# Patient Record
Sex: MALE | Race: BLACK OR AFRICAN AMERICAN | NOT HISPANIC OR LATINO | Employment: OTHER | URBAN - METROPOLITAN AREA
[De-identification: names, ages, dates, MRNs, and addresses within clinical notes are randomized per-mention and may not be internally consistent; named-entity substitution may affect disease eponyms.]

---

## 2017-03-30 RX ORDER — DOXAZOSIN 8 MG/1
8 TABLET ORAL DAILY
Qty: 30 TABLET | Refills: 11 | Status: SHIPPED | OUTPATIENT
Start: 2017-03-30 | End: 2018-02-01 | Stop reason: SDUPTHER

## 2017-04-26 ENCOUNTER — TELEPHONE (OUTPATIENT)
Dept: INTERNAL MEDICINE | Facility: CLINIC | Age: 72
End: 2017-04-26

## 2017-04-26 NOTE — TELEPHONE ENCOUNTER
----- Message from Vicky Oneal sent at 4/26/2017 11:43 AM CDT -----  Mr Johnson stopped in clinic stating someone called him regarding setting up an appointment. Stated his wife gave sugeyner the wrong cell number for him: Correct number to call him is: [phone]. tc

## 2017-04-26 NOTE — TELEPHONE ENCOUNTER
Patient was informed that I do not see anywhere where someone tried to call him.  Asked him if he needed anything from us.  Patient denied needing anything further.

## 2017-09-07 ENCOUNTER — TELEPHONE (OUTPATIENT)
Dept: PULMONOLOGY | Facility: CLINIC | Age: 72
End: 2017-09-07

## 2017-09-07 DIAGNOSIS — R05.9 COUGH: Primary | ICD-10-CM

## 2017-09-08 ENCOUNTER — OFFICE VISIT (OUTPATIENT)
Dept: PULMONOLOGY | Facility: CLINIC | Age: 72
End: 2017-09-08
Payer: MEDICARE

## 2017-09-08 ENCOUNTER — TELEPHONE (OUTPATIENT)
Dept: RADIOLOGY | Facility: HOSPITAL | Age: 72
End: 2017-09-08

## 2017-09-08 ENCOUNTER — HOSPITAL ENCOUNTER (OUTPATIENT)
Dept: RADIOLOGY | Facility: HOSPITAL | Age: 72
Discharge: HOME | End: 2017-09-08
Attending: NURSE PRACTITIONER
Payer: MEDICARE

## 2017-09-08 VITALS
OXYGEN SATURATION: 98 % | RESPIRATION RATE: 20 BRPM | WEIGHT: 183.63 LBS | SYSTOLIC BLOOD PRESSURE: 130 MMHG | HEART RATE: 60 BPM | BODY MASS INDEX: 29.51 KG/M2 | DIASTOLIC BLOOD PRESSURE: 68 MMHG | HEIGHT: 66 IN

## 2017-09-08 DIAGNOSIS — R09.82 POST-NASAL DRIP: ICD-10-CM

## 2017-09-08 DIAGNOSIS — F17.201 TOBACCO USE DISORDER, SEVERE, IN SUSTAINED REMISSION: ICD-10-CM

## 2017-09-08 DIAGNOSIS — R05.3 CHRONIC COUGH: Primary | ICD-10-CM

## 2017-09-08 DIAGNOSIS — J98.11 LINEAR ATELECTASIS: ICD-10-CM

## 2017-09-08 DIAGNOSIS — R05.9 COUGH: ICD-10-CM

## 2017-09-08 PROCEDURE — 71020 XR CHEST PA AND LATERAL: CPT | Mod: TC,PO

## 2017-09-08 PROCEDURE — 3078F DIAST BP <80 MM HG: CPT | Mod: S$GLB,,, | Performed by: NURSE PRACTITIONER

## 2017-09-08 PROCEDURE — 3075F SYST BP GE 130 - 139MM HG: CPT | Mod: S$GLB,,, | Performed by: NURSE PRACTITIONER

## 2017-09-08 PROCEDURE — 99204 OFFICE O/P NEW MOD 45 MIN: CPT | Mod: S$GLB,,, | Performed by: NURSE PRACTITIONER

## 2017-09-08 PROCEDURE — 1159F MED LIST DOCD IN RCRD: CPT | Mod: S$GLB,,, | Performed by: NURSE PRACTITIONER

## 2017-09-08 PROCEDURE — 71020 XR CHEST PA AND LATERAL: CPT | Mod: 26,,, | Performed by: RADIOLOGY

## 2017-09-08 PROCEDURE — 3008F BODY MASS INDEX DOCD: CPT | Mod: S$GLB,,, | Performed by: NURSE PRACTITIONER

## 2017-09-08 PROCEDURE — 99999 PR PBB SHADOW E&M-EST. PATIENT-LVL III: CPT | Mod: PBBFAC,,, | Performed by: NURSE PRACTITIONER

## 2017-09-08 RX ORDER — LORATADINE 10 MG/1
10 TABLET ORAL DAILY
Qty: 30 TABLET | Refills: 0 | Status: SHIPPED | OUTPATIENT
Start: 2017-09-08 | End: 2019-06-24

## 2017-09-08 RX ORDER — FLUTICASONE PROPIONATE 50 MCG
2 SPRAY, SUSPENSION (ML) NASAL DAILY
Qty: 16 G | Refills: 5 | Status: SHIPPED | OUTPATIENT
Start: 2017-09-08 | End: 2017-10-08

## 2017-09-08 NOTE — PROGRESS NOTES
Subjective:      Chief Complaint   Patient presents with    Cough        Alex Milner is a 72 y.o. male presents to pulmonary clinic self referred, his wife made the appointment related to long standing cough off and on over a year ago.  Onset of symptoms was 1 year ago.   Symptoms have been waxing and waning since that time.   He has seen by a Allergist, Marcelo Avelar MD in [place] more than a year ago. Last seen one month.  Patient reports breathing test was done and placed on Aerospan 80mcg steroid inhaler, then Advair and Spiriva   Off of Advair for about one month since he did not obtain relief so stopped.  Patient reports he was never told he had COPD.    The cough is productive of white sputum and is aggravated by  a feeling of lump in throat. .   Associated symptoms include: shortness of breath, sputum production and wheezing.   Patient does not have a history of asthma.   Patient does not have a history of environmental allergens.   Patient has not traveled recently.   Patient does have a history of smoking 2 pack/day smoker began in 1960 at age 15 years quit smoking in 1995, 70 pack/year smoker.     Occupational History:   Employed full time as  delivering parts for plants  Retired  from [employer]. 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 Denies occupational exposure to Asbestos, Silica,  Petrochemicals,  Fiber glass,  Saw Dust,  Grain Dust and  Pesticides.   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     Avocational Exposure:   None currently.     Pet Exposures:  None currently. Denies allergy to Dog and  cat dander.    Review of Systems  A comprehensive review of systems was negative except for: Ears, nose, mouth, throat, and face: positive for sore throat and post nasal drip and lump sensation deep in throat  Respiratory: positive for cough, sputum and wheezing      Objective:      Oxygen saturation 98% on room air  /68 (BP Location: Right arm, Patient  "Position: Sitting)   Pulse 60   Resp 20   Ht 5' 6" (1.676 m)   Wt 83.3 kg (183 lb 10.3 oz)   SpO2 98%   BMI 29.64 kg/m²   General appearance: alert, appears stated age and cooperative  Head: Normocephalic, without obvious abnormality, atraumatic  Eyes: negative  Ears: normal TM's and external ear canals both ears  Nose: Nares normal. Septum midline. Mucosa normal. No drainage or sinus tenderness.  Throat: lips, mucosa, and tongue normal; teeth and gums normal  Neck: no adenopathy, no carotid bruit, no JVD and supple, symmetrical, trachea midline  Lungs: clear to auscultation bilaterally  Heart: regular rate and rhythm, S1, S2 normal, no murmur, click, rub or gallop  Abdomen: normal findings: soft, non-tender  Extremities: extremities normal, atraumatic, no cyanosis or edema  Pulses: 2+ and symmetric  Skin: Skin color, texture, turgor normal. No rashes or lesions  Lymph nodes: Cervical, supraclavicular, and axillary nodes normal.  Neurologic: Grossly normal      Chest xray 9/8/2017  Focal infiltrates noted within the medial aspect of the LEFT base partially silhouetting heart border.    This appears more pronounced than noted on prior exam of 2010 and may represent a developing lingular infiltrate and/or partial atelectasis.    Additional subsegmental atelectasis and/or scarring noted within both bases.    Calcified nodule again noted along the inferior medial margin LEFT hilum.  Lungs otherwise clear and trachea midline.    Heart size within normal limits.  Mild osteopenia and spondylosis.  IMPRESSION:   Equivocal lingular infiltrates as suggesting a scarring and/or subsegmental atelectasis with superimposed areas of focal infiltrate or developing airspace disease unable to be completely excluded.    Followup after appropriate course of medical therapy suggested.  Remainder the exam is otherwise stable in appearance.  See above.        Assessment:          1. Chronic cough  CT Chest With Contrast    Complete " PFT with bronchodilator    Creatinine, serum   2. Tobacco use disorder, severe, in sustained remission  CT Chest With Contrast   3. Post-nasal drip  loratadine (CLARITIN) 10 mg tablet    fluticasone (FLONASE) 50 mcg/actuation nasal spray    sodium bicarb-sodium chloride Pack   4. Linear atelectasis  Incentive spirometry       Plan:     Problem List Items Addressed This Visit     None      Visit Diagnoses     Chronic cough    -  Primary    Relevant Orders    CT Chest With Contrast    Complete PFT with bronchodilator    Creatinine, serum    Tobacco use disorder, severe, in sustained remission        Relevant Orders    CT Chest With Contrast    Post-nasal drip        Relevant Medications    loratadine (CLARITIN) 10 mg tablet    fluticasone (FLONASE) 50 mcg/actuation nasal spray    sodium bicarb-sodium chloride Pack    Linear atelectasis        Relevant Orders    Incentive spirometry           Return in about 3 weeks (around 9/26/2017) for Cough w/review cpft/ct chest.

## 2017-09-11 ENCOUNTER — HOSPITAL ENCOUNTER (OUTPATIENT)
Dept: RADIOLOGY | Facility: HOSPITAL | Age: 72
Discharge: HOME | End: 2017-09-11
Attending: NURSE PRACTITIONER
Payer: MEDICARE

## 2017-09-11 DIAGNOSIS — F17.201 TOBACCO USE DISORDER, SEVERE, IN SUSTAINED REMISSION: ICD-10-CM

## 2017-09-11 DIAGNOSIS — R05.3 CHRONIC COUGH: ICD-10-CM

## 2017-09-11 PROCEDURE — 71260 CT THORAX DX C+: CPT | Mod: 26,,, | Performed by: RADIOLOGY

## 2017-09-11 PROCEDURE — 25500020 PHARM REV CODE 255: Mod: PO | Performed by: NURSE PRACTITIONER

## 2017-09-11 PROCEDURE — 71260 CT THORAX DX C+: CPT | Mod: TC,PO

## 2017-09-11 RX ADMIN — IOHEXOL 75 ML: 350 INJECTION, SOLUTION INTRAVENOUS at 03:09

## 2017-09-12 DIAGNOSIS — R91.1 PULMONARY NODULE, RIGHT: ICD-10-CM

## 2017-09-12 DIAGNOSIS — R91.1 PULMONARY NODULE, LEFT: Primary | ICD-10-CM

## 2017-09-12 NOTE — PROGRESS NOTES
No acute infiltrates, effusions or pneumonia. No indication for antibiotic therapy at this time. 7 mm noncalcified nodule in right and left lungs.   Needs repeat CT in 6 months.   Order placed for repeat CT of chest and creatinine level in 6 months.    Risk: Patient does have a history of smoking 2 pack/day smoker began in 1960 at age 15 years quit smoking in 1995, 70 pack/year smoker.

## 2017-09-21 ENCOUNTER — PROCEDURE VISIT (OUTPATIENT)
Dept: PULMONOLOGY | Facility: CLINIC | Age: 72
End: 2017-09-21
Payer: MEDICARE

## 2017-09-21 DIAGNOSIS — R05.3 CHRONIC COUGH: ICD-10-CM

## 2017-09-21 LAB
POST FEF 25 75: 1.18 L/S (ref 1.09–2.63)
POST FET 100: 9.69 S
POST FEV1 FVC: 73 %
POST FEV1: 1.8 L (ref 1.87–2.58)
POST FIF 50: 3.06 L/S
POST FVC: 2.48 L (ref 2.63–3.44)
POST PEF: 5.96 L/S (ref 5.5–7.79)
PRE DLCO: 17.14 ML/MMHG/MIN (ref 14.54–22.83)
PRE ERV: 0.27 L
PRE FEF 25 75: 1.07 L/S (ref 1.09–2.63)
PRE FET 100: 8.61 S
PRE FEV1 FVC: 73 %
PRE FEV1: 1.54 L (ref 1.87–2.58)
PRE FIF 50: 2.06 L/S
PRE FRC PL: 2.62 L (ref 2.26–3.47)
PRE FVC: 2.11 L (ref 2.63–3.44)
PRE KROGHS K: 4.57 1/MIN
PRE PEF: 6.79 L/S (ref 5.5–7.79)
PRE RV: 2.27 L (ref 1.87–2.66)
PRE SVC: 2.11 L
PRE TLC: 4.38 L (ref 4.76–5.75)
PREDICTED DLCO: 18.68 ML/MMHG/MIN (ref 14.54–22.83)
PREDICTED FEV1 FVC: 75.71 % (ref 70.5–80.92)
PREDICTED FEV1: 2.22 L (ref 1.87–2.58)
PREDICTED FRC N2: 2.87 L (ref 2.26–3.47)
PREDICTED FRC PL: 2.87 L (ref 2.26–3.47)
PREDICTED FVC: 3.03 L (ref 2.63–3.44)
PREDICTED RV: 2.27 L (ref 1.87–2.66)
PREDICTED SVC: 2.94 L
PREDICTED TLC: 5.26 L (ref 4.76–5.75)
PROVOCATION PROTOCOL: ABNORMAL

## 2017-09-21 PROCEDURE — 94726 PLETHYSMOGRAPHY LUNG VOLUMES: CPT | Mod: S$GLB,,, | Performed by: INTERNAL MEDICINE

## 2017-09-21 PROCEDURE — 94060 EVALUATION OF WHEEZING: CPT | Mod: S$GLB,,, | Performed by: INTERNAL MEDICINE

## 2017-09-21 PROCEDURE — 94729 DIFFUSING CAPACITY: CPT | Mod: S$GLB,,, | Performed by: INTERNAL MEDICINE

## 2017-09-26 ENCOUNTER — OFFICE VISIT (OUTPATIENT)
Dept: PULMONOLOGY | Facility: CLINIC | Age: 72
End: 2017-09-26
Payer: MEDICARE

## 2017-09-26 VITALS
BODY MASS INDEX: 29.58 KG/M2 | HEART RATE: 60 BPM | HEIGHT: 66 IN | RESPIRATION RATE: 20 BRPM | DIASTOLIC BLOOD PRESSURE: 72 MMHG | WEIGHT: 184.06 LBS | OXYGEN SATURATION: 98 % | SYSTOLIC BLOOD PRESSURE: 132 MMHG

## 2017-09-26 DIAGNOSIS — J45.40 MODERATE PERSISTENT ASTHMA WITHOUT COMPLICATION: Primary | ICD-10-CM

## 2017-09-26 DIAGNOSIS — R91.8 PULMONARY NODULES: ICD-10-CM

## 2017-09-26 PROCEDURE — 3008F BODY MASS INDEX DOCD: CPT | Mod: S$GLB,,, | Performed by: NURSE PRACTITIONER

## 2017-09-26 PROCEDURE — 99499 UNLISTED E&M SERVICE: CPT | Mod: S$GLB,,, | Performed by: NURSE PRACTITIONER

## 2017-09-26 PROCEDURE — 99214 OFFICE O/P EST MOD 30 MIN: CPT | Mod: S$GLB,,, | Performed by: NURSE PRACTITIONER

## 2017-09-26 PROCEDURE — 3075F SYST BP GE 130 - 139MM HG: CPT | Mod: S$GLB,,, | Performed by: NURSE PRACTITIONER

## 2017-09-26 PROCEDURE — 1159F MED LIST DOCD IN RCRD: CPT | Mod: S$GLB,,, | Performed by: NURSE PRACTITIONER

## 2017-09-26 PROCEDURE — 3078F DIAST BP <80 MM HG: CPT | Mod: S$GLB,,, | Performed by: NURSE PRACTITIONER

## 2017-09-26 PROCEDURE — 99999 PR PBB SHADOW E&M-EST. PATIENT-LVL III: CPT | Mod: PBBFAC,,, | Performed by: NURSE PRACTITIONER

## 2017-09-26 RX ORDER — ALBUTEROL SULFATE 90 UG/1
2 AEROSOL, METERED RESPIRATORY (INHALATION) EVERY 6 HOURS PRN
Qty: 18 G | Refills: 11 | Status: SHIPPED | OUTPATIENT
Start: 2017-09-26 | End: 2019-06-24

## 2017-09-26 NOTE — Clinical Note
Please schedule CT of chest order placed on 9/12, due in 6 months. Needs asthma follow up with jesus in  late December

## 2017-09-26 NOTE — ASSESSMENT & PLAN NOTE
Bilateral pul nodules 7 mm seen on CT chest 9/11/2017  Repeat CT chest in 6 months.  Order placed on 9/12/2017 for repeat CT of chest and creatinine level in 6 months.     Risk: Patient does have a history of smoking 2 pack/day smoker began in 1960 at age 15 years quit smoking in 1995, 70 pack/year smoker.

## 2017-09-26 NOTE — PROGRESS NOTES
Subjective:      Chief Complaint   Patient presents with    Cough        Alex Milner is a 72 y.o. male presents to pulmonary clinic for follow up visit related to long standing cough off and on over a year ago with review of CT scan of chest and cpft.  Onset of symptoms was 1 year ago.   Symptoms have been waxing and waning since that time.   He has seen by a Allergist, Marcelo Avelar MD in [place] more than a year ago. Last seen one month.  Patient reports breathing test was done and placed on Aerospan 80mcg steroid inhaler, then Advair and Spiriva   Off of Advair for about one month since he did not obtain relief so stopped.  Patient reports he was never told he had COPD.    The cough is productive of white sputum and is aggravated by  a feeling of lump in throat. .   Associated symptoms include: shortness of breath, sputum production and wheezing.   Patient does not have a history of asthma.   Patient does not have a history of environmental allergens.   Patient has not traveled recently.   Patient does have a history of smoking 2 pack/day smoker began in 1960 at age 15 years quit smoking in 1995, 70 pack/year smoker.     Immunizations reviewed, declined influenza vaccination.    Occupational History:   Employed full time as  delivering parts for plants  Retired  from [employer]. 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 Denies occupational exposure to Asbestos, Silica,  Petrochemicals,  Fiber glass,  Saw Dust,  Grain Dust and  Pesticides.   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     Avocational Exposure:   None currently.     Pet Exposures:  None currently. Denies allergy to Dog and  cat dander.    Review of Systems  A comprehensive review of systems was negative except for: Ears, nose, mouth, throat, and face: positive for sore throat and post nasal drip and lump sensation deep in throat  Respiratory: positive for cough, sputum and wheezing      Objective:       "Oxygen saturation 98% on room air  /72 (BP Location: Right arm, Patient Position: Sitting)   Pulse 60   Resp 20   Ht 5' 6" (1.676 m)   Wt 83.5 kg (184 lb 1.4 oz)   SpO2 98%   BMI 29.71 kg/m²   General appearance: alert, appears stated age and cooperative  Head: Normocephalic, without obvious abnormality, atraumatic  Eyes: negative  Ears: normal TM's and external ear canals both ears  Nose: Nares normal. Septum midline. Mucosa normal. No drainage or sinus tenderness.  Throat: lips, mucosa, and tongue normal; teeth and gums normal  Neck: no adenopathy, no carotid bruit, no JVD and supple, symmetrical, trachea midline  Lungs: clear to auscultation bilaterally  Heart: regular rate and rhythm, S1, S2 normal, no murmur, click, rub or gallop  Abdomen: normal findings: soft, non-tender  Extremities: extremities normal, atraumatic, no cyanosis or edema  Pulses: 2+ and symmetric  Skin: Skin color, texture, turgor normal. No rashes or lesions  Lymph nodes: Cervical, supraclavicular, and axillary nodes normal.  Neurologic: Grossly normal      CT chest 9/11/2017   7 mm noncalcified pulmonary nodule noted within either lung.  Per Fleischner Society guidelines for nodule >6-8mm; in a low risk patient, consider 6-12 month CT chest follow-up and then at 18-24 month if no change.    In a high risk patient/smoker, consider 3-6 month CT chest follow-up and then at 9-12 months and 24 months if unchanged to exclude neoplasia.  If stable at that time no further follow-up needed.    cpft 9/21/2017    Flow volume loop: Obstructive defect  Mild Obstructive Defect / Physiology  FEV1 is increased by >12% and >200mls, indicating a response to inhaled bronchodilators.  FVC is increased by >12% and >200mls indicating a response to inhaled bronchodilators  Plethysmographic lung volumes are normal.  Normal diffusion capacity (DLCO).    Assessment:          1. Moderate persistent asthma without complication  fluticasone furoate (ARNUITY " ELLIPTA) 100 mcg/actuation DsDv    albuterol (VENTOLIN HFA) 90 mcg/actuation inhaler   2. Pulmonary nodules         Plan:     Problem List Items Addressed This Visit     Moderate persistent asthma without complication - Primary     New diagnosis of adult onset asthma   cpft 9/21/2017 FEV1 69% predicted on pre, 81% predicted on post with 17% increase post bronchodilator.  Normal LV and DLCO.   Begin treatment with Arunity ICS and Albuterol inhaler if needed for wheezing or cough.   Follow up in 3 months with repeat jesus          Relevant Medications    fluticasone furoate (ARNUITY ELLIPTA) 100 mcg/actuation DsDv    albuterol (VENTOLIN HFA) 90 mcg/actuation inhaler    Pulmonary nodules     Bilateral pul nodules 7 mm seen on CT chest 9/11/2017  Repeat CT chest in 6 months.  Order placed on 9/12/2017 for repeat CT of chest and creatinine level in 6 months.     Risk: Patient does have a history of smoking 2 pack/day smoker began in 1960 at age 15 years quit smoking in 1995, 70 pack/year smoker.            Other Visit Diagnoses    None.          Return in about 3 months (around 12/19/2017) for asthma follow up w/review jesus. fu pul nodule w/review ct chest in 6mos..

## 2017-09-26 NOTE — ASSESSMENT & PLAN NOTE
New diagnosis of adult onset asthma   cpft 9/21/2017 FEV1 69% predicted on pre, 81% predicted on post with 17% increase post bronchodilator.  Normal LV and DLCO.   Begin treatment with Arunity ICS and Albuterol inhaler if needed for wheezing or cough.   Follow up in 3 months with repeat jesus

## 2017-10-03 ENCOUNTER — OFFICE VISIT (OUTPATIENT)
Dept: INTERNAL MEDICINE | Facility: CLINIC | Age: 72
End: 2017-10-03
Payer: MEDICARE

## 2017-10-03 VITALS
HEART RATE: 71 BPM | SYSTOLIC BLOOD PRESSURE: 137 MMHG | DIASTOLIC BLOOD PRESSURE: 72 MMHG | BODY MASS INDEX: 29.46 KG/M2 | OXYGEN SATURATION: 98 % | HEIGHT: 66 IN | WEIGHT: 183.31 LBS

## 2017-10-03 DIAGNOSIS — J84.10 CALCIFIED GRANULOMA OF LUNG: ICD-10-CM

## 2017-10-03 DIAGNOSIS — R91.8 PULMONARY NODULES: ICD-10-CM

## 2017-10-03 DIAGNOSIS — I10 ESSENTIAL HYPERTENSION: Chronic | ICD-10-CM

## 2017-10-03 DIAGNOSIS — J45.40 MODERATE PERSISTENT ASTHMA WITHOUT COMPLICATION: ICD-10-CM

## 2017-10-03 DIAGNOSIS — Z00.00 ENCOUNTER FOR PREVENTIVE HEALTH EXAMINATION: Primary | ICD-10-CM

## 2017-10-03 DIAGNOSIS — Z86.010 HISTORY OF COLON POLYPS: ICD-10-CM

## 2017-10-03 DIAGNOSIS — E78.00 PURE HYPERCHOLESTEROLEMIA: Chronic | ICD-10-CM

## 2017-10-03 DIAGNOSIS — R97.20 ELEVATED PSA, BETWEEN 10 AND LESS THAN 20 NG/ML: ICD-10-CM

## 2017-10-03 PROBLEM — Z86.0100 HISTORY OF COLON POLYPS: Status: ACTIVE | Noted: 2017-10-03

## 2017-10-03 PROBLEM — J98.4 CALCIFIED GRANULOMA OF LUNG: Status: ACTIVE | Noted: 2017-10-03

## 2017-10-03 PROCEDURE — G0439 PPPS, SUBSEQ VISIT: HCPCS | Mod: S$GLB,,, | Performed by: NURSE PRACTITIONER

## 2017-10-03 PROCEDURE — 99999 PR PBB SHADOW E&M-EST. PATIENT-LVL IV: CPT | Mod: PBBFAC,,, | Performed by: NURSE PRACTITIONER

## 2017-10-03 PROCEDURE — 99499 UNLISTED E&M SERVICE: CPT | Mod: S$GLB,,, | Performed by: NURSE PRACTITIONER

## 2017-10-03 NOTE — PATIENT INSTRUCTIONS
Counseling and Referral of Other Preventative  (Italic type indicates deductible and co-insurance are waived)    Patient Name: Alex Milner  Today's Date: 10/3/2017      SERVICE LIMITATIONS RECOMMENDATION    Vaccines    · Pneumococcal (once after 65)    · Influenza (annually)    · Hepatitis B (if medium/high risk)    · Prevnar 13      Hepatitis B medium/high risk factors:       - End-stage renal disease       - Hemophiliacs who received Factor VII or         IX concentrates       - Clients of institutions for the mentally             retarded       - Persons who live in the same house as          a HepB carrier       - Homosexual men       - Illicit injectable drug abusers     Pneumococcal: Scheduled - see appointments     Influenza: Scheduled - see appointments     Hepatitis B: N/A     Prevnar 13: Done, no repeat necessary    Prostate cancer screening (annually to age 75)     Prostate specific antigen (PSA) Shared decision making with Provider. Sometimes a co-pay may be required if the patient decides to have this test. The USPSTF no longer recommends prostate cancer screening routinely in medicine:  Due     Colorectal cancer screening (to age 75)    · Fecal occult blood test (annual)  · Flexible sigmoidoscopy (5y)  · Screening colonoscopy (10y)  · Barium enema   up to  date    Diabetes self-management training (no USPSTF recommendations)  Requires referral by treating physician for patient with diabetes or renal disease. 10 hours of initial DSMT sessions of no less than 30 minutes each in a continuous 12-month period. 2 hours of follow-up DSMT in subsequent years.  N/A    Glaucoma screening (no USPSTF recommendation)  Diabetes mellitus, family history   , age 50 or over    American, age 65 or over  Recommended to patient, declined    Medical nutrition therapy for diabetes or renal disease (no recommended schedule)  Requires referral by treating physician for patient with diabetes or  renal disease or kidney transplant within the past 3 years.  Can be provided in same year as diabetes self-management training (DSMT), and CMS recommends medical nutrition therapy take place after DSMT. Up to 3 hours for initial year and 2 hours in subsequent years.  N/A    Cardiovascular screening blood tests (every 5 years)  · Fasting lipid panel  Order as a panel if possible  due     Diabetes screening tests (at least every 3 years, Medicare covers annually or at 6-month intervals for prediabetic patients)  · Fasting blood sugar (FBS) or glucose tolerance test (GTT)  Patient must be diagnosed with one of the following:       - Hypertension       - Dyslipidemia       - Obesity (BMI 30kg/m2)       - Previous elevated impaired FBS or GTT       ... or any two of the following:       - Overweight (BMI 25 but <30)       - Family history of diabetes       - Age 65 or older       - History of gestational diabetes or birth of baby weighing more than 9 pounds  Done this year, repeat every year    Abdominal aortic aneurysm screening (once)  · Sonogram   Limited to patients who meet one of the following criteria:       - Men who are 65-75 years old and have smoked more than 100 cigarette in their lifetime       - Anyone with a family history of abdominal aortic aneurysm       - Anyone recommended for screening by the USPSTF  Done, no repeat necessary    HIV screening (annually for increased risk patients)  · HIV-1 and HIV-2 by EIA, or VENU, rapid antibody test or oral mucosa transudate  Patients must be at increased risk for HIV infection per USPSTF guidelines or pregnant. Tests covered annually for patient at increased risk or as requested by the patient. Pregnant patients may receive up to 3 tests during pregnancy.  Risks discussed, screening is not recommended    Smoking cessation counseling (up to 8 sessions per year)  Patients must be asymptomatic of tobacco-related conditions to receive as a preventative service.   Non-smoker    Subsequent annual wellness visit  At least 12 months since last AWV  Return in one year     The following information is provided to all patients.  This information is to help you find resources for any of the problems found today that may be affecting your health:                Living healthy guide: www.[facility]      Understanding Diabetes: www.diabetes.org      Eating healthy: www.cdc.gov/healthyweight      CDC home safety checklist: www.cdc.gov/steadi/patient.html      Agency on Aging: www.goea.[place].[facility]      Alcoholics anonymous (AA): www.aa.org      Physical Activity: www.raya.nih.gov/sc9txci      Tobacco use: www.quitwithusla.org

## 2017-10-03 NOTE — Clinical Note
Your patient was seen today for a HRA visit. Abnormalities have been identified during this visit that may require additional testing and follow up. I have included a copy of my visit note, please review the note and feel free to contact me with any questions.  Thank you for allowing me to participate in the care of your patients.  Saba Milner NP

## 2017-10-03 NOTE — PROGRESS NOTES
"Alex Milner presented for a  Medicare AWV and comprehensive Health Risk Assessment today. The following components were reviewed and updated:    · Medical history  · Family History  · Social history  · Allergies and Current Medications  · Health Risk Assessment  · Health Maintenance  · Care Team     ** See Completed Assessments for Annual Wellness Visit within the encounter summary.**       The following assessments were completed:  · Living Situation  · CAGE  · Depression Screening  · Timed Get Up and Go  · Whisper Test  · Cognitive Function Screening  · Nutrition Screening  · ADL Screening  · PAQ Screening    Vitals:    10/03/17 1424   BP: 137/72   BP Location: Left arm   Patient Position: Sitting   BP Method: Large (Manual)   Pulse: 71   SpO2: 98%   Weight: 83.2 kg (183 lb 5 oz)   Height: 5' 6" (1.676 m)     Body mass index is 29.59 kg/m².  Physical Exam   Constitutional: He appears well-developed. No distress.   HENT:   Head: Normocephalic and atraumatic.   Eyes: Pupils are equal, round, and reactive to light.   Neck: Carotid bruit is not present.   Cardiovascular: Normal rate, regular rhythm, normal heart sounds, intact distal pulses and normal pulses.  Exam reveals no gallop.    No murmur heard.  Pulmonary/Chest: Effort normal and breath sounds normal.   Abdominal: Soft. Bowel sounds are normal. He exhibits no distension. There is no tenderness.   Musculoskeletal: Normal range of motion. He exhibits no edema.   Neurological: He exhibits normal muscle tone. Gait normal.   Skin: Skin is warm, dry and intact.   Psychiatric: He has a normal mood and affect. His speech is normal and behavior is normal. Judgment and thought content normal. Cognition and memory are normal.   Nursing note and vitals reviewed.        Diagnoses and health risks identified today and associated recommendations/orders:    1. Encounter for preventive health examination    2. Elevated PSA, between 10 and less than 20 ng/m  Component     "  Latest Ref Rng & Units 7/13/2016   PSA, SCREEN      0.00 - 4.00 ng/mL 20.5 (H)   This problem is currently not controlled.  Pt  Has not follow up for a repeat since 2016.. Taking Cardura daily Pt  referred back to urologist on 10/ 2017 for evaluation .    3. Essential hypertension  Stable and controlled on Lisinopril daily . Continue current treatment plan as previously prescribed with your PCP.     4. Moderate persistent asthma without complication  Stable and controlled on Arnuity daily and Albuterol as needed . Scheduled for a pulmonary follow in 3/ 2018. Continue current treatment plan as previously prescribed with your pulmonologist     5. Pulmonary nodules  9/11/ 2017 CT scan There is a noncalcified pulmonary nodule noted within the right middle lobe that abuts the minor fissure and measures and best seen on series 3 image 32 and series 601 image 60 and series 602 image 42.  There is also a 2nd pulmonary nodule that is best appreciated on series 602 image 106 and series 601 image 120 within the left upper lobe adjacent to the major fissure that also measures approximate 7 mm.  Stable and controlled. Scheduled for a repeat CT scan in 6 months. Followed by PCP     6. Pure hypercholesterolemia  Component      Latest Ref Rng & Units 11/15/2016   Cholesterol      120 - 199 mg/dL 255 (H)   Triglycerides      30 - 150 mg/dL 109   HDL      40 - 75 mg/dL 63   LDL Cholesterol      63.0 - 159.0 mg/dL 170.2 (H)   HDL/Chol Ratio      20.0 - 50.0 % 24.7   Total Cholesterol/HDL Ratio      2.0 - 5.0 4.0   Non-HDL Cholesterol      mg/dL 192   Chronic and ongoing. Taking Lipitor daily. Due for repeat  Labs. Please follow up with your PCP as planned to discuss adjustments to your treatment plan.    7. Calcified granuloma of lung   CT chest 9/11/ 2017 Calcified left hilar and subcarinal granulomas are noted  ;Calcified granuloma noted within the posterior segment of the right hepatic lobe.  This is a new problem that has been  identified during today's visit. Please follow up with your PCP to discuss the next steps.    8. History of colon polyps  11/15/ 2013- colonoscopy  Due for a repeat colonoscopy in 5 years for surveillance.   Stable and controlled. Followed by PCP and gastroenterologist    Pt schedule for annual exam on 10/27/ 2017 to discuss health maintenance  Pt scheduled on to check PSA on 11/22/2017    Provided Alex with a 5-10 year written screening schedule and personal prevention plan. Recommendations were developed using the USPSTF age appropriate recommendations. Education, counseling, and referrals were provided as needed. After Visit Summary printed and given to patient which includes a list of additional screenings\tests needed.    Return in about 1 year (around 10/3/2018).    Saba Milner NP

## 2017-10-25 ENCOUNTER — TELEPHONE (OUTPATIENT)
Dept: PULMONOLOGY | Facility: CLINIC | Age: 72
End: 2017-10-25

## 2017-10-25 DIAGNOSIS — J45.40 MODERATE PERSISTENT ASTHMA WITHOUT COMPLICATION: Primary | ICD-10-CM

## 2017-10-25 RX ORDER — FLUTICASONE PROPIONATE AND SALMETEROL 100; 50 UG/1; UG/1
1 POWDER RESPIRATORY (INHALATION) 2 TIMES DAILY
Qty: 60 EACH | Refills: 11 | Status: SHIPPED | OUTPATIENT
Start: 2017-10-25 | End: 2018-01-02 | Stop reason: ALTCHOICE

## 2017-10-25 NOTE — TELEPHONE ENCOUNTER
Please advise patient of change in med and dosing to one puff twice a day on Advair approved by his insurance for treatment of Asthma.

## 2017-10-26 ENCOUNTER — TELEPHONE (OUTPATIENT)
Dept: PULMONOLOGY | Facility: CLINIC | Age: 72
End: 2017-10-26

## 2017-10-26 NOTE — TELEPHONE ENCOUNTER
Attempted to contact and notify patient of change in Advair 1 puuf twice daily, message left per voicemail

## 2017-10-27 ENCOUNTER — LAB VISIT (OUTPATIENT)
Dept: LAB | Facility: HOSPITAL | Age: 72
End: 2017-10-27
Payer: MEDICARE

## 2017-10-27 ENCOUNTER — OFFICE VISIT (OUTPATIENT)
Dept: INTERNAL MEDICINE | Facility: CLINIC | Age: 72
End: 2017-10-27
Payer: MEDICARE

## 2017-10-27 VITALS
WEIGHT: 182.56 LBS | SYSTOLIC BLOOD PRESSURE: 120 MMHG | TEMPERATURE: 99 F | BODY MASS INDEX: 29.34 KG/M2 | HEIGHT: 66 IN | HEART RATE: 74 BPM | DIASTOLIC BLOOD PRESSURE: 70 MMHG

## 2017-10-27 DIAGNOSIS — E78.00 PURE HYPERCHOLESTEROLEMIA: Chronic | ICD-10-CM

## 2017-10-27 DIAGNOSIS — Z12.5 ENCOUNTER FOR SCREENING FOR MALIGNANT NEOPLASM OF PROSTATE: ICD-10-CM

## 2017-10-27 DIAGNOSIS — R97.20 ELEVATED PSA, BETWEEN 10 AND LESS THAN 20 NG/ML: ICD-10-CM

## 2017-10-27 DIAGNOSIS — J84.10 CALCIFIED GRANULOMA OF LUNG: ICD-10-CM

## 2017-10-27 DIAGNOSIS — I10 ESSENTIAL HYPERTENSION: Chronic | ICD-10-CM

## 2017-10-27 DIAGNOSIS — Z00.00 ROUTINE GENERAL MEDICAL EXAMINATION AT HEALTH CARE FACILITY: Primary | ICD-10-CM

## 2017-10-27 DIAGNOSIS — J45.40 MODERATE PERSISTENT ASTHMA WITHOUT COMPLICATION: ICD-10-CM

## 2017-10-27 PROBLEM — R91.8 PULMONARY NODULES: Status: RESOLVED | Noted: 2017-09-26 | Resolved: 2017-10-27

## 2017-10-27 LAB
ALBUMIN SERPL BCP-MCNC: 3.7 G/DL
ALP SERPL-CCNC: 83 U/L
ALT SERPL W/O P-5'-P-CCNC: 14 U/L
ANION GAP SERPL CALC-SCNC: 9 MMOL/L
AST SERPL-CCNC: 21 U/L
BASOPHILS # BLD AUTO: 0.07 K/UL
BASOPHILS NFR BLD: 1.3 %
BILIRUB SERPL-MCNC: 0.4 MG/DL
BUN SERPL-MCNC: 12 MG/DL
CALCIUM SERPL-MCNC: 9.5 MG/DL
CHLORIDE SERPL-SCNC: 106 MMOL/L
CHOLEST SERPL-MCNC: 253 MG/DL
CHOLEST/HDLC SERPL: 3.2 {RATIO}
CO2 SERPL-SCNC: 27 MMOL/L
COMPLEXED PSA SERPL-MCNC: 31.3 NG/ML
CREAT SERPL-MCNC: 1.3 MG/DL
DIFFERENTIAL METHOD: ABNORMAL
EOSINOPHIL # BLD AUTO: 0.3 K/UL
EOSINOPHIL NFR BLD: 4.9 %
ERYTHROCYTE [DISTWIDTH] IN BLOOD BY AUTOMATED COUNT: 14.6 %
EST. GFR  (AFRICAN AMERICAN): >60 ML/MIN/1.73 M^2
EST. GFR  (NON AFRICAN AMERICAN): 53.8 ML/MIN/1.73 M^2
GLUCOSE SERPL-MCNC: 99 MG/DL
HCT VFR BLD AUTO: 46.1 %
HDLC SERPL-MCNC: 79 MG/DL
HDLC SERPL: 31.2 %
HGB BLD-MCNC: 14.5 G/DL
IMM GRANULOCYTES # BLD AUTO: 0.01 K/UL
IMM GRANULOCYTES NFR BLD AUTO: 0.2 %
LDLC SERPL CALC-MCNC: 157 MG/DL
LYMPHOCYTES # BLD AUTO: 1.6 K/UL
LYMPHOCYTES NFR BLD: 30.8 %
MCH RBC QN AUTO: 26.6 PG
MCHC RBC AUTO-ENTMCNC: 31.5 G/DL
MCV RBC AUTO: 84 FL
MONOCYTES # BLD AUTO: 0.5 K/UL
MONOCYTES NFR BLD: 9.9 %
NEUTROPHILS # BLD AUTO: 2.8 K/UL
NEUTROPHILS NFR BLD: 52.9 %
NONHDLC SERPL-MCNC: 174 MG/DL
NRBC BLD-RTO: 0 /100 WBC
PLATELET # BLD AUTO: 262 K/UL
PMV BLD AUTO: 10.2 FL
POTASSIUM SERPL-SCNC: 4.8 MMOL/L
PROT SERPL-MCNC: 7.2 G/DL
RBC # BLD AUTO: 5.46 M/UL
SODIUM SERPL-SCNC: 142 MMOL/L
TRIGL SERPL-MCNC: 85 MG/DL
WBC # BLD AUTO: 5.26 K/UL

## 2017-10-27 PROCEDURE — G0009 ADMIN PNEUMOCOCCAL VACCINE: HCPCS | Mod: S$GLB,,, | Performed by: INTERNAL MEDICINE

## 2017-10-27 PROCEDURE — 99999 PR PBB SHADOW E&M-EST. PATIENT-LVL III: CPT | Mod: PBBFAC,,, | Performed by: INTERNAL MEDICINE

## 2017-10-27 PROCEDURE — 36415 COLL VENOUS BLD VENIPUNCTURE: CPT | Mod: PO

## 2017-10-27 PROCEDURE — 90662 IIV NO PRSV INCREASED AG IM: CPT | Mod: S$GLB,,, | Performed by: INTERNAL MEDICINE

## 2017-10-27 PROCEDURE — 85025 COMPLETE CBC W/AUTO DIFF WBC: CPT

## 2017-10-27 PROCEDURE — 99397 PER PM REEVAL EST PAT 65+ YR: CPT | Mod: 25,S$GLB,, | Performed by: INTERNAL MEDICINE

## 2017-10-27 PROCEDURE — 84153 ASSAY OF PSA TOTAL: CPT

## 2017-10-27 PROCEDURE — 80061 LIPID PANEL: CPT

## 2017-10-27 PROCEDURE — G0008 ADMIN INFLUENZA VIRUS VAC: HCPCS | Mod: S$GLB,,, | Performed by: INTERNAL MEDICINE

## 2017-10-27 PROCEDURE — 80053 COMPREHEN METABOLIC PANEL: CPT

## 2017-10-27 PROCEDURE — 99499 UNLISTED E&M SERVICE: CPT | Mod: S$GLB,,, | Performed by: INTERNAL MEDICINE

## 2017-10-27 PROCEDURE — 90732 PPSV23 VACC 2 YRS+ SUBQ/IM: CPT | Mod: S$GLB,,, | Performed by: INTERNAL MEDICINE

## 2017-10-27 NOTE — ASSESSMENT & PLAN NOTE
Stable over time.  Occasional wheezing.  Managed well with current regimen. Follow up with pulmonary as planned.

## 2017-10-27 NOTE — PROGRESS NOTES
"Subjective:       Patient ID: Alex Milner is a 72 y.o. male.    Chief Complaint: Annual Exam    HPI  Patient is a 72-year-old male presented for updated physical exam review chronic health issues.  He indicates he has been doing well.  Patient has a history of hypertension, hyperlipidemia, elevated PSA, calcified granulomas of the long, moderate persistent asthma.  Overall he states he's been doing well.  His breathing is stable at this time is chronic health conditions are under control.  He has had no hospitalizations or emergency room visits.  He is due for flu shot and Pneumovax.  He has a pending appointment with Dr. Ramírez to follow-up on his prostate issues.  He will be having a follow CT of the chest further granuloma in March.    Review of Systems   Constitutional: Negative for fever and unexpected weight change.   HENT: Negative for hearing loss, postnasal drip and rhinorrhea.    Eyes: Negative for pain and visual disturbance.   Respiratory: Positive for shortness of breath (occasional mild). Negative for cough and wheezing.    Cardiovascular: Negative for chest pain and palpitations.   Gastrointestinal: Negative for constipation, diarrhea, nausea and vomiting.   Genitourinary: Negative for dysuria and hematuria.   Musculoskeletal: Negative for arthralgias, back pain, myalgias and neck stiffness.   Skin: Negative for pallor and rash.   Neurological: Negative for seizures, syncope and headaches.   Hematological: Negative for adenopathy.   Psychiatric/Behavioral: Negative for dysphoric mood. The patient is not nervous/anxious.        Objective:   /70   Pulse 74   Temp 98.5 °F (36.9 °C) (Tympanic)   Ht 5' 6" (1.676 m)   Wt 82.8 kg (182 lb 8.7 oz)   BMI 29.46 kg/m²      Physical Exam   Constitutional: He is oriented to person, place, and time. He appears well-developed and well-nourished. No distress.   HENT:   Head: Normocephalic and atraumatic.   Mouth/Throat: Oropharynx is clear and " moist.   Eyes: EOM are normal. Pupils are equal, round, and reactive to light.   Neck: Normal range of motion. Neck supple. No JVD present. No thyromegaly present.   Cardiovascular: Normal rate, regular rhythm, normal heart sounds and intact distal pulses.  Exam reveals no gallop and no friction rub.    No murmur heard.  Pulmonary/Chest: Effort normal and breath sounds normal. He has no wheezes. He has no rales.   Abdominal: Soft. Bowel sounds are normal. He exhibits no distension. There is no tenderness. There is no rebound and no guarding.   Musculoskeletal: Normal range of motion. He exhibits no edema.   Lymphadenopathy:     He has no cervical adenopathy.   Neurological: He is alert and oriented to person, place, and time. He has normal reflexes. No cranial nerve deficit.   Skin: Skin is warm and dry. No rash noted.   Psychiatric: He has a normal mood and affect. Judgment normal.   Vitals reviewed.      No visits with results within 2 Week(s) from this visit.   Latest known visit with results is:   Orders Only on 09/21/2017   Component Date Value    Provocation Protocol 09/21/2017 ---     Pre FVC 09/21/2017 2.11*    Pre FEV1 09/21/2017 1.54*    Pre FEF 25 75 09/21/2017 1.07*    Pre PEF 09/21/2017 6.79     Pre  09/21/2017 8.61     Pre FIF 50 09/21/2017 2.06     Pre SVC 09/21/2017 2.11     Pre TLC 09/21/2017 4.38*    Pre RV 09/21/2017 2.27     Pre FRC PL 09/21/2017 2.62     Pre ERV 09/21/2017 0.27     Pre DLCO 09/21/2017 17.14     Pre Kroghs K 09/21/2017 4.57     Pre FEV1 FVC 09/21/2017 73     Post FVC 09/21/2017 2.48*    Post FEV1 09/21/2017 1.8*    Post FEF 25 75 09/21/2017 1.18     Post PEF 09/21/2017 5.96     Post  09/21/2017 9.69     Post FIF 50 09/21/2017 3.06     Post FEV1 FVC 09/21/2017 73     Predicted FEV1 FVC 09/21/2017 75.71     Predicted DLCO 09/21/2017 18.68     Predicted FEV1 09/21/2017 2.22     Predicted FRC PL 09/21/2017 2.87     Predicted FRC N2 09/21/2017  2.87     Predicted RV 09/21/2017 2.27     Predicted TLC 09/21/2017 5.26     Predicted FVC 09/21/2017 3.03     Predicted SVC 09/21/2017 2.94        Assessment:       1. Routine general medical examination at health care facility    2. Essential hypertension    3. Pure hypercholesterolemia    4. Elevated PSA, between 10 and less than 20 ng/ml    5. Calcified granuloma of lung    6. Moderate persistent asthma without complication    7. Encounter for screening for malignant neoplasm of prostate        Plan:   Essential hypertension  Blood pressure is under good control.  We will continue the current regimen.  Will work on regular aerobic exercise and a low salt diet.        Pure hypercholesterolemia  Cholesterol stable over time. Update labs today.  No changes*    Moderate persistent asthma without complication  Stable over time.  Occasional wheezing.  Managed well with current regimen. Follow up with pulmonary as planned.    Calcified granuloma of lung  Lesions have been stable.  Repeat CT scheduled for March 2018    Elevated PSA, between 10 and less than 20 ng/ml  Update labs today.  Will follow up with Urology as planned.    Alex was seen today for annual exam.    Diagnoses and all orders for this visit:    Routine general medical examination at health care facility  Comments:  Focus on good health habits, low fat diet, regular exercise, seatbelt use  Orders:  -     (In Office Administered) Pneumococcal Polysaccharide Vaccine (23 Valent) (SQ/IM)    Essential hypertension  -     CBC auto differential; Future  -     Comprehensive metabolic panel; Future    Pure hypercholesterolemia  -     Lipid panel; Future    Elevated PSA, between 10 and less than 20 ng/ml  -     PSA, Screening; Future    Calcified granuloma of lung    Moderate persistent asthma without complication    Encounter for screening for malignant neoplasm of prostate  -     PSA, Screening; Future    Other orders  -     Influenza - High Dose (65+)  (PF) (IM)        Return in about 1 year (around 10/27/2018).

## 2018-01-02 ENCOUNTER — OFFICE VISIT (OUTPATIENT)
Dept: PULMONOLOGY | Facility: CLINIC | Age: 73
End: 2018-01-02
Payer: MEDICARE

## 2018-01-02 ENCOUNTER — PROCEDURE VISIT (OUTPATIENT)
Dept: PULMONOLOGY | Facility: CLINIC | Age: 73
End: 2018-01-02
Payer: MEDICARE

## 2018-01-02 VITALS
OXYGEN SATURATION: 97 % | DIASTOLIC BLOOD PRESSURE: 68 MMHG | RESPIRATION RATE: 20 BRPM | BODY MASS INDEX: 30.05 KG/M2 | SYSTOLIC BLOOD PRESSURE: 128 MMHG | WEIGHT: 187 LBS | HEIGHT: 66 IN | HEART RATE: 60 BPM

## 2018-01-02 DIAGNOSIS — J45.40 MODERATE PERSISTENT ASTHMA WITHOUT COMPLICATION: Primary | ICD-10-CM

## 2018-01-02 DIAGNOSIS — J45.909 ASTHMA, UNSPECIFIED ASTHMA SEVERITY, UNSPECIFIED WHETHER COMPLICATED, UNSPECIFIED WHETHER PERSISTENT: ICD-10-CM

## 2018-01-02 DIAGNOSIS — J84.10 CALCIFIED GRANULOMA OF LUNG: ICD-10-CM

## 2018-01-02 DIAGNOSIS — J45.909 ASTHMA, UNSPECIFIED ASTHMA SEVERITY, UNSPECIFIED WHETHER COMPLICATED, UNSPECIFIED WHETHER PERSISTENT: Primary | ICD-10-CM

## 2018-01-02 DIAGNOSIS — R91.1 PULMONARY NODULE, RIGHT: ICD-10-CM

## 2018-01-02 LAB
POST FEF 25 75: 1.07 L/S (ref 1.17–2.76)
POST FET 100: 9.86 S
POST FEV1 FVC: 72 %
POST FEV1: 1.57 L (ref 1.98–2.73)
POST FIF 50: 1.3 L/S
POST FVC: 2.18 L (ref 2.78–3.62)
POST PEF: 6.22 L/S (ref 5.73–8.1)
PRE FEF 25 75: 1.04 L/S (ref 1.17–2.76)
PRE FET 100: 9.05 S
PRE FEV1 FVC: 72 %
PRE FEV1: 1.52 L (ref 1.98–2.73)
PRE FIF 50: 1.5 L/S
PRE FVC: 2.11 L (ref 2.78–3.62)
PRE PEF: 3.21 L/S (ref 5.73–8.1)
PREDICTED FEV1 FVC: 75.71 % (ref 70.5–80.92)
PREDICTED FEV1: 2.36 L (ref 1.98–2.73)
PREDICTED FVC: 3.2 L (ref 2.78–3.62)
PROVOCATION PROTOCOL: ABNORMAL

## 2018-01-02 PROCEDURE — 99214 OFFICE O/P EST MOD 30 MIN: CPT | Mod: 25,S$GLB,, | Performed by: NURSE PRACTITIONER

## 2018-01-02 PROCEDURE — 99499 UNLISTED E&M SERVICE: CPT | Mod: S$GLB,,, | Performed by: NURSE PRACTITIONER

## 2018-01-02 PROCEDURE — 94060 EVALUATION OF WHEEZING: CPT | Mod: S$GLB,,, | Performed by: INTERNAL MEDICINE

## 2018-01-02 PROCEDURE — 99999 PR PBB SHADOW E&M-EST. PATIENT-LVL III: CPT | Mod: PBBFAC,,, | Performed by: NURSE PRACTITIONER

## 2018-01-02 RX ORDER — FLUTICASONE FUROATE AND VILANTEROL 100; 25 UG/1; UG/1
1 POWDER RESPIRATORY (INHALATION) DAILY
Qty: 1 EACH | Refills: 11 | Status: SHIPPED | OUTPATIENT
Start: 2018-01-02 | End: 2019-06-24

## 2018-01-02 NOTE — PROGRESS NOTES
"Subjective:      Chief Complaint   Patient presents with    Asthma        Alex Milner is a 72 y.o. male presents to pulmonary clinic for follow up visit related to diagnosis of Asthma with long standing cough off and on over a year ago.  Since on Breo ellipta daily he no longer coughs or has wheezing.   He stopped his Breo about 1 week ago since needed refill and wanted to await today's appointment before obtaining refills since he was doing well without cough or wheezing or shortness of breath.   Advised to continue Breo daily since cough and wheezing are improved with Breo treatment.     Immunizations reviewed, declined influenza vaccination.     Occupational History:   Employed full time as  delivering parts for plants  Retired  from [employer]. 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 Denies occupational exposure to Asbestos, Silica,  Petrochemicals,  Fiber glass,  Saw Dust,  Grain Dust and  Pesticides.   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     Avocational Exposure:   None currently.     Pet Exposures:  None currently. Denies allergy to Dog and  cat dander.    Review of Systems  A comprehensive review of systems was negative except for: Ears, nose, mouth, throat, and face: positive for sore throat and post nasal drip and lump sensation deep in throat  Respiratory: positive for cough, sputum and wheezing      Objective:      /68 (BP Location: Right arm, Patient Position: Sitting)   Pulse 60   Resp 20   Ht 5' 6" (1.676 m)   Wt 84.8 kg (187 lb)   SpO2 97%   BMI 30.18 kg/m²   General appearance: alert, appears stated age and cooperative  Head: Normocephalic, without obvious abnormality, atraumatic  Eyes: negative  Ears: normal TM's and external ear canals both ears  Nose: Nares normal. Septum midline. Mucosa normal. No drainage or sinus tenderness.  Throat: lips, mucosa, and tongue normal; teeth and gums normal  Neck: no adenopathy, no carotid bruit, no JVD " and supple, symmetrical, trachea midline  Lungs: clear to auscultation bilaterally  Heart: regular rate and rhythm, S1, S2 normal, no murmur, click, rub or gallop  Abdomen: normal findings: soft, non-tender  Extremities: extremities normal, atraumatic, no cyanosis or edema  Pulses: 2+ and symmetric  Skin: Skin color, texture, turgor normal. No rashes or lesions  Lymph nodes: Cervical, supraclavicular, and axillary nodes normal.  Neurologic: Grossly normal      Pulmonary function tests: 1/2/18  FEV1: 1.52  (65 % predicted), FVC:  2.11 (66 % predicted), FEV1/FVC:  72 (95 % predicted)  Post bronchodilator: FEV1: 1.57  (67 % predicted), FVC:  2.18 (68 % predicted), FEV1/FVC:  72 (96 % predicted).   Stable jesus compared to 9/21/2017, no bronchodilator response with today's jesus.   Positive bronchodilator response on 9/21/2017 jesus.    cpft 9/21/2017    Flow volume loop: Obstructive defect  Mild Obstructive Defect / Physiology  FEV1 is increased by >12% and >200mls, indicating a response to inhaled bronchodilators.  FVC is increased by >12% and >200mls indicating a response to inhaled bronchodilators  Plethysmographic lung volumes are normal.  Normal diffusion capacity (DLCO).    CT chest 9/11/2017   7 mm noncalcified pulmonary nodule noted within either lung.  Per Fleischner Society guidelines for nodule >6-8mm; in a low risk patient, consider 6-12 month CT chest follow-up and then at 18-24 month if no change.    In a high risk patient/smoker, consider 3-6 month CT chest follow-up and then at 9-12 months and 24 months if unchanged to exclude neoplasia.  If stable at that time no further follow-up needed.    Assessment:          1. Moderate persistent asthma without complication  fluticasone-vilanterol (BREO ELLIPTA) 100-25 mcg/dose diskus inhaler   2. Pulmonary nodule, right      fu in March for 6 month fu with repeat CT of chest   3. Calcified granuloma of lung         Plan:     Problem List Items Addressed This  Visit     Calcified granuloma of lung     No additional follow up for left calcified left hilar nodule.  Follow up in March 2018 for 7mm right non calcified pul nodules.         Moderate persistent asthma without complication - Primary     Improved on Breo 100 mcg inhaler daily.  Continue Breo daily and albuterol if needed.   ACT score 22. Scores 19 or greater indicate well controlled asthma.          Relevant Medications    fluticasone-vilanterol (BREO ELLIPTA) 100-25 mcg/dose diskus inhaler    Pulmonary nodule, right     Stable follow up CT chest planned March 2018.                 Return in about 3 months (around 3/26/2018) for asthma follow up, pul nodule fu w/review CT chest..

## 2018-01-02 NOTE — ASSESSMENT & PLAN NOTE
No additional follow up for left calcified left hilar nodule.  Follow up in March 2018 for 7mm right non calcified pul nodules.

## 2018-01-02 NOTE — ASSESSMENT & PLAN NOTE
Improved on Breo 100 mcg inhaler daily.  Continue Breo daily and albuterol if needed.   ACT score 22. Scores 19 or greater indicate well controlled asthma.

## 2018-01-08 ENCOUNTER — OFFICE VISIT (OUTPATIENT)
Dept: UROLOGY | Facility: CLINIC | Age: 73
End: 2018-01-08
Payer: MEDICARE

## 2018-01-08 VITALS — SYSTOLIC BLOOD PRESSURE: 142 MMHG | WEIGHT: 187 LBS | BODY MASS INDEX: 30.18 KG/M2 | DIASTOLIC BLOOD PRESSURE: 76 MMHG

## 2018-01-08 DIAGNOSIS — R97.20 ELEVATED PSA: Primary | ICD-10-CM

## 2018-01-08 DIAGNOSIS — Z12.5 ENCOUNTER FOR SCREENING FOR MALIGNANT NEOPLASM OF PROSTATE: ICD-10-CM

## 2018-01-08 LAB
BILIRUB SERPL-MCNC: NORMAL MG/DL
BLOOD URINE, POC: NORMAL
COLOR, POC UA: NORMAL
GLUCOSE UR QL STRIP: NORMAL
KETONES UR QL STRIP: NORMAL
LEUKOCYTE ESTERASE URINE, POC: NORMAL
NITRITE, POC UA: NORMAL
PH, POC UA: 6
PROTEIN, POC: NORMAL
SPECIFIC GRAVITY, POC UA: 1.01
UROBILINOGEN, POC UA: NORMAL

## 2018-01-08 PROCEDURE — 81002 URINALYSIS NONAUTO W/O SCOPE: CPT | Mod: S$GLB,,, | Performed by: UROLOGY

## 2018-01-08 PROCEDURE — 99999 PR PBB SHADOW E&M-EST. PATIENT-LVL II: CPT | Mod: PBBFAC,,, | Performed by: UROLOGY

## 2018-01-08 PROCEDURE — 99204 OFFICE O/P NEW MOD 45 MIN: CPT | Mod: 25,S$GLB,, | Performed by: UROLOGY

## 2018-01-08 RX ORDER — FINASTERIDE 5 MG/1
5 TABLET, FILM COATED ORAL DAILY
Qty: 30 TABLET | Refills: 11 | Status: SHIPPED | OUTPATIENT
Start: 2018-01-08 | End: 2018-02-08 | Stop reason: SDUPTHER

## 2018-01-08 RX ORDER — TAMSULOSIN HYDROCHLORIDE 0.4 MG/1
0.4 CAPSULE ORAL DAILY
Qty: 30 CAPSULE | Refills: 11 | Status: SHIPPED | OUTPATIENT
Start: 2018-01-08 | End: 2018-02-08 | Stop reason: SDUPTHER

## 2018-01-08 NOTE — PROGRESS NOTES
Chief Complaint: Elevated PSA    HPI:   1/8/18: Returns after long absence.  PSA >30.  Says he had a biopsy in 2016 with another urologist in town off of [facility].  Second one he had.  MRI report from 5/17 reassuring with 98gm prostate.  Reduced stream not on flomax/finasteride.  No abd/pelvic pain and no exac/rel factors.  No hematuria.  No urolithiasis.  3-4 cups coffee in AM.  Nocturia x3-4.  7/13: Carol: 67-year-old male returns to the clinic today for followup on his elevated PSA.  Currently, his PSA is 11.83.  This compares to 13.34 back in April, and 17.94 back in January of this year.  That was when we had started him on finasteride, and he has been taking it regularly.  It is good to note that his PSA is declining as we would anticipate.  He does need continued followup, and is willing to do so.    Allergies:  Patient has no known allergies.    Medications:  has a current medication list which includes the following prescription(s): albuterol, atorvastatin, brimonidine 0.15 % opth drop, doxazosin, fluticasone-vilanterol, lisinopril, loratadine, and sodium bicarb-sodium chloride.    Review of Systems:  General: No fever, chills, fatigability, or weight loss.  Skin: No rashes, itching, or changes in color or texture of skin.  Chest: Denies THOMPSON, cyanosis, wheezing, cough, and sputum production.  Abdomen: Appetite fine. No weight loss. Denies diarrhea, abdominal pain, hematemesis, or blood in stool.  Musculoskeletal: No joint stiffness or swelling. Some back pain.  : As above.  All other review of systems negative.    PMH:   has a past medical history of Atrial fibrillation; Back pain; Diverticulosis; Diverticulosis (10/28/13); Elevated PSA; Hyperlipidemia; Hypertension; Moderate persistent asthma without complication (9/26/2017); Obesity; Tobacco dependence; and Urinary tract infection.    PSH:   has a past surgical history that includes Hernia repair.    FamHx: family history includes Cancer in his  brother, father, and mother; Cancer (age of onset: 64) in his sister.    SocHx:  reports that he quit smoking about 32 years ago. He started smoking about 58 years ago. He has a 30.00 pack-year smoking history. He has never used smokeless tobacco. He reports that he drinks about 1.8 oz of alcohol per week . He reports that he does not use drugs.      Physical Exam:  Vitals:    01/08/18 0843   BP: (!) 142/76     General: A&Ox3, no apparent distress, no deformities  Neck: No masses, normal thyroid  Lungs: normal inspiration, no use of accessory muscles  Heart: normal pulse, no arrhythmias  Abdomen: Soft, NT, ND, no masses, no hernias, no hepatosplenomegaly  Lymphatic: Neck and groin nodes negative  Skin: The skin is warm and dry. No jaundice.  Ext: No c/c/e.  : Test desc loren, no abnormalities of epididymus. Penis normal, with normal penile and scrotal skin. Meatus normal. Normal rectal tone, no hemorrhoids. Prost >50 gm no nodules or masses appreciated. SV not palpable. Perineum and anus normal.    Labs/Studies:   PSA    10/17: 31.3    Impression/Plan:   1. Will restart flomax/finasteride and recheck PSA and RTC 6 mo.  2. Caffeine cessation.

## 2018-01-29 ENCOUNTER — TELEPHONE (OUTPATIENT)
Dept: INTERNAL MEDICINE | Facility: CLINIC | Age: 73
End: 2018-01-29

## 2018-02-01 ENCOUNTER — TELEPHONE (OUTPATIENT)
Dept: INTERNAL MEDICINE | Facility: CLINIC | Age: 73
End: 2018-02-01

## 2018-02-01 RX ORDER — DOXAZOSIN 8 MG/1
8 TABLET ORAL DAILY
Qty: 90 TABLET | Refills: 3 | Status: SHIPPED | OUTPATIENT
Start: 2018-02-01 | End: 2018-02-01 | Stop reason: ALTCHOICE

## 2018-02-01 NOTE — TELEPHONE ENCOUNTER
Patient has a prescription for flomax from Dr. Gaming and cardura from me.  These are essentially the same medications.  Call his pharmacy and cancel the cardura.  Make sure the patient knows to stop the cardura as well.  Refill request came through on the cardura this morning and was refilled before I caught this.

## 2018-02-01 NOTE — TELEPHONE ENCOUNTER
Canceled [facility] order form this morning.  Wife notified to pull medication he can no longer be on this drug.  Wife verbalized understanding.

## 2018-02-08 RX ORDER — FINASTERIDE 5 MG/1
5 TABLET, FILM COATED ORAL DAILY
Qty: 30 TABLET | Refills: 11 | Status: SHIPPED | OUTPATIENT
Start: 2018-02-08 | End: 2018-02-08 | Stop reason: SDUPTHER

## 2018-02-08 RX ORDER — TAMSULOSIN HYDROCHLORIDE 0.4 MG/1
0.4 CAPSULE ORAL DAILY
Qty: 30 CAPSULE | Refills: 11 | Status: SHIPPED | OUTPATIENT
Start: 2018-02-08 | End: 2018-02-08 | Stop reason: SDUPTHER

## 2018-02-08 NOTE — TELEPHONE ENCOUNTER
Patient was informed that his refills for finasteride and flomax were refilled and ready for pickup.

## 2018-02-12 RX ORDER — FINASTERIDE 5 MG/1
5 TABLET, FILM COATED ORAL DAILY
Qty: 30 TABLET | Refills: 11 | Status: SHIPPED | OUTPATIENT
Start: 2018-02-12 | End: 2018-07-12 | Stop reason: SDUPTHER

## 2018-02-12 RX ORDER — TAMSULOSIN HYDROCHLORIDE 0.4 MG/1
0.4 CAPSULE ORAL DAILY
Qty: 30 CAPSULE | Refills: 11 | Status: SHIPPED | OUTPATIENT
Start: 2018-02-12 | End: 2018-07-12 | Stop reason: SDUPTHER

## 2018-03-23 ENCOUNTER — LAB VISIT (OUTPATIENT)
Dept: LAB | Facility: HOSPITAL | Age: 73
End: 2018-03-23
Attending: NURSE PRACTITIONER
Payer: MEDICARE

## 2018-03-23 ENCOUNTER — TELEPHONE (OUTPATIENT)
Dept: RADIOLOGY | Facility: HOSPITAL | Age: 73
End: 2018-03-23

## 2018-03-23 DIAGNOSIS — R91.1 PULMONARY NODULE, LEFT: ICD-10-CM

## 2018-03-23 DIAGNOSIS — R91.1 PULMONARY NODULE, RIGHT: ICD-10-CM

## 2018-03-23 LAB
CREAT SERPL-MCNC: 1.2 MG/DL
EST. GFR  (AFRICAN AMERICAN): >60 ML/MIN/1.73 M^2
EST. GFR  (NON AFRICAN AMERICAN): 59 ML/MIN/1.73 M^2

## 2018-03-23 PROCEDURE — 82565 ASSAY OF CREATININE: CPT | Mod: PO

## 2018-03-23 PROCEDURE — 36415 COLL VENOUS BLD VENIPUNCTURE: CPT | Mod: PO

## 2018-03-26 ENCOUNTER — HOSPITAL ENCOUNTER (OUTPATIENT)
Dept: RADIOLOGY | Facility: HOSPITAL | Age: 73
Discharge: HOME | End: 2018-03-26
Attending: NURSE PRACTITIONER
Payer: MEDICARE

## 2018-03-26 DIAGNOSIS — R91.1 PULMONARY NODULE, RIGHT: ICD-10-CM

## 2018-03-26 DIAGNOSIS — R91.1 PULMONARY NODULE, LEFT: ICD-10-CM

## 2018-03-26 PROCEDURE — 25500020 PHARM REV CODE 255: Mod: PO | Performed by: NURSE PRACTITIONER

## 2018-03-26 PROCEDURE — 71260 CT THORAX DX C+: CPT | Mod: TC,PO

## 2018-03-26 PROCEDURE — 71260 CT THORAX DX C+: CPT | Mod: 26,,, | Performed by: RADIOLOGY

## 2018-03-26 RX ADMIN — IOHEXOL 75 ML: 350 INJECTION, SOLUTION INTRAVENOUS at 03:03

## 2018-03-27 ENCOUNTER — OFFICE VISIT (OUTPATIENT)
Dept: PULMONOLOGY | Facility: CLINIC | Age: 73
End: 2018-03-27
Payer: MEDICARE

## 2018-03-27 VITALS
SYSTOLIC BLOOD PRESSURE: 130 MMHG | BODY MASS INDEX: 29.09 KG/M2 | OXYGEN SATURATION: 96 % | DIASTOLIC BLOOD PRESSURE: 62 MMHG | HEIGHT: 66 IN | HEART RATE: 64 BPM | WEIGHT: 181 LBS | RESPIRATION RATE: 18 BRPM

## 2018-03-27 DIAGNOSIS — J45.20 ASTHMA, MILD INTERMITTENT, WELL-CONTROLLED: Primary | ICD-10-CM

## 2018-03-27 DIAGNOSIS — R91.8 MULTIPLE PULMONARY NODULES: ICD-10-CM

## 2018-03-27 PROBLEM — J45.909 ASTHMA, WELL CONTROLLED: Status: ACTIVE | Noted: 2018-03-27

## 2018-03-27 PROCEDURE — 99499 UNLISTED E&M SERVICE: CPT | Mod: S$GLB,,, | Performed by: NURSE PRACTITIONER

## 2018-03-27 PROCEDURE — 3075F SYST BP GE 130 - 139MM HG: CPT | Mod: CPTII,S$GLB,, | Performed by: NURSE PRACTITIONER

## 2018-03-27 PROCEDURE — 99214 OFFICE O/P EST MOD 30 MIN: CPT | Mod: S$GLB,,, | Performed by: NURSE PRACTITIONER

## 2018-03-27 PROCEDURE — 3078F DIAST BP <80 MM HG: CPT | Mod: CPTII,S$GLB,, | Performed by: NURSE PRACTITIONER

## 2018-03-27 PROCEDURE — 99999 PR PBB SHADOW E&M-EST. PATIENT-LVL IV: CPT | Mod: PBBFAC,,, | Performed by: NURSE PRACTITIONER

## 2018-03-27 NOTE — ASSESSMENT & PLAN NOTE
Controlled, ACT score 24.  Not on controller, no rescue use.  Has breo and albuterol if needed for acute flare

## 2018-03-27 NOTE — PROGRESS NOTES
Chief Complaint   Patient presents with    Asthma    Pulmonary Nodules       Subjective:       Alex Milner is a 72 y.o. male who presents for evaluation of asthma. The patient has a history of asthma. Age when diagnosed with Asthma as an adult.   Current Disease Severity  Alex has no daytime asthma symptoms. He has no nighttime asthma symptoms. The patient is using short-acting beta agonists for symptom control none, has been about 2 months since last used.     He has exacerbations requiring oral systemic corticosteroids 0 times per year.   Current limitations in activity from asthma: none.  Number of urgent/emergent visit in last year: 0  ACT score 24, asthma scores 19 or greater indicate well controlled asthma   Not on controller Breo, no use of rescue in past 2 months.      Pulmonary nodule right and left lung follow up with CT of chest results review  Stable follow up in 24 months with repeat CT chest low risk pul nodules >6mm.   7.4 right lung nodule  7.2 left lung nodule    Past Medical History: The following portions of the patient's history were reviewed and updated as appropriate:   He  has a past surgical history that includes Hernia repair.  His family history includes Cancer in his brother, father, and mother; Cancer (age of onset: 64) in his sister.  He  reports that he quit smoking about 32 years ago. He started smoking about 58 years ago. He has a 30.00 pack-year smoking history. He has never used smokeless tobacco. He reports that he drinks about 1.8 oz of alcohol per week . He reports that he does not use drugs.  He has a current medication list which includes the following prescription(s): finasteride, lisinopril, tamsulosin, albuterol, atorvastatin, brimonidine 0.15 % opth drop, fluticasone-vilanterol, loratadine, and sodium bicarb-sodium chloride.  He has No Known Allergies..    Review of Systems  Review of Systems   Constitutional: Negative for fever, chills, weight loss, weight gain,  "activity change, appetite change, fatigue and night sweats.   HENT: Negative for postnasal drip, rhinorrhea, sinus pressure, voice change and congestion.    Eyes: Negative for redness and itching.   Respiratory: Negative for snoring, cough, sputum production, chest tightness, shortness of breath, wheezing, orthopnea, asthma nighttime symptoms, dyspnea on extertion, use of rescue inhaler and somnolence.    Cardiovascular: Negative.  Negative for chest pain, palpitations and leg swelling.   Genitourinary: Negative for difficulty urinating and hematuria.   Endocrine: Negative for cold intolerance and heat intolerance.    Musculoskeletal: Negative for arthralgias, gait problem, joint swelling and myalgias.   Skin: Negative.    Gastrointestinal: Negative for nausea, vomiting, abdominal pain and acid reflux.   Neurological: Negative for dizziness, weakness, light-headedness and headaches.   Hematological: Negative for adenopathy. No excessive bruising.   All other systems reviewed and are negative.       Objective:     /62 (BP Location: Right arm, Patient Position: Sitting)   Pulse 64   Resp 18   Ht 5' 6" (1.676 m)   Wt 82.1 kg (181 lb)   SpO2 96%   BMI 29.21 kg/m²   Physical Exam   Constitutional: He is oriented to person, place, and time. He appears well-developed and well-nourished. He is active and cooperative.  Non-toxic appearance. He does not appear ill. No distress.   HENT:   Head: Normocephalic and atraumatic.   Right Ear: External ear normal.   Left Ear: External ear normal.   Nose: Nose normal.   Mouth/Throat: Oropharynx is clear and moist. No oropharyngeal exudate.   Eyes: Conjunctivae are normal.   Neck: Normal range of motion. Neck supple.   Cardiovascular: Normal rate, regular rhythm, normal heart sounds and intact distal pulses.    Pulmonary/Chest: Effort normal and breath sounds normal.   Abdominal: Soft.   Musculoskeletal: He exhibits no edema.   Neurological: He is alert and oriented to " person, place, and time.   Skin: Skin is warm and dry.   Psychiatric: He has a normal mood and affect. His behavior is normal. Judgment and thought content normal.   Vitals reviewed.    Diagnostic Testing Reviewed  All relevant imaging and labs reviewed.  CT chest 3/26/2018  COMPARISON:  September 11, 2017.    FINDINGS:  There are unchanged bilateral noncalcified lung nodules.  On the right there is a 7.4 mm nodule in the middle lobe abutting the minor fissure (series 3, image 118).  On the left there is a 7.2 mm nodule in the posterior upper lobe abutting the superior extent of the major fissure (series 3, image 64).  No new lung nodules are identified.  There is chronic parenchymal scarring or atelectasis in the medial right middle lobe and lingula.  No alveolar infiltrate or pleural effusion.    There are calcified lymph nodes in the mediastinum and left hilum.  No pathologic lymphadenopathy.  Aorta, heart, and pericardium are unremarkable.    There is a small calcified granuloma posteriorly in the right lobe of the liver.  Included upper abdominal structures are otherwise within normal limits.    There is multilevel degenerative change in the spine.   Impression       Stable bilateral lung nodules as detailed.  Recommend continued follow-up per Fleischner society guidelines.  No other significant findings.         Assessment:     1. Asthma, mild intermittent, well-controlled    2. Multiple pulmonary nodules        Orders Placed This Encounter   Procedures    X-Ray Chest PA And Lateral     Standing Status:   Future     Standing Expiration Date:   3/27/2020     Order Specific Question:   May the Radiologist modify the order per protocol to meet the clinical needs of the patient?     Answer:   Yes    CT Chest With Contrast     Standing Status:   Future     Standing Expiration Date:   3/27/2019     Order Specific Question:   Is the patient allergic to iodine or contrast? Has a steroid / antihistamine prep been  administered?     Answer:   No     Order Specific Question:   Is the patient on ANY Metformin drug such as Glugophage/Glucovance?           Should be off drug 48 hours after contrast. Check renal function before restart.     Answer:   No     Order Specific Question:   History of Kidney Disease - including: decreased kidney function, dialysis, kidney transplay, single kidney, kidney cancer, kidney surgery?     Answer:   None     Order Specific Question:   Does the patient have high blood pressure requiring medical treatment?     Answer:   Yes     Order Specific Question:   Diabetes?     Answer:   No     Order Specific Question:   May the Radiologist modify the order per protocol to meet the clinical needs of the patient?     Answer:   Yes     Order Specific Question:   Will this service be billed to a Worker's Comp policy?     Answer:   Yes    Creatinine, serum     Standing Status:   Future     Standing Expiration Date:   5/26/2019    Spirometry with/without bronchodilator     Standing Status:   Future     Standing Expiration Date:   3/27/2020     Plan:     Problem List Items Addressed This Visit     Asthma, well controlled - Primary     Controlled, ACT score 24.  Not on controller, no rescue use.  Has breo and albuterol if needed for acute flare            Relevant Orders    Spirometry with/without bronchodilator    X-Ray Chest PA And Lateral    Multiple pulmonary nodules     Stable, 7.4 right and 7.2 mm left pulmonary nodules. follow up in 18 -24 months per Fleishner guidelines in low risk patient. Scheduled for 18 month follow up CT chest, September 2019.  Quit smoking 33 years ago.             Relevant Orders    CT Chest With Contrast    Creatinine, serum        Follow-up in about 18 months (around 9/27/2019) for asthma and multiple pul nodule 18 mo fu w/review jesus/CT chest . follow up sooner for acute flare or new onset of symptoms.

## 2018-03-27 NOTE — ASSESSMENT & PLAN NOTE
Stable, 7.4 right and 7.2 mm left pulmonary nodules. follow up in 18 -24 months per Fleishner guidelines in low risk patient. Scheduled for 18 month follow up CT chest, September 2019.  Quit smoking 33 years ago.

## 2018-06-14 ENCOUNTER — PES CALL (OUTPATIENT)
Dept: ADMINISTRATIVE | Facility: CLINIC | Age: 73
End: 2018-06-14

## 2018-06-28 ENCOUNTER — PATIENT OUTREACH (OUTPATIENT)
Dept: ADMINISTRATIVE | Facility: HOSPITAL | Age: 73
End: 2018-06-28

## 2018-07-09 ENCOUNTER — LAB VISIT (OUTPATIENT)
Dept: LAB | Facility: HOSPITAL | Age: 73
End: 2018-07-09
Attending: UROLOGY
Payer: MEDICARE

## 2018-07-09 DIAGNOSIS — Z12.5 ENCOUNTER FOR SCREENING FOR MALIGNANT NEOPLASM OF PROSTATE: ICD-10-CM

## 2018-07-09 DIAGNOSIS — R97.20 ELEVATED PSA: ICD-10-CM

## 2018-07-09 LAB — COMPLEXED PSA SERPL-MCNC: 14.9 NG/ML

## 2018-07-09 PROCEDURE — 84153 ASSAY OF PSA TOTAL: CPT

## 2018-07-09 PROCEDURE — 36415 COLL VENOUS BLD VENIPUNCTURE: CPT | Mod: PO

## 2018-07-10 PROBLEM — I70.0 AORTIC CALCIFICATION: Status: ACTIVE | Noted: 2018-07-10

## 2018-07-12 ENCOUNTER — OFFICE VISIT (OUTPATIENT)
Dept: UROLOGY | Facility: CLINIC | Age: 73
End: 2018-07-12
Payer: MEDICARE

## 2018-07-12 ENCOUNTER — OFFICE VISIT (OUTPATIENT)
Dept: INTERNAL MEDICINE | Facility: CLINIC | Age: 73
End: 2018-07-12
Payer: MEDICARE

## 2018-07-12 VITALS
WEIGHT: 181.88 LBS | BODY MASS INDEX: 30.3 KG/M2 | HEIGHT: 66 IN | WEIGHT: 181 LBS | SYSTOLIC BLOOD PRESSURE: 112 MMHG | BODY MASS INDEX: 29.09 KG/M2 | HEIGHT: 65 IN | DIASTOLIC BLOOD PRESSURE: 60 MMHG | SYSTOLIC BLOOD PRESSURE: 112 MMHG | OXYGEN SATURATION: 97 % | HEART RATE: 62 BPM | DIASTOLIC BLOOD PRESSURE: 60 MMHG

## 2018-07-12 DIAGNOSIS — R91.8 MULTIPLE PULMONARY NODULES: ICD-10-CM

## 2018-07-12 DIAGNOSIS — N40.0 BENIGN PROSTATIC HYPERPLASIA, UNSPECIFIED WHETHER LOWER URINARY TRACT SYMPTOMS PRESENT: Primary | ICD-10-CM

## 2018-07-12 DIAGNOSIS — Z00.00 ENCOUNTER FOR PREVENTIVE HEALTH EXAMINATION: Primary | ICD-10-CM

## 2018-07-12 DIAGNOSIS — I10 ESSENTIAL HYPERTENSION: Chronic | ICD-10-CM

## 2018-07-12 DIAGNOSIS — M79.605 BILATERAL LEG PAIN: ICD-10-CM

## 2018-07-12 DIAGNOSIS — J45.909 ASTHMA, UNSPECIFIED ASTHMA SEVERITY, UNSPECIFIED WHETHER COMPLICATED, UNSPECIFIED WHETHER PERSISTENT: ICD-10-CM

## 2018-07-12 DIAGNOSIS — M79.604 BILATERAL LEG PAIN: ICD-10-CM

## 2018-07-12 DIAGNOSIS — Z12.5 ENCOUNTER FOR SCREENING FOR MALIGNANT NEOPLASM OF PROSTATE: ICD-10-CM

## 2018-07-12 DIAGNOSIS — R97.20 ELEVATED PSA: ICD-10-CM

## 2018-07-12 DIAGNOSIS — I70.0 AORTIC CALCIFICATION: ICD-10-CM

## 2018-07-12 DIAGNOSIS — E78.5 HYPERLIPIDEMIA, UNSPECIFIED HYPERLIPIDEMIA TYPE: ICD-10-CM

## 2018-07-12 DIAGNOSIS — R09.89 DECREASED DORSALIS PEDIS PULSE: ICD-10-CM

## 2018-07-12 DIAGNOSIS — N52.9 ERECTILE DYSFUNCTION, UNSPECIFIED ERECTILE DYSFUNCTION TYPE: ICD-10-CM

## 2018-07-12 LAB
BILIRUB SERPL-MCNC: NORMAL MG/DL
BLOOD URINE, POC: NORMAL
COLOR, POC UA: YELLOW
GLUCOSE UR QL STRIP: NORMAL
KETONES UR QL STRIP: NORMAL
LEUKOCYTE ESTERASE URINE, POC: NORMAL
NITRITE, POC UA: NORMAL
PH, POC UA: 5
POC RESIDUAL URINE VOLUME: 162 ML (ref 0–100)
PROTEIN, POC: NORMAL
SPECIFIC GRAVITY, POC UA: 1.01
UROBILINOGEN, POC UA: NORMAL

## 2018-07-12 PROCEDURE — 81002 URINALYSIS NONAUTO W/O SCOPE: CPT | Mod: S$GLB,,, | Performed by: UROLOGY

## 2018-07-12 PROCEDURE — 3078F DIAST BP <80 MM HG: CPT | Mod: CPTII,S$GLB,, | Performed by: NURSE PRACTITIONER

## 2018-07-12 PROCEDURE — 3074F SYST BP LT 130 MM HG: CPT | Mod: CPTII,S$GLB,, | Performed by: UROLOGY

## 2018-07-12 PROCEDURE — 99999 PR PBB SHADOW E&M-EST. PATIENT-LVL II: CPT | Mod: PBBFAC,,, | Performed by: UROLOGY

## 2018-07-12 PROCEDURE — 51798 US URINE CAPACITY MEASURE: CPT | Mod: S$GLB,,, | Performed by: UROLOGY

## 2018-07-12 PROCEDURE — 99499 UNLISTED E&M SERVICE: CPT | Mod: S$GLB,,, | Performed by: NURSE PRACTITIONER

## 2018-07-12 PROCEDURE — G0439 PPPS, SUBSEQ VISIT: HCPCS | Mod: S$GLB,,, | Performed by: NURSE PRACTITIONER

## 2018-07-12 PROCEDURE — 99214 OFFICE O/P EST MOD 30 MIN: CPT | Mod: 25,S$GLB,, | Performed by: UROLOGY

## 2018-07-12 PROCEDURE — 99999 PR PBB SHADOW E&M-EST. PATIENT-LVL IV: CPT | Mod: PBBFAC,,, | Performed by: NURSE PRACTITIONER

## 2018-07-12 PROCEDURE — 3078F DIAST BP <80 MM HG: CPT | Mod: CPTII,S$GLB,, | Performed by: UROLOGY

## 2018-07-12 PROCEDURE — 3074F SYST BP LT 130 MM HG: CPT | Mod: CPTII,S$GLB,, | Performed by: NURSE PRACTITIONER

## 2018-07-12 RX ORDER — TAMSULOSIN HYDROCHLORIDE 0.4 MG/1
0.4 CAPSULE ORAL DAILY
Qty: 30 CAPSULE | Refills: 11 | Status: SHIPPED | OUTPATIENT
Start: 2018-07-12 | End: 2019-01-14 | Stop reason: SDUPTHER

## 2018-07-12 RX ORDER — FINASTERIDE 5 MG/1
5 TABLET, FILM COATED ORAL DAILY
Qty: 30 TABLET | Refills: 11 | Status: SHIPPED | OUTPATIENT
Start: 2018-07-12 | End: 2019-01-14 | Stop reason: SDUPTHER

## 2018-07-12 RX ORDER — SILDENAFIL CITRATE 20 MG/1
TABLET ORAL
Qty: 50 TABLET | Refills: 11 | Status: SHIPPED | OUTPATIENT
Start: 2018-07-12 | End: 2019-01-14 | Stop reason: SDUPTHER

## 2018-07-12 NOTE — PATIENT INSTRUCTIONS
Counseling and Referral of Other Preventative  (Italic type indicates deductible and co-insurance are waived)    Patient Name: Alex Milner  Today's Date: 7/12/2018    Health Maintenance       Date Due Completion Date    TETANUS VACCINE 08/21/1961 ---    Influenza Vaccine 08/01/2018 10/27/2017    Lipid Panel 10/27/2018 10/27/2017    PROSTATE-SPECIFIC ANTIGEN 07/09/2019 7/9/2018    Override on 7/16/2013: Done    Override on 4/16/2013: Done    High Dose Statin 07/12/2019 7/12/2018    Colonoscopy 11/15/2023 11/15/2013    Override on 10/23/2008: Done        No orders of the defined types were placed in this encounter.    The following information is provided to all patients.  This information is to help you find resources for any of the problems found today that may be affecting your health:                Living healthy guide: www.[facility].[place].gov      Understanding Diabetes: www.diabetes.org      Eating healthy: www.cdc.gov/healthyweight      [place] home safety checklist: www.cdc.gov/steadi/patient.html      Agency on Aging: www.goea.[place].[facility]      Alcoholics anonymous (AA): www.aa.org      Physical Activity: www.raya.nih.gov/ui9xiuj      Tobacco use: www.quitwithusla.org

## 2018-07-12 NOTE — PROGRESS NOTES
Chief Complaint: Elevated PSA    HPI:   7/12/18: Stream is a little better.  Nocturia x2.  Has slowly improved no step change with flomax. Reviewed history in detail. Discussed ED.  1/8/18: Returns after long absence.  PSA >30.  Says he had a biopsy in 2016 with another urologist in [facility] off of [facility].  Second one he had.  MRI report from 5/17 reassuring with 98gm prostate.  Reduced stream not on flomax/finasteride.  No abd/pelvic pain and no exac/rel factors.  No hematuria.  No urolithiasis.  3-4 cups coffee in AM.  Nocturia x3-4.  7/13: Carol: 67-year-old male returns to the clinic today for followup on his elevated PSA.  Currently, his PSA is 11.83.  This compares to 13.34 back in April, and 17.94 back in January of this year.  That was when we had started him on finasteride, and he has been taking it regularly.  It is good to note that his PSA is declining as we would anticipate.  He does need continued followup, and is willing to do so.    Allergies:  Patient has no known allergies.    Medications:  has a current medication list which includes the following prescription(s): albuterol, atorvastatin, brimonidine 0.15 % opth drop, finasteride, fluticasone-vilanterol, lisinopril, loratadine, sodium bicarb-sodium chloride, and tamsulosin.    Review of Systems:  General: No fever, chills, fatigability, or weight loss.  Skin: No rashes, itching, or changes in color or texture of skin.  Chest: Denies THOMPSON, cyanosis, wheezing, cough, and sputum production.  Abdomen: Appetite fine. No weight loss. Denies diarrhea, abdominal pain, hematemesis, or blood in stool.  Musculoskeletal: No joint stiffness or swelling. Some back pain.  : As above.  All other review of systems negative.    PMH:   has a past medical history of Atrial fibrillation; Back pain; Bilateral inguinal hernia; Diverticulosis; Diverticulosis (10/28/13); Elevated PSA; Granuloma of liver; Hyperlipidemia; Hypertension; Moderate persistent asthma without  complication (9/26/2017); Obesity; Tobacco dependence; and Urinary tract infection.    PSH:   has a past surgical history that includes Hernia repair.    FamHx: family history includes Cancer in his brother, father, and mother; Cancer (age of onset: 64) in his sister.    SocHx:  reports that he quit smoking about 32 years ago. He started smoking about 58 years ago. He has a 30.00 pack-year smoking history. He has never used smokeless tobacco. He reports that he drinks about 1.8 oz of alcohol per week . He reports that he does not use drugs.      Physical Exam:  Vitals:    07/12/18 1306   BP: 112/60     General: A&Ox3, no apparent distress, no deformities  Neck: No masses, normal thyroid  Lungs: normal inspiration, no use of accessory muscles  Heart: normal pulse, no arrhythmias  Abdomen: Soft, NT, ND, no masses, no hernias, no hepatosplenomegaly  Lymphatic: Neck and groin nodes negative  Skin: The skin is warm and dry. No jaundice.  Ext: No c/c/e.  :   1/18: Test desc loren, no abnormalities of epididymus. Penis normal, with normal penile and scrotal skin. Meatus normal. Normal rectal tone, no hemorrhoids. Prost >50 gm no nodules or masses appreciated. SV not palpable. Perineum and anus normal.    Labs/Studies:   PSA    10/17: 31.3    7/18: 14.9    Impression/Plan:   1. Will continue flomax/finasteride and recheck PSA/RTC 6 mo.  2. Caffeine cessation.  3. Sildenafil rx.

## 2018-07-13 NOTE — PROGRESS NOTES
"Alex Milner presented for a  Medicare AWV and comprehensive Health Risk Assessment today. The following components were reviewed and updated:    · Medical history  · Family History  · Social history  · Allergies and Current Medications  · Health Risk Assessment  · Health Maintenance  · Care Team     ** See Completed Assessments for Annual Wellness Visit within the encounter summary.**       The following assessments were completed:  · Living Situation  · CAGE  · Depression Screening  · Timed Get Up and Go  · Whisper Test  · Cognitive Function Screening  · Nutrition Screening  · ADL Screening  · PAQ Screening    Vitals:    07/12/18 1340   BP: 112/60   Pulse: 62   SpO2: 97%   Weight: 82.5 kg (181 lb 14.1 oz)   Height: 5' 5.35" (1.66 m)     Body mass index is 29.94 kg/m².  Physical Exam   Constitutional: He is oriented to person, place, and time. He appears well-developed and well-nourished.   HENT:   Head: Normocephalic.   Cardiovascular: Normal rate, regular rhythm and normal heart sounds.    No murmur heard.  Pulses:       Dorsalis pedis pulses are 2+ on the right side, and 1+ on the left side.   Pulmonary/Chest: Effort normal and breath sounds normal. No respiratory distress.   Abdominal: Soft. He exhibits no mass. There is no tenderness.   Musculoskeletal: Normal range of motion. He exhibits no edema.   Feet:   Right Foot:   Protective Sensation: 10 sites tested. 10 sites sensed.   Skin Integrity: Positive for dry skin. Negative for ulcer or blister.   Left Foot:   Protective Sensation: 10 sites tested. 10 sites sensed.   Skin Integrity: Positive for dry skin. Negative for ulcer or blister.   Neurological: He is alert and oriented to person, place, and time. He exhibits normal muscle tone.   Skin: Skin is warm, dry and intact.   Toes cool to touch, otherwise feet warm to touch. Feet with normal color.   Thickening and yellowing noted to toenails. Advised to follow up with PCP for treatment. He expressed " "understanding.     Psychiatric: He has a normal mood and affect. His speech is normal and behavior is normal.   Nursing note and vitals reviewed.        Diagnoses and health risks identified today and associated recommendations/orders:    1. Encounter for preventive health examination  Abnormal whisper test-left abnormal; right normal. Denies difficulty hearing. Advised to follow up with PCP for further evaluation and recommendations. He expressed understanding.      Reports he believes he had a Tetanus vaccine about 2 years ago. He will try to find out and update PCP.     2. Essential hypertension  Stable and controlled. Continue current treatment plan as previously prescribed with your PCP.     3. Hyperlipidemia, unspecified hyperlipidemia type  Discussed dietary changes.  Improved. Continue current treatment plan as previously prescribed with your PCP.     4. Aortic calcification  Ct abdomen/pelvis 3/9/2015 (care everywhere)---  Aortic calcifications, no aneurysm.    Discussed diagnosis and risk reduction.   Advised to follow up with PCP for further recommendations. He expressed understanding.      5. Bilateral leg pain  Reports bilateral leg pain on exertion (long distances), ongoing for "a while", denies any worsening.   Decreased left DP pulse.   Discussed and recommended FREDY for screening for PVD. He declines scheduling at this time. He would like to first discuss with his PCP.   Advised to follow up with PCP for further evaluation and treatment. He expressed understanding.      6. Decreased dorsalis pedis pulse  See #5    7. Multiple pulmonary nodules  CT CHest 3/26/2018---Stable bilateral lung nodules as detailed.  Recommend continued follow-up per Fleischner society guidelines.  No other significant findings.  Reports he is at his respiratory baseline.   Stable. Continue current treatment plan as previously prescribed with your pulmonary NP, CROW Ocasio.     8. Asthma, unspecified asthma severity, unspecified " whether complicated, unspecified whether persistent  Reports he is at his respiratory baseline.   Reports he never started Breo due to cost. Advised to update pulmonary NP. He expressed understanding.    Stable and controlled. Continue current treatment plan as previously prescribed with your PCP and pulmonary NP.     9. Elevated PSA  14.9, decreased from prior check. Patient reports he had a prostate biopsy in 2014/2015, approx, reportedly negative.   BPH listed per urologist.   Taking Proscar and Flomax.   Continue current treatment plan as previously prescribed with your PCP and urologist, Dr. Ramírez.     Provided Alex with a 5-10 year written screening schedule and personal prevention plan. Education, counseling, and referrals were provided as needed. After Visit Summary printed and given to patient which includes a list of additional screenings\tests needed.    Follow-up in about 1 year (around 7/12/2019) for AWV.    Chacha Osborn NP

## 2019-01-11 ENCOUNTER — CLINICAL SUPPORT (OUTPATIENT)
Dept: INTERNAL MEDICINE | Facility: CLINIC | Age: 74
End: 2019-01-11
Payer: MEDICARE

## 2019-01-11 ENCOUNTER — LAB VISIT (OUTPATIENT)
Dept: LAB | Facility: HOSPITAL | Age: 74
End: 2019-01-11
Attending: UROLOGY
Payer: MEDICARE

## 2019-01-11 DIAGNOSIS — R97.20 ELEVATED PSA: ICD-10-CM

## 2019-01-11 DIAGNOSIS — Z23 FLU VACCINE NEED: Primary | ICD-10-CM

## 2019-01-11 DIAGNOSIS — N40.0 BENIGN PROSTATIC HYPERPLASIA, UNSPECIFIED WHETHER LOWER URINARY TRACT SYMPTOMS PRESENT: ICD-10-CM

## 2019-01-11 DIAGNOSIS — N52.9 ERECTILE DYSFUNCTION, UNSPECIFIED ERECTILE DYSFUNCTION TYPE: ICD-10-CM

## 2019-01-11 DIAGNOSIS — Z12.5 ENCOUNTER FOR SCREENING FOR MALIGNANT NEOPLASM OF PROSTATE: ICD-10-CM

## 2019-01-11 LAB — COMPLEXED PSA SERPL-MCNC: 15.1 NG/ML

## 2019-01-11 PROCEDURE — 84153 ASSAY OF PSA TOTAL: CPT | Mod: HCNC

## 2019-01-11 PROCEDURE — 90662 FLU VACCINE - HIGH DOSE (65+) PRESERVATIVE FREE IM: ICD-10-PCS | Mod: HCNC,S$GLB,, | Performed by: INTERNAL MEDICINE

## 2019-01-11 PROCEDURE — 36415 COLL VENOUS BLD VENIPUNCTURE: CPT | Mod: HCNC,PO

## 2019-01-11 PROCEDURE — 99999 PR PBB SHADOW E&M-EST. PATIENT-LVL II: CPT | Mod: PBBFAC,HCNC,,

## 2019-01-11 PROCEDURE — G0008 ADMIN INFLUENZA VIRUS VAC: HCPCS | Mod: HCNC,S$GLB,, | Performed by: INTERNAL MEDICINE

## 2019-01-11 PROCEDURE — G0008 FLU VACCINE - HIGH DOSE (65+) PRESERVATIVE FREE IM: ICD-10-PCS | Mod: HCNC,S$GLB,, | Performed by: INTERNAL MEDICINE

## 2019-01-11 PROCEDURE — 90662 IIV NO PRSV INCREASED AG IM: CPT | Mod: HCNC,S$GLB,, | Performed by: INTERNAL MEDICINE

## 2019-01-11 PROCEDURE — 99999 PR PBB SHADOW E&M-EST. PATIENT-LVL II: ICD-10-PCS | Mod: PBBFAC,HCNC,,

## 2019-01-14 ENCOUNTER — OFFICE VISIT (OUTPATIENT)
Dept: UROLOGY | Facility: CLINIC | Age: 74
End: 2019-01-14
Payer: MEDICARE

## 2019-01-14 VITALS
SYSTOLIC BLOOD PRESSURE: 152 MMHG | DIASTOLIC BLOOD PRESSURE: 60 MMHG | BODY MASS INDEX: 29.05 KG/M2 | WEIGHT: 180.75 LBS | HEIGHT: 66 IN | HEART RATE: 60 BPM

## 2019-01-14 DIAGNOSIS — N52.9 ERECTILE DYSFUNCTION, UNSPECIFIED ERECTILE DYSFUNCTION TYPE: ICD-10-CM

## 2019-01-14 DIAGNOSIS — Z12.5 PROSTATE CANCER SCREENING: ICD-10-CM

## 2019-01-14 DIAGNOSIS — N40.0 BENIGN PROSTATIC HYPERPLASIA, UNSPECIFIED WHETHER LOWER URINARY TRACT SYMPTOMS PRESENT: Primary | ICD-10-CM

## 2019-01-14 LAB
BILIRUB SERPL-MCNC: NORMAL MG/DL
BLOOD URINE, POC: NORMAL
COLOR, POC UA: YELLOW
GLUCOSE UR QL STRIP: NORMAL
KETONES UR QL STRIP: NORMAL
LEUKOCYTE ESTERASE URINE, POC: NORMAL
NITRITE, POC UA: NORMAL
PH, POC UA: 5
PROTEIN, POC: NORMAL
SPECIFIC GRAVITY, POC UA: 1.01
UROBILINOGEN, POC UA: NORMAL

## 2019-01-14 PROCEDURE — 3078F PR MOST RECENT DIASTOLIC BLOOD PRESSURE < 80 MM HG: ICD-10-PCS | Mod: CPTII,HCNC,S$GLB, | Performed by: UROLOGY

## 2019-01-14 PROCEDURE — 1101F PT FALLS ASSESS-DOCD LE1/YR: CPT | Mod: CPTII,HCNC,S$GLB, | Performed by: UROLOGY

## 2019-01-14 PROCEDURE — 99999 PR PBB SHADOW E&M-EST. PATIENT-LVL III: ICD-10-PCS | Mod: PBBFAC,HCNC,, | Performed by: UROLOGY

## 2019-01-14 PROCEDURE — 3077F SYST BP >= 140 MM HG: CPT | Mod: CPTII,HCNC,S$GLB, | Performed by: UROLOGY

## 2019-01-14 PROCEDURE — 99999 PR PBB SHADOW E&M-EST. PATIENT-LVL III: CPT | Mod: PBBFAC,HCNC,, | Performed by: UROLOGY

## 2019-01-14 PROCEDURE — 99214 OFFICE O/P EST MOD 30 MIN: CPT | Mod: 25,HCNC,S$GLB, | Performed by: UROLOGY

## 2019-01-14 PROCEDURE — 1101F PR PT FALLS ASSESS DOC 0-1 FALLS W/OUT INJ PAST YR: ICD-10-PCS | Mod: CPTII,HCNC,S$GLB, | Performed by: UROLOGY

## 2019-01-14 PROCEDURE — 81002 POCT URINE DIPSTICK WITHOUT MICROSCOPE: ICD-10-PCS | Mod: HCNC,S$GLB,, | Performed by: UROLOGY

## 2019-01-14 PROCEDURE — 3078F DIAST BP <80 MM HG: CPT | Mod: CPTII,HCNC,S$GLB, | Performed by: UROLOGY

## 2019-01-14 PROCEDURE — 81002 URINALYSIS NONAUTO W/O SCOPE: CPT | Mod: HCNC,S$GLB,, | Performed by: UROLOGY

## 2019-01-14 PROCEDURE — 3077F PR MOST RECENT SYSTOLIC BLOOD PRESSURE >= 140 MM HG: ICD-10-PCS | Mod: CPTII,HCNC,S$GLB, | Performed by: UROLOGY

## 2019-01-14 PROCEDURE — 99214 PR OFFICE/OUTPT VISIT, EST, LEVL IV, 30-39 MIN: ICD-10-PCS | Mod: 25,HCNC,S$GLB, | Performed by: UROLOGY

## 2019-01-14 RX ORDER — FINASTERIDE 5 MG/1
5 TABLET, FILM COATED ORAL DAILY
Qty: 30 TABLET | Refills: 11 | Status: SHIPPED | OUTPATIENT
Start: 2019-01-14 | End: 2020-01-15 | Stop reason: SDUPTHER

## 2019-01-14 RX ORDER — SILDENAFIL CITRATE 20 MG/1
TABLET ORAL
Qty: 50 TABLET | Refills: 11 | Status: SHIPPED | OUTPATIENT
Start: 2019-01-14 | End: 2020-01-15

## 2019-01-14 RX ORDER — TAMSULOSIN HYDROCHLORIDE 0.4 MG/1
0.4 CAPSULE ORAL DAILY
Qty: 30 CAPSULE | Refills: 11 | Status: SHIPPED | OUTPATIENT
Start: 2019-01-14 | End: 2020-01-15 | Stop reason: SDUPTHER

## 2019-01-14 NOTE — PROGRESS NOTES
Chief Complaint: Elevated PSA    HPI:   1/14/19: Stream overall better again since last visit, slow in the mornings.  Nocturia x2.  Cut back on caffeine but not all the way.  Sildenafil working well.  7/12/18: Stream is a little better.  Nocturia x2.  Has slowly improved no step change with flomax. Reviewed history in detail. Discussed ED.  1/8/18: Returns after long absence.  PSA >30.  Says he had a biopsy in 2016 with another urologist in town off of [facility].  Second one he had.  MRI report from 5/17 reassuring with 98gm prostate.  Reduced stream not on flomax/finasteride.  No abd/pelvic pain and no exac/rel factors.  No hematuria.  No urolithiasis.  3-4 cups coffee in AM.  Nocturia x3-4.  7/13: Carol: 67-year-old male returns to the clinic today for followup on his elevated PSA.  Currently, his PSA is 11.83.  This compares to 13.34 back in April, and 17.94 back in January of this year.  That was when we had started him on finasteride, and he has been taking it regularly.  It is good to note that his PSA is declining as we would anticipate.  He does need continued followup, and is willing to do so.    Allergies:  Patient has no known allergies.    Medications:  has a current medication list which includes the following prescription(s): albuterol, atorvastatin, brimonidine 0.15 % opth drop, finasteride, fluticasone-vilanterol, lisinopril, sildenafil, sodium bicarb-sodium chloride, tamsulosin, and loratadine.    Review of Systems:  General: No fever, chills, fatigability, or weight loss.  Skin: No rashes, itching, or changes in color or texture of skin.  Chest: Denies THOMPSON, cyanosis, wheezing, cough, and sputum production.  Abdomen: Appetite fine. No weight loss. Denies diarrhea, abdominal pain, hematemesis, or blood in stool.  Musculoskeletal: No joint stiffness or swelling. Some back pain.  : As above.  All other review of systems negative.    PMH:   has a past medical history of Atrial fibrillation, Back  pain, Bilateral inguinal hernia, Diverticulosis, Diverticulosis (10/28/13), ED (erectile dysfunction), Elevated PSA, Granuloma of liver, Hyperlipidemia, Hypertension, Moderate persistent asthma without complication (9/26/2017), Obesity, Tobacco dependence, and Urinary tract infection.    PSH:   has a past surgical history that includes Hernia repair and Prostate biopsy.    FamHx: family history includes Cancer in his brother, father, and mother; Cancer (age of onset: 64) in his sister; Prostate cancer in his brother.    SocHx:  reports that he quit smoking about 33 years ago. His smoking use included cigarettes. He started smoking about 59 years ago. He has a 30.00 pack-year smoking history. he has never used smokeless tobacco. He reports that he drinks about 7.2 oz of alcohol per week. He reports that he does not use drugs.      Physical Exam:  Vitals:    01/14/19 1312   BP: (!) 152/60   Pulse: 60     General: A&Ox3, no apparent distress, no deformities  Neck: No masses, normal thyroid  Lungs: normal inspiration, no use of accessory muscles  Heart: normal pulse, no arrhythmias  Abdomen: Soft, NT, ND, no masses, no hernias, no hepatosplenomegaly  Lymphatic: Neck and groin nodes negative  Skin: The skin is warm and dry. No jaundice.  Ext: No c/c/e.  :   1/18: Test desc loren, no abnormalities of epididymus. Penis normal, with normal penile and scrotal skin. Meatus normal. Normal rectal tone, no hemorrhoids. Prost >50 gm no nodules or masses appreciated. SV not palpable. Perineum and anus normal.    Labs/Studies:   PSA    10/17: 31.3    7/18: 14.9    1/19: 15.1    Impression/Plan:   1. Will continue flomax/finasteride and recheck PSA 6/12 with RTC 12 mo  2. Caffeine cessation still a good idea.  3. Sildenafil rx.

## 2019-01-17 ENCOUNTER — TELEPHONE (OUTPATIENT)
Dept: UROLOGY | Facility: CLINIC | Age: 74
End: 2019-01-17

## 2019-01-17 NOTE — TELEPHONE ENCOUNTER
----- Message from Joselyn Bautista sent at 1/17/2019  3:49 PM CST -----  Contact: Filipe from [employer]  States he's calling rg a PA on pt's celdinofil 20 mg tabs and can be reached at [phone]//thanks/dbw

## 2019-01-17 NOTE — TELEPHONE ENCOUNTER
Notified pharmacist that PA will not be approved for ED. He states understanding and will inform patient.

## 2019-03-21 ENCOUNTER — PES CALL (OUTPATIENT)
Dept: ADMINISTRATIVE | Facility: CLINIC | Age: 74
End: 2019-03-21

## 2019-06-21 ENCOUNTER — OFFICE VISIT (OUTPATIENT)
Dept: INTERNAL MEDICINE | Facility: CLINIC | Age: 74
End: 2019-06-21
Payer: MEDICARE

## 2019-06-21 VITALS
DIASTOLIC BLOOD PRESSURE: 52 MMHG | SYSTOLIC BLOOD PRESSURE: 132 MMHG | WEIGHT: 176.56 LBS | HEIGHT: 65 IN | HEART RATE: 64 BPM | BODY MASS INDEX: 29.42 KG/M2 | OXYGEN SATURATION: 98 %

## 2019-06-21 DIAGNOSIS — I10 ESSENTIAL HYPERTENSION: Chronic | ICD-10-CM

## 2019-06-21 DIAGNOSIS — I70.0 AORTIC CALCIFICATION: ICD-10-CM

## 2019-06-21 DIAGNOSIS — J45.909 ASTHMA, UNSPECIFIED ASTHMA SEVERITY, UNSPECIFIED WHETHER COMPLICATED, UNSPECIFIED WHETHER PERSISTENT: ICD-10-CM

## 2019-06-21 DIAGNOSIS — R91.8 MULTIPLE PULMONARY NODULES: ICD-10-CM

## 2019-06-21 DIAGNOSIS — E78.5 HYPERLIPIDEMIA, UNSPECIFIED HYPERLIPIDEMIA TYPE: ICD-10-CM

## 2019-06-21 DIAGNOSIS — R97.20 ELEVATED PSA, BETWEEN 10 AND LESS THAN 20 NG/ML: ICD-10-CM

## 2019-06-21 DIAGNOSIS — Z00.00 ENCOUNTER FOR PREVENTIVE HEALTH EXAMINATION: Primary | ICD-10-CM

## 2019-06-21 PROCEDURE — 99999 PR PBB SHADOW E&M-EST. PATIENT-LVL IV: ICD-10-PCS | Mod: PBBFAC,HCNC,, | Performed by: NURSE PRACTITIONER

## 2019-06-21 PROCEDURE — G0439 PR MEDICARE ANNUAL WELLNESS SUBSEQUENT VISIT: ICD-10-PCS | Mod: HCNC,S$GLB,, | Performed by: NURSE PRACTITIONER

## 2019-06-21 PROCEDURE — 3075F PR MOST RECENT SYSTOLIC BLOOD PRESS GE 130-139MM HG: ICD-10-PCS | Mod: HCNC,CPTII,S$GLB, | Performed by: NURSE PRACTITIONER

## 2019-06-21 PROCEDURE — 99999 PR PBB SHADOW E&M-EST. PATIENT-LVL IV: CPT | Mod: PBBFAC,HCNC,, | Performed by: NURSE PRACTITIONER

## 2019-06-21 PROCEDURE — 3078F PR MOST RECENT DIASTOLIC BLOOD PRESSURE < 80 MM HG: ICD-10-PCS | Mod: HCNC,CPTII,S$GLB, | Performed by: NURSE PRACTITIONER

## 2019-06-21 PROCEDURE — G0439 PPPS, SUBSEQ VISIT: HCPCS | Mod: HCNC,S$GLB,, | Performed by: NURSE PRACTITIONER

## 2019-06-21 PROCEDURE — 99499 UNLISTED E&M SERVICE: CPT | Mod: HCNC,S$GLB,, | Performed by: NURSE PRACTITIONER

## 2019-06-21 PROCEDURE — 3075F SYST BP GE 130 - 139MM HG: CPT | Mod: HCNC,CPTII,S$GLB, | Performed by: NURSE PRACTITIONER

## 2019-06-21 PROCEDURE — 3078F DIAST BP <80 MM HG: CPT | Mod: HCNC,CPTII,S$GLB, | Performed by: NURSE PRACTITIONER

## 2019-06-21 PROCEDURE — 99499 RISK ADDL DX/OHS AUDIT: ICD-10-PCS | Mod: HCNC,S$GLB,, | Performed by: NURSE PRACTITIONER

## 2019-06-21 NOTE — PATIENT INSTRUCTIONS
Counseling and Referral of Other Preventative  (Italic type indicates deductible and co-insurance are waived)    Patient Name: Alex Milner  Today's Date: 6/21/2019    Health Maintenance       Date Due Completion Date    TETANUS VACCINE 08/21/1961 ---    Shingles Vaccine (1 of 2) 08/21/1993 ---    Lipid Panel 10/27/2018 10/27/2017    Influenza Vaccine 08/01/2019 1/11/2019    PROSTATE-SPECIFIC ANTIGEN 01/11/2020 1/11/2019    Override on 7/16/2013: Done    Override on 4/16/2013: Done    High Dose Statin 06/21/2020 6/21/2019    Colonoscopy 11/15/2023 11/15/2013    Override on 10/23/2008: Done        No orders of the defined types were placed in this encounter.    The following information is provided to all patients.  This information is to help you find resources for any of the problems found today that may be affecting your health:                Living healthy guide: www.[facility].[place].[facility]      Understanding Diabetes: www.diabetes.org      Eating healthy: www.cdc.gov/healthyweight      CDC home safety checklist: www.cdc.gov/steadi/patient.html      Agency on Aging: www.goea.[place].[facility]      Alcoholics anonymous (AA): www.aa.org      Physical Activity: www.raya.nih.gov/fu3ftqs      Tobacco use: www.quitwithusla.org

## 2019-06-21 NOTE — PROGRESS NOTES
"Alex Milner presented for a  Medicare AWV and comprehensive Health Risk Assessment today. The following components were reviewed and updated:    · Medical history  · Family History  · Social history  · Allergies and Current Medications  · Health Risk Assessment  · Health Maintenance  · Care Team     ** See Completed Assessments for Annual Wellness Visit within the encounter summary.**       The following assessments were completed:  · Living Situation  · CAGE  · Depression Screening  · Timed Get Up and Go  · Whisper Test  · Cognitive Function Screening  · Nutrition Screening  · ADL Screening  · PAQ Screening    Vitals:    06/21/19 0750   BP: (!) 132/52   BP Location: Left arm   Patient Position: Sitting   BP Method: Medium (Manual)   Pulse: 64   SpO2: 98%   Weight: 80.1 kg (176 lb 9.4 oz)   Height: 5' 5.35" (1.66 m)     Body mass index is 29.07 kg/m².  Physical Exam   Constitutional: He is oriented to person, place, and time. He appears well-developed and well-nourished.   HENT:   Head: Normocephalic.   Cardiovascular: Normal rate, regular rhythm and normal heart sounds.   No murmur heard.  Pulmonary/Chest: Effort normal and breath sounds normal. No respiratory distress.   Abdominal: Soft. He exhibits no mass. There is no tenderness.   Musculoskeletal: Normal range of motion. He exhibits no edema.   Neurological: He is alert and oriented to person, place, and time. He exhibits normal muscle tone.   Skin: Skin is warm, dry and intact.   Psychiatric: He has a normal mood and affect. His speech is normal and behavior is normal.   Nursing note and vitals reviewed.        Diagnoses and health risks identified today and associated recommendations/orders:    1. Encounter for preventive health examination  He will discuss health maintenance at upcoming annual exam.    2. Essential hypertension  Reports he feels great. He will monitor blood pressure and keep a log and bring into upcoming annual exam for review. "   Continue current treatment plan as previously prescribed with your PCP.     3. Hyperlipidemia, unspecified hyperlipidemia type  No recent check. He will follow up at upcoming annual exam.   Continue current treatment plan as previously prescribed with your PCP.      4. Aortic calcification  Ct abdomen/pelvis 3/9/2015 (care everywhere)  Discussed diagnosis and risk reduction.   Advised to follow up with PCP for further recommendations. Patient expressed understanding.       5. Elevated PSA, between 10 and less than 20 ng/ml  Continue current treatment plan as previously prescribed with your urologist.     6. Asthma, unspecified asthma severity, unspecified whether complicated, unspecified whether persistent  Continue current treatment plan as previously prescribed with your pulmonary NP.     7. Multiple pulmonary nodules  Ct chest 3/26/2018  Continue current treatment plan as previously prescribed with your pulmonary NP.     8.   Abnormal whisper test.   Advised to follow up with PCP for further evaluation and recommendations. Patient expressed understanding.       Provided Alex with a 5-10 year written screening schedule and personal prevention plan.  Education, counseling, and referrals were provided as needed. After Visit Summary printed and given to patient which includes a list of additional screenings\tests needed.    Follow up in about 1 year (around 6/21/2020) for AWV.    Chacha Osborn, NP  I offered to discuss end of life issues, including information on how to make advance directives that the patient could use to name someone who would make medical decisions on their behalf if they became too ill to make themselves.    ___Patient declined  _X_Patient is interested, I provided paper work and offered to discuss.

## 2019-06-24 ENCOUNTER — OFFICE VISIT (OUTPATIENT)
Dept: INTERNAL MEDICINE | Facility: CLINIC | Age: 74
End: 2019-06-24
Payer: MEDICARE

## 2019-06-24 VITALS
DIASTOLIC BLOOD PRESSURE: 68 MMHG | SYSTOLIC BLOOD PRESSURE: 118 MMHG | TEMPERATURE: 99 F | WEIGHT: 180.13 LBS | BODY MASS INDEX: 29.65 KG/M2 | HEART RATE: 56 BPM

## 2019-06-24 DIAGNOSIS — I10 ESSENTIAL HYPERTENSION: Primary | Chronic | ICD-10-CM

## 2019-06-24 DIAGNOSIS — I70.0 AORTIC CALCIFICATION: ICD-10-CM

## 2019-06-24 DIAGNOSIS — E78.00 PURE HYPERCHOLESTEROLEMIA: Chronic | ICD-10-CM

## 2019-06-24 PROCEDURE — 99214 OFFICE O/P EST MOD 30 MIN: CPT | Mod: HCNC,S$GLB,, | Performed by: PHYSICIAN ASSISTANT

## 2019-06-24 PROCEDURE — 3078F DIAST BP <80 MM HG: CPT | Mod: HCNC,CPTII,S$GLB, | Performed by: PHYSICIAN ASSISTANT

## 2019-06-24 PROCEDURE — 99999 PR PBB SHADOW E&M-EST. PATIENT-LVL III: CPT | Mod: PBBFAC,HCNC,, | Performed by: PHYSICIAN ASSISTANT

## 2019-06-24 PROCEDURE — 1101F PR PT FALLS ASSESS DOC 0-1 FALLS W/OUT INJ PAST YR: ICD-10-PCS | Mod: HCNC,CPTII,S$GLB, | Performed by: PHYSICIAN ASSISTANT

## 2019-06-24 PROCEDURE — 3074F PR MOST RECENT SYSTOLIC BLOOD PRESSURE < 130 MM HG: ICD-10-PCS | Mod: HCNC,CPTII,S$GLB, | Performed by: PHYSICIAN ASSISTANT

## 2019-06-24 PROCEDURE — 3078F PR MOST RECENT DIASTOLIC BLOOD PRESSURE < 80 MM HG: ICD-10-PCS | Mod: HCNC,CPTII,S$GLB, | Performed by: PHYSICIAN ASSISTANT

## 2019-06-24 PROCEDURE — 3074F SYST BP LT 130 MM HG: CPT | Mod: HCNC,CPTII,S$GLB, | Performed by: PHYSICIAN ASSISTANT

## 2019-06-24 PROCEDURE — 99999 PR PBB SHADOW E&M-EST. PATIENT-LVL III: ICD-10-PCS | Mod: PBBFAC,HCNC,, | Performed by: PHYSICIAN ASSISTANT

## 2019-06-24 PROCEDURE — 1101F PT FALLS ASSESS-DOCD LE1/YR: CPT | Mod: HCNC,CPTII,S$GLB, | Performed by: PHYSICIAN ASSISTANT

## 2019-06-24 PROCEDURE — 99214 PR OFFICE/OUTPT VISIT, EST, LEVL IV, 30-39 MIN: ICD-10-PCS | Mod: HCNC,S$GLB,, | Performed by: PHYSICIAN ASSISTANT

## 2019-06-24 NOTE — PROGRESS NOTES
Subjective:       Patient ID: Alex Milner is a 73 y.o. male.    Chief Complaint: Annual Exam    HPI  Patient is a 73-year-old male presented for updated physical exam review chronic health issues.  He indicates he has been doing well.  Patient has a history of hypertension, hyperlipidemia, elevated PSA, calcified granulomas of the lung, moderate persistent asthma. Overall he states he's been doing well.  His breathing is stable at this time is chronic health conditions are under control.  He has had no hospitalizations or emergency room visits.  He is follwed by  Dr. Ramírez for  his prostate issues    BP also stable   Needs updated labs   Patient willing to return to do this since he is not fasting today   Health Maintenance Due   Topic Date Due    TETANUS VACCINE  08/21/1963    Lipid Panel  10/27/2018       Past Medical History:   Diagnosis Date    Atrial fibrillation     pt unaware of this    Back pain     Bilateral inguinal hernia     CT ABdomen/pelvis 3/9/2015 (care everywhere)---Bilateral inguinal hernias containing fat.      Calcified granuloma of lung 10/3/2017    CT CHest 3/26/2018---There are calcified lymph nodes in the mediastinum and left hilum.    CT chest 9/11/ 2017 Calcified left hilar and subcarinal granulomas are noted  ;Calcified granuloma noted within the posterior segment of the right hepatic lobe.    Diverticulosis     Diverticulosis 10/28/13    Colonoscopy     ED (erectile dysfunction)     Elevated PSA     Granuloma of liver     ct chest 3/26/2018---There is a small calcified granuloma posteriorly in the right lobe of the liver    Hyperlipidemia     Hypertension     Moderate persistent asthma without complication 9/26/2017    Obesity     Tobacco dependence     resolved    Urinary tract infection        Current Outpatient Medications   Medication Sig Dispense Refill    atorvastatin (LIPITOR) 80 MG tablet Take 1 tablet (80 mg total) by mouth once daily. 30 tablet 11     brimonidine 0.15 % OPTH DROP (ALPHAGAN) 0.15 % ophthalmic solution LOCATION: BOTH EYES. INSTILL ONE DROP IN BOTH EYES TWICE A DAY X 90 DAYS  1    finasteride (PROSCAR) 5 mg tablet Take 1 tablet (5 mg total) by mouth once daily. 30 tablet 11    lisinopril 10 MG tablet Take 1 tablet (10 mg total) by mouth once daily. 90 tablet 4    sildenafil (REVATIO) 20 mg Tab Take 1-5 tablets as needed on an empty stomach with no alcohol an hour before desiring an erection. 50 tablet 11    sodium bicarb-sodium chloride Pack 1 packet by Nasal route 2 (two) times daily. 120 each 0    tamsulosin (FLOMAX) 0.4 mg Cap Take 1 capsule (0.4 mg total) by mouth once daily. 30 capsule 11     No current facility-administered medications for this visit.        Review of Systems   Constitutional: Negative for activity change, fatigue, fever and unexpected weight change.   HENT: Negative for trouble swallowing and voice change.    Eyes: Negative for visual disturbance.   Respiratory: Negative for cough and shortness of breath.    Cardiovascular: Negative for chest pain and leg swelling.   Gastrointestinal: Negative for abdominal distention, abdominal pain and blood in stool.   Endocrine: Negative for polyuria.   Genitourinary: Negative for difficulty urinating and penile pain.   Musculoskeletal: Negative for arthralgias and back pain.   Skin: Negative for color change and rash.   Allergic/Immunologic: Negative.  Negative for immunocompromised state.   Neurological: Negative for dizziness and speech difficulty.   Hematological: Negative for adenopathy. Does not bruise/bleed easily.   Psychiatric/Behavioral: Negative for agitation and suicidal ideas.       Objective:   /68   Pulse (!) 56   Temp 98.7 °F (37.1 °C) (Oral)   Wt 81.7 kg (180 lb 1.9 oz)   BMI 29.65 kg/m²      Physical Exam   Constitutional: He is oriented to person, place, and time. He appears well-developed and well-nourished. No distress.   HENT:   Head: Normocephalic and  atraumatic.   Mouth/Throat: Oropharynx is clear and moist.   Eyes: Pupils are equal, round, and reactive to light. EOM are normal.   Neck: Normal range of motion. Neck supple. No JVD present. No thyromegaly present.   Cardiovascular: Normal rate, regular rhythm, normal heart sounds and intact distal pulses. Exam reveals no gallop and no friction rub.   No murmur heard.  Pulmonary/Chest: Effort normal and breath sounds normal. He has no wheezes. He has no rales.   Abdominal: Soft. Bowel sounds are normal. He exhibits no distension. There is no tenderness. There is no rebound and no guarding.   Musculoskeletal: Normal range of motion. He exhibits no edema.   Lymphadenopathy:     He has no cervical adenopathy.   Neurological: He is alert and oriented to person, place, and time. He has normal reflexes. No cranial nerve deficit.   Skin: Skin is warm and dry. No rash noted.   Psychiatric: He has a normal mood and affect. Judgment normal.   Vitals reviewed.        Lab Results   Component Value Date    WBC 5.26 10/27/2017    HGB 14.5 10/27/2017    HCT 46.1 10/27/2017     10/27/2017    CHOL 253 (H) 10/27/2017    TRIG 85 10/27/2017    HDL 79 (H) 10/27/2017    ALT 14 10/27/2017    AST 21 10/27/2017     10/27/2017    K 4.8 10/27/2017     10/27/2017    CREATININE 1.2 03/23/2018    BUN 12 10/27/2017    CO2 27 10/27/2017    TSH 1.5 08/09/2006    PSA 15.1 (H) 01/11/2019       Assessment:       1. Essential hypertension    2. Routine general medical examination at a health care facility    3. Pure hypercholesterolemia    4. Aortic calcification        Plan:   Essential hypertension  -     TSH; Future; Expected date: 06/24/2019  -     Lipid panel; Future; Expected date: 06/24/2019  -     Comprehensive metabolic panel; Future; Expected date: 06/24/2019  -     CBC auto differential; Future; Expected date: 06/24/2019    Routine general medical examination at a health care facility    Pure hypercholesterolemia  -      TSH; Future; Expected date: 06/24/2019  -     Lipid panel; Future; Expected date: 06/24/2019    Aortic calcification  -     Lipid panel; Future; Expected date: 06/24/2019    Update labs   Patient has no compalints today   Needs to set follow up with pulm for history of lung nodules and annual with PCP to go over labs.

## 2019-06-25 ENCOUNTER — TELEPHONE (OUTPATIENT)
Dept: INTERNAL MEDICINE | Facility: CLINIC | Age: 74
End: 2019-06-25

## 2019-06-25 NOTE — TELEPHONE ENCOUNTER
----- Message from IRENE Mayer sent at 6/25/2019 10:27 AM CDT -----  Make sure he is on target as to when he needs to follow up with pulm for his CT, he may be due

## 2019-06-27 ENCOUNTER — LAB VISIT (OUTPATIENT)
Dept: LAB | Facility: HOSPITAL | Age: 74
End: 2019-06-27
Attending: PHYSICIAN ASSISTANT
Payer: MEDICARE

## 2019-06-27 ENCOUNTER — OFFICE VISIT (OUTPATIENT)
Dept: OPHTHALMOLOGY | Facility: CLINIC | Age: 74
End: 2019-06-27
Payer: MEDICARE

## 2019-06-27 DIAGNOSIS — I10 ESSENTIAL HYPERTENSION: Chronic | ICD-10-CM

## 2019-06-27 DIAGNOSIS — H25.13 CATARACT, NUCLEAR SCLEROTIC SENILE, BILATERAL: ICD-10-CM

## 2019-06-27 DIAGNOSIS — H40.1134 PRIMARY OPEN ANGLE GLAUCOMA OF BOTH EYES, INDETERMINATE STAGE: Primary | ICD-10-CM

## 2019-06-27 DIAGNOSIS — H52.7 REFRACTIVE ERROR: ICD-10-CM

## 2019-06-27 DIAGNOSIS — I70.0 AORTIC CALCIFICATION: ICD-10-CM

## 2019-06-27 DIAGNOSIS — E78.00 PURE HYPERCHOLESTEROLEMIA: Chronic | ICD-10-CM

## 2019-06-27 LAB
ALBUMIN SERPL BCP-MCNC: 3.6 G/DL (ref 3.5–5.2)
ALP SERPL-CCNC: 77 U/L (ref 55–135)
ALT SERPL W/O P-5'-P-CCNC: 10 U/L (ref 10–44)
ANION GAP SERPL CALC-SCNC: 8 MMOL/L (ref 8–16)
AST SERPL-CCNC: 15 U/L (ref 10–40)
BASOPHILS # BLD AUTO: 0.07 K/UL (ref 0–0.2)
BASOPHILS NFR BLD: 1.3 % (ref 0–1.9)
BILIRUB SERPL-MCNC: 0.3 MG/DL (ref 0.1–1)
BUN SERPL-MCNC: 10 MG/DL (ref 8–23)
CALCIUM SERPL-MCNC: 8.8 MG/DL (ref 8.7–10.5)
CHLORIDE SERPL-SCNC: 106 MMOL/L (ref 95–110)
CHOLEST SERPL-MCNC: 271 MG/DL (ref 120–199)
CHOLEST/HDLC SERPL: 3.3 {RATIO} (ref 2–5)
CO2 SERPL-SCNC: 27 MMOL/L (ref 23–29)
CREAT SERPL-MCNC: 1.1 MG/DL (ref 0.5–1.4)
DIFFERENTIAL METHOD: ABNORMAL
EOSINOPHIL # BLD AUTO: 0.2 K/UL (ref 0–0.5)
EOSINOPHIL NFR BLD: 4.2 % (ref 0–8)
ERYTHROCYTE [DISTWIDTH] IN BLOOD BY AUTOMATED COUNT: 14.9 % (ref 11.5–14.5)
EST. GFR  (AFRICAN AMERICAN): >60 ML/MIN/1.73 M^2
EST. GFR  (NON AFRICAN AMERICAN): >60 ML/MIN/1.73 M^2
GLUCOSE SERPL-MCNC: 87 MG/DL (ref 70–110)
HCT VFR BLD AUTO: 46.9 % (ref 40–54)
HDLC SERPL-MCNC: 81 MG/DL (ref 40–75)
HDLC SERPL: 29.9 % (ref 20–50)
HGB BLD-MCNC: 14.7 G/DL (ref 14–18)
IMM GRANULOCYTES # BLD AUTO: 0.01 K/UL (ref 0–0.04)
IMM GRANULOCYTES NFR BLD AUTO: 0.2 % (ref 0–0.5)
LDLC SERPL CALC-MCNC: 173.2 MG/DL (ref 63–159)
LYMPHOCYTES # BLD AUTO: 1.9 K/UL (ref 1–4.8)
LYMPHOCYTES NFR BLD: 35 % (ref 18–48)
MCH RBC QN AUTO: 26.7 PG (ref 27–31)
MCHC RBC AUTO-ENTMCNC: 31.3 G/DL (ref 32–36)
MCV RBC AUTO: 85 FL (ref 82–98)
MONOCYTES # BLD AUTO: 0.5 K/UL (ref 0.3–1)
MONOCYTES NFR BLD: 9.8 % (ref 4–15)
NEUTROPHILS # BLD AUTO: 2.6 K/UL (ref 1.8–7.7)
NEUTROPHILS NFR BLD: 49.5 % (ref 38–73)
NONHDLC SERPL-MCNC: 190 MG/DL
NRBC BLD-RTO: 0 /100 WBC
PLATELET # BLD AUTO: 234 K/UL (ref 150–350)
PMV BLD AUTO: 9.7 FL (ref 9.2–12.9)
POTASSIUM SERPL-SCNC: 4.2 MMOL/L (ref 3.5–5.1)
PROT SERPL-MCNC: 6.7 G/DL (ref 6–8.4)
RBC # BLD AUTO: 5.5 M/UL (ref 4.6–6.2)
SODIUM SERPL-SCNC: 141 MMOL/L (ref 136–145)
TRIGL SERPL-MCNC: 84 MG/DL (ref 30–150)
TSH SERPL DL<=0.005 MIU/L-ACNC: 0.91 UIU/ML (ref 0.4–4)
WBC # BLD AUTO: 5.28 K/UL (ref 3.9–12.7)

## 2019-06-27 PROCEDURE — 36415 COLL VENOUS BLD VENIPUNCTURE: CPT | Mod: HCNC

## 2019-06-27 PROCEDURE — 99999 PR PBB SHADOW E&M-EST. PATIENT-LVL II: CPT | Mod: PBBFAC,HCNC,, | Performed by: OPTOMETRIST

## 2019-06-27 PROCEDURE — 92133 CPTRZD OPH DX IMG PST SGM ON: CPT | Mod: HCNC,S$GLB,, | Performed by: OPTOMETRIST

## 2019-06-27 PROCEDURE — 80061 LIPID PANEL: CPT | Mod: HCNC

## 2019-06-27 PROCEDURE — 92015 DETERMINE REFRACTIVE STATE: CPT | Mod: HCNC,S$GLB,, | Performed by: OPTOMETRIST

## 2019-06-27 PROCEDURE — 80053 COMPREHEN METABOLIC PANEL: CPT | Mod: HCNC

## 2019-06-27 PROCEDURE — 92004 COMPRE OPH EXAM NEW PT 1/>: CPT | Mod: HCNC,S$GLB,, | Performed by: OPTOMETRIST

## 2019-06-27 PROCEDURE — 92015 PR REFRACTION: ICD-10-PCS | Mod: HCNC,S$GLB,, | Performed by: OPTOMETRIST

## 2019-06-27 PROCEDURE — 85025 COMPLETE CBC W/AUTO DIFF WBC: CPT | Mod: HCNC

## 2019-06-27 PROCEDURE — 99999 PR PBB SHADOW E&M-EST. PATIENT-LVL II: ICD-10-PCS | Mod: PBBFAC,HCNC,, | Performed by: OPTOMETRIST

## 2019-06-27 PROCEDURE — 84443 ASSAY THYROID STIM HORMONE: CPT | Mod: HCNC

## 2019-06-27 PROCEDURE — 92004 PR EYE EXAM, NEW PATIENT,COMPREHESV: ICD-10-PCS | Mod: HCNC,S$GLB,, | Performed by: OPTOMETRIST

## 2019-06-27 PROCEDURE — 92133 POSTERIOR SEGMENT OCT OPTIC NERVE(OCULAR COHERENCE TOMOGRAPHY) - OU - BOTH EYES: ICD-10-PCS | Mod: HCNC,S$GLB,, | Performed by: OPTOMETRIST

## 2019-06-27 RX ORDER — LATANOPROST 50 UG/ML
1 SOLUTION/ DROPS OPHTHALMIC DAILY
Qty: 1 BOTTLE | Refills: 5 | Status: SHIPPED | OUTPATIENT
Start: 2019-06-27 | End: 2020-07-24 | Stop reason: SDUPTHER

## 2019-06-27 NOTE — PROGRESS NOTES
HPI     New Patient  Patient has been prescribed Alphagan drops and has not used them in 2 yrs   Screening for glaucoma  RE  Needs updated eyeglass Rx   Not using any correction at this time    Last edited by Byron Hernandez MA on 6/27/2019  8:03 AM. (History)            Assessment /Plan     For exam results, see Encounter Report.    Primary open angle glaucoma of both eyes, indeterminate stage  -     latanoprost 0.005 % ophthalmic solution; Place 1 drop into both eyes once daily.  Dispense: 1 Bottle; Refill: 5    Cataract, nuclear sclerotic senile, bilateral    Refractive error      GOCT = shows definitive NFL loss OU.  He has large C/D 0.85/0.80.  This alone justifies restarting treatment for  POAG.  Mild to moderate NS OU = will follow.  OH OK OU otherwise.  RTC 3-4 weeks for 24-2 HVF, CCT, and SDP.  GOCT:  First GOCT here at [facility].  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  RTC 3-4 weeks for HVF 24-2, IOL, CCT, and SDP.

## 2019-06-27 NOTE — LETTER
June 27, 2019      Chacha Osborn, [place]  [address] [facility] [address] [place] [address]           [facility]  [address] [facility] [address]  Phone: [phone]  Fax: [phone]          Patient: Alex Milner   MR Number: [id number]   YOB: 1945   Date of Visit: 6/27/2019       Dear Chacha Osborn:    Thank you for referring Alex Milner to me for evaluation. Attached you will find relevant portions of my assessment and plan of care.    If you have questions, please do not hesitate to call me. I look forward to following Alex Milner along with you.    Sincerely,    LUIGI Barboza, OD    Enclosure  CC:  No Recipients    If you would like to receive this communication electronically, please contact [email or web address] or [phone] to request more information on [facility] access.    For providers and/or their staff who would like to refer a patient to [facility], please contact us through our one-stop-shop provider referral line, Elena Mena, at [phone].    If you feel you have received this communication in error or would no longer like to receive these types of communications, please e-mail [email or web address]

## 2019-07-15 ENCOUNTER — LAB VISIT (OUTPATIENT)
Dept: LAB | Facility: HOSPITAL | Age: 74
End: 2019-07-15
Attending: UROLOGY
Payer: MEDICARE

## 2019-07-15 DIAGNOSIS — Z12.5 PROSTATE CANCER SCREENING: ICD-10-CM

## 2019-07-15 DIAGNOSIS — N40.0 BENIGN PROSTATIC HYPERPLASIA, UNSPECIFIED WHETHER LOWER URINARY TRACT SYMPTOMS PRESENT: ICD-10-CM

## 2019-07-15 DIAGNOSIS — N52.9 ERECTILE DYSFUNCTION, UNSPECIFIED ERECTILE DYSFUNCTION TYPE: ICD-10-CM

## 2019-07-15 PROCEDURE — 36415 COLL VENOUS BLD VENIPUNCTURE: CPT | Mod: HCNC,PO

## 2019-07-15 PROCEDURE — 84153 ASSAY OF PSA TOTAL: CPT | Mod: HCNC

## 2019-07-16 LAB — COMPLEXED PSA SERPL-MCNC: 17.5 NG/ML (ref 0–4)

## 2019-07-29 ENCOUNTER — OFFICE VISIT (OUTPATIENT)
Dept: OPHTHALMOLOGY | Facility: CLINIC | Age: 74
End: 2019-07-29
Payer: MEDICARE

## 2019-07-29 DIAGNOSIS — H40.1134 PRIMARY OPEN ANGLE GLAUCOMA OF BOTH EYES, INDETERMINATE STAGE: Primary | ICD-10-CM

## 2019-07-29 PROCEDURE — 92083 EXTENDED VISUAL FIELD XM: CPT | Mod: HCNC,S$GLB,, | Performed by: OPTOMETRIST

## 2019-07-29 PROCEDURE — 99499 RISK ADDL DX/OHS AUDIT: ICD-10-PCS | Mod: HCNC,S$GLB,, | Performed by: OPTOMETRIST

## 2019-07-29 PROCEDURE — 99999 PR PBB SHADOW E&M-EST. PATIENT-LVL II: CPT | Mod: PBBFAC,HCNC,, | Performed by: OPTOMETRIST

## 2019-07-29 PROCEDURE — 92012 PR EYE EXAM, EST PATIENT,INTERMED: ICD-10-PCS | Mod: HCNC,S$GLB,, | Performed by: OPTOMETRIST

## 2019-07-29 PROCEDURE — 92083 HUMPHREY VISUAL FIELD - OU - BOTH EYES: ICD-10-PCS | Mod: HCNC,S$GLB,, | Performed by: OPTOMETRIST

## 2019-07-29 PROCEDURE — 92012 INTRM OPH EXAM EST PATIENT: CPT | Mod: HCNC,S$GLB,, | Performed by: OPTOMETRIST

## 2019-07-29 PROCEDURE — 99499 UNLISTED E&M SERVICE: CPT | Mod: HCNC,S$GLB,, | Performed by: OPTOMETRIST

## 2019-07-29 PROCEDURE — 99999 PR PBB SHADOW E&M-EST. PATIENT-LVL II: ICD-10-PCS | Mod: PBBFAC,HCNC,, | Performed by: OPTOMETRIST

## 2019-07-29 NOTE — PROGRESS NOTES
HPI     Last [facility] exam 06/27/2019  Glaucoma   Latanoprost 0.005% 1 drop OU  Last GOCT 06/27/2019  HVF (Today)    Last edited by Byron Hernandez MA on 7/29/2019  3:38 PM. (History)            Assessment /Plan     For exam results, see Encounter Report.    Primary open angle glaucoma of both eyes, indeterminate stage  -     Coe Visual Field - OU - Extended - Both Eyes      HVF 24-2:  OD = Enlarged blind spot OD with inferior arcuate defects consistent with COAG.                    OS = Poor reliability but no definitive COAG changes    RTC 3 months for IOP check and repeat HVF and GOCT in 6 months.  Continue with latanoprost OU qhs.

## 2019-08-08 ENCOUNTER — OFFICE VISIT (OUTPATIENT)
Dept: INTERNAL MEDICINE | Facility: CLINIC | Age: 74
End: 2019-08-08
Payer: MEDICARE

## 2019-08-08 VITALS
HEIGHT: 66 IN | WEIGHT: 178.81 LBS | RESPIRATION RATE: 20 BRPM | SYSTOLIC BLOOD PRESSURE: 138 MMHG | HEART RATE: 60 BPM | BODY MASS INDEX: 28.74 KG/M2 | DIASTOLIC BLOOD PRESSURE: 60 MMHG | TEMPERATURE: 98 F

## 2019-08-08 DIAGNOSIS — I10 ESSENTIAL HYPERTENSION: ICD-10-CM

## 2019-08-08 DIAGNOSIS — E78.2 MIXED HYPERLIPIDEMIA: ICD-10-CM

## 2019-08-08 DIAGNOSIS — I73.9 CLAUDICATION: ICD-10-CM

## 2019-08-08 PROCEDURE — 3075F SYST BP GE 130 - 139MM HG: CPT | Mod: HCNC,CPTII,S$GLB, | Performed by: INTERNAL MEDICINE

## 2019-08-08 PROCEDURE — 3078F DIAST BP <80 MM HG: CPT | Mod: HCNC,CPTII,S$GLB, | Performed by: INTERNAL MEDICINE

## 2019-08-08 PROCEDURE — 99999 PR PBB SHADOW E&M-EST. PATIENT-LVL III: CPT | Mod: PBBFAC,HCNC,, | Performed by: INTERNAL MEDICINE

## 2019-08-08 PROCEDURE — 99214 OFFICE O/P EST MOD 30 MIN: CPT | Mod: HCNC,S$GLB,, | Performed by: INTERNAL MEDICINE

## 2019-08-08 PROCEDURE — 1101F PR PT FALLS ASSESS DOC 0-1 FALLS W/OUT INJ PAST YR: ICD-10-PCS | Mod: HCNC,CPTII,S$GLB, | Performed by: INTERNAL MEDICINE

## 2019-08-08 PROCEDURE — 99999 PR PBB SHADOW E&M-EST. PATIENT-LVL III: ICD-10-PCS | Mod: PBBFAC,HCNC,, | Performed by: INTERNAL MEDICINE

## 2019-08-08 PROCEDURE — 3078F PR MOST RECENT DIASTOLIC BLOOD PRESSURE < 80 MM HG: ICD-10-PCS | Mod: HCNC,CPTII,S$GLB, | Performed by: INTERNAL MEDICINE

## 2019-08-08 PROCEDURE — 3075F PR MOST RECENT SYSTOLIC BLOOD PRESS GE 130-139MM HG: ICD-10-PCS | Mod: HCNC,CPTII,S$GLB, | Performed by: INTERNAL MEDICINE

## 2019-08-08 PROCEDURE — 99214 PR OFFICE/OUTPT VISIT, EST, LEVL IV, 30-39 MIN: ICD-10-PCS | Mod: HCNC,S$GLB,, | Performed by: INTERNAL MEDICINE

## 2019-08-08 PROCEDURE — 1101F PT FALLS ASSESS-DOCD LE1/YR: CPT | Mod: HCNC,CPTII,S$GLB, | Performed by: INTERNAL MEDICINE

## 2019-08-08 RX ORDER — CILOSTAZOL 50 MG/1
50 TABLET ORAL 2 TIMES DAILY
Qty: 60 TABLET | Refills: 11 | Status: SHIPPED | OUTPATIENT
Start: 2019-08-08 | End: 2020-05-21 | Stop reason: SDUPTHER

## 2019-08-08 RX ORDER — LISINOPRIL 20 MG/1
20 TABLET ORAL DAILY
Qty: 90 TABLET | Refills: 3 | Status: SHIPPED | OUTPATIENT
Start: 2019-08-08 | End: 2022-07-25 | Stop reason: SDUPTHER

## 2019-08-08 NOTE — PROGRESS NOTES
"Subjective:       Patient ID: Alex Milner is a 73 y.o. male.    Chief Complaint: Follow-up (4-5 wks)    HPI Patient is a 73-year-old male presenting today following up on hyperlipidemia and hypertension.  He indicates he has been doing well since his last visit.  His blood pressure remains under good control he is not having any problems with the numbers.  He does occasionally notice numbers into the 140 range systolic.    Cholesterol numbers have been good over time.  He has not experienced any issues with it.  He is noted no problems with medications.    In discussion today he experiences some problems with some leg pain. He states if he walks about 100 ft he gets pain in the back of his calves bilaterally but more on the right than the left.  He has noted that if he stops walking it gets better and then a few minutes at most sometimes it is better within 30 to seconds to a minute.  He denies cold ft.  He has not had a history of peripheral vascular issues.    Review of Systems   Constitutional: Negative for fever and unexpected weight change.   Respiratory: Negative for cough, shortness of breath and wheezing.    Cardiovascular: Negative for chest pain and palpitations.   Gastrointestinal: Negative for constipation, diarrhea, nausea and vomiting.   Genitourinary: Negative for dysuria and hematuria.       Objective:   /60   Pulse 60   Temp 97.9 °F (36.6 °C) (Tympanic)   Resp 20   Ht 5' 5.5" (1.664 m)   Wt 81.1 kg (178 lb 12.7 oz)   BMI 29.30 kg/m²      Physical Exam   Constitutional: He is oriented to person, place, and time. He appears well-developed and well-nourished.   HENT:   Head: Normocephalic and atraumatic.   Eyes: Pupils are equal, round, and reactive to light.   Neck: Neck supple. No thyromegaly present.   Cardiovascular: Normal rate, regular rhythm and normal heart sounds. Exam reveals no gallop and no friction rub.   No murmur heard.  Pulses:       Dorsalis pedis pulses are 0 on the " right side, and 1+ on the left side.        Posterior tibial pulses are 0 on the right side, and 1+ on the left side.   Decreased pulses in the right foot.  Capillary refill appears normal bilaterally. Ft are warm and dry.   Pulmonary/Chest: Breath sounds normal. He has no wheezes. He has no rales.   Abdominal: Soft. Bowel sounds are normal. He exhibits no distension. There is no tenderness.   Neurological: He is alert and oriented to person, place, and time.   Vitals reviewed.      No visits with results within 2 Week(s) from this visit.   Latest known visit with results is:   Lab Visit on 07/15/2019   Component Date Value    PSA, SCREEN 07/15/2019 17.5*       Assessment:       1. Mixed hyperlipidemia    2. Essential hypertension    3. Claudication        Plan:   Mixed hyperlipidemia  Cholesterol numbers look good.  We will continue the current regimen at this time.  Remain focused on low fat diet and high dietary fiber intake.      Mixed hyperlipidemia    Essential hypertension  Comments:  increase lisinopril to 20 mg once daily  Orders:  -     lisinopril (PRINIVIL,ZESTRIL) 20 MG tablet; Take 1 tablet (20 mg total) by mouth once daily.  Dispense: 90 tablet; Refill: 3    Claudication  Comments:  start pletal 50 mg twice daily, exercise arpit  Orders:  -     cilostazol (PLETAL) 50 MG Tab; Take 1 tablet (50 mg total) by mouth 2 (two) times daily.  Dispense: 60 tablet; Refill: 11  -     CAR Ankle Brachial Indices W Stress; Future          Follow up in about 4 weeks (around 9/5/2019).

## 2019-08-14 ENCOUNTER — CLINICAL SUPPORT (OUTPATIENT)
Dept: CARDIOLOGY | Facility: CLINIC | Age: 74
End: 2019-08-14
Attending: INTERNAL MEDICINE
Payer: MEDICARE

## 2019-08-14 DIAGNOSIS — I73.9 CLAUDICATION: ICD-10-CM

## 2019-08-14 LAB — VASCULAR ANKLE BRACHIAL INDEX (ABI) LEFT: 0.49 (ref 0.9–1.2)

## 2019-08-14 PROCEDURE — 93922 CAR ANKLE BRACHIAL INDICES W STRESS: ICD-10-PCS | Mod: HCNC,S$GLB,, | Performed by: INTERNAL MEDICINE

## 2019-08-14 PROCEDURE — 93922 UPR/L XTREMITY ART 2 LEVELS: CPT | Mod: HCNC,S$GLB,, | Performed by: INTERNAL MEDICINE

## 2019-08-15 ENCOUNTER — TELEPHONE (OUTPATIENT)
Dept: INTERNAL MEDICINE | Facility: CLINIC | Age: 74
End: 2019-08-15

## 2019-08-15 DIAGNOSIS — I73.9 PAD (PERIPHERAL ARTERY DISEASE): Primary | ICD-10-CM

## 2019-08-15 NOTE — TELEPHONE ENCOUNTER
Patient was informed of his results via phone.  Patient verbalized understanding.  Appointment was already scheduled with Dr. Ross for tomorrow.  Patient verbalized understanding of his appointment and the importance of keeping it.

## 2019-08-15 NOTE — TELEPHONE ENCOUNTER
FREDY shows significant blood flow issues in the legs.  Referral to cardiology placed.  Needs to see Adam Ross or Dereje FULTON.

## 2019-08-16 ENCOUNTER — OFFICE VISIT (OUTPATIENT)
Dept: CARDIOLOGY | Facility: CLINIC | Age: 74
End: 2019-08-16
Payer: MEDICARE

## 2019-08-16 ENCOUNTER — CLINICAL SUPPORT (OUTPATIENT)
Dept: CARDIOLOGY | Facility: CLINIC | Age: 74
End: 2019-08-16
Payer: MEDICARE

## 2019-08-16 VITALS
BODY MASS INDEX: 29.66 KG/M2 | SYSTOLIC BLOOD PRESSURE: 160 MMHG | DIASTOLIC BLOOD PRESSURE: 74 MMHG | WEIGHT: 178 LBS | HEIGHT: 65 IN | HEART RATE: 58 BPM | RESPIRATION RATE: 18 BRPM

## 2019-08-16 DIAGNOSIS — I10 ESSENTIAL HYPERTENSION: Chronic | ICD-10-CM

## 2019-08-16 DIAGNOSIS — R91.8 MULTIPLE PULMONARY NODULES: ICD-10-CM

## 2019-08-16 DIAGNOSIS — G89.29 CHRONIC CHEST PAIN: ICD-10-CM

## 2019-08-16 DIAGNOSIS — I70.0 AORTIC CALCIFICATION: ICD-10-CM

## 2019-08-16 DIAGNOSIS — E78.2 MIXED HYPERLIPIDEMIA: ICD-10-CM

## 2019-08-16 DIAGNOSIS — I70.219 ATHEROSCLEROTIC PVD WITH INTERMITTENT CLAUDICATION: Primary | ICD-10-CM

## 2019-08-16 DIAGNOSIS — R97.20 ELEVATED PSA, BETWEEN 10 AND LESS THAN 20 NG/ML: ICD-10-CM

## 2019-08-16 DIAGNOSIS — R07.9 CHRONIC CHEST PAIN: ICD-10-CM

## 2019-08-16 DIAGNOSIS — Z86.010 HISTORY OF COLON POLYPS: ICD-10-CM

## 2019-08-16 DIAGNOSIS — I10 ESSENTIAL HYPERTENSION: Primary | Chronic | ICD-10-CM

## 2019-08-16 DIAGNOSIS — J45.20 ASTHMA, MILD INTERMITTENT, WELL-CONTROLLED: ICD-10-CM

## 2019-08-16 PROCEDURE — 3077F PR MOST RECENT SYSTOLIC BLOOD PRESSURE >= 140 MM HG: ICD-10-PCS | Mod: HCNC,CPTII,S$GLB, | Performed by: INTERNAL MEDICINE

## 2019-08-16 PROCEDURE — 1101F PR PT FALLS ASSESS DOC 0-1 FALLS W/OUT INJ PAST YR: ICD-10-PCS | Mod: HCNC,CPTII,S$GLB, | Performed by: INTERNAL MEDICINE

## 2019-08-16 PROCEDURE — 3078F PR MOST RECENT DIASTOLIC BLOOD PRESSURE < 80 MM HG: ICD-10-PCS | Mod: HCNC,CPTII,S$GLB, | Performed by: INTERNAL MEDICINE

## 2019-08-16 PROCEDURE — 93010 EKG 12-LEAD: ICD-10-PCS | Mod: HCNC,S$GLB,, | Performed by: INTERNAL MEDICINE

## 2019-08-16 PROCEDURE — 99204 OFFICE O/P NEW MOD 45 MIN: CPT | Mod: HCNC,S$GLB,, | Performed by: INTERNAL MEDICINE

## 2019-08-16 PROCEDURE — 99999 PR PBB SHADOW E&M-EST. PATIENT-LVL III: CPT | Mod: PBBFAC,HCNC,, | Performed by: INTERNAL MEDICINE

## 2019-08-16 PROCEDURE — 93010 ELECTROCARDIOGRAM REPORT: CPT | Mod: HCNC,S$GLB,, | Performed by: INTERNAL MEDICINE

## 2019-08-16 PROCEDURE — 3077F SYST BP >= 140 MM HG: CPT | Mod: HCNC,CPTII,S$GLB, | Performed by: INTERNAL MEDICINE

## 2019-08-16 PROCEDURE — 93005 ELECTROCARDIOGRAM TRACING: CPT | Mod: HCNC,S$GLB,, | Performed by: INTERNAL MEDICINE

## 2019-08-16 PROCEDURE — 93005 EKG 12-LEAD: ICD-10-PCS | Mod: HCNC,S$GLB,, | Performed by: INTERNAL MEDICINE

## 2019-08-16 PROCEDURE — 3078F DIAST BP <80 MM HG: CPT | Mod: HCNC,CPTII,S$GLB, | Performed by: INTERNAL MEDICINE

## 2019-08-16 PROCEDURE — 1101F PT FALLS ASSESS-DOCD LE1/YR: CPT | Mod: HCNC,CPTII,S$GLB, | Performed by: INTERNAL MEDICINE

## 2019-08-16 PROCEDURE — 99999 PR PBB SHADOW E&M-EST. PATIENT-LVL III: ICD-10-PCS | Mod: PBBFAC,HCNC,, | Performed by: INTERNAL MEDICINE

## 2019-08-16 PROCEDURE — 99204 PR OFFICE/OUTPT VISIT, NEW, LEVL IV, 45-59 MIN: ICD-10-PCS | Mod: HCNC,S$GLB,, | Performed by: INTERNAL MEDICINE

## 2019-08-16 RX ORDER — ATORVASTATIN CALCIUM 80 MG/1
80 TABLET, FILM COATED ORAL DAILY
Qty: 30 TABLET | Refills: 11 | Status: SHIPPED | OUTPATIENT
Start: 2019-08-16 | End: 2021-06-28 | Stop reason: SDUPTHER

## 2019-08-16 RX ORDER — ASPIRIN 81 MG/1
81 TABLET ORAL DAILY
Qty: 30 TABLET | Refills: 6 | Status: SHIPPED | OUTPATIENT
Start: 2019-08-16 | End: 2020-11-12 | Stop reason: SDUPTHER

## 2019-08-16 RX ORDER — AMLODIPINE BESYLATE 2.5 MG/1
2.5 TABLET ORAL DAILY
Qty: 30 TABLET | Refills: 11 | Status: SHIPPED | OUTPATIENT
Start: 2019-08-16 | End: 2020-08-21 | Stop reason: SDUPTHER

## 2019-08-16 NOTE — PROGRESS NOTES
Subjective:   Patient ID:  Alex Milner is a 73 y.o. male who presents for evaluation of No chief complaint on file.      HPI  A 72 yo male with htn hlp ex smoker is here for evaluation of abnormal arpit wreefrerd from DR CAMARGO. THE PATIENT HAS CLAUDICATION IN BOTH LOWER EXTREMITIES THAT IS VERY LIMITING HE CANNOT MAKE A BLOCK HE HAS TO STOP BECAUSE OF LEG PAIN . HE HAS TO REST FOR 2-3 MINUTES BEFORE HE CANM RESUME. HAS NOCTURNAL CRAMPS HAS NUMBNESS IN BOTH LEGS.HE STARTED PLETAL 50 MG PO BID. HAS  ELEVATED PSA FROM HYPERTROPHY NO MALIGNANCY.HAS NO EXERTIONAL CHEST PAIN AND SOME SHORTNESS OF BREATH HAS PULMONARY NODULE.NO TIA. HE IS NOT TAKING ANY STATINS.   Past Medical History:   Diagnosis Date    Atherosclerotic PVD with intermittent claudication 8/16/2019    Atrial fibrillation     pt unaware of this    Back pain     Bilateral inguinal hernia     CT ABdomen/pelvis 3/9/2015 (care everywhere)---Bilateral inguinal hernias containing fat.      Calcified granuloma of lung 10/3/2017    CT CHest 3/26/2018---There are calcified lymph nodes in the mediastinum and left hilum.    CT chest 9/11/ 2017 Calcified left hilar and subcarinal granulomas are noted  ;Calcified granuloma noted within the posterior segment of the right hepatic lobe.    Diverticulosis     Diverticulosis 10/28/13    Colonoscopy     ED (erectile dysfunction)     Elevated PSA     Granuloma of liver     ct chest 3/26/2018---There is a small calcified granuloma posteriorly in the right lobe of the liver    Hyperlipidemia     Hypertension     Moderate persistent asthma without complication 9/26/2017    Obesity     Tobacco dependence     resolved    Urinary tract infection        Past Surgical History:   Procedure Laterality Date    COLONOSCOPY N/A 11/15/2013    [facility]    HERNIA REPAIR      x2    PROSTATE BIOPSY      reported per patient around 2014 or 2015 which was reportedly negative        Social History     Tobacco Use    Smoking status: Former Smoker     Packs/day: 2.00     Years: 15.00     Pack years: 30.00     Types: Cigarettes     Start date:      Last attempt to quit: 1985     Years since quittin.0    Smokeless tobacco: Never Used   Substance Use Topics    Alcohol use: Yes     Alcohol/week: 7.2 oz     Types: 12 Cans of beer per week    Drug use: No       Family History   Problem Relation Age of Onset    Cancer Mother         Unknown primary    Cancer Father     Cancer Brother         prostate     Prostate cancer Brother     Cancer Sister 64        pancreatic     Diabetes Neg Hx     Heart disease Neg Hx     Kidney disease Neg Hx     Stroke Neg Hx     Hyperlipidemia Neg Hx     Hypertension Neg Hx        Current Outpatient Medications   Medication Sig    atorvastatin (LIPITOR) 80 MG tablet Take 1 tablet (80 mg total) by mouth once daily.    brimonidine 0.15 % OPTH DROP (ALPHAGAN) 0.15 % ophthalmic solution LOCATION: BOTH EYES. INSTILL ONE DROP IN BOTH EYES TWICE A DAY X 90 DAYS    cilostazol (PLETAL) 50 MG Tab Take 1 tablet (50 mg total) by mouth 2 (two) times daily.    finasteride (PROSCAR) 5 mg tablet Take 1 tablet (5 mg total) by mouth once daily.    latanoprost 0.005 % ophthalmic solution Place 1 drop into both eyes once daily.    lisinopril (PRINIVIL,ZESTRIL) 20 MG tablet Take 1 tablet (20 mg total) by mouth once daily.    sildenafil (REVATIO) 20 mg Tab Take 1-5 tablets as needed on an empty stomach with no alcohol an hour before desiring an erection.    sodium bicarb-sodium chloride Pack 1 packet by Nasal route 2 (two) times daily.    tamsulosin (FLOMAX) 0.4 mg Cap Take 1 capsule (0.4 mg total) by mouth once daily.     No current facility-administered medications for this visit.      Current Outpatient Medications on File Prior to Visit   Medication Sig    atorvastatin (LIPITOR) 80 MG tablet Take 1 tablet (80 mg total) by mouth once daily.     brimonidine 0.15 % OPTH DROP (ALPHAGAN) 0.15 % ophthalmic solution LOCATION: BOTH EYES. INSTILL ONE DROP IN BOTH EYES TWICE A DAY X 90 DAYS    cilostazol (PLETAL) 50 MG Tab Take 1 tablet (50 mg total) by mouth 2 (two) times daily.    finasteride (PROSCAR) 5 mg tablet Take 1 tablet (5 mg total) by mouth once daily.    latanoprost 0.005 % ophthalmic solution Place 1 drop into both eyes once daily.    lisinopril (PRINIVIL,ZESTRIL) 20 MG tablet Take 1 tablet (20 mg total) by mouth once daily.    sildenafil (REVATIO) 20 mg Tab Take 1-5 tablets as needed on an empty stomach with no alcohol an hour before desiring an erection.    sodium bicarb-sodium chloride Pack 1 packet by Nasal route 2 (two) times daily.    tamsulosin (FLOMAX) 0.4 mg Cap Take 1 capsule (0.4 mg total) by mouth once daily.     No current facility-administered medications on file prior to visit.        Review of patient's allergies indicates:  No Known Allergies    Review of Systems   Constitution: Negative for malaise/fatigue.   Eyes: Negative for blurred vision.   Cardiovascular: Positive for chest pain and claudication. Negative for cyanosis, dyspnea on exertion, irregular heartbeat, leg swelling, near-syncope, orthopnea, palpitations and paroxysmal nocturnal dyspnea.   Respiratory: Positive for shortness of breath. Negative for cough and hemoptysis.    Hematologic/Lymphatic: Negative for bleeding problem. Does not bruise/bleed easily.   Skin: Negative for dry skin and itching.   Musculoskeletal: Negative for falls, muscle weakness and myalgias.   Gastrointestinal: Negative for abdominal pain, diarrhea, heartburn, hematemesis, hematochezia and melena.   Genitourinary: Negative for flank pain and hematuria.   Neurological: Negative for dizziness, focal weakness, headaches, light-headedness, numbness, paresthesias, seizures and weakness.   Psychiatric/Behavioral: Negative for altered mental status and memory loss. The patient is not  nervous/anxious.    Allergic/Immunologic: Negative for hives.       Objective:   Physical Exam   Constitutional: He is oriented to person, place, and time. He appears well-developed and well-nourished. No distress.   HENT:   Head: Normocephalic and atraumatic.   Eyes: Pupils are equal, round, and reactive to light. EOM are normal. Right eye exhibits no discharge. Left eye exhibits no discharge.   Neck: Neck supple. No JVD present. No thyromegaly present.   Cardiovascular: Normal rate, regular rhythm and normal heart sounds. Exam reveals decreased pulses. Exam reveals no gallop and no friction rub.   No murmur heard.  Pulses:       Carotid pulses are 2+ on the right side, and 2+ on the left side.       Radial pulses are 2+ on the right side, and 2+ on the left side.        Femoral pulses are 2+ on the right side with bruit, and 1+ on the left side with bruit.       Popliteal pulses are 1+ on the right side, and 0 on the left side.        Dorsalis pedis pulses are 0 on the right side, and 0 on the left side.        Posterior tibial pulses are 0 on the right side, and 0 on the left side.   Pulmonary/Chest: Effort normal and breath sounds normal. No respiratory distress. He has no wheezes. He has no rales. He exhibits no tenderness.   Abdominal: Soft. Bowel sounds are normal. He exhibits no distension. There is no tenderness.   Musculoskeletal: Normal range of motion. He exhibits no edema.   Neurological: He is alert and oriented to person, place, and time. No cranial nerve deficit.   Skin: Skin is warm and dry. No rash noted. He is not diaphoretic. No erythema.   Psychiatric: He has a normal mood and affect. His behavior is normal.   Nursing note and vitals reviewed.    Vitals:    08/16/19 1523 08/16/19 1525   BP: (!) 156/78 (!) 160/74   BP Location: Right arm Left arm   Patient Position: Sitting Sitting   BP Method: Medium (Automatic) Medium (Automatic)   Pulse: (!) 58    Resp: 18    Weight: 80.7 kg (178 lb)   "  Height: 5' 5" (1.651 m)      Lab Results   Component Value Date    CHOL 271 (H) 06/27/2019    CHOL 253 (H) 10/27/2017    CHOL 255 (H) 11/15/2016     Lab Results   Component Value Date    HDL 81 (H) 06/27/2019    HDL 79 (H) 10/27/2017    HDL 63 11/15/2016     Lab Results   Component Value Date    LDLCALC 173.2 (H) 06/27/2019    LDLCALC 157.0 10/27/2017    LDLCALC 170.2 (H) 11/15/2016     Lab Results   Component Value Date    TRIG 84 06/27/2019    TRIG 85 10/27/2017    TRIG 109 11/15/2016     Lab Results   Component Value Date    CHOLHDL 29.9 06/27/2019    CHOLHDL 31.2 10/27/2017    CHOLHDL 24.7 11/15/2016       Chemistry        Component Value Date/Time     06/27/2019 0722    K 4.2 06/27/2019 0722     06/27/2019 0722    CO2 27 06/27/2019 0722    BUN 10 06/27/2019 0722    CREATININE 1.1 06/27/2019 0722    GLU 87 06/27/2019 0722        Component Value Date/Time    CALCIUM 8.8 06/27/2019 0722    ALKPHOS 77 06/27/2019 0722    AST 15 06/27/2019 0722    ALT 10 06/27/2019 0722    BILITOT 0.3 06/27/2019 0722    ESTGFRAFRICA >60.0 06/27/2019 0722    EGFRNONAA >60.0 06/27/2019 0722          Lab Results   Component Value Date    TSH 0.911 06/27/2019     No results found for: INR, PROTIME  Lab Results   Component Value Date    WBC 5.28 06/27/2019    HGB 14.7 06/27/2019    HCT 46.9 06/27/2019    MCV 85 06/27/2019     06/27/2019     BNP  @LABRCNTIP(BNP,BNPTRIAGEBLO)@  CrCl cannot be calculated (Patient's most recent lab result is older than the maximum 7 days allowed.).     Date of Procedure: 08/14/2019    PRE-TEST DATA   Resting FREDY:    The right ankle brachial index was 0.86 which suggests mild right lower extremity arterial disease.   The left ankle brachial index was 0.49 which suggests severe left lower extremity arterial disease sufficient to cause rest pain.   The right TBI is 0.44.   The left TBI is 0.24.             This document has been electronically    SIGNED BY: Chencho Ross MD On: 08/14/2019 " 17:30  Assessment:     1. Atherosclerotic PVD with intermittent claudication    2. Essential hypertension    3. Elevated PSA, between 10 and less than 20 ng/ml    4. Mixed hyperlipidemia    5. Multiple pulmonary nodules    6. History of colon polyps    7. Asthma, mild intermittent, well-controlled    8. Aortic calcification      HAS SEVERE LIMITING CLAUDICATION WITH SEVERE JEROMY ABNORMAL FREDY WITH MULTILEVEL DISEASE WORSE ON THE L;EFT   HAS SOME EXERTIONAL CHEST PAIN WILL NEED TO ASSESS FOR CAD WILL ADD AMLODIPINE 2.5 MG PO DAILY START ASA EC 81 MG PO DAILY AND RESUME HIS STATINS THAT HE HAS NOT TAKEN IN 4 MONTHS.   Plan:   AMLODIPINE   ATORVASTATIN   ASA   LEXISCAN '  ECHO   ARTERIAL DUPLEX THEN MAKE DECISION ON U/S

## 2019-08-23 ENCOUNTER — HOSPITAL ENCOUNTER (OUTPATIENT)
Dept: RADIOLOGY | Facility: HOSPITAL | Age: 74
Discharge: HOME | End: 2019-08-23
Attending: INTERNAL MEDICINE
Payer: MEDICARE

## 2019-08-23 ENCOUNTER — CLINICAL SUPPORT (OUTPATIENT)
Dept: CARDIOLOGY | Facility: CLINIC | Age: 74
End: 2019-08-23
Attending: INTERNAL MEDICINE
Payer: MEDICARE

## 2019-08-23 DIAGNOSIS — R07.9 CHRONIC CHEST PAIN: ICD-10-CM

## 2019-08-23 DIAGNOSIS — G89.29 CHRONIC CHEST PAIN: ICD-10-CM

## 2019-08-23 DIAGNOSIS — I70.219 ATHEROSCLEROTIC PVD WITH INTERMITTENT CLAUDICATION: ICD-10-CM

## 2019-08-23 DIAGNOSIS — E78.2 MIXED HYPERLIPIDEMIA: ICD-10-CM

## 2019-08-23 PROCEDURE — 78452 NM MULTI PHARM STRESS CARDIAC COMPONENT: ICD-10-PCS | Mod: 26,HCNC,, | Performed by: INTERNAL MEDICINE

## 2019-08-23 PROCEDURE — 78452 HT MUSCLE IMAGE SPECT MULT: CPT | Mod: 26,HCNC,, | Performed by: INTERNAL MEDICINE

## 2019-08-23 PROCEDURE — 93306 TTE W/DOPPLER COMPLETE: CPT | Mod: HCNC,S$GLB,, | Performed by: INTERNAL MEDICINE

## 2019-08-23 PROCEDURE — 93015 CV STRESS TEST SUPVJ I&R: CPT | Mod: HCNC,S$GLB,, | Performed by: INTERNAL MEDICINE

## 2019-08-23 PROCEDURE — 93015 NM MULTI PHARM STRESS CARDIAC COMPONENT: ICD-10-PCS | Mod: HCNC,S$GLB,, | Performed by: INTERNAL MEDICINE

## 2019-08-23 PROCEDURE — 93306 2D ECHO WITH COLOR FLOW DOPPLER: ICD-10-PCS | Mod: HCNC,S$GLB,, | Performed by: INTERNAL MEDICINE

## 2019-08-23 PROCEDURE — A9502 TC99M TETROFOSMIN: HCPCS | Mod: HCNC

## 2019-08-24 LAB
DIASTOLIC DYSFUNCTION: NO
ESTIMATED PA SYSTOLIC PRESSURE: 45.16
RETIRED EF AND QEF - SEE NOTES: 60 (ref 55–65)
TRICUSPID VALVE REGURGITATION: ABNORMAL

## 2019-08-26 ENCOUNTER — CLINICAL SUPPORT (OUTPATIENT)
Dept: CARDIOLOGY | Facility: CLINIC | Age: 74
End: 2019-08-26
Attending: INTERNAL MEDICINE
Payer: MEDICARE

## 2019-08-26 DIAGNOSIS — I70.219 ATHEROSCLEROTIC PVD WITH INTERMITTENT CLAUDICATION: ICD-10-CM

## 2019-08-26 DIAGNOSIS — G89.29 CHRONIC CHEST PAIN: ICD-10-CM

## 2019-08-26 DIAGNOSIS — E78.2 MIXED HYPERLIPIDEMIA: ICD-10-CM

## 2019-08-26 DIAGNOSIS — R07.9 CHRONIC CHEST PAIN: ICD-10-CM

## 2019-08-26 PROCEDURE — 93925 CAR US DOPPLER ARTERIAL LEGS BILATERAL: ICD-10-PCS | Mod: HCNC,S$GLB,, | Performed by: INTERNAL MEDICINE

## 2019-08-26 PROCEDURE — 93925 LOWER EXTREMITY STUDY: CPT | Mod: HCNC,S$GLB,, | Performed by: INTERNAL MEDICINE

## 2019-08-29 ENCOUNTER — TELEPHONE (OUTPATIENT)
Dept: CARDIOLOGY | Facility: CLINIC | Age: 74
End: 2019-08-29

## 2019-08-29 DIAGNOSIS — M79.605 PAIN IN BOTH LOWER EXTREMITIES: ICD-10-CM

## 2019-08-29 DIAGNOSIS — I70.219 ATHEROSCLEROTIC PVD WITH INTERMITTENT CLAUDICATION: Primary | ICD-10-CM

## 2019-08-29 DIAGNOSIS — I73.9 PVD (PERIPHERAL VASCULAR DISEASE): ICD-10-CM

## 2019-08-29 DIAGNOSIS — M79.604 PAIN IN BOTH LOWER EXTREMITIES: ICD-10-CM

## 2019-08-29 LAB — DIASTOLIC DYSFUNCTION: NO

## 2019-08-29 NOTE — TELEPHONE ENCOUNTER
Left voice mail message for return call for results and next plan of care with Dr. Ross        ---- Message from Chencho Ross MD sent at 8/28/2019  5:46 PM CDT -----    Please  He has significant pvd needs angio  Also needs dr belle to read his cardiolite

## 2019-09-10 ENCOUNTER — OFFICE VISIT (OUTPATIENT)
Dept: PULMONOLOGY | Facility: CLINIC | Age: 74
End: 2019-09-10
Payer: MEDICARE

## 2019-09-10 ENCOUNTER — HOSPITAL ENCOUNTER (OUTPATIENT)
Dept: RADIOLOGY | Facility: HOSPITAL | Age: 74
Discharge: HOME | End: 2019-09-10
Attending: NURSE PRACTITIONER
Payer: MEDICARE

## 2019-09-10 VITALS
RESPIRATION RATE: 16 BRPM | HEIGHT: 65 IN | BODY MASS INDEX: 29.72 KG/M2 | HEART RATE: 60 BPM | SYSTOLIC BLOOD PRESSURE: 132 MMHG | DIASTOLIC BLOOD PRESSURE: 60 MMHG | OXYGEN SATURATION: 98 % | WEIGHT: 178.38 LBS

## 2019-09-10 DIAGNOSIS — R91.8 MULTIPLE PULMONARY NODULES: ICD-10-CM

## 2019-09-10 DIAGNOSIS — J45.20 ASTHMA, MILD INTERMITTENT, WELL-CONTROLLED: ICD-10-CM

## 2019-09-10 DIAGNOSIS — R91.8 MULTIPLE PULMONARY NODULES: Primary | ICD-10-CM

## 2019-09-10 PROCEDURE — 71046 X-RAY EXAM CHEST 2 VIEWS: CPT | Mod: TC,HCNC

## 2019-09-10 PROCEDURE — 3078F PR MOST RECENT DIASTOLIC BLOOD PRESSURE < 80 MM HG: ICD-10-PCS | Mod: HCNC,CPTII,S$GLB, | Performed by: NURSE PRACTITIONER

## 2019-09-10 PROCEDURE — 71046 X-RAY EXAM CHEST 2 VIEWS: CPT | Mod: 26,HCNC,, | Performed by: RADIOLOGY

## 2019-09-10 PROCEDURE — 1101F PR PT FALLS ASSESS DOC 0-1 FALLS W/OUT INJ PAST YR: ICD-10-PCS | Mod: HCNC,CPTII,S$GLB, | Performed by: NURSE PRACTITIONER

## 2019-09-10 PROCEDURE — 1101F PT FALLS ASSESS-DOCD LE1/YR: CPT | Mod: HCNC,CPTII,S$GLB, | Performed by: NURSE PRACTITIONER

## 2019-09-10 PROCEDURE — 99214 PR OFFICE/OUTPT VISIT, EST, LEVL IV, 30-39 MIN: ICD-10-PCS | Mod: HCNC,S$GLB,, | Performed by: NURSE PRACTITIONER

## 2019-09-10 PROCEDURE — 71046 XR CHEST PA AND LATERAL: ICD-10-PCS | Mod: 26,HCNC,, | Performed by: RADIOLOGY

## 2019-09-10 PROCEDURE — 99999 PR PBB SHADOW E&M-EST. PATIENT-LVL IV: CPT | Mod: PBBFAC,HCNC,, | Performed by: NURSE PRACTITIONER

## 2019-09-10 PROCEDURE — 3075F SYST BP GE 130 - 139MM HG: CPT | Mod: HCNC,CPTII,S$GLB, | Performed by: NURSE PRACTITIONER

## 2019-09-10 PROCEDURE — 99999 PR PBB SHADOW E&M-EST. PATIENT-LVL IV: ICD-10-PCS | Mod: PBBFAC,HCNC,, | Performed by: NURSE PRACTITIONER

## 2019-09-10 PROCEDURE — 3078F DIAST BP <80 MM HG: CPT | Mod: HCNC,CPTII,S$GLB, | Performed by: NURSE PRACTITIONER

## 2019-09-10 PROCEDURE — 3075F PR MOST RECENT SYSTOLIC BLOOD PRESS GE 130-139MM HG: ICD-10-PCS | Mod: HCNC,CPTII,S$GLB, | Performed by: NURSE PRACTITIONER

## 2019-09-10 PROCEDURE — 99214 OFFICE O/P EST MOD 30 MIN: CPT | Mod: HCNC,S$GLB,, | Performed by: NURSE PRACTITIONER

## 2019-09-10 NOTE — ASSESSMENT & PLAN NOTE
Controlled, ACT score 25, asthma scores 19 or greater indicate well controlled asthma .  Not on controller, no rescue use.  Has breo and albuterol if needed for acute flares.   Chest x-ray 9/10/2019 stable no acute findings.  Follow-up in 6 months for [place]

## 2019-09-10 NOTE — PROGRESS NOTES
Chief Complaint   Patient presents with    Asthma    Multiple pulmonary nodules       Subjective:       Alex Milner is a 74 y.o. male who presents for 18 month follow up evaluation of asthma and pulmonary nodules. The patient has a history of asthma. Age when diagnosed with Asthma as an adult.  Patient reports his asthma continues to remain stable he is not on controller he had been on controller Breo in the past.  No use of Breo since about 2017.  Has not utilized rescue albuterol in over 1 year.  Current Disease Severity  Alex has no daytime asthma symptoms. He has no nighttime asthma symptoms. The patient is using short-acting beta agonists for symptom control none, has been about 2 months since last used.     He has exacerbations requiring oral systemic corticosteroids 0 times per year.   Current limitations in activity from asthma: none.  Number of urgent/emergent visit in last year: 0  ACT score 24, asthma scores 19 or greater indicate well controlled asthma   Not on controller Breo, no use of rescue in past 2 months.      Consideration for repeat CT at this visit however was not scheduled.  Will follow Fleischner's guideline and repeat at 24 months which will be March 2020.   Pulmonary nodule right and left lung CT of chest 3/26/2018 revealed stable 7.4 right lung nodule and 7.2 left lung nodule    Past Medical History: The following portions of the patient's history were reviewed and updated as appropriate:   He  has a past surgical history that includes Hernia repair and Prostate biopsy.  His family history includes Cancer in his brother, father, and mother; Cancer (age of onset: 64) in his sister; Prostate cancer in his brother.  He  reports that he quit smoking about 34 years ago. His smoking use included cigarettes. He started smoking about 59 years ago. He has a 30.00 pack-year smoking history. He has never used smokeless tobacco. He reports that he drinks about 7.2 oz of alcohol per week. He  "reports that he does not use drugs.  He has a current medication list which includes the following prescription(s): amlodipine, aspirin, atorvastatin, brimonidine 0.15 % opth drop, cilostazol, finasteride, latanoprost, lisinopril, sildenafil, sodium bicarb-sodium chloride, and tamsulosin.  He has No Known Allergies..    Review of Systems  Review of Systems   Constitutional: Negative for fever, chills, weight loss, weight gain, activity change, appetite change, fatigue and night sweats.   HENT: Negative for postnasal drip, rhinorrhea, sinus pressure, voice change and congestion.    Eyes: Negative for redness and itching.   Respiratory: Negative for snoring, cough, sputum production, chest tightness, shortness of breath, wheezing, orthopnea, asthma nighttime symptoms, dyspnea on extertion, use of rescue inhaler and somnolence.    Cardiovascular: Negative.  Negative for chest pain, palpitations and leg swelling.   Genitourinary: Negative for difficulty urinating and hematuria.   Endocrine: Negative for cold intolerance and heat intolerance.    Musculoskeletal: Negative for arthralgias, gait problem, joint swelling and myalgias.   Skin: Negative.    Gastrointestinal: Negative for nausea, vomiting, abdominal pain and acid reflux.   Neurological: Negative for dizziness, weakness, light-headedness and headaches.   Hematological: Negative for adenopathy. No excessive bruising.   All other systems reviewed and are negative.       Objective:     /60   Pulse 60   Resp 16   Ht 5' 5" (1.651 m)   Wt 80.9 kg (178 lb 5.6 oz)   SpO2 98%   BMI 29.68 kg/m²   Physical Exam   Constitutional: He is oriented to person, place, and time. He appears well-developed and well-nourished. He is active and cooperative.  Non-toxic appearance. He does not appear ill. No distress.   HENT:   Head: Normocephalic and atraumatic.   Right Ear: External ear normal.   Left Ear: External ear normal.   Nose: Nose normal.   Mouth/Throat: Oropharynx " is clear and moist. No oropharyngeal exudate.   Eyes: Conjunctivae are normal.   Neck: Normal range of motion. Neck supple.   Cardiovascular: Normal rate, regular rhythm, normal heart sounds and intact distal pulses.   Pulmonary/Chest: Effort normal and breath sounds normal. He has no wheezes. He has no rales.   Abdominal: Soft.   Musculoskeletal: He exhibits no edema.   Neurological: He is alert and oriented to person, place, and time.   Skin: Skin is warm and dry.   Psychiatric: He has a normal mood and affect. His behavior is normal. Judgment and thought content normal.   Vitals reviewed.    Diagnostic Testing Reviewed  All relevant imaging and labs reviewed.  CT chest 3/26/2018  COMPARISON:  September 11, 2017.    FINDINGS:  There are unchanged bilateral noncalcified lung nodules.  On the right there is a 7.4 mm nodule in the middle lobe abutting the minor fissure (series 3, image 118).  On the left there is a 7.2 mm nodule in the posterior upper lobe abutting the superior extent of the major fissure (series 3, image 64).  No new lung nodules are identified.  There is chronic parenchymal scarring or atelectasis in the medial right middle lobe and lingula.  No alveolar infiltrate or pleural effusion.    There are calcified lymph nodes in the mediastinum and left hilum.  No pathologic lymphadenopathy.  Aorta, heart, and pericardium are unremarkable.    There is a small calcified granuloma posteriorly in the right lobe of the liver.  Included upper abdominal structures are otherwise within normal limits.    There is multilevel degenerative change in the spine.   Impression       Stable bilateral lung nodules as detailed.  Recommend continued follow-up per Fleischner society guidelines.  No other significant findings.       X-Ray Chest PA And Lateral  Narrative: EXAMINATION:  XR CHEST PA AND LATERAL    CLINICAL HISTORY:  Other nonspecific abnormal finding of lung field    TECHNIQUE:  PA and lateral views of the  chest were performed.    COMPARISON:  Chest x-ray from 09/08/2017. chest CT from March 2018.    FINDINGS:  Previous described lung nodules were better appreciated on the prior CT.  There Is scarring at the left lung base.  No focal consolidation.  Cardiomediastinal silhouette and osseous structures are stable in appearance.  Impression: Stable chest.  No acute disease.    Electronically signed by: Donato Castellanos MD  Date:    09/10/2019  Time:    09:15        Assessment:     1. Multiple pulmonary nodules    2. Asthma, mild intermittent, well-controlled        Orders Placed This Encounter   Procedures    CT Chest Without Contrast     Standing Status:   Future     Standing Expiration Date:   9/10/2020     Order Specific Question:   May the Radiologist modify the order per protocol to meet the clinical needs of the patient?     Answer:   Yes    X-Ray Chest PA And Lateral     Standing Status:   Future     Number of Occurrences:   1     Standing Expiration Date:   9/10/2020     Order Specific Question:   May the Radiologist modify the order per protocol to meet the clinical needs of the patient?     Answer:   Yes    Spirometry with/without bronchodilator     Standing Status:   Future     Standing Expiration Date:   9/10/2020     Plan:     Problem List Items Addressed This Visit     Multiple pulmonary nodules - Primary     Stable, 7.4 right and 7.2 mm left pulmonary nodules. follow up in 18 -24 months per Fleishner guidelines in low risk patient.   Scheduled for 18 month follow up CT chest, September 2019, patient did not perform CT his preference is to defer to March 2020.  Asymptomatic, no cough, no chest congestion, no wheezing, no hemoptysis, no fever, chills or no malaise.  No lymph node swelling.  Quit smoking 33 years ago.    Return for CT chest 24 month follow-up March 2020.  Follow-up sooner if needed         Relevant Orders    CT Chest Without Contrast    X-Ray Chest PA And Lateral (Completed)    Asthma, well  controlled     Controlled, ACT score 25, asthma scores 19 or greater indicate well controlled asthma .  Not on controller, no rescue use.  Has breo and albuterol if needed for acute flares.   Chest x-ray 9/10/2019 stable no acute findings.  Follow-up in 6 months for [place]           Relevant Orders    Spirometry with/without bronchodilator    X-Ray Chest PA And Lateral (Completed)        Follow up in about 6 months (around 3/10/2020) for Asthma, Pulmonary nodule, w/review jesus and CT chest .

## 2019-09-10 NOTE — ASSESSMENT & PLAN NOTE
Stable, 7.4 right and 7.2 mm left pulmonary nodules. follow up in 18 -24 months per Fleishner guidelines in low risk patient.   Scheduled for 18 month follow up CT chest, September 2019, patient did not perform CT his preference is to defer to March 2020.  Asymptomatic, no cough, no chest congestion, no wheezing, no hemoptysis, no fever, chills or no malaise.  No lymph node swelling.  Quit smoking 33 years ago.    Return for CT chest 24 month follow-up March 2020.  Follow-up sooner if needed

## 2019-09-16 ENCOUNTER — OFFICE VISIT (OUTPATIENT)
Dept: INTERNAL MEDICINE | Facility: CLINIC | Age: 74
End: 2019-09-16
Payer: MEDICARE

## 2019-09-16 VITALS
SYSTOLIC BLOOD PRESSURE: 132 MMHG | DIASTOLIC BLOOD PRESSURE: 62 MMHG | HEART RATE: 60 BPM | TEMPERATURE: 99 F | BODY MASS INDEX: 29.72 KG/M2 | HEIGHT: 65 IN | WEIGHT: 178.38 LBS

## 2019-09-16 DIAGNOSIS — I10 ESSENTIAL HYPERTENSION: Chronic | ICD-10-CM

## 2019-09-16 DIAGNOSIS — I70.219 ATHEROSCLEROTIC PVD WITH INTERMITTENT CLAUDICATION: Primary | ICD-10-CM

## 2019-09-16 PROCEDURE — 3075F SYST BP GE 130 - 139MM HG: CPT | Mod: HCNC,CPTII,S$GLB, | Performed by: INTERNAL MEDICINE

## 2019-09-16 PROCEDURE — 99213 PR OFFICE/OUTPT VISIT, EST, LEVL III, 20-29 MIN: ICD-10-PCS | Mod: HCNC,S$GLB,, | Performed by: INTERNAL MEDICINE

## 2019-09-16 PROCEDURE — 99213 OFFICE O/P EST LOW 20 MIN: CPT | Mod: HCNC,S$GLB,, | Performed by: INTERNAL MEDICINE

## 2019-09-16 PROCEDURE — 99999 PR PBB SHADOW E&M-EST. PATIENT-LVL III: ICD-10-PCS | Mod: PBBFAC,HCNC,, | Performed by: INTERNAL MEDICINE

## 2019-09-16 PROCEDURE — 1101F PT FALLS ASSESS-DOCD LE1/YR: CPT | Mod: HCNC,CPTII,S$GLB, | Performed by: INTERNAL MEDICINE

## 2019-09-16 PROCEDURE — 1101F PR PT FALLS ASSESS DOC 0-1 FALLS W/OUT INJ PAST YR: ICD-10-PCS | Mod: HCNC,CPTII,S$GLB, | Performed by: INTERNAL MEDICINE

## 2019-09-16 PROCEDURE — 3075F PR MOST RECENT SYSTOLIC BLOOD PRESS GE 130-139MM HG: ICD-10-PCS | Mod: HCNC,CPTII,S$GLB, | Performed by: INTERNAL MEDICINE

## 2019-09-16 PROCEDURE — 99999 PR PBB SHADOW E&M-EST. PATIENT-LVL III: CPT | Mod: PBBFAC,HCNC,, | Performed by: INTERNAL MEDICINE

## 2019-09-16 PROCEDURE — 3078F DIAST BP <80 MM HG: CPT | Mod: HCNC,CPTII,S$GLB, | Performed by: INTERNAL MEDICINE

## 2019-09-16 PROCEDURE — 3078F PR MOST RECENT DIASTOLIC BLOOD PRESSURE < 80 MM HG: ICD-10-PCS | Mod: HCNC,CPTII,S$GLB, | Performed by: INTERNAL MEDICINE

## 2019-09-16 NOTE — Clinical Note
Patient has not heard about follow up on his claudication.  He is awaiting further testing and treatment.  Please reach out to him to schedule his next steps.  Thanks.  SHEILA

## 2019-09-16 NOTE — TELEPHONE ENCOUNTER
Telephoned patient on home #[phone] stated my cell has been off with work and agreed with scheduling Aortagram with runoff on October 21st with lab work the week before in [place]      ----- Message from Chencho Ross MD sent at 9/16/2019  5:30 PM CDT -----  Yes we will   My nurse Mirtha has tried multiple times no answer.  ----- Message -----  From: Loi Patel MD  Sent: 9/16/2019   4:56 PM  To: Chencho Ross MD    Patient has not heard about follow up on his claudication.  He is awaiting further testing and treatment.  Please reach out to him to schedule his next steps.  Thanks.  BB

## 2019-09-16 NOTE — PROGRESS NOTES
"Subjective:       Patient ID: Alex Milner is a 74 y.o. male.    Chief Complaint: Follow-up    HPI Patient is a 74-year-old male presenting today for follow-up on his peripheral vascular disease and claudication issues.  Since I last saw him he has been seen by vascular Medicine.  They had some concerns about possible cardiac symptoms at the set him up for a nuclear stress test.  He did well with stress test with no evidence of ischemia.  He is supposed to be getting set up for further testing on his legs but that has not been done at this time.  We will recheck the vascular department to get that addressed.  He indicates his discomfort and his claudication has improved with the Pletal and the aspirin at this point.    His blood pressure continues to be good at this time.  He notes no problems tolerating his blood pressure medications.  He denies any chest pain.    Review of Systems    Objective:   /62   Pulse 60   Temp 98.5 °F (36.9 °C) (Oral)   Ht 5' 5" (1.651 m)   Wt 80.9 kg (178 lb 5.6 oz)   BMI 29.68 kg/m²      Physical Exam   Constitutional: He appears well-developed and well-nourished.   HENT:   Head: Normocephalic and atraumatic.   Eyes: Pupils are equal, round, and reactive to light.   Neck: Neck supple. No thyromegaly present.   Cardiovascular: Normal rate, regular rhythm and normal heart sounds. Exam reveals no gallop and no friction rub.   No murmur heard.  Pulmonary/Chest: Breath sounds normal. He has no wheezes. He has no rales.   Abdominal: Soft. Bowel sounds are normal. He exhibits no distension. There is no tenderness.   Vitals reviewed.          Assessment:       1. Atherosclerotic PVD with intermittent claudication    2. Essential hypertension        Plan:   Hypertension  Blood pressure is under good control.  We will continue the current regimen.  Will work on regular aerobic exercise and a low salt diet.        Atherosclerotic PVD with intermittent " claudication  Comments:  continue current medications. Awaiting further testing through cardiovascular    Essential hypertension          Follow up in about 6 weeks (around 10/28/2019) for PVD, with Carlene Coronado PA-C.

## 2019-09-16 NOTE — PATIENT INSTRUCTIONS
You are a candidate to receive the new shingles vaccination.  Due to medicare rules we can not give it to you in the clinic.  You will need to get it at the pharmacy.  You can check with your local pharmacy to arrange to get the vaccination.  It is a 2 shot series.  You will get the first shot and then a second shot between 2 and 6 months later.

## 2019-09-17 ENCOUNTER — TELEPHONE (OUTPATIENT)
Dept: INTERNAL MEDICINE | Facility: CLINIC | Age: 74
End: 2019-09-17

## 2019-09-17 NOTE — TELEPHONE ENCOUNTER
Mirtha in cardiology indicates they can't get in touch with him to schedule his follow up testing.  He was in clinic yesterday.  We need to try to contact him and verify his phone numbers.  I don't know what number cardiology is calling.  The patient is waiting to hear from them.

## 2019-09-17 NOTE — TELEPHONE ENCOUNTER
Per Shilpi Mullins note, she was able to get in touch with patient and he agreed to schedule aortagram.    Mirtha Juárez, LUC           5:57 PM   Note      Telephoned patient on home #[phone] stated my cell has been off with work and agreed with scheduling Aortagram with runoff on October 21st with lab work the week before in [place]

## 2019-10-14 ENCOUNTER — LAB VISIT (OUTPATIENT)
Dept: LAB | Facility: HOSPITAL | Age: 74
End: 2019-10-14
Attending: INTERNAL MEDICINE
Payer: MEDICARE

## 2019-10-14 DIAGNOSIS — M79.605 PAIN IN BOTH LOWER EXTREMITIES: ICD-10-CM

## 2019-10-14 DIAGNOSIS — I70.219 ATHEROSCLEROTIC PVD WITH INTERMITTENT CLAUDICATION: ICD-10-CM

## 2019-10-14 DIAGNOSIS — I73.9 PVD (PERIPHERAL VASCULAR DISEASE): ICD-10-CM

## 2019-10-14 DIAGNOSIS — M79.604 PAIN IN BOTH LOWER EXTREMITIES: ICD-10-CM

## 2019-10-14 LAB
ANION GAP SERPL CALC-SCNC: 8 MMOL/L (ref 8–16)
APTT BLDCRRT: 25 SEC (ref 21–32)
BASOPHILS # BLD AUTO: 0.08 K/UL (ref 0–0.2)
BASOPHILS NFR BLD: 1.5 % (ref 0–1.9)
BUN SERPL-MCNC: 10 MG/DL (ref 8–23)
CALCIUM SERPL-MCNC: 9.1 MG/DL (ref 8.7–10.5)
CHLORIDE SERPL-SCNC: 107 MMOL/L (ref 95–110)
CO2 SERPL-SCNC: 26 MMOL/L (ref 23–29)
CREAT SERPL-MCNC: 1.1 MG/DL (ref 0.5–1.4)
DIFFERENTIAL METHOD: ABNORMAL
EOSINOPHIL # BLD AUTO: 0.4 K/UL (ref 0–0.5)
EOSINOPHIL NFR BLD: 7.2 % (ref 0–8)
ERYTHROCYTE [DISTWIDTH] IN BLOOD BY AUTOMATED COUNT: 14.6 % (ref 11.5–14.5)
EST. GFR  (AFRICAN AMERICAN): >60 ML/MIN/1.73 M^2
EST. GFR  (NON AFRICAN AMERICAN): >60 ML/MIN/1.73 M^2
GLUCOSE SERPL-MCNC: 98 MG/DL (ref 70–110)
HCT VFR BLD AUTO: 45.6 % (ref 40–54)
HGB BLD-MCNC: 14.1 G/DL (ref 14–18)
IMM GRANULOCYTES # BLD AUTO: 0.02 K/UL (ref 0–0.04)
IMM GRANULOCYTES NFR BLD AUTO: 0.4 % (ref 0–0.5)
INR PPP: 1 (ref 0.8–1.2)
LYMPHOCYTES # BLD AUTO: 1.7 K/UL (ref 1–4.8)
LYMPHOCYTES NFR BLD: 32.7 % (ref 18–48)
MCH RBC QN AUTO: 27.5 PG (ref 27–31)
MCHC RBC AUTO-ENTMCNC: 30.9 G/DL (ref 32–36)
MCV RBC AUTO: 89 FL (ref 82–98)
MONOCYTES # BLD AUTO: 0.6 K/UL (ref 0.3–1)
MONOCYTES NFR BLD: 10.5 % (ref 4–15)
NEUTROPHILS # BLD AUTO: 2.5 K/UL (ref 1.8–7.7)
NEUTROPHILS NFR BLD: 47.7 % (ref 38–73)
NRBC BLD-RTO: 0 /100 WBC
PLATELET # BLD AUTO: 276 K/UL (ref 150–350)
PMV BLD AUTO: 10 FL (ref 9.2–12.9)
POTASSIUM SERPL-SCNC: 4.4 MMOL/L (ref 3.5–5.1)
PROTHROMBIN TIME: 10.1 SEC (ref 9–12.5)
RBC # BLD AUTO: 5.13 M/UL (ref 4.6–6.2)
SODIUM SERPL-SCNC: 141 MMOL/L (ref 136–145)
WBC # BLD AUTO: 5.26 K/UL (ref 3.9–12.7)

## 2019-10-14 PROCEDURE — 36415 COLL VENOUS BLD VENIPUNCTURE: CPT | Mod: HCNC,PO

## 2019-10-14 PROCEDURE — 85730 THROMBOPLASTIN TIME PARTIAL: CPT | Mod: HCNC

## 2019-10-14 PROCEDURE — 85025 COMPLETE CBC W/AUTO DIFF WBC: CPT | Mod: HCNC

## 2019-10-14 PROCEDURE — 85610 PROTHROMBIN TIME: CPT | Mod: HCNC

## 2019-10-14 PROCEDURE — 80048 BASIC METABOLIC PNL TOTAL CA: CPT | Mod: HCNC

## 2019-10-21 ENCOUNTER — HOSPITAL ENCOUNTER (OUTPATIENT)
Facility: HOSPITAL | Age: 74
Discharge: HOME | End: 2019-10-21
Attending: INTERNAL MEDICINE | Admitting: INTERNAL MEDICINE
Payer: MEDICARE

## 2019-10-21 VITALS
WEIGHT: 178 LBS | OXYGEN SATURATION: 99 % | HEIGHT: 65 IN | DIASTOLIC BLOOD PRESSURE: 64 MMHG | HEART RATE: 52 BPM | RESPIRATION RATE: 18 BRPM | BODY MASS INDEX: 29.66 KG/M2 | TEMPERATURE: 97 F | SYSTOLIC BLOOD PRESSURE: 140 MMHG

## 2019-10-21 DIAGNOSIS — I70.219 ATHEROSCLEROTIC PVD WITH INTERMITTENT CLAUDICATION: Primary | ICD-10-CM

## 2019-10-21 DIAGNOSIS — I73.9 PERIPHERAL VASCULAR DISEASE, UNSPECIFIED: ICD-10-CM

## 2019-10-21 DIAGNOSIS — I70.209 ATHEROSCLEROTIC PERIPHERAL VASCULAR DISEASE: ICD-10-CM

## 2019-10-21 LAB
PERIPHERAL PTA: YES
PERIPHERAL STENOSIS: ABNORMAL
POC ACTIVATED CLOTTING TIME K: 219 SEC (ref 74–137)
SAMPLE: ABNORMAL

## 2019-10-21 PROCEDURE — 75625 CONTRAST EXAM ABDOMINL AORTA: CPT | Mod: 26,HCNC,, | Performed by: INTERNAL MEDICINE

## 2019-10-21 PROCEDURE — 75625 CATH LAB PROCEDURE: ICD-10-PCS | Mod: 26,HCNC,, | Performed by: INTERNAL MEDICINE

## 2019-10-21 PROCEDURE — 37224 CATH LAB PROCEDURE: CPT | Mod: HCNC,LT,, | Performed by: INTERNAL MEDICINE

## 2019-10-21 PROCEDURE — 75716 ARTERY X-RAYS ARMS/LEGS: CPT | Mod: 26,59,HCNC, | Performed by: INTERNAL MEDICINE

## 2019-10-21 PROCEDURE — 63600175 PHARM REV CODE 636 W HCPCS: Mod: HCNC

## 2019-10-21 PROCEDURE — 75716 CATH LAB PROCEDURE: ICD-10-PCS | Mod: 26,59,HCNC, | Performed by: INTERNAL MEDICINE

## 2019-10-21 PROCEDURE — 99152 CATH LAB PROCEDURE: ICD-10-PCS | Mod: HCNC,,, | Performed by: INTERNAL MEDICINE

## 2019-10-21 PROCEDURE — 99152 MOD SED SAME PHYS/QHP 5/>YRS: CPT | Mod: HCNC,,, | Performed by: INTERNAL MEDICINE

## 2019-10-21 PROCEDURE — C1769 GUIDE WIRE: HCPCS | Mod: HCNC

## 2019-10-21 PROCEDURE — C2623 CATH, TRANSLUMIN, DRUG-COAT: HCPCS | Mod: HCNC

## 2019-10-21 PROCEDURE — 37224 CATH LAB PROCEDURE: ICD-10-PCS | Mod: HCNC,LT,, | Performed by: INTERNAL MEDICINE

## 2019-10-21 PROCEDURE — 25000003 PHARM REV CODE 250: Mod: HCNC

## 2019-10-21 PROCEDURE — 25500020 PHARM REV CODE 255: Mod: HCNC

## 2019-10-21 RX ORDER — DIPHENHYDRAMINE HCL 50 MG
50 CAPSULE ORAL ONCE
Status: COMPLETED | OUTPATIENT
Start: 2019-10-21 | End: 2019-10-21

## 2019-10-21 RX ORDER — CLOPIDOGREL BISULFATE 75 MG/1
75 TABLET ORAL DAILY
Qty: 30 TABLET | Refills: 11 | Status: SHIPPED | OUTPATIENT
Start: 2019-10-21 | End: 2019-11-26 | Stop reason: SDUPTHER

## 2019-10-21 RX ORDER — DIAZEPAM 5 MG/1
5 TABLET ORAL
Status: DISCONTINUED | OUTPATIENT
Start: 2019-10-21 | End: 2019-10-21 | Stop reason: HOSPADM

## 2019-10-21 RX ORDER — NAPROXEN SODIUM 220 MG/1
81 TABLET, FILM COATED ORAL ONCE
Status: COMPLETED | OUTPATIENT
Start: 2019-10-21 | End: 2019-10-21

## 2019-10-21 RX ORDER — SODIUM CHLORIDE 450 MG/100ML
INJECTION, SOLUTION INTRAVENOUS CONTINUOUS
Status: DISCONTINUED | OUTPATIENT
Start: 2019-10-21 | End: 2019-10-21 | Stop reason: HOSPADM

## 2019-10-21 RX ADMIN — DIAZEPAM 5 MG: 5 TABLET ORAL at 09:10

## 2019-10-21 RX ADMIN — SODIUM CHLORIDE: 450 INJECTION, SOLUTION INTRAVENOUS at 01:10

## 2019-10-21 RX ADMIN — Medication 50 MG: at 09:10

## 2019-10-21 RX ADMIN — NAPROXEN SODIUM 81 MG: 220 TABLET, FILM COATED ORAL at 09:10

## 2019-10-21 NOTE — BRIEF OP NOTE
Date: 10/21/2019  Surgeon/Physician: Larissa Ross MD  Assistants: none    Pre Op Diagnosis: pvd with claudication    Post OP Diagnosis:pvd with claudication    Procedure Performed: ao run off   Selective lle angio   lsfa pta dcb   perclose placement.    ANESTHESIA:RN IV SEDATION    COMPLICATION: none    Specimen / Tissue Removed: No    Estimated Blood Loss: <50 cc    Prostetics/Devices: None    Findings / Operative Note:   Bilateral severe distal sfa stenosis.  Bilateral single vessel run off infrapopliteal   tortuous proximal iliacs bilateral w/o any significant stenosis.   lsfa ramón dcvb to less than 10% residues.      PLAN:  Routine post pta care   Elective r sfa pta.    Discharge Note  Short Stay      SUMMARY     Admit Date: 10/21/2019    Attending Physician: Chencho Ross MD     Discharge Physician: Larissa Ross MD     Discharge Date: 10/21/2019    Final Diagnosis: pvd with claudication  S/p peripheral pta  Outcome of Stay:patient tolerated procedure well no complications he has left sfa severe strenosis treated with pta dcb to less than 10% residue with  No distal embolization. He has failure of perclose had manual hemostasis. He is deemed stable for discharge. He will follow up for rt sfa pta.  Disposition: Home or Self Care    Patient Instructions:   Current Discharge Medication List      START taking these medications    Details   clopidogrel (PLAVIX) 75 mg tablet Take 1 tablet (75 mg total) by mouth once daily.  Qty: 30 tablet, Refills: 11         CONTINUE these medications which have NOT CHANGED    Details   amLODIPine (NORVASC) 2.5 MG tablet Take 1 tablet (2.5 mg total) by mouth once daily.  Qty: 30 tablet, Refills: 11    Associated Diagnoses: Essential hypertension      aspirin (ECOTRIN) 81 MG EC tablet Take 1 tablet (81 mg total) by mouth once daily.  Qty: 30 tablet, Refills: 6    Associated Diagnoses: Atherosclerotic PVD with intermittent claudication      atorvastatin (LIPITOR) 80 MG tablet  Take 1 tablet (80 mg total) by mouth once daily.  Qty: 30 tablet, Refills: 11    Associated Diagnoses: Mixed hyperlipidemia; Atherosclerotic PVD with intermittent claudication      brimonidine 0.15 % OPTH DROP (ALPHAGAN) 0.15 % ophthalmic solution LOCATION: BOTH EYES. INSTILL ONE DROP IN BOTH EYES TWICE A DAY X 90 DAYS  Refills: 1      cilostazol (PLETAL) 50 MG Tab Take 1 tablet (50 mg total) by mouth 2 (two) times daily.  Qty: 60 tablet, Refills: 11    Associated Diagnoses: Claudication      finasteride (PROSCAR) 5 mg tablet Take 1 tablet (5 mg total) by mouth once daily.  Qty: 30 tablet, Refills: 11      latanoprost 0.005 % ophthalmic solution Place 1 drop into both eyes once daily.  Qty: 1 Bottle, Refills: 5    Associated Diagnoses: Primary open angle glaucoma of both eyes, indeterminate stage      lisinopril (PRINIVIL,ZESTRIL) 20 MG tablet Take 1 tablet (20 mg total) by mouth once daily.  Qty: 90 tablet, Refills: 3    Associated Diagnoses: Essential hypertension      sildenafil (REVATIO) 20 mg Tab Take 1-5 tablets as needed on an empty stomach with no alcohol an hour before desiring an erection.  Qty: 50 tablet, Refills: 11      sodium bicarb-sodium chloride Pack 1 packet by Nasal route 2 (two) times daily.  Qty: 120 each, Refills: 0    Associated Diagnoses: Post-nasal drip      tamsulosin (FLOMAX) 0.4 mg Cap Take 1 capsule (0.4 mg total) by mouth once daily.  Qty: 30 capsule, Refills: 11             Discharge Procedure Orders (must include Diet, Follow-up, Activity)   Discharge Procedure Orders (must include Diet, Follow-up, Activity)   Diet general     Call MD for:  temperature >100.4     Call MD for:  persistent nausea and vomiting     Call MD for:  severe uncontrolled pain     Call MD for:  difficulty breathing, headache or visual disturbances     Call MD for:  redness, tenderness, or signs of infection (pain, swelling, redness, odor or green/yellow discharge around incision site)     Call MD for:  hives      Call MD for:  persistent dizziness or light-headedness     Call MD for:  extreme fatigue     Follow-up Information     Larissa Ross MD In 1 week.    Specialty:  Cardiology  Contact information:  [address] [facility] [place] [address] [phone]

## 2019-10-21 NOTE — HPI
A 73 yo male with htn hlp has severe limiting claudication with multilevel disease presents fro angiography.

## 2019-10-21 NOTE — H&P
[facility] -   Cardiology  History and Physical     Patient Name: Alex Milner  MRN: [id number]  Admission Date: 10/21/2019  Code Status: No Order   Attending Provider: Chencho Ross MD   Primary Care Physician: Loi Patel MD  Principal Problem:<principal problem not specified>    Patient information was obtained from patient and ER records.     Subjective:     Chief Complaint:  pvd with claudication lifestyle limiting severe on left      HPI:  A 73 yo male with htn hlp has severe limiting claudication with multilevel disease presents fro angiography.    Past Medical History:   Diagnosis Date    Atherosclerotic PVD with intermittent claudication 8/16/2019    Atrial fibrillation     pt unaware of this    Back pain     Bilateral inguinal hernia     CT ABdomen/pelvis 3/9/2015 (care everywhere)---Bilateral inguinal hernias containing fat.      Calcified granuloma of lung 10/3/2017    CT CHest 3/26/2018---There are calcified lymph nodes in the mediastinum and left hilum.    CT chest 9/11/ 2017 Calcified left hilar and subcarinal granulomas are noted  ;Calcified granuloma noted within the posterior segment of the right hepatic lobe.    Diverticulosis     Diverticulosis 10/28/13    Colonoscopy     ED (erectile dysfunction)     Elevated PSA     Granuloma of liver     ct chest 3/26/2018---There is a small calcified granuloma posteriorly in the right lobe of the liver    Hyperlipidemia     Hypertension     Moderate persistent asthma without complication 9/26/2017    Obesity     Tobacco dependence     resolved    Urinary tract infection        Past Surgical History:   Procedure Laterality Date    HERNIA REPAIR      x2    PROSTATE BIOPSY      reported per patient around 2014 or 2015 which was reportedly negative       Review of patient's allergies indicates:  No Known Allergies    No current facility-administered medications on file prior to encounter.      Current Outpatient  Medications on File Prior to Encounter   Medication Sig    amLODIPine (NORVASC) 2.5 MG tablet Take 1 tablet (2.5 mg total) by mouth once daily.    aspirin (ECOTRIN) 81 MG EC tablet Take 1 tablet (81 mg total) by mouth once daily.    atorvastatin (LIPITOR) 80 MG tablet Take 1 tablet (80 mg total) by mouth once daily.    brimonidine 0.15 % OPTH DROP (ALPHAGAN) 0.15 % ophthalmic solution LOCATION: BOTH EYES. INSTILL ONE DROP IN BOTH EYES TWICE A DAY X 90 DAYS    cilostazol (PLETAL) 50 MG Tab Take 1 tablet (50 mg total) by mouth 2 (two) times daily.    finasteride (PROSCAR) 5 mg tablet Take 1 tablet (5 mg total) by mouth once daily.    latanoprost 0.005 % ophthalmic solution Place 1 drop into both eyes once daily.    lisinopril (PRINIVIL,ZESTRIL) 20 MG tablet Take 1 tablet (20 mg total) by mouth once daily.    sildenafil (REVATIO) 20 mg Tab Take 1-5 tablets as needed on an empty stomach with no alcohol an hour before desiring an erection.    sodium bicarb-sodium chloride Pack 1 packet by Nasal route 2 (two) times daily.    tamsulosin (FLOMAX) 0.4 mg Cap Take 1 capsule (0.4 mg total) by mouth once daily.     Family History     Problem Relation (Age of Onset)    Cancer Mother, Father, Brother, Sister (64)    Prostate cancer Brother        Tobacco Use    Smoking status: Former Smoker     Packs/day: 2.00     Years: 15.00     Pack years: 30.00     Types: Cigarettes     Start date:      Last attempt to quit: 1985     Years since quittin.2    Smokeless tobacco: Never Used   Substance and Sexual Activity    Alcohol use: Yes     Alcohol/week: 12.0 standard drinks     Types: 12 Cans of beer per week    Drug use: No    Sexual activity: Yes     Partners: Female     Review of Systems   Constitution: Negative for malaise/fatigue.   Eyes: Negative for blurred vision.   Cardiovascular: Positive for chest pain and claudication. Negative for cyanosis, dyspnea on exertion, irregular heartbeat, leg swelling,  near-syncope, orthopnea, palpitations and paroxysmal nocturnal dyspnea.   Respiratory: Positive for shortness of breath. Negative for cough and hemoptysis.    Hematologic/Lymphatic: Negative for bleeding problem. Does not bruise/bleed easily.   Skin: Negative for dry skin and itching.   Musculoskeletal: Negative for falls, muscle weakness and myalgias.   Gastrointestinal: Negative for abdominal pain, diarrhea, heartburn, hematemesis, hematochezia and melena.   Genitourinary: Negative for flank pain and hematuria.   Neurological: Negative for dizziness, focal weakness, headaches, light-headedness, numbness, paresthesias, seizures and weakness.   Psychiatric/Behavioral: Negative for altered mental status and memory loss. The patient is not nervous/anxious.    Allergic/Immunologic: Negative for hives.     Objective:     Vital Signs (Most Recent):  Temp: 97.9 °F (36.6 °C) (10/21/19 0948)  Pulse: (!) 53 (10/21/19 0948)  Resp: 16 (10/21/19 0948)  BP: (!) 147/57 (10/21/19 0948)  SpO2: 100 % (10/21/19 0948) Vital Signs (24h Range):  Temp:  [97.9 °F (36.6 °C)] 97.9 °F (36.6 °C)  Pulse:  [53] 53  Resp:  [16] 16  SpO2:  [100 %] 100 %  BP: (147)/(57) 147/57     Weight: 80.7 kg (178 lb)  Body mass index is 29.62 kg/m².    SpO2: 100 %       No intake or output data in the 24 hours ending 10/21/19 0951    Lines/Drains/Airways     Airway                 Airway - Non-Surgical Nasal Cannula -- days                Physical Exam  Constitutional: He is oriented to person, place, and time. He appears well-developed and well-nourished. No distress.   HENT:   Head: Normocephalic and atraumatic.   Eyes: Pupils are equal, round, and reactive to light. EOM are normal. Right eye exhibits no discharge. Left eye exhibits no discharge.   Neck: Neck supple. No JVD present. No thyromegaly present.   Cardiovascular: Normal rate, regular rhythm and normal heart sounds. Exam reveals decreased pulses. Exam reveals no gallop and no friction rub.   No  murmur heard.  Pulses:       Carotid pulses are 2+ on the right side, and 2+ on the left side.       Radial pulses are 2+ on the right side, and 2+ on the left side.        Femoral pulses are 2+ on the right side with bruit, and 1+ on the left side with bruit.       Popliteal pulses are 1+ on the right side, and 0 on the left side.        Dorsalis pedis pulses are 0 on the right side, and 0 on the left side.        Posterior tibial pulses are 0 on the right side, and 0 on the left side.   Pulmonary/Chest: Effort normal and breath sounds normal. No respiratory distress. He has no wheezes. He has no rales. He exhibits no tenderness.   Abdominal: Soft. Bowel sounds are normal. He exhibits no distension. There is no tenderness.   Musculoskeletal: Normal range of motion. He exhibits no edema.   Neurological: He is alert and oriented to person, place, and time. No cranial nerve deficit.   Skin: Skin is warm and dry. No rash noted. He is not diaphoretic. No erythema.   Psychiatric: He has a normal mood and affect. His behavior is normal.   Nursing note and vitals reviewed.     Significant Labs:   Laboratory:  Chemistry:   Lab Results   Component Value Date     10/14/2019    K 4.4 10/14/2019     10/14/2019    CO2 26 10/14/2019    BUN 10 10/14/2019    CREATININE 1.1 10/14/2019    CALCIUM 9.1 10/14/2019     Cardiac Markers: No results found for: CKTOTAL, CKMB, CKMBINDEX, TROPONINI  Cardiac Markers (Last 3): No results found for: CKTOTAL, CKMB, CKMBINDEX, TROPONINI  CBC:   Lab Results   Component Value Date    WBC 5.26 10/14/2019    HGB 14.1 10/14/2019    HCT 45.6 10/14/2019    MCV 89 10/14/2019     10/14/2019     Lipids:   Lab Results   Component Value Date    CHOL 271 (H) 06/27/2019    TRIG 84 06/27/2019    HDL 81 (H) 06/27/2019     Coagulation:   Lab Results   Component Value Date    INR 1.0 10/14/2019    APTT 25.0 10/14/2019     Significant Imaging: reviewed noninvasive avscular eval as well as nuclear  stress test.    Assessment and Plan:     Atherosclerotic PVD with intermittent claudication  Severe lifestyle limiting with multilevel disease.    Hyperlipidemia  On statins    Hypertension  On medical therapy        VTE Risk Mitigation (From admission, onward)    None        aogram with run off/pta  I have explained the risks, benefits , and alternatives of the procedure in detail.the patient voices understanding and all questions have been answered.the patient agrees to proceed as planned.  Larissa Ross MD  Cardiology   [facility] -

## 2019-10-21 NOTE — SUBJECTIVE & OBJECTIVE
Past Medical History:   Diagnosis Date    Atherosclerotic PVD with intermittent claudication 8/16/2019    Atrial fibrillation     pt unaware of this    Back pain     Bilateral inguinal hernia     CT ABdomen/pelvis 3/9/2015 (care everywhere)---Bilateral inguinal hernias containing fat.      Calcified granuloma of lung 10/3/2017    CT CHest 3/26/2018---There are calcified lymph nodes in the mediastinum and left hilum.    CT chest 9/11/ 2017 Calcified left hilar and subcarinal granulomas are noted  ;Calcified granuloma noted within the posterior segment of the right hepatic lobe.    Diverticulosis     Diverticulosis 10/28/13    Colonoscopy     ED (erectile dysfunction)     Elevated PSA     Granuloma of liver     ct chest 3/26/2018---There is a small calcified granuloma posteriorly in the right lobe of the liver    Hyperlipidemia     Hypertension     Moderate persistent asthma without complication 9/26/2017    Obesity     Tobacco dependence     resolved    Urinary tract infection        Past Surgical History:   Procedure Laterality Date    HERNIA REPAIR      x2    PROSTATE BIOPSY      reported per patient around 2014 or 2015 which was reportedly negative       Review of patient's allergies indicates:  No Known Allergies    No current facility-administered medications on file prior to encounter.      Current Outpatient Medications on File Prior to Encounter   Medication Sig    amLODIPine (NORVASC) 2.5 MG tablet Take 1 tablet (2.5 mg total) by mouth once daily.    aspirin (ECOTRIN) 81 MG EC tablet Take 1 tablet (81 mg total) by mouth once daily.    atorvastatin (LIPITOR) 80 MG tablet Take 1 tablet (80 mg total) by mouth once daily.    brimonidine 0.15 % OPTH DROP (ALPHAGAN) 0.15 % ophthalmic solution LOCATION: BOTH EYES. INSTILL ONE DROP IN BOTH EYES TWICE A DAY X 90 DAYS    cilostazol (PLETAL) 50 MG Tab Take 1 tablet (50 mg total) by mouth 2 (two) times daily.    finasteride (PROSCAR) 5 mg tablet  Take 1 tablet (5 mg total) by mouth once daily.    latanoprost 0.005 % ophthalmic solution Place 1 drop into both eyes once daily.    lisinopril (PRINIVIL,ZESTRIL) 20 MG tablet Take 1 tablet (20 mg total) by mouth once daily.    sildenafil (REVATIO) 20 mg Tab Take 1-5 tablets as needed on an empty stomach with no alcohol an hour before desiring an erection.    sodium bicarb-sodium chloride Pack 1 packet by Nasal route 2 (two) times daily.    tamsulosin (FLOMAX) 0.4 mg Cap Take 1 capsule (0.4 mg total) by mouth once daily.     Family History     Problem Relation (Age of Onset)    Cancer Mother, Father, Brother, Sister (64)    Prostate cancer Brother        Tobacco Use    Smoking status: Former Smoker     Packs/day: 2.00     Years: 15.00     Pack years: 30.00     Types: Cigarettes     Start date:      Last attempt to quit: 1985     Years since quittin.2    Smokeless tobacco: Never Used   Substance and Sexual Activity    Alcohol use: Yes     Alcohol/week: 12.0 standard drinks     Types: 12 Cans of beer per week    Drug use: No    Sexual activity: Yes     Partners: Female     Review of Systems   Constitution: Negative for malaise/fatigue.   Eyes: Negative for blurred vision.   Cardiovascular: Positive for chest pain and claudication. Negative for cyanosis, dyspnea on exertion, irregular heartbeat, leg swelling, near-syncope, orthopnea, palpitations and paroxysmal nocturnal dyspnea.   Respiratory: Positive for shortness of breath. Negative for cough and hemoptysis.    Hematologic/Lymphatic: Negative for bleeding problem. Does not bruise/bleed easily.   Skin: Negative for dry skin and itching.   Musculoskeletal: Negative for falls, muscle weakness and myalgias.   Gastrointestinal: Negative for abdominal pain, diarrhea, heartburn, hematemesis, hematochezia and melena.   Genitourinary: Negative for flank pain and hematuria.   Neurological: Negative for dizziness, focal weakness, headaches,  light-headedness, numbness, paresthesias, seizures and weakness.   Psychiatric/Behavioral: Negative for altered mental status and memory loss. The patient is not nervous/anxious.    Allergic/Immunologic: Negative for hives.     Objective:     Vital Signs (Most Recent):  Temp: 97.9 °F (36.6 °C) (10/21/19 0948)  Pulse: (!) 53 (10/21/19 0948)  Resp: 16 (10/21/19 0948)  BP: (!) 147/57 (10/21/19 0948)  SpO2: 100 % (10/21/19 0948) Vital Signs (24h Range):  Temp:  [97.9 °F (36.6 °C)] 97.9 °F (36.6 °C)  Pulse:  [53] 53  Resp:  [16] 16  SpO2:  [100 %] 100 %  BP: (147)/(57) 147/57     Weight: 80.7 kg (178 lb)  Body mass index is 29.62 kg/m².    SpO2: 100 %       No intake or output data in the 24 hours ending 10/21/19 0951    Lines/Drains/Airways     Airway                 Airway - Non-Surgical Nasal Cannula -- days                Physical Exam  Constitutional: He is oriented to person, place, and time. He appears well-developed and well-nourished. No distress.   HENT:   Head: Normocephalic and atraumatic.   Eyes: Pupils are equal, round, and reactive to light. EOM are normal. Right eye exhibits no discharge. Left eye exhibits no discharge.   Neck: Neck supple. No JVD present. No thyromegaly present.   Cardiovascular: Normal rate, regular rhythm and normal heart sounds. Exam reveals decreased pulses. Exam reveals no gallop and no friction rub.   No murmur heard.  Pulses:       Carotid pulses are 2+ on the right side, and 2+ on the left side.       Radial pulses are 2+ on the right side, and 2+ on the left side.        Femoral pulses are 2+ on the right side with bruit, and 1+ on the left side with bruit.       Popliteal pulses are 1+ on the right side, and 0 on the left side.        Dorsalis pedis pulses are 0 on the right side, and 0 on the left side.        Posterior tibial pulses are 0 on the right side, and 0 on the left side.   Pulmonary/Chest: Effort normal and breath sounds normal. No respiratory distress. He has no  wheezes. He has no rales. He exhibits no tenderness.   Abdominal: Soft. Bowel sounds are normal. He exhibits no distension. There is no tenderness.   Musculoskeletal: Normal range of motion. He exhibits no edema.   Neurological: He is alert and oriented to person, place, and time. No cranial nerve deficit.   Skin: Skin is warm and dry. No rash noted. He is not diaphoretic. No erythema.   Psychiatric: He has a normal mood and affect. His behavior is normal.   Nursing note and vitals reviewed.     Significant Labs:   Laboratory:  Chemistry:   Lab Results   Component Value Date     10/14/2019    K 4.4 10/14/2019     10/14/2019    CO2 26 10/14/2019    BUN 10 10/14/2019    CREATININE 1.1 10/14/2019    CALCIUM 9.1 10/14/2019     Cardiac Markers: No results found for: CKTOTAL, CKMB, CKMBINDEX, TROPONINI  Cardiac Markers (Last 3): No results found for: CKTOTAL, CKMB, CKMBINDEX, TROPONINI  CBC:   Lab Results   Component Value Date    WBC 5.26 10/14/2019    HGB 14.1 10/14/2019    HCT 45.6 10/14/2019    MCV 89 10/14/2019     10/14/2019     Lipids:   Lab Results   Component Value Date    CHOL 271 (H) 06/27/2019    TRIG 84 06/27/2019    HDL 81 (H) 06/27/2019     Coagulation:   Lab Results   Component Value Date    INR 1.0 10/14/2019    APTT 25.0 10/14/2019     Significant Imaging: reviewed noninvasive avscular eval as well as nuclear stress test.

## 2019-10-21 NOTE — PLAN OF CARE
Left hand saline lock discontinued with canula intact; tolerated well.  Right groin site remains c/d/i and wnl at time of discharge.  Discharge instructions, home medication list and post discharge follow up appointments discussed with pt and wife with teach back received.  Discharged to wife, via wheelchair, in no apparent distress. All personal belongings taken with pt and wife. Encouraged continued compliance with MD directives.

## 2019-10-21 NOTE — DISCHARGE INSTRUCTIONS
"Post-op Heart Catheterization    1. DIET: It is advisable for you to follow a diet that limits the intake of salt, sugar, saturated fats and cholesterol.     2. FOR THE NEXT 24 HOURS:   · For the next 8 hours, you should be watched by a responsible adult. This person should make sure your condition is not getting worse.   · Don't drink any alcohol for the next 24 hours.  · Don't drive, operate dangerous machinery, or make important business or personal decisions during the next 24 hours.  · Your healthcare provider may tell you not to take any medicine by mouth for pain or sleep in the next 4 hours. These medicines may react with the medicines you were given in the hospital. This could cause a much stronger response than usual.       3. ACTIVITY:                                Day of discharge:             NO vigorous activity, lifting or straining                                                   Day after discharge:         Avoid heavy lifting (up to 10 lbs)                                                                        The day after discharge you may shower, but avoid tub baths for 5 days                                                                         Wash site gently with soap and water                 NO powder or lotion to your procedure site.                 Before you shower, remove dressing, apply bandaid if desired                                                                                                                                                                            2nd day after discharge:  Resume normal activities                                                                         Exercise program as instructed      4. WOUND CARE: It is not unusual to have a small amount of bruising appear in the groin area. It is also common to have a tender "knot" develop beneath the skin at the puncture site in the groin. This is scar tissue only and is not a cause for concern or " "alarm. This tender knot may take several weeks to fully resolve. The bruise will usually spread over several days. If the lump gets bigger, call you doctor immediately.    5. DISCOMFORT: For general discomfort at the puncture site, you may take 1 or 2 Acetaminophen (Tylenol) tablets every 4 hours as needed. (Do not take more than 4000 mg a day)                 6. CALL YOUR HEALTHCARE PROVIDER IF YOU START TO HAVE THE FOLLOWING SYMPTOMS:        1. Problems with the affected leg: Pain, discomfort, loss of warmth, numbness                     or tingling                                                                                                                            2. Problems at the groin site: Bleeding, pain that is sudden/sharp/persistent,                   swelling at site or a change in "lump" size, increased redness or drainage at                     puncture site                                                               3. High fever (101 degrees or higher)       4.  Drowsiness that doesn't get better       5. Weakness or dizziness that doesn't get better            6. Repeated vomiting        7. GO TO  THE EMERGENCY ROOM OR CALL 911 IF YOU HAVE: Chest pains or discomforts not relieved with 3 nitroglycerin doses (sublingual tablets or spray), numbness or severe pain or if your foot or leg becomes cold or discolored or uncontrolled bleeding from site (apply direct pressure above site).  "

## 2019-10-28 ENCOUNTER — OFFICE VISIT (OUTPATIENT)
Dept: INTERNAL MEDICINE | Facility: CLINIC | Age: 74
End: 2019-10-28
Payer: MEDICARE

## 2019-10-28 VITALS
HEART RATE: 68 BPM | BODY MASS INDEX: 28.91 KG/M2 | HEIGHT: 65 IN | TEMPERATURE: 98 F | DIASTOLIC BLOOD PRESSURE: 60 MMHG | WEIGHT: 173.5 LBS | SYSTOLIC BLOOD PRESSURE: 140 MMHG

## 2019-10-28 DIAGNOSIS — E78.2 MIXED HYPERLIPIDEMIA: ICD-10-CM

## 2019-10-28 DIAGNOSIS — I70.219 ATHEROSCLEROTIC PVD WITH INTERMITTENT CLAUDICATION: Primary | ICD-10-CM

## 2019-10-28 DIAGNOSIS — I73.9 PAD (PERIPHERAL ARTERY DISEASE): ICD-10-CM

## 2019-10-28 PROCEDURE — 99213 OFFICE O/P EST LOW 20 MIN: CPT | Mod: 25,HCNC,S$GLB, | Performed by: PHYSICIAN ASSISTANT

## 2019-10-28 PROCEDURE — 1101F PR PT FALLS ASSESS DOC 0-1 FALLS W/OUT INJ PAST YR: ICD-10-PCS | Mod: HCNC,CPTII,S$GLB, | Performed by: PHYSICIAN ASSISTANT

## 2019-10-28 PROCEDURE — 99213 PR OFFICE/OUTPT VISIT, EST, LEVL III, 20-29 MIN: ICD-10-PCS | Mod: 25,HCNC,S$GLB, | Performed by: PHYSICIAN ASSISTANT

## 2019-10-28 PROCEDURE — 99999 PR PBB SHADOW E&M-EST. PATIENT-LVL III: CPT | Mod: PBBFAC,HCNC,, | Performed by: PHYSICIAN ASSISTANT

## 2019-10-28 PROCEDURE — 99999 PR PBB SHADOW E&M-EST. PATIENT-LVL III: ICD-10-PCS | Mod: PBBFAC,HCNC,, | Performed by: PHYSICIAN ASSISTANT

## 2019-10-28 PROCEDURE — 3077F SYST BP >= 140 MM HG: CPT | Mod: HCNC,CPTII,S$GLB, | Performed by: PHYSICIAN ASSISTANT

## 2019-10-28 PROCEDURE — 1101F PT FALLS ASSESS-DOCD LE1/YR: CPT | Mod: HCNC,CPTII,S$GLB, | Performed by: PHYSICIAN ASSISTANT

## 2019-10-28 PROCEDURE — 3078F DIAST BP <80 MM HG: CPT | Mod: HCNC,CPTII,S$GLB, | Performed by: PHYSICIAN ASSISTANT

## 2019-10-28 PROCEDURE — 90662 IIV NO PRSV INCREASED AG IM: CPT | Mod: HCNC,S$GLB,, | Performed by: PHYSICIAN ASSISTANT

## 2019-10-28 PROCEDURE — G0008 FLU VACCINE - HIGH DOSE (65+) PRESERVATIVE FREE IM: ICD-10-PCS | Mod: HCNC,S$GLB,, | Performed by: PHYSICIAN ASSISTANT

## 2019-10-28 PROCEDURE — 3077F PR MOST RECENT SYSTOLIC BLOOD PRESSURE >= 140 MM HG: ICD-10-PCS | Mod: HCNC,CPTII,S$GLB, | Performed by: PHYSICIAN ASSISTANT

## 2019-10-28 PROCEDURE — 90662 FLU VACCINE - HIGH DOSE (65+) PRESERVATIVE FREE IM: ICD-10-PCS | Mod: HCNC,S$GLB,, | Performed by: PHYSICIAN ASSISTANT

## 2019-10-28 PROCEDURE — G0008 ADMIN INFLUENZA VIRUS VAC: HCPCS | Mod: HCNC,S$GLB,, | Performed by: PHYSICIAN ASSISTANT

## 2019-10-28 PROCEDURE — 3078F PR MOST RECENT DIASTOLIC BLOOD PRESSURE < 80 MM HG: ICD-10-PCS | Mod: HCNC,CPTII,S$GLB, | Performed by: PHYSICIAN ASSISTANT

## 2019-10-28 NOTE — PROGRESS NOTES
Subjective:       Patient ID: Alex Milner is a 74 y.o. male.    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     Denies back pain, no coolness in extremities     Health Maintenance Due   Topic Date Due    TETANUS VACCINE  08/21/1963       Past Medical History:   Diagnosis Date    Atherosclerotic PVD with intermittent claudication 8/16/2019    Atrial fibrillation     pt unaware of this    Back pain     Bilateral inguinal hernia     CT ABdomen/pelvis 3/9/2015 (care everywhere)---Bilateral inguinal hernias containing fat.      Calcified granuloma of lung 10/3/2017    CT [facility] 3/26/2018---There are calcified lymph nodes in the mediastinum and left hilum.    CT chest 9/11/ 2017 Calcified left hilar and subcarinal granulomas are noted  ;Calcified granuloma noted within the posterior segment of the right hepatic lobe.    Diverticulosis     Diverticulosis 10/28/13    Colonoscopy     ED (erectile dysfunction)     Elevated PSA     Granuloma of liver     ct chest 3/26/2018---There is a small calcified granuloma posteriorly in the right lobe of the liver    Hyperlipidemia     Hypertension     Moderate persistent asthma without complication 9/26/2017    Obesity     Tobacco dependence     resolved    Urinary tract infection        Current Outpatient Medications   Medication Sig Dispense Refill    amLODIPine (NORVASC) 2.5 MG tablet Take 1 tablet (2.5 mg total) by mouth once daily. 30 tablet 11    aspirin (ECOTRIN) 81 MG EC tablet Take 1 tablet (81 mg total) by mouth once daily. 30 tablet 6    atorvastatin (LIPITOR) 80 MG tablet Take 1 tablet (80 mg total) by mouth once daily. 30 tablet 11    brimonidine 0.15 % OPTH DROP (ALPHAGAN) 0.15 % ophthalmic solution LOCATION: BOTH EYES. INSTILL ONE DROP IN BOTH EYES TWICE A  "DAY X 90 DAYS  1    cilostazol (PLETAL) 50 MG Tab Take 1 tablet (50 mg total) by mouth 2 (two) times daily. 60 tablet 11    clopidogrel (PLAVIX) 75 mg tablet Take 1 tablet (75 mg total) by mouth once daily. 30 tablet 11    finasteride (PROSCAR) 5 mg tablet Take 1 tablet (5 mg total) by mouth once daily. 30 tablet 11    latanoprost 0.005 % ophthalmic solution Place 1 drop into both eyes once daily. 1 Bottle 5    lisinopril (PRINIVIL,ZESTRIL) 20 MG tablet Take 1 tablet (20 mg total) by mouth once daily. 90 tablet 3    sildenafil (REVATIO) 20 mg Tab Take 1-5 tablets as needed on an empty stomach with no alcohol an hour before desiring an erection. 50 tablet 11    sodium bicarb-sodium chloride Pack 1 packet by Nasal route 2 (two) times daily. 120 each 0    tamsulosin (FLOMAX) 0.4 mg Cap Take 1 capsule (0.4 mg total) by mouth once daily. 30 capsule 11     No current facility-administered medications for this visit.        Review of Systems   Constitutional: Negative for fatigue, fever and unexpected weight change.   HENT: Negative for sore throat and trouble swallowing.    Respiratory: Negative for cough and shortness of breath.    Cardiovascular: Negative for chest pain.   Gastrointestinal: Negative for abdominal pain.   Musculoskeletal: Positive for myalgias.   Hematological: Negative for adenopathy. Does not bruise/bleed easily.   All other systems reviewed and are negative.      Objective:   BP (!) 140/60   Pulse 68   Temp 98.3 °F (36.8 °C) (Tympanic)   Ht 5' 5" (1.651 m)   Wt 78.7 kg (173 lb 8 oz)   BMI 28.87 kg/m²      Physical Exam   Constitutional: He appears well-developed and well-nourished.   HENT:   Head: Normocephalic and atraumatic.   Eyes: Pupils are equal, round, and reactive to light.   Neck: Neck supple. No thyromegaly present.   Cardiovascular: Normal rate, regular rhythm and normal heart sounds. Exam reveals no gallop and no friction rub.   No murmur heard.  Pulmonary/Chest: Breath sounds " normal. He has no wheezes. He has no rales.   Abdominal: Soft. Bowel sounds are normal. He exhibits no distension. There is no tenderness.   Vitals reviewed.        Lab Results   Component Value Date    WBC 5.26 10/14/2019    HGB 14.1 10/14/2019    HCT 45.6 10/14/2019     10/14/2019    CHOL 271 (H) 06/27/2019    TRIG 84 06/27/2019    HDL 81 (H) 06/27/2019    ALT 10 06/27/2019    AST 15 06/27/2019     10/14/2019    K 4.4 10/14/2019     10/14/2019    CREATININE 1.1 10/14/2019    BUN 10 10/14/2019    CO2 26 10/14/2019    TSH 0.911 06/27/2019    PSA 17.5 (H) 07/15/2019    INR 1.0 10/14/2019       Assessment:       1. Atherosclerotic PVD with intermittent claudication    2. PAD (peripheral artery disease)    3. Mixed hyperlipidemia        Plan:   Atherosclerotic PVD with intermittent claudication    PAD (peripheral artery disease)    Mixed hyperlipidemia    Other orders  -     Influenza - High Dose (65+) (PF) (IM)        Follow up in about 4 months (around 2/28/2020).  Keeping follow ups with cardiology follow up with PCP in 4 months for chronic issues

## 2019-11-01 ENCOUNTER — OFFICE VISIT (OUTPATIENT)
Dept: CARDIOLOGY | Facility: CLINIC | Age: 74
End: 2019-11-01
Payer: MEDICARE

## 2019-11-01 VITALS
SYSTOLIC BLOOD PRESSURE: 142 MMHG | DIASTOLIC BLOOD PRESSURE: 60 MMHG | HEIGHT: 65 IN | WEIGHT: 175.69 LBS | OXYGEN SATURATION: 99 % | BODY MASS INDEX: 29.27 KG/M2 | HEART RATE: 61 BPM

## 2019-11-01 DIAGNOSIS — I70.0 AORTIC CALCIFICATION: ICD-10-CM

## 2019-11-01 DIAGNOSIS — I10 ESSENTIAL HYPERTENSION: Chronic | ICD-10-CM

## 2019-11-01 DIAGNOSIS — E78.5 HYPERLIPIDEMIA, UNSPECIFIED HYPERLIPIDEMIA TYPE: ICD-10-CM

## 2019-11-01 DIAGNOSIS — R06.02 SOB (SHORTNESS OF BREATH): Primary | ICD-10-CM

## 2019-11-01 DIAGNOSIS — E66.9 OBESITY (BMI 30.0-34.9): ICD-10-CM

## 2019-11-01 DIAGNOSIS — I70.219 ATHEROSCLEROTIC PVD WITH INTERMITTENT CLAUDICATION: Primary | ICD-10-CM

## 2019-11-01 PROCEDURE — 1101F PR PT FALLS ASSESS DOC 0-1 FALLS W/OUT INJ PAST YR: ICD-10-PCS | Mod: HCNC,CPTII,S$GLB, | Performed by: INTERNAL MEDICINE

## 2019-11-01 PROCEDURE — 1101F PT FALLS ASSESS-DOCD LE1/YR: CPT | Mod: HCNC,CPTII,S$GLB, | Performed by: INTERNAL MEDICINE

## 2019-11-01 PROCEDURE — 3077F SYST BP >= 140 MM HG: CPT | Mod: HCNC,CPTII,S$GLB, | Performed by: INTERNAL MEDICINE

## 2019-11-01 PROCEDURE — 99999 PR PBB SHADOW E&M-EST. PATIENT-LVL III: CPT | Mod: PBBFAC,HCNC,, | Performed by: INTERNAL MEDICINE

## 2019-11-01 PROCEDURE — 99499 RISK ADDL DX/OHS AUDIT: ICD-10-PCS | Mod: HCNC,S$GLB,, | Performed by: INTERNAL MEDICINE

## 2019-11-01 PROCEDURE — 99999 PR PBB SHADOW E&M-EST. PATIENT-LVL III: ICD-10-PCS | Mod: PBBFAC,HCNC,, | Performed by: INTERNAL MEDICINE

## 2019-11-01 PROCEDURE — 3078F PR MOST RECENT DIASTOLIC BLOOD PRESSURE < 80 MM HG: ICD-10-PCS | Mod: HCNC,CPTII,S$GLB, | Performed by: INTERNAL MEDICINE

## 2019-11-01 PROCEDURE — 99213 OFFICE O/P EST LOW 20 MIN: CPT | Mod: HCNC,S$GLB,, | Performed by: INTERNAL MEDICINE

## 2019-11-01 PROCEDURE — 99499 UNLISTED E&M SERVICE: CPT | Mod: HCNC,S$GLB,, | Performed by: INTERNAL MEDICINE

## 2019-11-01 PROCEDURE — 3077F PR MOST RECENT SYSTOLIC BLOOD PRESSURE >= 140 MM HG: ICD-10-PCS | Mod: HCNC,CPTII,S$GLB, | Performed by: INTERNAL MEDICINE

## 2019-11-01 PROCEDURE — 99213 PR OFFICE/OUTPT VISIT, EST, LEVL III, 20-29 MIN: ICD-10-PCS | Mod: HCNC,S$GLB,, | Performed by: INTERNAL MEDICINE

## 2019-11-01 PROCEDURE — 3078F DIAST BP <80 MM HG: CPT | Mod: HCNC,CPTII,S$GLB, | Performed by: INTERNAL MEDICINE

## 2019-11-01 NOTE — H&P (VIEW-ONLY)
Subjective:   Patient ID:  Alex Milner is a 74 y.o. male who presents for follow up of Atherosclerotic PVD with intermittent claudication (1 week hospital f/u)      HPI  A 73 yo male with afib pvd htn hlp is here for f/u had pta of left sfa has significant infrapopliteal disease bilaterally has also severe distal rt sfa stenosis. His claudication improved on the left.no groin complaints.  Past Medical History:   Diagnosis Date    Atherosclerotic PVD with intermittent claudication 8/16/2019    Atrial fibrillation     pt unaware of this    Back pain     Bilateral inguinal hernia     CT ABdomen/pelvis 3/9/2015 (care everywhere)---Bilateral inguinal hernias containing fat.      Calcified granuloma of lung 10/3/2017    CT CHest 3/26/2018---There are calcified lymph nodes in the mediastinum and left hilum.    CT chest 9/11/ 2017 Calcified left hilar and subcarinal granulomas are noted  ;Calcified granuloma noted within the posterior segment of the right hepatic lobe.    Diverticulosis     Diverticulosis 10/28/13    Colonoscopy     ED (erectile dysfunction)     Elevated PSA     Granuloma of liver     ct chest 3/26/2018---There is a small calcified granuloma posteriorly in the right lobe of the liver    Hyperlipidemia     Hypertension     Moderate persistent asthma without complication 9/26/2017    Obesity     Tobacco dependence     resolved    Urinary tract infection        Past Surgical History:   Procedure Laterality Date    AORTOGRAPHY WITH SERIALOGRAPHY N/A 10/21/2019    Procedure: AORTOGRAM, WITH RUNOFF;  Surgeon: Chencho Ross MD;  Location: [facility] CATH LAB;  Service: Cardiology;  Laterality: N/A;  pt requesting 9am arrival    HERNIA REPAIR      x2    PROSTATE BIOPSY      reported per patient around 2014 or 2015 which was reportedly negative       Social History     Tobacco Use    Smoking status: Former Smoker     Packs/day: 2.00     Years: 15.00     Pack years: 30.00     Types: Cigarettes      Start date:      Last attempt to quit: 1985     Years since quittin.2    Smokeless tobacco: Never Used   Substance Use Topics    Alcohol use: Yes     Alcohol/week: 12.0 standard drinks     Types: 12 Cans of beer per week    Drug use: No       Family History   Problem Relation Age of Onset    Cancer Mother         Unknown primary    Cancer Father     Cancer Brother         prostate     Prostate cancer Brother     Cancer Sister 64        pancreatic     Diabetes Neg Hx     Heart disease Neg Hx     Kidney disease Neg Hx     Stroke Neg Hx     Hyperlipidemia Neg Hx     Hypertension Neg Hx        Current Outpatient Medications   Medication Sig    amLODIPine (NORVASC) 2.5 MG tablet Take 1 tablet (2.5 mg total) by mouth once daily.    aspirin (ECOTRIN) 81 MG EC tablet Take 1 tablet (81 mg total) by mouth once daily.    atorvastatin (LIPITOR) 80 MG tablet Take 1 tablet (80 mg total) by mouth once daily.    brimonidine 0.15 % OPTH DROP (ALPHAGAN) 0.15 % ophthalmic solution LOCATION: BOTH EYES. INSTILL ONE DROP IN BOTH EYES TWICE A DAY X 90 DAYS    cilostazol (PLETAL) 50 MG Tab Take 1 tablet (50 mg total) by mouth 2 (two) times daily.    clopidogrel (PLAVIX) 75 mg tablet Take 1 tablet (75 mg total) by mouth once daily.    finasteride (PROSCAR) 5 mg tablet Take 1 tablet (5 mg total) by mouth once daily.    latanoprost 0.005 % ophthalmic solution Place 1 drop into both eyes once daily.    lisinopril (PRINIVIL,ZESTRIL) 20 MG tablet Take 1 tablet (20 mg total) by mouth once daily.    sildenafil (REVATIO) 20 mg Tab Take 1-5 tablets as needed on an empty stomach with no alcohol an hour before desiring an erection.    sodium bicarb-sodium chloride Pack 1 packet by Nasal route 2 (two) times daily.    tamsulosin (FLOMAX) 0.4 mg Cap Take 1 capsule (0.4 mg total) by mouth once daily.     No current facility-administered medications for this visit.      Current Outpatient Medications on File  Prior to Visit   Medication Sig    amLODIPine (NORVASC) 2.5 MG tablet Take 1 tablet (2.5 mg total) by mouth once daily.    aspirin (ECOTRIN) 81 MG EC tablet Take 1 tablet (81 mg total) by mouth once daily.    atorvastatin (LIPITOR) 80 MG tablet Take 1 tablet (80 mg total) by mouth once daily.    brimonidine 0.15 % OPTH DROP (ALPHAGAN) 0.15 % ophthalmic solution LOCATION: BOTH EYES. INSTILL ONE DROP IN BOTH EYES TWICE A DAY X 90 DAYS    cilostazol (PLETAL) 50 MG Tab Take 1 tablet (50 mg total) by mouth 2 (two) times daily.    clopidogrel (PLAVIX) 75 mg tablet Take 1 tablet (75 mg total) by mouth once daily.    finasteride (PROSCAR) 5 mg tablet Take 1 tablet (5 mg total) by mouth once daily.    latanoprost 0.005 % ophthalmic solution Place 1 drop into both eyes once daily.    lisinopril (PRINIVIL,ZESTRIL) 20 MG tablet Take 1 tablet (20 mg total) by mouth once daily.    sildenafil (REVATIO) 20 mg Tab Take 1-5 tablets as needed on an empty stomach with no alcohol an hour before desiring an erection.    sodium bicarb-sodium chloride Pack 1 packet by Nasal route 2 (two) times daily.    tamsulosin (FLOMAX) 0.4 mg Cap Take 1 capsule (0.4 mg total) by mouth once daily.     No current facility-administered medications on file prior to visit.      Review of patient's allergies indicates:  No Known Allergies  Review of Systems   Constitution: Negative for malaise/fatigue.   Eyes: Negative for blurred vision.   Cardiovascular: Positive for claudication. Negative for chest pain, cyanosis, dyspnea on exertion, irregular heartbeat, leg swelling, near-syncope, orthopnea, palpitations and paroxysmal nocturnal dyspnea.   Respiratory: Negative for cough, hemoptysis and shortness of breath.    Hematologic/Lymphatic: Negative for bleeding problem. Does not bruise/bleed easily.   Skin: Negative for dry skin and itching.   Musculoskeletal: Negative for falls, muscle weakness and myalgias.   Gastrointestinal: Negative for  abdominal pain, diarrhea, heartburn, hematemesis, hematochezia and melena.   Genitourinary: Negative for flank pain and hematuria.   Neurological: Negative for dizziness, focal weakness, headaches, light-headedness, numbness, paresthesias, seizures and weakness.   Psychiatric/Behavioral: Negative for altered mental status and memory loss. The patient is not nervous/anxious.    Allergic/Immunologic: Negative for hives.       Objective:   Physical Exam   Constitutional: He is oriented to person, place, and time. He appears well-developed and well-nourished. No distress.   HENT:   Head: Normocephalic and atraumatic.   Eyes: Pupils are equal, round, and reactive to light. EOM are normal. Right eye exhibits no discharge. Left eye exhibits no discharge.   Neck: Neck supple. No JVD present. No thyromegaly present.   Cardiovascular: Normal rate, regular rhythm, normal heart sounds and intact distal pulses. Exam reveals no gallop and no friction rub.   No murmur heard.  Pulses:       Carotid pulses are 2+ on the right side, and 2+ on the left side.       Radial pulses are 2+ on the right side, and 2+ on the left side.        Femoral pulses are 1+ on the right side, and 2+ on the left side.       Popliteal pulses are 1+ on the right side, and 2+ on the left side.        Dorsalis pedis pulses are 0 on the right side, and 1+ on the left side.        Posterior tibial pulses are 0 on the right side, and 0 on the left side.   Pulmonary/Chest: Effort normal and breath sounds normal. No respiratory distress. He has no wheezes. He has no rales. He exhibits no tenderness.   Abdominal: Soft. Bowel sounds are normal. He exhibits no distension. There is no tenderness.   Obese abdomen.   Musculoskeletal: Normal range of motion. He exhibits no edema.   Neurological: He is alert and oriented to person, place, and time. No cranial nerve deficit.   Skin: Skin is warm and dry. No rash noted. He is not diaphoretic. No erythema.   Psychiatric:  "He has a normal mood and affect. His behavior is normal.   Nursing note and vitals reviewed.    Vitals:    11/01/19 1450 11/01/19 1454   BP: (!) 146/64 (!) 142/60   BP Location: Left arm Right arm   Patient Position: Sitting Sitting   BP Method: Medium (Manual) Medium (Manual)   Pulse: 61    SpO2: 99%    Weight: 79.7 kg (175 lb 11.3 oz)    Height: 5' 5" (1.651 m)      Lab Results   Component Value Date    CHOL 271 (H) 06/27/2019    CHOL 253 (H) 10/27/2017    CHOL 255 (H) 11/15/2016     Lab Results   Component Value Date    HDL 81 (H) 06/27/2019    HDL 79 (H) 10/27/2017    HDL 63 11/15/2016     Lab Results   Component Value Date    LDLCALC 173.2 (H) 06/27/2019    LDLCALC 157.0 10/27/2017    LDLCALC 170.2 (H) 11/15/2016     Lab Results   Component Value Date    TRIG 84 06/27/2019    TRIG 85 10/27/2017    TRIG 109 11/15/2016     Lab Results   Component Value Date    CHOLHDL 29.9 06/27/2019    CHOLHDL 31.2 10/27/2017    CHOLHDL 24.7 11/15/2016       Chemistry        Component Value Date/Time     10/14/2019 1052    K 4.4 10/14/2019 1052     10/14/2019 1052    CO2 26 10/14/2019 1052    BUN 10 10/14/2019 1052    CREATININE 1.1 10/14/2019 1052    GLU 98 10/14/2019 1052        Component Value Date/Time    CALCIUM 9.1 10/14/2019 1052    ALKPHOS 77 06/27/2019 0722    AST 15 06/27/2019 0722    ALT 10 06/27/2019 0722    BILITOT 0.3 06/27/2019 0722    ESTGFRAFRICA >60.0 10/14/2019 1052    EGFRNONAA >60.0 10/14/2019 1052          Lab Results   Component Value Date    TSH 0.911 06/27/2019     Lab Results   Component Value Date    INR 1.0 10/14/2019     Lab Results   Component Value Date    WBC 5.26 10/14/2019    HGB 14.1 10/14/2019    HCT 45.6 10/14/2019    MCV 89 10/14/2019     10/14/2019     BMP  Sodium   Date Value Ref Range Status   10/14/2019 141 136 - 145 mmol/L Final     Potassium   Date Value Ref Range Status   10/14/2019 4.4 3.5 - 5.1 mmol/L Final     Chloride   Date Value Ref Range Status   10/14/2019 " 107 95 - 110 mmol/L Final     CO2   Date Value Ref Range Status   10/14/2019 26 23 - 29 mmol/L Final     BUN, Bld   Date Value Ref Range Status   10/14/2019 10 8 - 23 mg/dL Final     Creatinine   Date Value Ref Range Status   10/14/2019 1.1 0.5 - 1.4 mg/dL Final     Calcium   Date Value Ref Range Status   10/14/2019 9.1 8.7 - 10.5 mg/dL Final     Anion Gap   Date Value Ref Range Status   10/14/2019 8 8 - 16 mmol/L Final     eGFR if    Date Value Ref Range Status   10/14/2019 >60.0 >60 mL/min/1.73 m^2 Final     eGFR if non    Date Value Ref Range Status   10/14/2019 >60.0 >60 mL/min/1.73 m^2 Final     Comment:     Calculation used to obtain the estimated glomerular filtration  rate (eGFR) is the CKD-EPI equation.        CrCl cannot be calculated (Patient's most recent lab result is older than the maximum 7 days allowed.).    Assessment:     1. Atherosclerotic PVD with intermittent claudication    2. Essential hypertension    3. Hyperlipidemia, unspecified hyperlipidemia type    4. Aortic calcification    5. Obesity (BMI 30.0-34.9)      S/p successful intervention of the left sfa . Will plan on staged pta rt sfa.   Plan:   Rt sfa pta   I have explained the risks, benefits , and alternatives of the procedure in detail.the patient voices understanding and all questions have been answered.the patient agrees to proceed as planned.

## 2019-11-01 NOTE — PROGRESS NOTES
Subjective:   Patient ID:  Alex Milner is a 74 y.o. male who presents for follow up of Atherosclerotic PVD with intermittent claudication (1 week hospital f/u)      HPI  A 73 yo male with afib pvd htn hlp is here for f/u had pta of left sfa has significant infrapopliteal disease bilaterally has also severe distal rt sfa stenosis. His claudication improved on the left.no groin complaints.  Past Medical History:   Diagnosis Date    Atherosclerotic PVD with intermittent claudication 8/16/2019    Atrial fibrillation     pt unaware of this    Back pain     Bilateral inguinal hernia     CT ABdomen/pelvis 3/9/2015 (care everywhere)---Bilateral inguinal hernias containing fat.      Calcified granuloma of lung 10/3/2017    CT CHest 3/26/2018---There are calcified lymph nodes in the mediastinum and left hilum.    CT chest 9/11/ 2017 Calcified left hilar and subcarinal granulomas are noted  ;Calcified granuloma noted within the posterior segment of the right hepatic lobe.    Diverticulosis     Diverticulosis 10/28/13    Colonoscopy     ED (erectile dysfunction)     Elevated PSA     Granuloma of liver     ct chest 3/26/2018---There is a small calcified granuloma posteriorly in the right lobe of the liver    Hyperlipidemia     Hypertension     Moderate persistent asthma without complication 9/26/2017    Obesity     Tobacco dependence     resolved    Urinary tract infection        Past Surgical History:   Procedure Laterality Date    AORTOGRAPHY WITH SERIALOGRAPHY N/A 10/21/2019    Procedure: AORTOGRAM, WITH RUNOFF;  Surgeon: Chencho Ross MD;  Location: [facility] CATH LAB;  Service: Cardiology;  Laterality: N/A;  pt requesting 9am arrival    HERNIA REPAIR      x2    PROSTATE BIOPSY      reported per patient around 2014 or 2015 which was reportedly negative       Social History     Tobacco Use    Smoking status: Former Smoker     Packs/day: 2.00     Years: 15.00     Pack years: 30.00     Types: Cigarettes      Start date:      Last attempt to quit: 1985     Years since quittin.2    Smokeless tobacco: Never Used   Substance Use Topics    Alcohol use: Yes     Alcohol/week: 12.0 standard drinks     Types: 12 Cans of beer per week    Drug use: No       Family History   Problem Relation Age of Onset    Cancer Mother         Unknown primary    Cancer Father     Cancer Brother         prostate     Prostate cancer Brother     Cancer Sister 64        pancreatic     Diabetes Neg Hx     Heart disease Neg Hx     Kidney disease Neg Hx     Stroke Neg Hx     Hyperlipidemia Neg Hx     Hypertension Neg Hx        Current Outpatient Medications   Medication Sig    amLODIPine (NORVASC) 2.5 MG tablet Take 1 tablet (2.5 mg total) by mouth once daily.    aspirin (ECOTRIN) 81 MG EC tablet Take 1 tablet (81 mg total) by mouth once daily.    atorvastatin (LIPITOR) 80 MG tablet Take 1 tablet (80 mg total) by mouth once daily.    brimonidine 0.15 % OPTH DROP (ALPHAGAN) 0.15 % ophthalmic solution LOCATION: BOTH EYES. INSTILL ONE DROP IN BOTH EYES TWICE A DAY X 90 DAYS    cilostazol (PLETAL) 50 MG Tab Take 1 tablet (50 mg total) by mouth 2 (two) times daily.    clopidogrel (PLAVIX) 75 mg tablet Take 1 tablet (75 mg total) by mouth once daily.    finasteride (PROSCAR) 5 mg tablet Take 1 tablet (5 mg total) by mouth once daily.    latanoprost 0.005 % ophthalmic solution Place 1 drop into both eyes once daily.    lisinopril (PRINIVIL,ZESTRIL) 20 MG tablet Take 1 tablet (20 mg total) by mouth once daily.    sildenafil (REVATIO) 20 mg Tab Take 1-5 tablets as needed on an empty stomach with no alcohol an hour before desiring an erection.    sodium bicarb-sodium chloride Pack 1 packet by Nasal route 2 (two) times daily.    tamsulosin (FLOMAX) 0.4 mg Cap Take 1 capsule (0.4 mg total) by mouth once daily.     No current facility-administered medications for this visit.      Current Outpatient Medications on File  Prior to Visit   Medication Sig    amLODIPine (NORVASC) 2.5 MG tablet Take 1 tablet (2.5 mg total) by mouth once daily.    aspirin (ECOTRIN) 81 MG EC tablet Take 1 tablet (81 mg total) by mouth once daily.    atorvastatin (LIPITOR) 80 MG tablet Take 1 tablet (80 mg total) by mouth once daily.    brimonidine 0.15 % OPTH DROP (ALPHAGAN) 0.15 % ophthalmic solution LOCATION: BOTH EYES. INSTILL ONE DROP IN BOTH EYES TWICE A DAY X 90 DAYS    cilostazol (PLETAL) 50 MG Tab Take 1 tablet (50 mg total) by mouth 2 (two) times daily.    clopidogrel (PLAVIX) 75 mg tablet Take 1 tablet (75 mg total) by mouth once daily.    finasteride (PROSCAR) 5 mg tablet Take 1 tablet (5 mg total) by mouth once daily.    latanoprost 0.005 % ophthalmic solution Place 1 drop into both eyes once daily.    lisinopril (PRINIVIL,ZESTRIL) 20 MG tablet Take 1 tablet (20 mg total) by mouth once daily.    sildenafil (REVATIO) 20 mg Tab Take 1-5 tablets as needed on an empty stomach with no alcohol an hour before desiring an erection.    sodium bicarb-sodium chloride Pack 1 packet by Nasal route 2 (two) times daily.    tamsulosin (FLOMAX) 0.4 mg Cap Take 1 capsule (0.4 mg total) by mouth once daily.     No current facility-administered medications on file prior to visit.      Review of patient's allergies indicates:  No Known Allergies  Review of Systems   Constitution: Negative for malaise/fatigue.   Eyes: Negative for blurred vision.   Cardiovascular: Positive for claudication. Negative for chest pain, cyanosis, dyspnea on exertion, irregular heartbeat, leg swelling, near-syncope, orthopnea, palpitations and paroxysmal nocturnal dyspnea.   Respiratory: Negative for cough, hemoptysis and shortness of breath.    Hematologic/Lymphatic: Negative for bleeding problem. Does not bruise/bleed easily.   Skin: Negative for dry skin and itching.   Musculoskeletal: Negative for falls, muscle weakness and myalgias.   Gastrointestinal: Negative for  abdominal pain, diarrhea, heartburn, hematemesis, hematochezia and melena.   Genitourinary: Negative for flank pain and hematuria.   Neurological: Negative for dizziness, focal weakness, headaches, light-headedness, numbness, paresthesias, seizures and weakness.   Psychiatric/Behavioral: Negative for altered mental status and memory loss. The patient is not nervous/anxious.    Allergic/Immunologic: Negative for hives.       Objective:   Physical Exam   Constitutional: He is oriented to person, place, and time. He appears well-developed and well-nourished. No distress.   HENT:   Head: Normocephalic and atraumatic.   Eyes: Pupils are equal, round, and reactive to light. EOM are normal. Right eye exhibits no discharge. Left eye exhibits no discharge.   Neck: Neck supple. No JVD present. No thyromegaly present.   Cardiovascular: Normal rate, regular rhythm, normal heart sounds and intact distal pulses. Exam reveals no gallop and no friction rub.   No murmur heard.  Pulses:       Carotid pulses are 2+ on the right side, and 2+ on the left side.       Radial pulses are 2+ on the right side, and 2+ on the left side.        Femoral pulses are 1+ on the right side, and 2+ on the left side.       Popliteal pulses are 1+ on the right side, and 2+ on the left side.        Dorsalis pedis pulses are 0 on the right side, and 1+ on the left side.        Posterior tibial pulses are 0 on the right side, and 0 on the left side.   Pulmonary/Chest: Effort normal and breath sounds normal. No respiratory distress. He has no wheezes. He has no rales. He exhibits no tenderness.   Abdominal: Soft. Bowel sounds are normal. He exhibits no distension. There is no tenderness.   Obese abdomen.   Musculoskeletal: Normal range of motion. He exhibits no edema.   Neurological: He is alert and oriented to person, place, and time. No cranial nerve deficit.   Skin: Skin is warm and dry. No rash noted. He is not diaphoretic. No erythema.   Psychiatric:  "He has a normal mood and affect. His behavior is normal.   Nursing note and vitals reviewed.    Vitals:    11/01/19 1450 11/01/19 1454   BP: (!) 146/64 (!) 142/60   BP Location: Left arm Right arm   Patient Position: Sitting Sitting   BP Method: Medium (Manual) Medium (Manual)   Pulse: 61    SpO2: 99%    Weight: 79.7 kg (175 lb 11.3 oz)    Height: 5' 5" (1.651 m)      Lab Results   Component Value Date    CHOL 271 (H) 06/27/2019    CHOL 253 (H) 10/27/2017    CHOL 255 (H) 11/15/2016     Lab Results   Component Value Date    HDL 81 (H) 06/27/2019    HDL 79 (H) 10/27/2017    HDL 63 11/15/2016     Lab Results   Component Value Date    LDLCALC 173.2 (H) 06/27/2019    LDLCALC 157.0 10/27/2017    LDLCALC 170.2 (H) 11/15/2016     Lab Results   Component Value Date    TRIG 84 06/27/2019    TRIG 85 10/27/2017    TRIG 109 11/15/2016     Lab Results   Component Value Date    CHOLHDL 29.9 06/27/2019    CHOLHDL 31.2 10/27/2017    CHOLHDL 24.7 11/15/2016       Chemistry        Component Value Date/Time     10/14/2019 1052    K 4.4 10/14/2019 1052     10/14/2019 1052    CO2 26 10/14/2019 1052    BUN 10 10/14/2019 1052    CREATININE 1.1 10/14/2019 1052    GLU 98 10/14/2019 1052        Component Value Date/Time    CALCIUM 9.1 10/14/2019 1052    ALKPHOS 77 06/27/2019 0722    AST 15 06/27/2019 0722    ALT 10 06/27/2019 0722    BILITOT 0.3 06/27/2019 0722    ESTGFRAFRICA >60.0 10/14/2019 1052    EGFRNONAA >60.0 10/14/2019 1052          Lab Results   Component Value Date    TSH 0.911 06/27/2019     Lab Results   Component Value Date    INR 1.0 10/14/2019     Lab Results   Component Value Date    WBC 5.26 10/14/2019    HGB 14.1 10/14/2019    HCT 45.6 10/14/2019    MCV 89 10/14/2019     10/14/2019     BMP  Sodium   Date Value Ref Range Status   10/14/2019 141 136 - 145 mmol/L Final     Potassium   Date Value Ref Range Status   10/14/2019 4.4 3.5 - 5.1 mmol/L Final     Chloride   Date Value Ref Range Status   10/14/2019 " 107 95 - 110 mmol/L Final     CO2   Date Value Ref Range Status   10/14/2019 26 23 - 29 mmol/L Final     BUN, Bld   Date Value Ref Range Status   10/14/2019 10 8 - 23 mg/dL Final     Creatinine   Date Value Ref Range Status   10/14/2019 1.1 0.5 - 1.4 mg/dL Final     Calcium   Date Value Ref Range Status   10/14/2019 9.1 8.7 - 10.5 mg/dL Final     Anion Gap   Date Value Ref Range Status   10/14/2019 8 8 - 16 mmol/L Final     eGFR if    Date Value Ref Range Status   10/14/2019 >60.0 >60 mL/min/1.73 m^2 Final     eGFR if non    Date Value Ref Range Status   10/14/2019 >60.0 >60 mL/min/1.73 m^2 Final     Comment:     Calculation used to obtain the estimated glomerular filtration  rate (eGFR) is the CKD-EPI equation.        CrCl cannot be calculated (Patient's most recent lab result is older than the maximum 7 days allowed.).    Assessment:     1. Atherosclerotic PVD with intermittent claudication    2. Essential hypertension    3. Hyperlipidemia, unspecified hyperlipidemia type    4. Aortic calcification    5. Obesity (BMI 30.0-34.9)      S/p successful intervention of the left sfa . Will plan on staged pta rt sfa.   Plan:   Rt sfa pta   I have explained the risks, benefits , and alternatives of the procedure in detail.the patient voices understanding and all questions have been answered.the patient agrees to proceed as planned.

## 2019-11-07 ENCOUNTER — LAB VISIT (OUTPATIENT)
Dept: LAB | Facility: HOSPITAL | Age: 74
End: 2019-11-07
Attending: INTERNAL MEDICINE
Payer: MEDICARE

## 2019-11-07 DIAGNOSIS — R06.02 SOB (SHORTNESS OF BREATH): ICD-10-CM

## 2019-11-07 LAB
ANION GAP SERPL CALC-SCNC: 7 MMOL/L (ref 8–16)
APTT BLDCRRT: 24.8 SEC (ref 21–32)
BASOPHILS # BLD AUTO: 0.09 K/UL (ref 0–0.2)
BASOPHILS NFR BLD: 1.5 % (ref 0–1.9)
BUN SERPL-MCNC: 10 MG/DL (ref 8–23)
CALCIUM SERPL-MCNC: 9.2 MG/DL (ref 8.7–10.5)
CHLORIDE SERPL-SCNC: 108 MMOL/L (ref 95–110)
CO2 SERPL-SCNC: 28 MMOL/L (ref 23–29)
CREAT SERPL-MCNC: 1.2 MG/DL (ref 0.5–1.4)
DIFFERENTIAL METHOD: ABNORMAL
EOSINOPHIL # BLD AUTO: 0.5 K/UL (ref 0–0.5)
EOSINOPHIL NFR BLD: 7.9 % (ref 0–8)
ERYTHROCYTE [DISTWIDTH] IN BLOOD BY AUTOMATED COUNT: 14.2 % (ref 11.5–14.5)
EST. GFR  (AFRICAN AMERICAN): >60 ML/MIN/1.73 M^2
EST. GFR  (NON AFRICAN AMERICAN): 58.4 ML/MIN/1.73 M^2
GLUCOSE SERPL-MCNC: 95 MG/DL (ref 70–110)
HCT VFR BLD AUTO: 43.3 % (ref 40–54)
HGB BLD-MCNC: 13.2 G/DL (ref 14–18)
IMM GRANULOCYTES # BLD AUTO: 0.02 K/UL (ref 0–0.04)
IMM GRANULOCYTES NFR BLD AUTO: 0.3 % (ref 0–0.5)
INR PPP: 0.9 (ref 0.8–1.2)
LYMPHOCYTES # BLD AUTO: 1.7 K/UL (ref 1–4.8)
LYMPHOCYTES NFR BLD: 27.4 % (ref 18–48)
MCH RBC QN AUTO: 26.8 PG (ref 27–31)
MCHC RBC AUTO-ENTMCNC: 30.5 G/DL (ref 32–36)
MCV RBC AUTO: 88 FL (ref 82–98)
MONOCYTES # BLD AUTO: 0.8 K/UL (ref 0.3–1)
MONOCYTES NFR BLD: 13.6 % (ref 4–15)
NEUTROPHILS # BLD AUTO: 3 K/UL (ref 1.8–7.7)
NEUTROPHILS NFR BLD: 49.3 % (ref 38–73)
NRBC BLD-RTO: 0 /100 WBC
PLATELET # BLD AUTO: 294 K/UL (ref 150–350)
PMV BLD AUTO: 9.8 FL (ref 9.2–12.9)
POTASSIUM SERPL-SCNC: 4.5 MMOL/L (ref 3.5–5.1)
PROTHROMBIN TIME: 9.9 SEC (ref 9–12.5)
RBC # BLD AUTO: 4.93 M/UL (ref 4.6–6.2)
SODIUM SERPL-SCNC: 143 MMOL/L (ref 136–145)
WBC # BLD AUTO: 6.09 K/UL (ref 3.9–12.7)

## 2019-11-07 PROCEDURE — 85610 PROTHROMBIN TIME: CPT | Mod: HCNC

## 2019-11-07 PROCEDURE — 85025 COMPLETE CBC W/AUTO DIFF WBC: CPT | Mod: HCNC

## 2019-11-07 PROCEDURE — 36415 COLL VENOUS BLD VENIPUNCTURE: CPT | Mod: HCNC,PO

## 2019-11-07 PROCEDURE — 80048 BASIC METABOLIC PNL TOTAL CA: CPT | Mod: HCNC

## 2019-11-07 PROCEDURE — 85730 THROMBOPLASTIN TIME PARTIAL: CPT | Mod: HCNC

## 2019-11-14 ENCOUNTER — HOSPITAL ENCOUNTER (OUTPATIENT)
Facility: HOSPITAL | Age: 74
Discharge: HOME | End: 2019-11-14
Attending: INTERNAL MEDICINE | Admitting: INTERNAL MEDICINE
Payer: MEDICARE

## 2019-11-14 ENCOUNTER — TELEPHONE (OUTPATIENT)
Dept: CARDIOLOGY | Facility: CLINIC | Age: 74
End: 2019-11-14

## 2019-11-14 VITALS
TEMPERATURE: 98 F | BODY MASS INDEX: 28.61 KG/M2 | WEIGHT: 178 LBS | HEART RATE: 53 BPM | DIASTOLIC BLOOD PRESSURE: 53 MMHG | HEIGHT: 66 IN | RESPIRATION RATE: 18 BRPM | OXYGEN SATURATION: 100 % | SYSTOLIC BLOOD PRESSURE: 133 MMHG

## 2019-11-14 DIAGNOSIS — I73.9 PVD (PERIPHERAL VASCULAR DISEASE): ICD-10-CM

## 2019-11-14 DIAGNOSIS — I73.9 PERIPHERAL VASCULAR DISEASE, UNSPECIFIED: ICD-10-CM

## 2019-11-14 DIAGNOSIS — I70.219 ATHEROSCLEROTIC PVD WITH INTERMITTENT CLAUDICATION: ICD-10-CM

## 2019-11-14 DIAGNOSIS — I10 ESSENTIAL (PRIMARY) HYPERTENSION: ICD-10-CM

## 2019-11-14 LAB
PERIPHERAL PTA: YES
PERIPHERAL STENOSIS: ABNORMAL
POC ACTIVATED CLOTTING TIME K: 241 SEC (ref 74–137)
SAMPLE: ABNORMAL

## 2019-11-14 PROCEDURE — 99152 CATH LAB PROCEDURE: ICD-10-PCS | Mod: HCNC,,, | Performed by: INTERNAL MEDICINE

## 2019-11-14 PROCEDURE — 99152 MOD SED SAME PHYS/QHP 5/>YRS: CPT | Mod: HCNC,,, | Performed by: INTERNAL MEDICINE

## 2019-11-14 PROCEDURE — 25500020 PHARM REV CODE 255: Mod: HCNC

## 2019-11-14 PROCEDURE — 63600175 PHARM REV CODE 636 W HCPCS: Mod: HCNC

## 2019-11-14 PROCEDURE — C2623 CATH, TRANSLUMIN, DRUG-COAT: HCPCS | Mod: HCNC

## 2019-11-14 PROCEDURE — 37224 CATH LAB PROCEDURE: CPT | Mod: HCNC,RT,, | Performed by: INTERNAL MEDICINE

## 2019-11-14 PROCEDURE — 37224 CATH LAB PROCEDURE: ICD-10-PCS | Mod: HCNC,RT,, | Performed by: INTERNAL MEDICINE

## 2019-11-14 PROCEDURE — C1769 GUIDE WIRE: HCPCS | Mod: HCNC

## 2019-11-14 PROCEDURE — 25000003 PHARM REV CODE 250: Mod: HCNC

## 2019-11-14 RX ORDER — DIAZEPAM 5 MG/1
5 TABLET ORAL
Status: DISCONTINUED | OUTPATIENT
Start: 2019-11-14 | End: 2019-11-14 | Stop reason: HOSPADM

## 2019-11-14 RX ORDER — NAPROXEN SODIUM 220 MG/1
81 TABLET, FILM COATED ORAL ONCE
Status: COMPLETED | OUTPATIENT
Start: 2019-11-14 | End: 2019-11-14

## 2019-11-14 RX ORDER — SODIUM CHLORIDE 9 MG/ML
INJECTION, SOLUTION INTRAVENOUS CONTINUOUS
Status: DISCONTINUED | OUTPATIENT
Start: 2019-11-14 | End: 2019-11-14 | Stop reason: HOSPADM

## 2019-11-14 RX ORDER — SODIUM CHLORIDE 9 MG/ML
INJECTION, SOLUTION INTRAVENOUS CONTINUOUS
Status: DISCONTINUED | OUTPATIENT
Start: 2019-11-15 | End: 2019-11-14 | Stop reason: HOSPADM

## 2019-11-14 RX ORDER — DIPHENHYDRAMINE HCL 50 MG
50 CAPSULE ORAL ONCE
Status: COMPLETED | OUTPATIENT
Start: 2019-11-14 | End: 2019-11-14

## 2019-11-14 RX ORDER — DIPHENHYDRAMINE HCL 50 MG
50 CAPSULE ORAL ONCE
Status: DISCONTINUED | OUTPATIENT
Start: 2019-11-15 | End: 2019-11-14

## 2019-11-14 RX ADMIN — SODIUM CHLORIDE: 9 INJECTION, SOLUTION INTRAVENOUS at 11:11

## 2019-11-14 RX ADMIN — NAPROXEN SODIUM 81 MG: 220 TABLET, FILM COATED ORAL at 11:11

## 2019-11-14 RX ADMIN — DIAZEPAM 5 MG: 5 TABLET ORAL at 11:11

## 2019-11-14 RX ADMIN — Medication 50 MG: at 11:11

## 2019-11-14 NOTE — NURSING
VSS. Painfree. No bleeding or hematoma to left groin.Ambulating in room and to the bathroom without problems. DC instructions reviewed with pt and spouse and able to teach back.

## 2019-11-14 NOTE — TELEPHONE ENCOUNTER
----- Message from Keagan Valdez sent at 11/14/2019  3:07 PM CST -----  Contact: [email or web address]  Type:  Sooner Apoointment Request    Caller is requesting a sooner appointment.  Caller declined first available appointment listed below.  Caller will not accept being placed on the waitlist and is requesting a message be sent to doctor.  Name of Caller:maryjane  When is the first available appointment?12/02  Symptoms:n/a  Would the patient rather a call back or a response via [facility]? Call back  Best Call Back Number:[phone]  Additional Information: pt needs two week f/u appt for post op      Thanks,  Keagan Valdez

## 2019-11-14 NOTE — BRIEF OP NOTE
Date: 11/14/2019  Surgeon/Physician: Larissa Cobb MD  Assistants: none    Pre Op Diagnosis: pvd claudication    Post OP Diagnosis: pvd claudication    Procedure Performed:rt sfa pta dcb    ANESTHESIA:RN IV SEDATION    COMPLICATION: none    Specimen / Tissue Removed: No    Estimated Blood Loss: <50 cc    Prostetics/Devices: None    Findings / Operative Note:   Rt sfa mid 80%  67 cm long irregular contour treated with ptca dcb to less than 10% residue.       PLAN:  Routine post pta care.       Discharge Note  Short Stay      SUMMARY     Admit Date: 11/14/2019    Attending Physician: Chencho Cobb MD     Discharge Physician: Larissa Cobb MD     Discharge Date: 11/14/2019    Final Diagnosis: pvd claudication sfa pta    Outcome of Stay:patient tolerated procedure well no complications. He was deemed stable for discharge. He had sfa pta and dcb of mid sfa stenosis with excellent result.he has 1 vessel run off. He was deemd stable for discharge he will follow in clinic with DR COBB    Disposition: Home or Self Care    Patient Instructions:   Current Discharge Medication List      CONTINUE these medications which have NOT CHANGED    Details   amLODIPine (NORVASC) 2.5 MG tablet Take 1 tablet (2.5 mg total) by mouth once daily.  Qty: 30 tablet, Refills: 11    Associated Diagnoses: Essential hypertension      aspirin (ECOTRIN) 81 MG EC tablet Take 1 tablet (81 mg total) by mouth once daily.  Qty: 30 tablet, Refills: 6    Associated Diagnoses: Atherosclerotic PVD with intermittent claudication      atorvastatin (LIPITOR) 80 MG tablet Take 1 tablet (80 mg total) by mouth once daily.  Qty: 30 tablet, Refills: 11    Associated Diagnoses: Mixed hyperlipidemia; Atherosclerotic PVD with intermittent claudication      brimonidine 0.15 % OPTH DROP (ALPHAGAN) 0.15 % ophthalmic solution LOCATION: BOTH EYES. INSTILL ONE DROP IN BOTH EYES TWICE A DAY X 90 DAYS  Refills: 1      cilostazol (PLETAL) 50 MG Tab Take 1 tablet (50 mg  total) by mouth 2 (two) times daily.  Qty: 60 tablet, Refills: 11    Associated Diagnoses: Claudication      clopidogrel (PLAVIX) 75 mg tablet Take 1 tablet (75 mg total) by mouth once daily.  Qty: 30 tablet, Refills: 11      finasteride (PROSCAR) 5 mg tablet Take 1 tablet (5 mg total) by mouth once daily.  Qty: 30 tablet, Refills: 11      latanoprost 0.005 % ophthalmic solution Place 1 drop into both eyes once daily.  Qty: 1 Bottle, Refills: 5    Associated Diagnoses: Primary open angle glaucoma of both eyes, indeterminate stage      lisinopril (PRINIVIL,ZESTRIL) 20 MG tablet Take 1 tablet (20 mg total) by mouth once daily.  Qty: 90 tablet, Refills: 3    Associated Diagnoses: Essential hypertension      sodium bicarb-sodium chloride Pack 1 packet by Nasal route 2 (two) times daily.  Qty: 120 each, Refills: 0    Associated Diagnoses: Post-nasal drip      tamsulosin (FLOMAX) 0.4 mg Cap Take 1 capsule (0.4 mg total) by mouth once daily.  Qty: 30 capsule, Refills: 11      sildenafil (REVATIO) 20 mg Tab Take 1-5 tablets as needed on an empty stomach with no alcohol an hour before desiring an erection.  Qty: 50 tablet, Refills: 11             Discharge Procedure Orders (must include Diet, Follow-up, Activity)   Discharge Procedure Orders (must include Diet, Follow-up, Activity)   Diet general     Call MD for:  temperature >100.4     Call MD for:  persistent nausea and vomiting     Call MD for:  severe uncontrolled pain     Call MD for:  difficulty breathing, headache or visual disturbances     Call MD for:  redness, tenderness, or signs of infection (pain, swelling, redness, odor or green/yellow discharge around incision site)     Call MD for:  hives     Call MD for:  persistent dizziness or light-headedness     Call MD for:  extreme fatigue     Follow-up Information     Larissa Ross MD In 2 weeks.    Specialty:  Cardiology  Contact information:  [address] [facility] [address] [place] [address] [phone]

## 2019-11-14 NOTE — DISCHARGE INSTRUCTIONS
"Post-op Heart Catheterization    1. DIET: It is advisable for you to follow a diet that limits the intake of salt, sugar, saturated fats and cholesterol.     2. FOR THE NEXT 24 HOURS:   · For the next 8 hours, you should be watched by a responsible adult. This person should make sure your condition is not getting worse.   · Don't drink any alcohol for the next 24 hours.  · Don't drive, operate dangerous machinery, or make important business or personal decisions during the next 24 hours.  · Your healthcare provider may tell you not to take any medicine by mouth for pain or sleep in the next 4 hours. These medicines may react with the medicines you were given in the hospital. This could cause a much stronger response than usual.       3. ACTIVITY:                                Day of discharge:             NO vigorous activity, lifting or straining                                                   Day after discharge:         Avoid heavy lifting (up to 10 lbs)                                                                        The day after discharge you may shower,                but avoid tub baths for 5 days                                                                         Wash site gently with soap and water        NO powder or lotion to your procedure site.                 Before you shower, remove dressing, apply       bandaid if desired                                                                                                                                                                            2nd day after discharge:  Resume normal activities                                                                         Exercise program as instructed      4. WOUND CARE: It is not unusual to have a small amount of bruising appear in the groin area. It is also common to have a tender "knot" develop beneath the skin at the puncture site in the groin. This is scar tissue only and is not a cause for " "concern or alarm. This tender knot may take several weeks to fully resolve. The bruise will usually spread over several days. If the lump gets bigger, call you doctor immediately.    5. DISCOMFORT: For general discomfort at the puncture site, you may take 1 or 2 Acetaminophen (Tylenol) tablets every 4 hours as needed. (Do not take more than 4000 mg a day)                 6. CALL YOUR HEALTHCARE PROVIDER IF YOU START TO HAVE THE FOLLOWING SYMPTOMS:        1. Problems with the affected leg: Pain, discomfort, loss of warmth, numbness                     or tingling                                                                                                                            2. Problems at the groin site: Bleeding, pain that is sudden/sharp/persistent,                   swelling at site or a change in "lump" size, increased redness or drainage at                     puncture site                                                               3. High fever (101 degrees or higher)       4.  Drowsiness that doesn't get better       5. Weakness or dizziness that doesn't get better            6. Repeated vomiting        7. GO TO  THE EMERGENCY ROOM OR CALL 911 IF YOU HAVE: Chest pains or discomforts not relieved with 3 nitroglycerin doses (sublingual tablets or spray), numbness or severe pain or if your foot or leg becomes cold or discolored or uncontrolled bleeding from site (apply direct pressure above site).    1. DIET: It is advisable for you to follow a diet that limits the intake of salt, sugar, saturated fats and cholesterol.     2. FOR THE NEXT 24 HOURS:   · For the next 8 hours, you should be watched by a responsible adult. This person should make sure your condition is not getting worse.   · Don't drink any alcohol for the next 24 hours.  · Don't drive, operate dangerous machinery, or make important business or personal decisions during the next 24 hours.  · Your healthcare provider may tell you not to " "take any medicine by mouth for pain or sleep in the next 4 hours. These medicines may react with the medicines you were given in the hospital. This could cause a much stronger response than usual.       3. ACTIVITY:                                Day of discharge:             NO vigorous activity, lifting or straining                                                   Day after discharge:         Avoid heavy lifting (up to 10 lbs)                                                                        The day after discharge you may shower,                but avoid tub baths for 5 days                                                                         Wash site gently with soap and water        NO powder or lotion to your procedure site.                 Before you shower, remove dressing, apply       bandaid if desired                                                                                                                                                                            2nd day after discharge:  Resume normal activities                                                                         Exercise program as instructed      4. WOUND CARE: It is not unusual to have a small amount of bruising appear in the groin area. It is also common to have a tender "knot" develop beneath the skin at the puncture site in the groin. This is scar tissue only and is not a cause for concern or alarm. This tender knot may take several weeks to fully resolve. The bruise will usually spread over several days. If the lump gets bigger, call you doctor immediately.    5. DISCOMFORT: For general discomfort at the puncture site, you may take 1 or 2 Acetaminophen (Tylenol) tablets every 4 hours as needed. (Do not take more than 4000 mg a day)                 6. CALL YOUR HEALTHCARE PROVIDER IF YOU START TO HAVE THE FOLLOWING SYMPTOMS:        1. Problems with the affected leg: Pain, discomfort, loss of warmth, numbness      " "               or tingling                                                                                                                            2. Problems at the groin site: Bleeding, pain that is sudden/sharp/persistent,                   swelling at site or a change in "lump" size, increased redness or drainage at                     puncture site                                                               3. High fever (101 degrees or higher)       4.  Drowsiness that doesn't get better       5. Weakness or dizziness that doesn't get better            6. Repeated vomiting        7. GO TO  THE EMERGENCY ROOM OR CALL 911 IF YOU HAVE:  numbness or severe pain or if your foot or leg becomes cold or discolored or uncontrolled bleeding from site (apply direct pressure above site).  "

## 2019-11-14 NOTE — INTERVAL H&P NOTE
The patient has been examined and the H&P has been reviewed:    I concur with the findings and no changes have occurred since H&P was written.    Anesthesia/Surgery risks, benefits and alternative options discussed and understood by patient/family.  I have explained the risks, benefits , and alternatives of the procedure in detail.the patient voices understanding and all questions have been answered.the patient agrees to proceed as planned.          Active Hospital Problems    Diagnosis  POA    Atherosclerotic PVD with intermittent claudication [I70.219]  Yes     Priority: Low      Resolved Hospital Problems   No resolved problems to display.

## 2019-11-14 NOTE — TELEPHONE ENCOUNTER
Attempted to contact pt and the pt is still in the hospital. Left a voicemail for the pt to call back and schedule his post-op.

## 2019-11-18 ENCOUNTER — TELEPHONE (OUTPATIENT)
Dept: CARDIOLOGY | Facility: CLINIC | Age: 74
End: 2019-11-18

## 2019-11-18 NOTE — TELEPHONE ENCOUNTER
Called and left voicemail for patient to return my call to schedule post-op follow up appointment.

## 2019-11-26 ENCOUNTER — OFFICE VISIT (OUTPATIENT)
Dept: CARDIOLOGY | Facility: CLINIC | Age: 74
End: 2019-11-26
Payer: MEDICARE

## 2019-11-26 VITALS
WEIGHT: 178.81 LBS | HEART RATE: 65 BPM | DIASTOLIC BLOOD PRESSURE: 64 MMHG | OXYGEN SATURATION: 98 % | BODY MASS INDEX: 28.86 KG/M2 | SYSTOLIC BLOOD PRESSURE: 142 MMHG

## 2019-11-26 DIAGNOSIS — I10 ESSENTIAL HYPERTENSION: Chronic | ICD-10-CM

## 2019-11-26 DIAGNOSIS — I70.219 ATHEROSCLEROTIC PVD WITH INTERMITTENT CLAUDICATION: Primary | ICD-10-CM

## 2019-11-26 DIAGNOSIS — I70.0 AORTIC CALCIFICATION: ICD-10-CM

## 2019-11-26 DIAGNOSIS — E78.2 MIXED HYPERLIPIDEMIA: ICD-10-CM

## 2019-11-26 DIAGNOSIS — F10.20 UNCOMPLICATED ALCOHOL DEPENDENCE: ICD-10-CM

## 2019-11-26 PROCEDURE — 99214 PR OFFICE/OUTPT VISIT, EST, LEVL IV, 30-39 MIN: ICD-10-PCS | Mod: HCNC,S$GLB,, | Performed by: NURSE PRACTITIONER

## 2019-11-26 PROCEDURE — 99499 UNLISTED E&M SERVICE: CPT | Mod: HCNC,S$GLB,, | Performed by: NURSE PRACTITIONER

## 2019-11-26 PROCEDURE — 99214 OFFICE O/P EST MOD 30 MIN: CPT | Mod: HCNC,S$GLB,, | Performed by: NURSE PRACTITIONER

## 2019-11-26 PROCEDURE — 99999 PR PBB SHADOW E&M-EST. PATIENT-LVL III: CPT | Mod: PBBFAC,HCNC,, | Performed by: NURSE PRACTITIONER

## 2019-11-26 PROCEDURE — 1125F PR PAIN SEVERITY QUANTIFIED, PAIN PRESENT: ICD-10-PCS | Mod: HCNC,S$GLB,, | Performed by: NURSE PRACTITIONER

## 2019-11-26 PROCEDURE — 3077F PR MOST RECENT SYSTOLIC BLOOD PRESSURE >= 140 MM HG: ICD-10-PCS | Mod: HCNC,CPTII,S$GLB, | Performed by: NURSE PRACTITIONER

## 2019-11-26 PROCEDURE — 3077F SYST BP >= 140 MM HG: CPT | Mod: HCNC,CPTII,S$GLB, | Performed by: NURSE PRACTITIONER

## 2019-11-26 PROCEDURE — 1101F PT FALLS ASSESS-DOCD LE1/YR: CPT | Mod: HCNC,CPTII,S$GLB, | Performed by: NURSE PRACTITIONER

## 2019-11-26 PROCEDURE — 1159F MED LIST DOCD IN RCRD: CPT | Mod: HCNC,S$GLB,, | Performed by: NURSE PRACTITIONER

## 2019-11-26 PROCEDURE — 3078F PR MOST RECENT DIASTOLIC BLOOD PRESSURE < 80 MM HG: ICD-10-PCS | Mod: HCNC,CPTII,S$GLB, | Performed by: NURSE PRACTITIONER

## 2019-11-26 PROCEDURE — 1101F PR PT FALLS ASSESS DOC 0-1 FALLS W/OUT INJ PAST YR: ICD-10-PCS | Mod: HCNC,CPTII,S$GLB, | Performed by: NURSE PRACTITIONER

## 2019-11-26 PROCEDURE — 99499 RISK ADDL DX/OHS AUDIT: ICD-10-PCS | Mod: HCNC,S$GLB,, | Performed by: NURSE PRACTITIONER

## 2019-11-26 PROCEDURE — 1125F AMNT PAIN NOTED PAIN PRSNT: CPT | Mod: HCNC,S$GLB,, | Performed by: NURSE PRACTITIONER

## 2019-11-26 PROCEDURE — 1159F PR MEDICATION LIST DOCUMENTED IN MEDICAL RECORD: ICD-10-PCS | Mod: HCNC,S$GLB,, | Performed by: NURSE PRACTITIONER

## 2019-11-26 PROCEDURE — 3078F DIAST BP <80 MM HG: CPT | Mod: HCNC,CPTII,S$GLB, | Performed by: NURSE PRACTITIONER

## 2019-11-26 PROCEDURE — 99999 PR PBB SHADOW E&M-EST. PATIENT-LVL III: ICD-10-PCS | Mod: PBBFAC,HCNC,, | Performed by: NURSE PRACTITIONER

## 2019-11-26 RX ORDER — EZETIMIBE 10 MG/1
10 TABLET ORAL DAILY
Qty: 90 TABLET | Refills: 3 | Status: SHIPPED | OUTPATIENT
Start: 2019-11-26 | End: 2021-06-28 | Stop reason: SDUPTHER

## 2019-11-26 RX ORDER — CLOPIDOGREL BISULFATE 75 MG/1
75 TABLET ORAL DAILY
Qty: 30 TABLET | Refills: 11 | Status: SHIPPED | OUTPATIENT
Start: 2019-11-26 | End: 2020-05-21 | Stop reason: ALTCHOICE

## 2019-11-27 PROBLEM — F10.20 UNCOMPLICATED ALCOHOL DEPENDENCE: Status: ACTIVE | Noted: 2019-11-27

## 2019-11-27 NOTE — PROGRESS NOTES
Subjective:   Patient ID:  Alex Milner is a 74 y.o. male who presents for evaluation of No chief complaint on file.      HPI     Alex Milner is a 74 year old male who presents to clinic for cath/hospital follow up. He recently underwent AO with RO per Dr. Ross with successful PTA of RT SFA. His current medical conditions include HTN, HLP, PVD, obesity, ETOH use. He returns today and states he is doing ok.     Denies chest pain or anginal equivalents. No shortness of breath, THOMPSON or palpitations. Denies orthopnea, PND or abdominal bloating. Reports regular walking without any issues lately. NO leg swelling or claudications. No recent falls, syncope or near syncopal events. Reports compliance with medications and dietary restrictions. NO CNS complaints to suggest TIA or CVA today. No signs of abnormal bleeding on ASA, Plavix, Pletal.     Of note, patient does endorse that he drinks 2 beers daily.       Past Medical History:   Diagnosis Date    Atherosclerotic PVD with intermittent claudication 8/16/2019    Atrial fibrillation     pt unaware of this    Back pain     Bilateral inguinal hernia     CT ABdomen/pelvis 3/9/2015 (care everywhere)---Bilateral inguinal hernias containing fat.      Calcified granuloma of lung 10/3/2017    CT CHest 3/26/2018---There are calcified lymph nodes in the mediastinum and left hilum.    CT chest 9/11/ 2017 Calcified left hilar and subcarinal granulomas are noted  ;Calcified granuloma noted within the posterior segment of the right hepatic lobe.    Diverticulosis     Diverticulosis 10/28/13    Colonoscopy     ED (erectile dysfunction)     Elevated PSA     Granuloma of liver     ct chest 3/26/2018---There is a small calcified granuloma posteriorly in the right lobe of the liver    Hyperlipidemia     Hypertension     Moderate persistent asthma without complication 9/26/2017    Obesity     Tobacco dependence     resolved    Uncomplicated alcohol dependence  2019    Urinary tract infection        Past Surgical History:   Procedure Laterality Date    AORTOGRAPHY WITH SERIALOGRAPHY N/A 10/21/2019    Procedure: AORTOGRAM, WITH RUNOFF;  Surgeon: Chencho Ross MD;  Location: [facility] CATH LAB;  Service: Cardiology;  Laterality: N/A;  pt requesting 9am arrival    AORTOGRAPHY WITH SERIALOGRAPHY N/A 2019    Procedure: AORTOGRAM, WITH RUNOFF;  Surgeon: Chencho Ross MD;  Location: [facility] CATH LAB;  Service: Cardiology;  Laterality: N/A;    HERNIA REPAIR      x2    PROSTATE BIOPSY      reported per patient around  or  which was reportedly negative       Social History     Tobacco Use    Smoking status: Former Smoker     Packs/day: 2.00     Years: 15.00     Pack years: 30.00     Types: Cigarettes     Start date:      Last attempt to quit: 1985     Years since quittin.3    Smokeless tobacco: Never Used   Substance Use Topics    Alcohol use: Yes     Alcohol/week: 12.0 standard drinks     Types: 12 Cans of beer per week    Drug use: No       Family History   Problem Relation Age of Onset    Cancer Mother         Unknown primary    Cancer Father     Cancer Brother         prostate     Prostate cancer Brother     Cancer Sister 64        pancreatic     Diabetes Neg Hx     Heart disease Neg Hx     Kidney disease Neg Hx     Stroke Neg Hx     Hyperlipidemia Neg Hx     Hypertension Neg Hx      Wt Readings from Last 3 Encounters:   19 81.1 kg (178 lb 12.7 oz)   19 80.7 kg (178 lb)   19 79.7 kg (175 lb 11.3 oz)     Temp Readings from Last 3 Encounters:   19 98.1 °F (36.7 °C) (Temporal)   10/28/19 98.3 °F (36.8 °C) (Tympanic)   10/21/19 97.3 °F (36.3 °C) (Temporal)     BP Readings from Last 3 Encounters:   19 (!) 142/64   19 (!) 133/53   19 (!) 142/60     Pulse Readings from Last 3 Encounters:   19 65   19 (!) 53   19 61     Current Outpatient Medications on File Prior to Visit    Medication Sig Dispense Refill    amLODIPine (NORVASC) 2.5 MG tablet Take 1 tablet (2.5 mg total) by mouth once daily. 30 tablet 11    aspirin (ECOTRIN) 81 MG EC tablet Take 1 tablet (81 mg total) by mouth once daily. 30 tablet 6    atorvastatin (LIPITOR) 80 MG tablet Take 1 tablet (80 mg total) by mouth once daily. 30 tablet 11    brimonidine 0.15 % OPTH DROP (ALPHAGAN) 0.15 % ophthalmic solution LOCATION: BOTH EYES. INSTILL ONE DROP IN BOTH EYES TWICE A DAY X 90 DAYS  1    cilostazol (PLETAL) 50 MG Tab Take 1 tablet (50 mg total) by mouth 2 (two) times daily. 60 tablet 11    finasteride (PROSCAR) 5 mg tablet Take 1 tablet (5 mg total) by mouth once daily. 30 tablet 11    latanoprost 0.005 % ophthalmic solution Place 1 drop into both eyes once daily. 1 Bottle 5    lisinopril (PRINIVIL,ZESTRIL) 20 MG tablet Take 1 tablet (20 mg total) by mouth once daily. 90 tablet 3    sildenafil (REVATIO) 20 mg Tab Take 1-5 tablets as needed on an empty stomach with no alcohol an hour before desiring an erection. 50 tablet 11    sodium bicarb-sodium chloride Pack 1 packet by Nasal route 2 (two) times daily. 120 each 0    tamsulosin (FLOMAX) 0.4 mg Cap Take 1 capsule (0.4 mg total) by mouth once daily. 30 capsule 11     No current facility-administered medications on file prior to visit.          Review of Systems   Constitution: Negative for malaise/fatigue.   HENT: Negative for hearing loss and hoarse voice.    Eyes: Negative for blurred vision and visual disturbance.   Cardiovascular: Negative for chest pain, claudication, dyspnea on exertion, irregular heartbeat, leg swelling, near-syncope, orthopnea, palpitations, paroxysmal nocturnal dyspnea and syncope.   Respiratory: Negative for cough, hemoptysis, shortness of breath, sleep disturbances due to breathing, snoring and wheezing.    Endocrine: Negative for cold intolerance and heat intolerance.   Hematologic/Lymphatic: Bruises/bleeds easily.   Skin: Negative for  color change, dry skin and nail changes.   Musculoskeletal: Positive for arthritis. Negative for back pain, joint pain and myalgias.   Gastrointestinal: Negative for bloating, abdominal pain, constipation, nausea and vomiting.   Genitourinary: Negative for dysuria, flank pain, hematuria and hesitancy.   Neurological: Negative for headaches, light-headedness, loss of balance, numbness, paresthesias and weakness.   Psychiatric/Behavioral: Positive for substance abuse (daily etoh use). Negative for altered mental status.   Allergic/Immunologic: Negative for environmental allergies.     BP (!) 142/64 (BP Location: Left arm, Patient Position: Sitting)   Pulse 65   Wt 81.1 kg (178 lb 12.7 oz)   SpO2 98%   BMI 28.86 kg/m²     Objective:   Physical Exam   Constitutional: He is oriented to person, place, and time. He appears well-developed and well-nourished. No distress.   HENT:   Head: Normocephalic and atraumatic.   Eyes: Pupils are equal, round, and reactive to light.   Neck: Normal range of motion and full passive range of motion without pain. Neck supple. No JVD present.   Cardiovascular: Normal rate, regular rhythm, S1 normal, S2 normal and intact distal pulses. PMI is not displaced. Exam reveals no distant heart sounds.   No murmur heard.  Pulses:       Radial pulses are 2+ on the right side, and 2+ on the left side.        Dorsalis pedis pulses are 2+ on the right side, and 2+ on the left side.   LEFT FEMORAL ACCESS SITE C/D/I, NO HEMATOMA OR ECCHYMOSIS NOTED. NO DRAINAGE OR SIGNS OF INFECTION NOTED.    Pulmonary/Chest: Effort normal and breath sounds normal. No accessory muscle usage. No respiratory distress. He has no decreased breath sounds. He has no wheezes. He has no rales.   Abdominal: Soft. Bowel sounds are normal. He exhibits no distension. There is no tenderness.   Musculoskeletal: Normal range of motion. He exhibits no edema.        Right ankle: He exhibits no swelling.        Left ankle: He exhibits  no swelling.   Neurological: He is alert and oriented to person, place, and time.   Skin: Skin is warm and dry. He is not diaphoretic. No cyanosis. Nails show no clubbing.   Psychiatric: He has a normal mood and affect. His speech is normal and behavior is normal. Judgment and thought content normal. Cognition and memory are normal.   Nursing note and vitals reviewed.      Lab Results   Component Value Date    CHOL 271 (H) 06/27/2019    CHOL 253 (H) 10/27/2017    CHOL 255 (H) 11/15/2016     Lab Results   Component Value Date    HDL 81 (H) 06/27/2019    HDL 79 (H) 10/27/2017    HDL 63 11/15/2016     Lab Results   Component Value Date    LDLCALC 173.2 (H) 06/27/2019    LDLCALC 157.0 10/27/2017    LDLCALC 170.2 (H) 11/15/2016     Lab Results   Component Value Date    TRIG 84 06/27/2019    TRIG 85 10/27/2017    TRIG 109 11/15/2016     Lab Results   Component Value Date    CHOLHDL 29.9 06/27/2019    CHOLHDL 31.2 10/27/2017    CHOLHDL 24.7 11/15/2016       Chemistry        Component Value Date/Time     11/07/2019 1605    K 4.5 11/07/2019 1605     11/07/2019 1605    CO2 28 11/07/2019 1605    BUN 10 11/07/2019 1605    CREATININE 1.2 11/07/2019 1605    GLU 95 11/07/2019 1605        Component Value Date/Time    CALCIUM 9.2 11/07/2019 1605    ALKPHOS 77 06/27/2019 0722    AST 15 06/27/2019 0722    ALT 10 06/27/2019 0722    BILITOT 0.3 06/27/2019 0722    ESTGFRAFRICA >60.0 11/07/2019 1605    EGFRNONAA 58.4 (A) 11/07/2019 1605          Lab Results   Component Value Date    TSH 0.911 06/27/2019     Lab Results   Component Value Date    INR 0.9 11/07/2019    INR 1.0 10/14/2019     Lab Results   Component Value Date    WBC 6.09 11/07/2019    HGB 13.2 (L) 11/07/2019    HCT 43.3 11/07/2019    MCV 88 11/07/2019     11/07/2019      Angiographic Results     Diagnostic:        - Left Common Femoral Artery:             The left CFA is normal.     - Right Common Femoral Artery:             The right CFA is normal.     -  Right Superficial Femoral Artery:             The mid right SFA has a 90% stenosis.                     Lesion Details:  Eccentric shape, moderate proximal tortuosity, segment angulation of 45-90 degrees, irregular contour, mild to moderate calcification. the lesion length is close to 6 cm.     - Left Profunda Femoral Artery:             The left PFA is normal.     - Right Profunda Femoral Artery:             The right PFA is normal.     - Right Popliteal Artery:             The right POP has luminal irregularities.     - Right Tibioperoneal Trunk:             The right TIBTRUNK is diffusely diseased (75).     - Right Anterior Tibial Artery:             The right AT has chronic total occlusion. There is PRIMO 0 flow.     - Right Peroneal Artery:             The right peroneal has luminal irregularities.     - Right Posterior Tibial Artery:             The right PT has chronic total occlusion. There is PRIMO 0 flow.      Intervention          Mid Right SFA:              The lesion was successfully intervened. Post-stenosis of 10% and post-PRIMO 3 flow. The vessel was accessed natively.  The following items were used: Blln Saber 6 X 6 X 150 and Blln Lutonix 018 7.0 X 80 X 130.    E. Details of Procedure     PROCEDURES PERFORMED: Drug Coated Balloon - Peripheral, Placement of Closure Device, SFA PTA, Lower Ext Uni Angio and Unilateral Runoff    ANESTHESIA: Conscious sedation was achieved with 100 mcg of Fentanyl and 2 mg of Versed. Local anesthesia was achieved with 20 ml of Lidocaine 2%. Moderate conscious sedation was performed and cardiorespiratory functions were monitored the entire   procedure by Vicky Garcia RN. Sedation began at 01:03 PM and concluded at 02:08 PM, totalling 65.2 minutes.     PRIMARY SURGEON: Chencho Ross MD  Assessment:      1. Atherosclerotic PVD with intermittent claudication    2. Mixed hyperlipidemia    3. Essential hypertension    4. Aortic calcification    5. Uncomplicated alcohol  dependence      Patient presents to clinic for hospital follow up and is doing well today.   No new cardiac complaints today in office.   BP well controlled today.   Needs better lipid control, will add Zetia and reassess in 3 months.   Plan:     Add Zetia 10 mg nightly  Continue all other CV meds for now  Not interested in Repatha/Praluent at today's visit  Decrease alcohol use  Dash diet, 2 gm sodium restriction  1500 ml fluid restriction  Encourage regular physical activity  Monitor Bp at home  Discussed heart healthy diet with patient in detail  RTC in 3 months with CMP, Lipids or sooner if needed.     Mallory Coronado, LYNNP-NOA  [facility]

## 2020-01-09 ENCOUNTER — LAB VISIT (OUTPATIENT)
Dept: LAB | Facility: HOSPITAL | Age: 75
End: 2020-01-09
Attending: UROLOGY
Payer: MEDICARE

## 2020-01-09 DIAGNOSIS — N52.9 ERECTILE DYSFUNCTION, UNSPECIFIED ERECTILE DYSFUNCTION TYPE: ICD-10-CM

## 2020-01-09 DIAGNOSIS — N40.0 BENIGN PROSTATIC HYPERPLASIA, UNSPECIFIED WHETHER LOWER URINARY TRACT SYMPTOMS PRESENT: ICD-10-CM

## 2020-01-09 DIAGNOSIS — Z12.5 PROSTATE CANCER SCREENING: ICD-10-CM

## 2020-01-09 PROCEDURE — 84153 ASSAY OF PSA TOTAL: CPT | Mod: HCNC

## 2020-01-09 PROCEDURE — 36415 COLL VENOUS BLD VENIPUNCTURE: CPT | Mod: HCNC,PO

## 2020-01-10 LAB — COMPLEXED PSA SERPL-MCNC: 18 NG/ML (ref 0–4)

## 2020-01-15 ENCOUNTER — OFFICE VISIT (OUTPATIENT)
Dept: UROLOGY | Facility: CLINIC | Age: 75
End: 2020-01-15
Payer: MEDICARE

## 2020-01-15 VITALS
DIASTOLIC BLOOD PRESSURE: 78 MMHG | BODY MASS INDEX: 27.75 KG/M2 | WEIGHT: 171.94 LBS | SYSTOLIC BLOOD PRESSURE: 156 MMHG

## 2020-01-15 DIAGNOSIS — N40.0 BENIGN PROSTATIC HYPERPLASIA, UNSPECIFIED WHETHER LOWER URINARY TRACT SYMPTOMS PRESENT: ICD-10-CM

## 2020-01-15 DIAGNOSIS — I10 HYPERTENSION, UNSPECIFIED TYPE: ICD-10-CM

## 2020-01-15 DIAGNOSIS — N52.9 ERECTILE DYSFUNCTION, UNSPECIFIED ERECTILE DYSFUNCTION TYPE: ICD-10-CM

## 2020-01-15 DIAGNOSIS — Z12.5 PROSTATE CANCER SCREENING: Primary | ICD-10-CM

## 2020-01-15 LAB
BILIRUB SERPL-MCNC: NORMAL MG/DL
BLOOD URINE, POC: NORMAL
COLOR, POC UA: YELLOW
GLUCOSE UR QL STRIP: NORMAL
KETONES UR QL STRIP: NORMAL
LEUKOCYTE ESTERASE URINE, POC: NORMAL
NITRITE, POC UA: NORMAL
PH, POC UA: 5
PROTEIN, POC: NORMAL
SPECIFIC GRAVITY, POC UA: 1.02
UROBILINOGEN, POC UA: NORMAL

## 2020-01-15 PROCEDURE — 99214 PR OFFICE/OUTPT VISIT, EST, LEVL IV, 30-39 MIN: ICD-10-PCS | Mod: HCNC,25,S$GLB, | Performed by: UROLOGY

## 2020-01-15 PROCEDURE — 81002 POCT URINE DIPSTICK WITHOUT MICROSCOPE: ICD-10-PCS | Mod: HCNC,S$GLB,, | Performed by: UROLOGY

## 2020-01-15 PROCEDURE — 1101F PT FALLS ASSESS-DOCD LE1/YR: CPT | Mod: HCNC,CPTII,S$GLB, | Performed by: UROLOGY

## 2020-01-15 PROCEDURE — 3077F PR MOST RECENT SYSTOLIC BLOOD PRESSURE >= 140 MM HG: ICD-10-PCS | Mod: HCNC,CPTII,S$GLB, | Performed by: UROLOGY

## 2020-01-15 PROCEDURE — 3078F DIAST BP <80 MM HG: CPT | Mod: HCNC,CPTII,S$GLB, | Performed by: UROLOGY

## 2020-01-15 PROCEDURE — 1126F AMNT PAIN NOTED NONE PRSNT: CPT | Mod: HCNC,S$GLB,, | Performed by: UROLOGY

## 2020-01-15 PROCEDURE — 1101F PR PT FALLS ASSESS DOC 0-1 FALLS W/OUT INJ PAST YR: ICD-10-PCS | Mod: HCNC,CPTII,S$GLB, | Performed by: UROLOGY

## 2020-01-15 PROCEDURE — 1159F MED LIST DOCD IN RCRD: CPT | Mod: HCNC,S$GLB,, | Performed by: UROLOGY

## 2020-01-15 PROCEDURE — 1159F PR MEDICATION LIST DOCUMENTED IN MEDICAL RECORD: ICD-10-PCS | Mod: HCNC,S$GLB,, | Performed by: UROLOGY

## 2020-01-15 PROCEDURE — 1126F PR PAIN SEVERITY QUANTIFIED, NO PAIN PRESENT: ICD-10-PCS | Mod: HCNC,S$GLB,, | Performed by: UROLOGY

## 2020-01-15 PROCEDURE — 99214 OFFICE O/P EST MOD 30 MIN: CPT | Mod: HCNC,25,S$GLB, | Performed by: UROLOGY

## 2020-01-15 PROCEDURE — 99999 PR PBB SHADOW E&M-EST. PATIENT-LVL III: ICD-10-PCS | Mod: PBBFAC,HCNC,, | Performed by: UROLOGY

## 2020-01-15 PROCEDURE — 3077F SYST BP >= 140 MM HG: CPT | Mod: HCNC,CPTII,S$GLB, | Performed by: UROLOGY

## 2020-01-15 PROCEDURE — 99999 PR PBB SHADOW E&M-EST. PATIENT-LVL III: CPT | Mod: PBBFAC,HCNC,, | Performed by: UROLOGY

## 2020-01-15 PROCEDURE — 3078F PR MOST RECENT DIASTOLIC BLOOD PRESSURE < 80 MM HG: ICD-10-PCS | Mod: HCNC,CPTII,S$GLB, | Performed by: UROLOGY

## 2020-01-15 PROCEDURE — 81002 URINALYSIS NONAUTO W/O SCOPE: CPT | Mod: HCNC,S$GLB,, | Performed by: UROLOGY

## 2020-01-15 RX ORDER — SILDENAFIL 100 MG/1
100 TABLET, FILM COATED ORAL DAILY PRN
Qty: 30 TABLET | Refills: 11 | Status: SHIPPED | OUTPATIENT
Start: 2020-01-15 | End: 2021-01-14

## 2020-01-15 RX ORDER — SILDENAFIL 100 MG/1
100 TABLET, FILM COATED ORAL DAILY PRN
Qty: 30 TABLET | Refills: 11 | Status: SHIPPED | OUTPATIENT
Start: 2020-01-15 | End: 2022-07-11

## 2020-01-15 RX ORDER — FINASTERIDE 5 MG/1
5 TABLET, FILM COATED ORAL DAILY
Qty: 30 TABLET | Refills: 11 | Status: SHIPPED | OUTPATIENT
Start: 2020-01-15 | End: 2020-02-11

## 2020-01-15 RX ORDER — TAMSULOSIN HYDROCHLORIDE 0.4 MG/1
0.4 CAPSULE ORAL DAILY
Qty: 30 CAPSULE | Refills: 11 | Status: SHIPPED | OUTPATIENT
Start: 2020-01-15 | End: 2020-03-10

## 2020-01-15 NOTE — PROGRESS NOTES
Chief Complaint: Elevated PSA    HPI:   1/15/20: No interval problems.  Nocturia x2 still.  Sildenafil working well.  Reviewed history in detail.   1/14/19: Stream overall better again since last visit, slow in the mornings.  Nocturia x2.  Cut back on caffeine but not all the way.  Sildenafil working well.  7/12/18: Stream is a little better.  Nocturia x2.  Has slowly improved no step change with flomax. Reviewed history in detail. Discussed ED.  1/8/18: Returns after long absence.  PSA >30.  Says he had a biopsy in 2016 with another urologist in [facility] off of [facility].  Second one he had.  MRI report from 5/17 reassuring with 98gm prostate.  Reduced stream not on flomax/finasteride.  No abd/pelvic pain and no exac/rel factors.  No hematuria.  No urolithiasis.  3-4 cups coffee in AM.  Nocturia x3-4.  7/13: Carol: 67-year-old male returns to the clinic today for followup on his elevated PSA.  Currently, his PSA is 11.83.  This compares to 13.34 back in April, and 17.94 back in January of this year.  That was when we had started him on finasteride, and he has been taking it regularly.  It is good to note that his PSA is declining as we would anticipate.  He does need continued followup, and is willing to do so.    Allergies:  Patient has no known allergies.    Medications:  has a current medication list which includes the following prescription(s): amlodipine, aspirin, atorvastatin, brimonidine 0.15 % opth drop, ezetimibe, cilostazol, clopidogrel, finasteride, latanoprost, lisinopril, sildenafil, sodium bicarb-sodium chloride, and tamsulosin.    Review of Systems:  General: No fever, chills, fatigability, or weight loss.  Skin: No rashes, itching, or changes in color or texture of skin.  Chest: Denies THOMPSON, cyanosis, wheezing, cough, and sputum production.  Abdomen: Appetite fine. No weight loss. Denies diarrhea, abdominal pain, hematemesis, or blood in stool.  Musculoskeletal: No joint stiffness or swelling. Some back  pain.  : As above.  All other review of systems negative.    PMH:   has a past medical history of Atherosclerotic PVD with intermittent claudication (8/16/2019), Atrial fibrillation, Back pain, Bilateral inguinal hernia, Calcified granuloma of lung (10/3/2017), Diverticulosis, Diverticulosis (10/28/13), ED (erectile dysfunction), Elevated PSA, Granuloma of liver, Hyperlipidemia, Hypertension, Moderate persistent asthma without complication (9/26/2017), Obesity, Tobacco dependence, Uncomplicated alcohol dependence (11/27/2019), and Urinary tract infection.    PSH:   has a past surgical history that includes Hernia repair; Prostate biopsy; Aortography with serialography (N/A, 10/21/2019); and Aortography with serialography (N/A, 11/14/2019).    FamHx: family history includes Cancer in his brother, father, and mother; Cancer (age of onset: 64) in his sister; Prostate cancer in his brother.    SocHx:  reports that he quit smoking about 34 years ago. His smoking use included cigarettes. He started smoking about 60 years ago. He has a 30.00 pack-year smoking history. He has never used smokeless tobacco. He reports that he drinks about 12.0 standard drinks of alcohol per week. He reports that he does not use drugs.      Physical Exam:  Vitals:    01/15/20 1259   BP: (!) 156/78     General: A&Ox3, no apparent distress, no deformities  Neck: No masses, normal thyroid  Lungs: normal inspiration, no use of accessory muscles  Heart: normal pulse, no arrhythmias  Abdomen: Soft, NT, ND, no masses, no hernias, no hepatosplenomegaly  Lymphatic: Neck and groin nodes negative  Skin: The skin is warm and dry. No jaundice.  Ext: No c/c/e.  :   1/18: Test desc loren, no abnormalities of epididymus. Penis normal, with normal penile and scrotal skin. Meatus normal. Normal rectal tone, no hemorrhoids. Prost >50 gm no nodules or masses appreciated. SV not palpable. Perineum and anus normal.    Labs/Studies:   PSA    10/17: 31.3    7/18:  14.9    1/19: 15.1    1/20: 18.0    Impression/Plan:   1. Will continue flomax/finasteride and recheck PSA 6/12 with RTC 12 mo  2. Caffeine cessation still a good idea.  3. Sildenafil rx change to 100mg  4. HTN: discussed and referred to PCP for further management. Took his meds later today.

## 2020-02-06 ENCOUNTER — PES CALL (OUTPATIENT)
Dept: ADMINISTRATIVE | Facility: CLINIC | Age: 75
End: 2020-02-06

## 2020-02-11 RX ORDER — FINASTERIDE 5 MG/1
TABLET, FILM COATED ORAL
Qty: 90 TABLET | Refills: 3 | Status: SHIPPED | OUTPATIENT
Start: 2020-02-11 | End: 2021-10-07

## 2020-02-13 ENCOUNTER — OFFICE VISIT (OUTPATIENT)
Dept: URGENT CARE | Facility: CLINIC | Age: 75
End: 2020-02-13
Payer: MEDICARE

## 2020-02-13 VITALS
OXYGEN SATURATION: 98 % | WEIGHT: 171 LBS | TEMPERATURE: 98 F | HEART RATE: 64 BPM | HEIGHT: 66 IN | DIASTOLIC BLOOD PRESSURE: 66 MMHG | BODY MASS INDEX: 27.48 KG/M2 | RESPIRATION RATE: 18 BRPM | SYSTOLIC BLOOD PRESSURE: 144 MMHG

## 2020-02-13 DIAGNOSIS — J06.9 URI WITH COUGH AND CONGESTION: Primary | ICD-10-CM

## 2020-02-13 LAB
CTP QC/QA: YES
FLUAV AG NPH QL: NEGATIVE
FLUBV AG NPH QL: NEGATIVE

## 2020-02-13 PROCEDURE — 87804 INFLUENZA ASSAY W/OPTIC: CPT | Mod: QW,S$GLB,, | Performed by: PHYSICIAN ASSISTANT

## 2020-02-13 PROCEDURE — 87804 POCT INFLUENZA A/B: ICD-10-PCS | Mod: QW,S$GLB,, | Performed by: PHYSICIAN ASSISTANT

## 2020-02-13 PROCEDURE — 99214 PR OFFICE/OUTPT VISIT, EST, LEVL IV, 30-39 MIN: ICD-10-PCS | Mod: 25,S$GLB,, | Performed by: PHYSICIAN ASSISTANT

## 2020-02-13 PROCEDURE — 99214 OFFICE O/P EST MOD 30 MIN: CPT | Mod: 25,S$GLB,, | Performed by: PHYSICIAN ASSISTANT

## 2020-02-13 RX ORDER — BENZONATATE 200 MG/1
200 CAPSULE ORAL 3 TIMES DAILY PRN
Qty: 21 CAPSULE | Refills: 0 | Status: SHIPPED | OUTPATIENT
Start: 2020-02-13 | End: 2021-03-19 | Stop reason: SDUPTHER

## 2020-02-13 NOTE — PROGRESS NOTES
"Subjective:       Patient ID: Alex Milner is a 74 y.o. male.    Vitals:  height is 5' 6" (1.676 m) and weight is 77.6 kg (171 lb). His temperature is 98 °F (36.7 °C). His blood pressure is 144/66 (abnormal) and his pulse is 64. His respiration is 18 and oxygen saturation is 98%.     Chief Complaint: Cough    Cough   This is a new problem. The current episode started 1 to 4 weeks ago (2 weeks ). The problem has been unchanged. The cough is productive of sputum. Associated symptoms include chills and nasal congestion. Pertinent negatives include no chest pain, ear pain, eye redness, fever, hemoptysis, myalgias, rash, sore throat, shortness of breath or wheezing. The symptoms are aggravated by lying down. He has tried OTC cough suppressant (tylenol, promethazine cough syrup) for the symptoms. The treatment provided mild relief. His past medical history is significant for asthma.       Constitution: Positive for chills and sweating. Negative for fatigue and fever.   HENT: Negative for ear pain, ear discharge, foreign body in ear, tinnitus, congestion, sinus pain, sinus pressure, sore throat and voice change.    Neck: Negative for neck pain, neck stiffness and painful lymph nodes.   Cardiovascular: Negative for chest pain and palpitations.   Eyes: Negative for eye itching, eye pain and eye redness.   Respiratory: Positive for cough and asthma (history of). Negative for chest tightness, sputum production, bloody sputum, COPD, shortness of breath, stridor and wheezing.    Gastrointestinal: Negative for abdominal pain, nausea, vomiting, constipation and diarrhea.   Genitourinary: Negative for dysuria.   Musculoskeletal: Negative for muscle ache.   Skin: Negative for rash.   Allergic/Immunologic: Positive for asthma (history of). Negative for seasonal allergies.   Hematologic/Lymphatic: Negative for swollen lymph nodes.       Objective:      Physical Exam   Constitutional: He is oriented to person, place, and time. " Vital signs are normal. He appears well-developed and well-nourished. He is cooperative.  Non-toxic appearance. He does not have a sickly appearance. He does not appear ill. No distress.   HENT:   Head: Normocephalic.   Right Ear: Tympanic membrane, external ear and ear canal normal.   Left Ear: Tympanic membrane, external ear and ear canal normal.   Nose: Nose normal. No mucosal edema, rhinorrhea or purulent discharge. Right sinus exhibits no maxillary sinus tenderness and no frontal sinus tenderness. Left sinus exhibits no maxillary sinus tenderness and no frontal sinus tenderness.   Mouth/Throat: Uvula is midline, oropharynx is clear and moist and mucous membranes are normal.   Eyes: Pupils are equal, round, and reactive to light. Conjunctivae, EOM and lids are normal.   Neck: Trachea normal, normal range of motion, full passive range of motion without pain and phonation normal. Neck supple.   Cardiovascular: Normal rate, regular rhythm, normal heart sounds and intact distal pulses.   No murmur heard.  Pulmonary/Chest: Effort normal and breath sounds normal. No stridor. No respiratory distress. He has no decreased breath sounds. He has no wheezes. He has no rhonchi. He has no rales.   Abdominal: Soft. Normal appearance and bowel sounds are normal. He exhibits no distension. There is no tenderness.   Musculoskeletal: Normal range of motion.   Lymphadenopathy:     He has no cervical adenopathy.   Neurological: He is alert and oriented to person, place, and time.   Skin: Skin is warm, dry, intact, not diaphoretic and no rash. Capillary refill takes less than 2 seconds.   Psychiatric: He has a normal mood and affect. His behavior is normal. Judgment and thought content normal.   Nursing note and vitals reviewed.    Results for orders placed or performed in visit on 02/13/20   POCT Influenza A/B   Result Value Ref Range    Rapid Influenza A Ag Negative Negative    Rapid Influenza B Ag Negative Negative    Quality  Control Acceptable Yes             Assessment:       1. URI with cough and congestion        Plan:         URI with cough and congestion  -     POCT Influenza A/B  -     benzonatate (TESSALON) 200 MG capsule; Take 1 capsule (200 mg total) by mouth 3 (three) times daily as needed for Cough.  Dispense: 21 capsule; Refill: 0    - Vitals are reassuring.  - Patient declined CXR at this time.  - Will recommend OTC medications for symptomatic relief: Guaifenesin, Xyzal/Claritin/Zyrtec, Ibuprofen/Tylenol, cough drops, Cepacol, immune boosters (Elderberry/Vitamin C).    I have discussed the diagnosis, treatment plan and recommendations for follow-up with primary care and patient verbalized understanding and is agreeable to the plan. ED precautions given. AVS printed and given to patient upon discharge with information regarding this visit. All questions were addressed prior to discharge.    Bri Rodriguez PA-C

## 2020-02-13 NOTE — PATIENT INSTRUCTIONS
Make sure you are getting rest and drinking lots of fluids.    You have been prescribed Tessalon perles for cough.     You can take plain Mucinex (Guaifenesin) to break-up the mucous. Humidifiers and propping up at night will also help this.    You can take a daily anti-histamine such as Xyzal, Claritin or Zyrtec.    You can use cough drops or Cepacol to soothe your sore throat.     You can also take Elderberry and/or Emergen-C (vitamin C) to help boost your immune system.    You can also take Tylenol for body aches/fever control.    Follow-up with primary care.    If for some reason your symptoms worsen or for any new or concerning symptoms, go to the emergency room.        Viral Upper Respiratory Illness (Adult)  You have a viral upper respiratory illness (URI), which is another term for the common cold. This illness is contagious during the first few days. It is spread through the air by coughing and sneezing. It may also be spread by direct contact (touching the sick person and then touching your own eyes, nose, or mouth). Frequent handwashing will decrease risk of spread. Most viral illnesses go away within 7 to 10 days with rest and simple home remedies. Sometimes the illness may last for several weeks. Antibiotics will not kill a virus, and they are generally not prescribed for this condition.    Home care  · If symptoms are severe, rest at home for the first 2 to 3 days. When you resume activity, don't let yourself get too tired.  · Avoid being exposed to cigarette smoke (yours or others).  · You may use acetaminophen or ibuprofen to control pain and fever, unless another medicine was prescribed. (Note: If you have chronic liver or kidney disease, have ever had a stomach ulcer or gastrointestinal bleeding, or are taking blood-thinning medicines, talk with your healthcare provider before using these medicines.) Aspirin should never be given to anyone under 18 years of age who is ill with a viral infection or  fever. It may cause severe liver or brain damage.  · Your appetite may be poor, so a light diet is fine. Avoid dehydration by drinking 6 to 8 glasses of fluids per day (water, soft drinks, juices, tea, or soup). Extra fluids will help loosen secretions in the nose and lungs.  · Over-the-counter cold medicines will not shorten the length of time youre sick, but they may be helpful for the following symptoms: cough, sore throat, and nasal and sinus congestion. (Note: Do not use decongestants if you have high blood pressure.)  Follow-up care  Follow up with your healthcare provider, or as advised.  When to seek medical advice  Call your healthcare provider right away if any of these occur:  · Cough with lots of colored sputum (mucus)  · Severe headache; face, neck, or ear pain  · Difficulty swallowing due to throat pain  · Fever of 100.4°F (38°C)  Call 911, or get immediate medical care  Call emergency services right away if any of these occur:  · Chest pain, shortness of breath, wheezing, or difficulty breathing  · Coughing up blood  · Inability to swallow due to throat pain  Date Last Reviewed: 9/13/2015  © 5487-3735 [employer] [address] [place] [address], [place] [address]. All rights reserved. This information is not intended as a substitute for professional medical care. Always follow your healthcare professional's instructions.

## 2020-02-15 ENCOUNTER — TELEPHONE (OUTPATIENT)
Dept: URGENT CARE | Facility: CLINIC | Age: 75
End: 2020-02-15

## 2020-02-19 NOTE — PROGRESS NOTES
Subjective:   Patient ID:  Alex Milner is a 74 y.o. male who presents for follow up of No chief complaint on file.      HPI     Mr. Milner' current medical conditions include HTN, HLP, PVD, obesity, ETOH use  Underwent AO with RO in Nov 2019 with Dr. Ross with successful PTA of RT SFA.  Denies any chest pain, SOB, THOMPSON,  orthopnea, PND, dizziness, palpitations,  near syncope, syncope or edema . Has no claudication symptoms. Has no symptoms concerning for angina or equivalent. No CNS Complaints to suggest TIA or CVA. Does well with limiting sodium intake.  Denies any abnormal bleeding on ASA, Plavix, Pletal.       Past Medical History:   Diagnosis Date    Atherosclerotic PVD with intermittent claudication 8/16/2019    Atrial fibrillation     pt unaware of this    Back pain     Bilateral inguinal hernia     CT ABdomen/pelvis 3/9/2015 (care everywhere)---Bilateral inguinal hernias containing fat.      Calcified granuloma of lung 10/3/2017    CT CHest 3/26/2018---There are calcified lymph nodes in the mediastinum and left hilum.    CT chest 9/11/ 2017 Calcified left hilar and subcarinal granulomas are noted  ;Calcified granuloma noted within the posterior segment of the right hepatic lobe.    Diverticulosis     Diverticulosis 10/28/13    Colonoscopy     ED (erectile dysfunction)     Elevated PSA     Granuloma of liver     ct chest 3/26/2018---There is a small calcified granuloma posteriorly in the right lobe of the liver    Hyperlipidemia     Hypertension     Moderate persistent asthma without complication 9/26/2017    Obesity     Tobacco dependence     resolved    Uncomplicated alcohol dependence 11/27/2019    Urinary tract infection        Past Surgical History:   Procedure Laterality Date    AORTOGRAPHY WITH SERIALOGRAPHY N/A 10/21/2019    Procedure: AORTOGRAM, WITH RUNOFF;  Surgeon: Chencho Ross MD;  Location: [facility] CATH LAB;  Service: Cardiology;  Laterality: N/A;  pt requesting 9am  arrival    AORTOGRAPHY WITH SERIALOGRAPHY N/A 2019    Procedure: AORTOGRAM, WITH RUNOFF;  Surgeon: Chencho Ross MD;  Location: [facility] CATH LAB;  Service: Cardiology;  Laterality: N/A;    HERNIA REPAIR      x2    PROSTATE BIOPSY      reported per patient around  or  which was reportedly negative       Social History     Tobacco Use    Smoking status: Former Smoker     Packs/day: 2.00     Years: 15.00     Pack years: 30.00     Types: Cigarettes     Start date:      Last attempt to quit: 1985     Years since quittin.5    Smokeless tobacco: Never Used   Substance Use Topics    Alcohol use: Yes     Alcohol/week: 12.0 standard drinks     Types: 12 Cans of beer per week    Drug use: No       Family History   Problem Relation Age of Onset    Cancer Mother         Unknown primary    Cancer Father     Cancer Brother         prostate     Prostate cancer Brother     Cancer Sister 64        pancreatic     Diabetes Neg Hx     Heart disease Neg Hx     Kidney disease Neg Hx     Stroke Neg Hx     Hyperlipidemia Neg Hx     Hypertension Neg Hx        Current Outpatient Medications   Medication Sig    amLODIPine (NORVASC) 2.5 MG tablet Take 1 tablet (2.5 mg total) by mouth once daily.    aspirin (ECOTRIN) 81 MG EC tablet Take 1 tablet (81 mg total) by mouth once daily.    atorvastatin (LIPITOR) 80 MG tablet Take 1 tablet (80 mg total) by mouth once daily.    benzonatate (TESSALON) 200 MG capsule Take 1 capsule (200 mg total) by mouth 3 (three) times daily as needed for Cough.    brimonidine 0.15 % OPTH DROP (ALPHAGAN) 0.15 % ophthalmic solution LOCATION: BOTH EYES. INSTILL ONE DROP IN BOTH EYES TWICE A DAY X 90 DAYS    cilostazol (PLETAL) 50 MG Tab Take 1 tablet (50 mg total) by mouth 2 (two) times daily.    clopidogrel (PLAVIX) 75 mg tablet Take 1 tablet (75 mg total) by mouth once daily.    ezetimibe (ZETIA) 10 mg tablet Take 1 tablet (10 mg total) by mouth once daily.     finasteride (PROSCAR) 5 mg tablet TAKE 1 TABLET BY MOUTH EVERY DAY    latanoprost 0.005 % ophthalmic solution Place 1 drop into both eyes once daily.    lisinopril (PRINIVIL,ZESTRIL) 20 MG tablet Take 1 tablet (20 mg total) by mouth once daily.    sildenafil (VIAGRA) 100 MG tablet Take 1 tablet (100 mg total) by mouth daily as needed.    sildenafil (VIAGRA) 100 MG tablet Take 1 tablet (100 mg total) by mouth daily as needed.    sodium bicarb-sodium chloride Pack 1 packet by Nasal route 2 (two) times daily.    tamsulosin (FLOMAX) 0.4 mg Cap Take 1 capsule (0.4 mg total) by mouth once daily.     No current facility-administered medications for this visit.      Current Outpatient Medications on File Prior to Visit   Medication Sig    amLODIPine (NORVASC) 2.5 MG tablet Take 1 tablet (2.5 mg total) by mouth once daily.    aspirin (ECOTRIN) 81 MG EC tablet Take 1 tablet (81 mg total) by mouth once daily.    atorvastatin (LIPITOR) 80 MG tablet Take 1 tablet (80 mg total) by mouth once daily.    benzonatate (TESSALON) 200 MG capsule Take 1 capsule (200 mg total) by mouth 3 (three) times daily as needed for Cough.    brimonidine 0.15 % OPTH DROP (ALPHAGAN) 0.15 % ophthalmic solution LOCATION: BOTH EYES. INSTILL ONE DROP IN BOTH EYES TWICE A DAY X 90 DAYS    cilostazol (PLETAL) 50 MG Tab Take 1 tablet (50 mg total) by mouth 2 (two) times daily.    clopidogrel (PLAVIX) 75 mg tablet Take 1 tablet (75 mg total) by mouth once daily.    ezetimibe (ZETIA) 10 mg tablet Take 1 tablet (10 mg total) by mouth once daily.    finasteride (PROSCAR) 5 mg tablet TAKE 1 TABLET BY MOUTH EVERY DAY    latanoprost 0.005 % ophthalmic solution Place 1 drop into both eyes once daily.    lisinopril (PRINIVIL,ZESTRIL) 20 MG tablet Take 1 tablet (20 mg total) by mouth once daily.    sildenafil (VIAGRA) 100 MG tablet Take 1 tablet (100 mg total) by mouth daily as needed.    sildenafil (VIAGRA) 100 MG tablet Take 1 tablet (100 mg  total) by mouth daily as needed.    sodium bicarb-sodium chloride Pack 1 packet by Nasal route 2 (two) times daily.    tamsulosin (FLOMAX) 0.4 mg Cap Take 1 capsule (0.4 mg total) by mouth once daily.     No current facility-administered medications on file prior to visit.        ROS    Objective:   Physical Exam  There were no vitals filed for this visit.  Lab Results   Component Value Date    CHOL 271 (H) 06/27/2019    CHOL 253 (H) 10/27/2017    CHOL 255 (H) 11/15/2016     Lab Results   Component Value Date    HDL 81 (H) 06/27/2019    HDL 79 (H) 10/27/2017    HDL 63 11/15/2016     Lab Results   Component Value Date    LDLCALC 173.2 (H) 06/27/2019    LDLCALC 157.0 10/27/2017    LDLCALC 170.2 (H) 11/15/2016     Lab Results   Component Value Date    TRIG 84 06/27/2019    TRIG 85 10/27/2017    TRIG 109 11/15/2016     Lab Results   Component Value Date    CHOLHDL 29.9 06/27/2019    CHOLHDL 31.2 10/27/2017    CHOLHDL 24.7 11/15/2016       Chemistry        Component Value Date/Time     11/07/2019 1605    K 4.5 11/07/2019 1605     11/07/2019 1605    CO2 28 11/07/2019 1605    BUN 10 11/07/2019 1605    CREATININE 1.2 11/07/2019 1605    GLU 95 11/07/2019 1605        Component Value Date/Time    CALCIUM 9.2 11/07/2019 1605    ALKPHOS 77 06/27/2019 0722    AST 15 06/27/2019 0722    ALT 10 06/27/2019 0722    BILITOT 0.3 06/27/2019 0722    ESTGFRAFRICA >60.0 11/07/2019 1605    EGFRNONAA 58.4 (A) 11/07/2019 1605          Lab Results   Component Value Date    TSH 0.911 06/27/2019     Lab Results   Component Value Date    INR 0.9 11/07/2019    INR 1.0 10/14/2019     Lab Results   Component Value Date    WBC 6.09 11/07/2019    HGB 13.2 (L) 11/07/2019    HCT 43.3 11/07/2019    MCV 88 11/07/2019     11/07/2019     BMP  Sodium   Date Value Ref Range Status   11/07/2019 143 136 - 145 mmol/L Final     Potassium   Date Value Ref Range Status   11/07/2019 4.5 3.5 - 5.1 mmol/L Final     Chloride   Date Value Ref Range  Status   11/07/2019 108 95 - 110 mmol/L Final     CO2   Date Value Ref Range Status   11/07/2019 28 23 - 29 mmol/L Final     BUN, Bld   Date Value Ref Range Status   11/07/2019 10 8 - 23 mg/dL Final     Creatinine   Date Value Ref Range Status   11/07/2019 1.2 0.5 - 1.4 mg/dL Final     Calcium   Date Value Ref Range Status   11/07/2019 9.2 8.7 - 10.5 mg/dL Final     Anion Gap   Date Value Ref Range Status   11/07/2019 7 (L) 8 - 16 mmol/L Final     eGFR if    Date Value Ref Range Status   11/07/2019 >60.0 >60 mL/min/1.73 m^2 Final     eGFR if non    Date Value Ref Range Status   11/07/2019 58.4 (A) >60 mL/min/1.73 m^2 Final     Comment:     Calculation used to obtain the estimated glomerular filtration  rate (eGFR) is the CKD-EPI equation.        CrCl cannot be calculated (Patient's most recent lab result is older than the maximum 7 days allowed.).    Assessment:     1. Essential hypertension        Plan:   Essential hypertension

## 2020-02-20 ENCOUNTER — OFFICE VISIT (OUTPATIENT)
Dept: CARDIOLOGY | Facility: CLINIC | Age: 75
End: 2020-02-20
Payer: MEDICARE

## 2020-02-20 VITALS
OXYGEN SATURATION: 99 % | BODY MASS INDEX: 26.83 KG/M2 | WEIGHT: 166.25 LBS | HEART RATE: 79 BPM | DIASTOLIC BLOOD PRESSURE: 68 MMHG | SYSTOLIC BLOOD PRESSURE: 144 MMHG

## 2020-02-20 DIAGNOSIS — I73.9 PAD (PERIPHERAL ARTERY DISEASE): ICD-10-CM

## 2020-02-20 DIAGNOSIS — E78.2 MIXED HYPERLIPIDEMIA: ICD-10-CM

## 2020-02-20 DIAGNOSIS — I10 ESSENTIAL HYPERTENSION: Primary | Chronic | ICD-10-CM

## 2020-02-20 PROCEDURE — 99214 PR OFFICE/OUTPT VISIT, EST, LEVL IV, 30-39 MIN: ICD-10-PCS | Mod: HCNC,S$GLB,, | Performed by: INTERNAL MEDICINE

## 2020-02-20 PROCEDURE — 1159F MED LIST DOCD IN RCRD: CPT | Mod: HCNC,S$GLB,, | Performed by: INTERNAL MEDICINE

## 2020-02-20 PROCEDURE — 1126F AMNT PAIN NOTED NONE PRSNT: CPT | Mod: HCNC,S$GLB,, | Performed by: INTERNAL MEDICINE

## 2020-02-20 PROCEDURE — 99214 OFFICE O/P EST MOD 30 MIN: CPT | Mod: HCNC,S$GLB,, | Performed by: INTERNAL MEDICINE

## 2020-02-20 PROCEDURE — 3078F DIAST BP <80 MM HG: CPT | Mod: HCNC,CPTII,S$GLB, | Performed by: INTERNAL MEDICINE

## 2020-02-20 PROCEDURE — 3077F SYST BP >= 140 MM HG: CPT | Mod: HCNC,CPTII,S$GLB, | Performed by: INTERNAL MEDICINE

## 2020-02-20 PROCEDURE — 1159F PR MEDICATION LIST DOCUMENTED IN MEDICAL RECORD: ICD-10-PCS | Mod: HCNC,S$GLB,, | Performed by: INTERNAL MEDICINE

## 2020-02-20 PROCEDURE — 3078F PR MOST RECENT DIASTOLIC BLOOD PRESSURE < 80 MM HG: ICD-10-PCS | Mod: HCNC,CPTII,S$GLB, | Performed by: INTERNAL MEDICINE

## 2020-02-20 PROCEDURE — 3077F PR MOST RECENT SYSTOLIC BLOOD PRESSURE >= 140 MM HG: ICD-10-PCS | Mod: HCNC,CPTII,S$GLB, | Performed by: INTERNAL MEDICINE

## 2020-02-20 PROCEDURE — 1101F PR PT FALLS ASSESS DOC 0-1 FALLS W/OUT INJ PAST YR: ICD-10-PCS | Mod: HCNC,CPTII,S$GLB, | Performed by: INTERNAL MEDICINE

## 2020-02-20 PROCEDURE — 99999 PR PBB SHADOW E&M-EST. PATIENT-LVL III: ICD-10-PCS | Mod: PBBFAC,HCNC,, | Performed by: INTERNAL MEDICINE

## 2020-02-20 PROCEDURE — 99999 PR PBB SHADOW E&M-EST. PATIENT-LVL III: CPT | Mod: PBBFAC,HCNC,, | Performed by: INTERNAL MEDICINE

## 2020-02-20 PROCEDURE — 1126F PR PAIN SEVERITY QUANTIFIED, NO PAIN PRESENT: ICD-10-PCS | Mod: HCNC,S$GLB,, | Performed by: INTERNAL MEDICINE

## 2020-02-20 PROCEDURE — 1101F PT FALLS ASSESS-DOCD LE1/YR: CPT | Mod: HCNC,CPTII,S$GLB, | Performed by: INTERNAL MEDICINE

## 2020-03-06 ENCOUNTER — PATIENT OUTREACH (OUTPATIENT)
Dept: ADMINISTRATIVE | Facility: OTHER | Age: 75
End: 2020-03-06

## 2020-03-10 ENCOUNTER — HOSPITAL ENCOUNTER (OUTPATIENT)
Dept: CARDIOLOGY | Facility: HOSPITAL | Age: 75
Discharge: HOME | End: 2020-03-10
Attending: INTERNAL MEDICINE
Payer: MEDICARE

## 2020-03-10 ENCOUNTER — CLINICAL SUPPORT (OUTPATIENT)
Dept: PULMONOLOGY | Facility: CLINIC | Age: 75
End: 2020-03-10
Payer: MEDICARE

## 2020-03-10 ENCOUNTER — OFFICE VISIT (OUTPATIENT)
Dept: PULMONOLOGY | Facility: CLINIC | Age: 75
End: 2020-03-10
Payer: MEDICARE

## 2020-03-10 ENCOUNTER — TELEPHONE (OUTPATIENT)
Dept: CARDIOLOGY | Facility: CLINIC | Age: 75
End: 2020-03-10

## 2020-03-10 VITALS
WEIGHT: 157 LBS | DIASTOLIC BLOOD PRESSURE: 60 MMHG | HEIGHT: 66 IN | HEART RATE: 54 BPM | SYSTOLIC BLOOD PRESSURE: 145 MMHG | RESPIRATION RATE: 16 BRPM | BODY MASS INDEX: 25.23 KG/M2 | OXYGEN SATURATION: 98 %

## 2020-03-10 VITALS — WEIGHT: 166 LBS | HEIGHT: 66 IN | BODY MASS INDEX: 26.68 KG/M2

## 2020-03-10 VITALS — BODY MASS INDEX: 26.68 KG/M2 | HEIGHT: 66 IN | WEIGHT: 166 LBS

## 2020-03-10 DIAGNOSIS — I73.9 PAD (PERIPHERAL ARTERY DISEASE): ICD-10-CM

## 2020-03-10 DIAGNOSIS — J45.20 ASTHMA, MILD INTERMITTENT, WELL-CONTROLLED: ICD-10-CM

## 2020-03-10 DIAGNOSIS — I70.0 AORTIC CALCIFICATION: ICD-10-CM

## 2020-03-10 DIAGNOSIS — J45.20 ASTHMA, MILD INTERMITTENT, WELL-CONTROLLED: Primary | ICD-10-CM

## 2020-03-10 DIAGNOSIS — I70.219 ATHEROSCLEROTIC PVD WITH INTERMITTENT CLAUDICATION: ICD-10-CM

## 2020-03-10 DIAGNOSIS — R91.8 MULTIPLE PULMONARY NODULES: ICD-10-CM

## 2020-03-10 LAB
BRPFT: NORMAL
FEF 25 75 CHG: 40.2 %
FEF 25 75 LLN: 0.55
FEF 25 75 POST REF: 75.8 %
FEF 25 75 PRE REF: 54.1 %
FEF 25 75 REF: 1.71
FET100 CHG: -2.4 %
FEV1 CHG: 24.4 %
FEV1 FVC CHG: 4.1 %
FEV1 FVC LLN: 62
FEV1 FVC POST REF: 94.7 %
FEV1 FVC PRE REF: 90.9 %
FEV1 FVC REF: 76
FEV1 LLN: 1.5
FEV1 POST REF: 86.5 %
FEV1 PRE REF: 69.6 %
FEV1 REF: 2.25
FVC CHG: 19.4 %
FVC LLN: 2.09
FVC POST REF: 91 %
FVC PRE REF: 76.2 %
FVC REF: 2.95
LEFT ABI: 0.7
LEFT ANT TIBIAL SYS PSV: 36 CM/S
LEFT ARM BP: 123 MMHG
LEFT CFA PSV: 204 CM/S
LEFT DORSALIS PEDIS: 71 MMHG
LEFT PERONEAL SYS PSV: 71 CM/S
LEFT POPLITEAL PSV: 166 CM/S
LEFT POST TIBIAL SYS PSV: 15 CM/S
LEFT POSTERIOR TIBIAL: 91 MMHG
LEFT PROFUNDA SYS PSV: 172 CM/S
LEFT SUPER FEMORAL DIST SYS PSV: 178 CM/S
LEFT SUPER FEMORAL MID SYS PSV: 115 CM/S
LEFT SUPER FEMORAL OSTIAL SYS PSV: 167 CM/S
LEFT SUPER FEMORAL PROX SYS PSV: 238 CM/S
LEFT TBI: 0.37
LEFT TIB/PER TRUNK SYS PSV: 216 CM/S
LEFT TOE PRESSURE: 48 MMHG
OHS CV LEFT LOWER EXTREMITY ABI (NO CALC): 0.7
OHS CV RIGHT ABI LOWER EXTREMITY (NO CALC): 1.17
PEF CHG: 31.4 %
PEF LLN: 4.47
PEF POST REF: 91.2 %
PEF PRE REF: 69.4 %
PEF REF: 6.84
POST FEF 25 75: 1.29 L/S (ref 0.55–2.87)
POST FET 100: 10.5 SEC
POST FEV1 FVC: 72.3 % (ref 62.41–90.32)
POST FEV1: 1.95 L (ref 1.5–3)
POST FVC: 2.69 L (ref 2.09–3.82)
POST PEF: 6.23 L/S (ref 4.47–9.21)
PRE FEF 25 75: 0.92 L/S (ref 0.55–2.87)
PRE FET 100: 10.76 SEC
PRE FEV1 FVC: 69.45 % (ref 62.41–90.32)
PRE FEV1: 1.56 L (ref 1.5–3)
PRE FVC: 2.25 L (ref 2.09–3.82)
PRE PEF: 4.74 L/S (ref 4.47–9.21)
RIGHT ABI: 1.17
RIGHT ANT TIBIAL SYS PSV: 124 CM/S
RIGHT ARM BP: 130 MMHG
RIGHT CFA PSV: 159 CM/S
RIGHT DORSALIS PEDIS: 152 MMHG
RIGHT PERONEAL SYS PSV: 107 CM/S
RIGHT POPLITEAL PSV: 98 CM/S
RIGHT POST TIBIAL SYS PSV: 107 CM/S
RIGHT POSTERIOR TIBIAL: 152 MMHG
RIGHT PROFUNDA SYS PSV: 246 CM/S
RIGHT SUPER FEMORAL DIST SYS PSV: 149 CM/S
RIGHT SUPER FEMORAL MID SYS PSV: 136 CM/S
RIGHT SUPER FEMORAL OSTIAL SYS PSV: 155 CM/S
RIGHT SUPER FEMORAL PROX SYS PSV: 135 CM/S
RIGHT TBI: 0.52
RIGHT TIB/PER TRUNK SYS PSV: 253 CM/S
RIGHT TOE PRESSURE: 68 MMHG

## 2020-03-10 PROCEDURE — 93925 LOWER EXTREMITY STUDY: CPT | Mod: 50,HCNC

## 2020-03-10 PROCEDURE — 93922 UPR/L XTREMITY ART 2 LEVELS: CPT | Mod: 26,HCNC,, | Performed by: INTERNAL MEDICINE

## 2020-03-10 PROCEDURE — 1159F PR MEDICATION LIST DOCUMENTED IN MEDICAL RECORD: ICD-10-PCS | Mod: HCNC,S$GLB,, | Performed by: NURSE PRACTITIONER

## 2020-03-10 PROCEDURE — 1159F MED LIST DOCD IN RCRD: CPT | Mod: HCNC,S$GLB,, | Performed by: NURSE PRACTITIONER

## 2020-03-10 PROCEDURE — 3077F PR MOST RECENT SYSTOLIC BLOOD PRESSURE >= 140 MM HG: ICD-10-PCS | Mod: HCNC,CPTII,S$GLB, | Performed by: NURSE PRACTITIONER

## 2020-03-10 PROCEDURE — 99214 OFFICE O/P EST MOD 30 MIN: CPT | Mod: 25,HCNC,S$GLB, | Performed by: NURSE PRACTITIONER

## 2020-03-10 PROCEDURE — 3078F DIAST BP <80 MM HG: CPT | Mod: HCNC,CPTII,S$GLB, | Performed by: NURSE PRACTITIONER

## 2020-03-10 PROCEDURE — 94060 PR EVAL OF BRONCHOSPASM: ICD-10-PCS | Mod: HCNC,S$GLB,, | Performed by: INTERNAL MEDICINE

## 2020-03-10 PROCEDURE — 94060 EVALUATION OF WHEEZING: CPT | Mod: HCNC,S$GLB,, | Performed by: INTERNAL MEDICINE

## 2020-03-10 PROCEDURE — 1101F PR PT FALLS ASSESS DOC 0-1 FALLS W/OUT INJ PAST YR: ICD-10-PCS | Mod: HCNC,CPTII,S$GLB, | Performed by: NURSE PRACTITIONER

## 2020-03-10 PROCEDURE — 99999 PR PBB SHADOW E&M-EST. PATIENT-LVL IV: ICD-10-PCS | Mod: PBBFAC,HCNC,, | Performed by: NURSE PRACTITIONER

## 2020-03-10 PROCEDURE — 3078F PR MOST RECENT DIASTOLIC BLOOD PRESSURE < 80 MM HG: ICD-10-PCS | Mod: HCNC,CPTII,S$GLB, | Performed by: NURSE PRACTITIONER

## 2020-03-10 PROCEDURE — 93925 LOWER EXTREMITY STUDY: CPT | Mod: 26,HCNC,, | Performed by: INTERNAL MEDICINE

## 2020-03-10 PROCEDURE — 3077F SYST BP >= 140 MM HG: CPT | Mod: HCNC,CPTII,S$GLB, | Performed by: NURSE PRACTITIONER

## 2020-03-10 PROCEDURE — 93925 CV US DOPPLER ARTERIAL LEGS BILATERAL (CUPID ONLY): ICD-10-PCS | Mod: 26,HCNC,, | Performed by: INTERNAL MEDICINE

## 2020-03-10 PROCEDURE — 99999 PR PBB SHADOW E&M-EST. PATIENT-LVL IV: CPT | Mod: PBBFAC,HCNC,, | Performed by: NURSE PRACTITIONER

## 2020-03-10 PROCEDURE — 93922 UPR/L XTREMITY ART 2 LEVELS: CPT | Mod: 50,HCNC

## 2020-03-10 PROCEDURE — 99214 PR OFFICE/OUTPT VISIT, EST, LEVL IV, 30-39 MIN: ICD-10-PCS | Mod: 25,HCNC,S$GLB, | Performed by: NURSE PRACTITIONER

## 2020-03-10 PROCEDURE — 93922 CV US ANKLE BRACHIAL INDICES WO STRESS W TOE TRACINGS (CUPID ONLY): ICD-10-PCS | Mod: 26,HCNC,, | Performed by: INTERNAL MEDICINE

## 2020-03-10 PROCEDURE — 1101F PT FALLS ASSESS-DOCD LE1/YR: CPT | Mod: HCNC,CPTII,S$GLB, | Performed by: NURSE PRACTITIONER

## 2020-03-10 NOTE — ASSESSMENT & PLAN NOTE
2 year follow up CT scan chest missed appointment 3/9/2020, rescheduled to 3/12/2020.   Stable, 3/26/2018 CT scan chest  7.4 right and 7.2 mm left pulmonary nodules. follow up in 18 -24 months per Fleishner guidelines in low risk patient.   Scheduled for 18 month follow up CT chest, September 2019, patient did not perform CT his preference is to defer to March 2020.  Asymptomatic, no cough, no chest congestion, no wheezing, no hemoptysis, no fever, chills or no malaise.  No lymph node swelling.  Quit smoking 33 years ago.

## 2020-03-10 NOTE — PROGRESS NOTES
Chief Complaint   Patient presents with    Asthma    Pulmonary Nodules     Subjective:       Alex Milner is a 74 y.o. male who presents for 6 month follow up evaluation of asthma and 2 year follow up pulmonary nodules.  Patient missed his CT chest appointment yesterday, rescheduled for in 2 days.   The patient has a history of asthma. Age when diagnosed with Asthma as an adult.  Patient reports his asthma continues to remain stable he is not on controller he had been on controller Breo in the past.  No use of Breo since about 2017.  Has not utilized rescue albuterol in over 1 year.  Current Disease Severity  Alex has no daytime asthma symptoms. He has no nighttime asthma symptoms. The patient is using short-acting beta agonists for symptom control none, not used in many months.     He has exacerbations requiring oral systemic corticosteroids 0 times per year.   Current limitations in activity from asthma: none.  Number of urgent/emergent visit in last year: 0  ACT score 24, asthma scores 19 or greater indicate well controlled asthma      Consideration for repeat CT at this visit however was not scheduled.  Will follow Fleischner's guideline and repeat at 24 months which will be March 2020, scheduled 3/12/2020.   Pulmonary nodule right and left lung CT of chest 3/26/2018 revealed stable 7.4 right lung nodule and 7.2 left lung nodule.    30 pack year smoker. Quit 34 years ago.     Past Medical History: The following portions of the patient's history were reviewed and updated as appropriate:   He  has a past surgical history that includes Hernia repair; Prostate biopsy; Aortography with serialography (N/A, 10/21/2019); and Aortography with serialography (N/A, 11/14/2019).  His family history includes Cancer in his brother, father, and mother; Cancer (age of onset: 64) in his sister; Prostate cancer in his brother.  He  reports that he quit smoking about 34 years ago. His smoking use included cigarettes. He  "started smoking about 60 years ago. He has a 30.00 pack-year smoking history. He has never used smokeless tobacco. He reports that he drinks about 12.0 standard drinks of alcohol per week. He reports that he does not use drugs.  He has a current medication list which includes the following prescription(s): amlodipine, aspirin, atorvastatin, benzonatate, brimonidine 0.15 % opth drop, cilostazol, clopidogrel, ezetimibe, finasteride, latanoprost, lisinopril, sildenafil, and sildenafil.  He has No Known Allergies..    Review of Systems  Review of Systems   Constitutional: Negative for fever, chills, weight loss, weight gain, activity change, appetite change, fatigue and night sweats.   HENT: Negative for postnasal drip, rhinorrhea, sinus pressure, voice change and congestion.    Eyes: Negative for redness and itching.   Respiratory: Negative for snoring, cough, sputum production, chest tightness, shortness of breath, wheezing, orthopnea, asthma nighttime symptoms, dyspnea on extertion, use of rescue inhaler and somnolence.    Cardiovascular: Negative.  Negative for chest pain, palpitations and leg swelling.   Genitourinary: Negative for difficulty urinating and hematuria.   Endocrine: Negative for cold intolerance and heat intolerance.    Musculoskeletal: Negative for arthralgias, gait problem, joint swelling and myalgias.   Skin: Negative.    Gastrointestinal: Negative for nausea, vomiting, abdominal pain and acid reflux.   Neurological: Negative for dizziness, weakness, light-headedness and headaches.   Hematological: Negative for adenopathy. No excessive bruising.   All other systems reviewed and are negative.       Objective:     BP (!) 145/60   Pulse (!) 54   Resp 16   Ht 5' 6" (1.676 m)   Wt 71.2 kg (157 lb)   SpO2 98%   BMI 25.34 kg/m²   Physical Exam   Constitutional: He is oriented to person, place, and time. He appears well-developed and well-nourished. He is active and cooperative.  Non-toxic " appearance. He does not appear ill. No distress.   HENT:   Head: Normocephalic and atraumatic.   Right Ear: External ear normal.   Left Ear: External ear normal.   Nose: Nose normal.   Mouth/Throat: Oropharynx is clear and moist. No oropharyngeal exudate.   Eyes: Conjunctivae are normal.   Neck: Normal range of motion. Neck supple.   Cardiovascular: Normal rate, regular rhythm, normal heart sounds and intact distal pulses.   Pulmonary/Chest: Effort normal and breath sounds normal. He has no wheezes. He has no rales.   Abdominal: Soft.   Musculoskeletal: He exhibits no edema.   Neurological: He is alert and oriented to person, place, and time.   Skin: Skin is warm and dry.   Psychiatric: He has a normal mood and affect. His behavior is normal. Judgment and thought content normal.   Vitals reviewed.    Diagnostic Testing Reviewed  All relevant imaging and labs reviewed.    3/10/2020 Spirometry   Restriction based on fvc, flow volume loop moderate obstruction. Positive bronchodilator response, reversal airway disease indicative of asthma. Stable compared to prior.    Repeat CT not done yesterday rescheduled to 3/12/2020.     CT chest 3/26/2018  COMPARISON:  September 11, 2017.    FINDINGS:  There are unchanged bilateral noncalcified lung nodules.  On the right there is a 7.4 mm nodule in the middle lobe abutting the minor fissure (series 3, image 118).  On the left there is a 7.2 mm nodule in the posterior upper lobe abutting the superior extent of the major fissure (series 3, image 64).  No new lung nodules are identified.  There is chronic parenchymal scarring or atelectasis in the medial right middle lobe and lingula.  No alveolar infiltrate or pleural effusion.    There are calcified lymph nodes in the mediastinum and left hilum.  No pathologic lymphadenopathy.  Aorta, heart, and pericardium are unremarkable.    There is a small calcified granuloma posteriorly in the right lobe of the liver.  Included upper  abdominal structures are otherwise within normal limits.    There is multilevel degenerative change in the spine.   Impression       Stable bilateral lung nodules as detailed.  Recommend continued follow-up per Fleischner society guidelines.  No other significant findings.         Assessment:     1. Asthma, mild intermittent, well-controlled    2. Multiple pulmonary nodules    3. Atherosclerotic PVD with intermittent claudication    4. Aortic calcification        Orders Placed This Encounter   Procedures    CT Chest Without Contrast     Standing Status:   Future     Standing Expiration Date:   3/10/2021     Order Specific Question:   May the Radiologist modify the order per protocol to meet the clinical needs of the patient?     Answer:   Yes    X-Ray Chest PA And Lateral     Standing Status:   Future     Standing Expiration Date:   3/9/2022     Order Specific Question:   May the Radiologist modify the order per protocol to meet the clinical needs of the patient?     Answer:   Yes    Spirometry with/without bronchodilator     Standing Status:   Future     Standing Expiration Date:   3/10/2022     Plan:     Problem List Items Addressed This Visit     Multiple pulmonary nodules     2 year follow up CT scan chest missed appointment 3/9/2020, rescheduled to 3/12/2020.   Stable, 3/26/2018 CT scan chest  7.4 right and 7.2 mm left pulmonary nodules. follow up in 18 -24 months per Fleishner guidelines in low risk patient.   Scheduled for 18 month follow up CT chest, September 2019, patient did not perform CT his preference is to defer to March 2020.  Asymptomatic, no cough, no chest congestion, no wheezing, no hemoptysis, no fever, chills or no malaise.  No lymph node swelling.  Quit smoking 33 years ago.             Relevant Orders    CT Chest Without Contrast    Atherosclerotic PVD with intermittent claudication     Followed by cardiology Ab index, us doppler art legs 3/10/2020         Asthma, well controlled - Primary      Controlled, ACT score 24, asthma scores 19 or greater indicate well controlled asthma .  Not on controller, no rescue use.  Has breo and albuterol if needed   3/10/2020 stable spirometry, positive bronchodilator response.   No COPD gold findings.            Relevant Orders    Spirometry with/without bronchodilator    X-Ray Chest PA And Lateral    Aortic calcification     Seen on imaging              Follow up in about 1 year (around 3/10/2021) for Asthma w/review jesus/cxr.  Pul nodules call/mail report on CT chest scheduled 3/12/2020.

## 2020-03-10 NOTE — ASSESSMENT & PLAN NOTE
Controlled, ACT score 24, asthma scores 19 or greater indicate well controlled asthma .  Not on controller, no rescue use.  Has breo and albuterol if needed   3/10/2020 stable spirometry, positive bronchodilator response.   No COPD gold findings.

## 2020-03-11 ENCOUNTER — TELEPHONE (OUTPATIENT)
Dept: CARDIOLOGY | Facility: CLINIC | Age: 75
End: 2020-03-11

## 2020-03-11 NOTE — TELEPHONE ENCOUNTER
----- Message from ADALGISA Mancia sent at 3/11/2020  9:03 AM CDT -----  Per Dr. Upton    FREDY SHOWED PAD  F/U WITH DR. COBB    Thanks        ----- Message -----  From: Zackary Upton MD  Sent: 3/10/2020   9:45 PM CDT  To: ADALGISA Mancia    FREDY SHOWED PAD  F/U WITH DR. COBB

## 2020-03-11 NOTE — TELEPHONE ENCOUNTER
I left a voicemail for Mr Johnson to call the office for his results and schedule a follow up with Dr Cobb.    Per Dr. Upton     FREDY SHOWED PAD   F/U WITH DR. COBB

## 2020-03-11 NOTE — TELEPHONE ENCOUNTER
Attempted to contact pt about results, lvm for pt to call back.          ----- Message from Zackary Upton MD sent at 3/10/2020  9:04 PM CDT -----   IMPROVED

## 2020-03-11 NOTE — TELEPHONE ENCOUNTER
I attempted a second time to reach Mr Johnson.    I left a voicemail for Mr Johnsno to call the office for his results and schedule a follow up with Dr Cobb.     Per Dr. Upton     FREDY SHOWED PAD   F/U WITH DR. COBB

## 2020-03-12 ENCOUNTER — HOSPITAL ENCOUNTER (OUTPATIENT)
Dept: RADIOLOGY | Facility: HOSPITAL | Age: 75
Discharge: HOME | End: 2020-03-12
Attending: NURSE PRACTITIONER
Payer: MEDICARE

## 2020-03-12 ENCOUNTER — TELEPHONE (OUTPATIENT)
Dept: PULMONOLOGY | Facility: CLINIC | Age: 75
End: 2020-03-12

## 2020-03-12 DIAGNOSIS — R16.0 LIVER MASS: Primary | ICD-10-CM

## 2020-03-12 DIAGNOSIS — F10.20 UNCOMPLICATED ALCOHOL DEPENDENCE: ICD-10-CM

## 2020-03-12 DIAGNOSIS — R91.8 MULTIPLE PULMONARY NODULES: ICD-10-CM

## 2020-03-12 PROCEDURE — 71250 CT THORAX DX C-: CPT | Mod: TC,HCNC

## 2020-03-12 PROCEDURE — 71250 CT THORAX DX C-: CPT | Mod: 26,HCNC,, | Performed by: RADIOLOGY

## 2020-03-12 PROCEDURE — 71250 CT CHEST WITHOUT CONTRAST: ICD-10-PCS | Mod: 26,HCNC,, | Performed by: RADIOLOGY

## 2020-03-12 NOTE — TELEPHONE ENCOUNTER
I attempted a third time to reach Mr Johnson.     I left a voicemail for Mr Johnson to call the office for his results and schedule a follow up with Dr Cobb.     Per Dr. Upton     FREDY SHOWED PAD   F/U WITH DR. COBB

## 2020-03-12 NOTE — TELEPHONE ENCOUNTER
----- Message from Shwetha Ocasio NP sent at 3/12/2020  2:30 PM CDT -----  3/12/2020 2:28pm telephoned spoke with patient advised of stable pulmonary nodules. New finding on liver requiring additional follow up with MRI.   Please call patient to schedule MRI. Thank you

## 2020-03-12 NOTE — PROGRESS NOTES
Orders Placed This Encounter   Procedures    MRI Abdomen W WO Contrast     Standing Status:   Future     Standing Expiration Date:   3/12/2021     Scheduling Instructions:      Please have radiologist review to determine optimal study is ordered to further evaluate new hepatic dome mass. Thank you     Order Specific Question:   Does the patient have a pacemaker or a defibrilator?     Answer:   No     Order Specific Question:   Does the patient have a cerebral aneurysm or surgical clip, pump, nerve or brain stimulator, middle or inner ear prosthesis, or other metal implant or  been injured by a metal object(i.e. bullet, bb, shrapnel)?     Answer:   No     Order Specific Question:   Is the patient claustrophobic?     Answer:   No     Order Specific Question:   Will the patient require sedation?     Answer:   No     Order Specific Question:   Does the patient have any of the following conditions? Diabetes, History of Renal Disease or Hypertension requiring medical therapy?     Answer:   Yes     Comments:   HTN     Order Specific Question:   May the Radiologist modify the order per protocol to meet the clinical needs of the patient?     Answer:   Yes     Order Specific Question:   Is this part of a Research Study?     Answer:   No     Order Specific Question:   Recist criteria?     Answer:   No     Order Specific Question:   Will this service be billed to a Worker's Comp policy?     Answer:   No     Order Specific Question:   Does the patient have on a skin patch for medication with aluminized backing?     Answer:   No     1. Liver mass  MRI Abdomen W WO Contrast   2. Uncomplicated alcohol dependence  MRI Abdomen W WO Contrast         CT Chest Without Contrast  Narrative: EXAMINATION:  CT CHEST WITHOUT CONTRAST    CLINICAL HISTORY:  2 year final fu if stable on 7.4 rt lung nodule and 7.2 left lung nodule; Other nonspecific abnormal finding of lung field    TECHNIQUE:  Low dose axial images, sagittal and coronal  reformations were obtained from the thoracic inlet to the lung bases. Contrast was not administered.    COMPARISON:  CT chest from 03/26/2018.  Chest CT from 09/11/2017.    FINDINGS:  Stable bilateral lung nodules.  For example, on image 131 of series 4, there is a 7 mm nodule which is unchanged in the posterior left upper lobe adjacent to the fissure.  In the right lung, on image 229 of series 4 there is a stable 7 mm nodule.  A tiny nodule is seen in the right lung on image 206 of series 4.  This is also stable.  A tiny nodule is also seen in the right lung on image 199 of series 4.  This is also stable.  Calcified granulomas are seen in the left lower lobe.  No new nodule is seen.  Mild bronchiectasis in the lower lobes.    No pneumothorax or pleural effusion or pulmonary infiltrate.    Heart size is normal.  No pericardial effusion.  Calcified left perihilar and mediastinal granulomas are seen.  No new or bulky adenopathy.  Trachea is patent.    Limited imaging through the upper abdomen demonstrates an indeterminate mass in the patent dome on image 70 of series 2 which measures up to 5.1 cm.  Neoplasm is the diagnosis of exclusion.  This was not clearly present on the prior exams.  Review of bone windows demonstrate degenerative changes of the spine.  Impression: Stable bilateral pulmonary nodules.  New mass in the hepatic dome.  Further evaluation with contrasted CT or MRI is suggested.    This report was flagged in Epic as abnormal.    Electronically signed by: Donato Castellanos MD  Date:    03/12/2020  Time:    14:05

## 2020-03-13 ENCOUNTER — HOSPITAL ENCOUNTER (OUTPATIENT)
Dept: RADIOLOGY | Facility: HOSPITAL | Age: 75
Discharge: HOME | End: 2020-03-13
Attending: NURSE PRACTITIONER
Payer: MEDICARE

## 2020-03-13 ENCOUNTER — TELEPHONE (OUTPATIENT)
Dept: PULMONOLOGY | Facility: CLINIC | Age: 75
End: 2020-03-13

## 2020-03-13 DIAGNOSIS — R16.0 LIVER MASS: ICD-10-CM

## 2020-03-13 DIAGNOSIS — F10.20 ALCOHOL DEPENDENCE, DAILY USE: Chronic | ICD-10-CM

## 2020-03-13 DIAGNOSIS — F10.20 UNCOMPLICATED ALCOHOL DEPENDENCE: ICD-10-CM

## 2020-03-13 DIAGNOSIS — R16.0 LIVER MASS: Primary | ICD-10-CM

## 2020-03-13 DIAGNOSIS — R91.8 MULTIPLE PULMONARY NODULES: ICD-10-CM

## 2020-03-13 PROCEDURE — A9585 GADOBUTROL INJECTION: HCPCS | Mod: HCNC | Performed by: NURSE PRACTITIONER

## 2020-03-13 PROCEDURE — 74183 MRI ABD W/O CNTR FLWD CNTR: CPT | Mod: 26,HCNC,, | Performed by: RADIOLOGY

## 2020-03-13 PROCEDURE — 74183 MRI ABD W/O CNTR FLWD CNTR: CPT | Mod: TC,HCNC

## 2020-03-13 PROCEDURE — 74183 MRI ABDOMEN W WO CONTRAST: ICD-10-PCS | Mod: 26,HCNC,, | Performed by: RADIOLOGY

## 2020-03-13 PROCEDURE — 25500020 PHARM REV CODE 255: Mod: HCNC | Performed by: NURSE PRACTITIONER

## 2020-03-13 RX ORDER — GADOBUTROL 604.72 MG/ML
7.5 INJECTION INTRAVENOUS
Status: COMPLETED | OUTPATIENT
Start: 2020-03-13 | End: 2020-03-13

## 2020-03-13 RX ADMIN — GADOBUTROL 7.5 ML: 604.72 INJECTION INTRAVENOUS at 11:03

## 2020-03-13 NOTE — ASSESSMENT & PLAN NOTE
Report of 2 beers daily  Advice for cessation.  Patient reports he does not have to drink and will stop with report of liver lesion seen on CT 3/12/2020 and MRI abdomen 3/12/2020.

## 2020-03-13 NOTE — TELEPHONE ENCOUNTER
Results on MRI abdomen that require further evaluation and referral to oncology and also spoke with Dr. Hernandez who recommends patient also have follow up with a pulmonologist to review CT chest 3/12/2020  7 mm pulmonary nodules that are stable compared to CT chest 9/11/2017 and CT chest 3/26/2018.   Case discussed with Dr. Hernandez with direction to cancel PET CT and order liver bx with interventional radiology.         Orders Placed This Encounter   Procedures    IR Biopsy Liver     Standing Status:   Future     Standing Expiration Date:   3/15/2021     Order Specific Question:   Has the patient had a prior contrast or iodine reaction?     Answer:   No     Order Specific Question:   Is the patient currently on any blood thinners like Aspirin, Coumadin, or Plavix?     Answer:   Yes     Comments:   plavix and aspirin     Order Specific Question:   Is the patient on any Metformin drug, such as Glucophage or Glucovance?     Answer:   No     Order Specific Question:   May the Radiologist modify the order per protocol to meet the clinical needs of the patient?     Answer:   Yes    Ambulatory referral/consult to Oncology     Standing Status:   Future     Standing Expiration Date:   4/13/2021     Referral Priority:   Routine     Referral Type:   Consultation     Referral Reason:   Specialty Services Required     Requested Specialty:   Oncology     Number of Visits Requested:   1    Ambulatory referral/consult to Gastroenterology     Standing Status:   Future     Standing Expiration Date:   4/15/2021     Referral Priority:   Routine     Referral Type:   Consultation     Referral Reason:   Specialty Services Required     Requested Specialty:   Gastroenterology     Number of Visits Requested:   1     1. Liver mass  Ambulatory referral/consult to Oncology    IR Biopsy Liver    Ambulatory referral/consult to Gastroenterology    CANCELED: NM PET CT Routine Skull to Mid Thigh   2. Alcohol dependence, daily use     3.  Multiple pulmonary nodules  Ambulatory referral/consult to Oncology    CANCELED: NM PET CT Routine Skull to Mid Thigh     MRI abdomen w/wo 3/13/2020  MRI Abdomen W WO Contrast  Narrative: EXAMINATION:  MRI ABDOMEN W WO CONTRAST    CLINICAL HISTORY:  new mass hepatic dome seen on 3/12/2020 CT chest, further evaluate;  Hepatomegaly, not elsewhere classified    TECHNIQUE:  Multiplanar multisequence MR imaging of the abdomen is performed with and without contrast.  7.5 cc of Gadavist was administered without immediate complication.    COMPARISON:  CT of the chest from 03/12/2020    FINDINGS:  There is a large mass noted within the dome of the liver segment 8 that measures 4.9 x 4.0 cm in the greatest axial dimension.  The mass is I so to mildly hyperintense on T2 weighted images with a more central area of significant T2 hyperintensity.  On the T1 weighted images the mass is iso to mildly hypointense with the central portion more hypointense.  Postcontrast images demonstrate what appears to represent mild enhancement involving the majority of the solid component of the mass with the more central area of the mass thought to be necrotic and demonstrate no enhancement.  The differential for this lesion is quite broad but would include metastatic disease along with primary malignancies of the liver including an atypical appearance of an HCC and cholangiocarcinoma.  The remote possibility of hepatic abscess is thought much less likely given the lack of a suspicious history in no obvious perilesional edema seen on the T2 weighted images.    The gallbladder is present and unremarkable in appearance.  No biliary ductal dilation.  The pancreas demonstrates no focal abnormality.  The adrenals are unremarkable.  The spleen is not enlarged.  There is a nonenhancing upper pole left renal lesion that measures approximately 7 mm most consistent with a cyst.  Impression: 1. Large lesion within the dome of the liver is new when compared  to a prior CT chest dating back to 2018.  Findings are highly suspicious for malignancy.  Other possibilities discussed above.  2. Remaining findings as discussed above.    Electronically signed by: Pedro Gonzalez DO  Date:    03/13/2020  Time:    12:07

## 2020-03-13 NOTE — ASSESSMENT & PLAN NOTE
2 year follow up CT scan chest missed appointment 3/9/2020, rescheduled to 3/12/2020.   Stable 3/26/2018 CT scan chest  7.4 right and 7.2 mm left pulmonary nodules. follow up in 18 -24 months per [facility] guidelines in low risk patient.   Scheduled for 18 month follow up CT chest, September 2019, patient did not perform CT his preference is to defer to March 2020.  Asymptomatic, no cough, no chest congestion, no wheezing, no hemoptysis, no fever, chills or no malaise.  No lymph node swelling.  Quit smoking 33 years ago.  3/12/2020 CT chest 7 mm pulmonary nodules that are stable compared to CT chest 9/11/2017 and CT chest 3/26/2018.

## 2020-03-13 NOTE — ASSESSMENT & PLAN NOTE
New lesion compared to CT chest dating back to 2018. New liver lesion, dome of liver seen on CT 3/12/2020 and MRI abdomen 3/12/2020.   3/13/2020 referral to Oncology   PET CT

## 2020-03-16 ENCOUNTER — TELEPHONE (OUTPATIENT)
Dept: HEMATOLOGY/ONCOLOGY | Facility: CLINIC | Age: 75
End: 2020-03-16

## 2020-03-16 NOTE — TELEPHONE ENCOUNTER
LM for pt to call me back regarding setting up appt from work que referral for oncology. My direct number left in message

## 2020-03-23 ENCOUNTER — OFFICE VISIT (OUTPATIENT)
Dept: HEMATOLOGY/ONCOLOGY | Facility: CLINIC | Age: 75
End: 2020-03-23
Payer: MEDICARE

## 2020-03-23 DIAGNOSIS — I10 ESSENTIAL HYPERTENSION: Chronic | ICD-10-CM

## 2020-03-23 DIAGNOSIS — I70.219 ATHEROSCLEROTIC PVD WITH INTERMITTENT CLAUDICATION: ICD-10-CM

## 2020-03-23 DIAGNOSIS — R91.8 MULTIPLE PULMONARY NODULES: ICD-10-CM

## 2020-03-23 DIAGNOSIS — K76.9 LIVER LESION: Primary | ICD-10-CM

## 2020-03-23 DIAGNOSIS — R79.89 OTHER SPECIFIED ABNORMAL FINDINGS OF BLOOD CHEMISTRY: ICD-10-CM

## 2020-03-23 DIAGNOSIS — R97.20 ELEVATED PSA, BETWEEN 10 AND LESS THAN 20 NG/ML: ICD-10-CM

## 2020-03-23 DIAGNOSIS — C78.7 SECONDARY MALIGNANT NEOPLASM OF LIVER AND INTRAHEPATIC BILE DUCT: ICD-10-CM

## 2020-03-23 DIAGNOSIS — Z12.5 ENCOUNTER FOR SCREENING FOR MALIGNANT NEOPLASM OF PROSTATE: ICD-10-CM

## 2020-03-23 DIAGNOSIS — E88.09 HYPOALBUMINEMIA: ICD-10-CM

## 2020-03-23 DIAGNOSIS — Z86.010 HISTORY OF COLON POLYPS: ICD-10-CM

## 2020-03-23 DIAGNOSIS — C22.7 OTHER SPECIFIED CARCINOMAS OF LIVER: ICD-10-CM

## 2020-03-23 DIAGNOSIS — F10.20 ALCOHOL DEPENDENCE, DAILY USE: Chronic | ICD-10-CM

## 2020-03-23 DIAGNOSIS — E78.2 MIXED HYPERLIPIDEMIA: ICD-10-CM

## 2020-03-23 PROCEDURE — 99358 PROLONG SERVICE W/O CONTACT: CPT | Mod: HCNC,S$GLB,, | Performed by: INTERNAL MEDICINE

## 2020-03-23 PROCEDURE — 99999 PR PBB SHADOW E&M-EST. PATIENT-LVL II: CPT | Mod: PBBFAC,HCNC,, | Performed by: INTERNAL MEDICINE

## 2020-03-23 PROCEDURE — 99358 PR PROLONGED SERV,NO CONTACT,1ST HR: ICD-10-PCS | Mod: HCNC,S$GLB,, | Performed by: INTERNAL MEDICINE

## 2020-03-23 PROCEDURE — 1159F PR MEDICATION LIST DOCUMENTED IN MEDICAL RECORD: ICD-10-PCS | Mod: HCNC,S$GLB,, | Performed by: INTERNAL MEDICINE

## 2020-03-23 PROCEDURE — 1101F PT FALLS ASSESS-DOCD LE1/YR: CPT | Mod: HCNC,CPTII,S$GLB, | Performed by: INTERNAL MEDICINE

## 2020-03-23 PROCEDURE — 99999 PR PBB SHADOW E&M-EST. PATIENT-LVL II: ICD-10-PCS | Mod: PBBFAC,HCNC,, | Performed by: INTERNAL MEDICINE

## 2020-03-23 PROCEDURE — 1101F PR PT FALLS ASSESS DOC 0-1 FALLS W/OUT INJ PAST YR: ICD-10-PCS | Mod: HCNC,CPTII,S$GLB, | Performed by: INTERNAL MEDICINE

## 2020-03-23 PROCEDURE — 1159F MED LIST DOCD IN RCRD: CPT | Mod: HCNC,S$GLB,, | Performed by: INTERNAL MEDICINE

## 2020-03-23 PROCEDURE — 99443 PR PHYSICIAN TELEPHONE EVALUATION 21-30 MIN: CPT | Mod: 95,,, | Performed by: INTERNAL MEDICINE

## 2020-03-23 PROCEDURE — 99443 PR PHYSICIAN TELEPHONE EVALUATION 21-30 MIN: ICD-10-PCS | Mod: 95,,, | Performed by: INTERNAL MEDICINE

## 2020-03-23 NOTE — PROGRESS NOTES
Subjective:      DATE OF VISIT: 3/23/20     ?The patient location is:  home  The chief complaint leading to consultation is:  Liver lesion  Visit type: Virtual visit with audio only  Total time spent with patient:  20 min  Each patient to whom he or she provides medical services by telemedicine is:  (1) informed of the relationship between the physician and patient and the respective role of any other health care provider with respect to management of the patient; and (2) notified that he or she may decline to receive medical services by telemedicine and may withdraw from such care at any time.    Notes:     Patient ID:?Alex Milner is a 74 y.o. male.?? MR#: [id number]   ?   REFERRING PROVIDER: Dominik Hernandez MD  [address] [facility]  [place] [facility] [place] [address]     ? Primary Care Providers:  Loi Patel MD, MD (General)     CHIEF COMPLAINT: ?  Liver mass??   ?   HPI    Mr. Milner is a 74-year-old man with PVD, former tobacco use, BPH with elevated PSA followed by urology (past prostate biopsy per patient report).  He has been surveilled for bilateral pulmonary nodules since 2017, about 7 mm in size; serial CT chest in 2018 in 03/2020 show stability of pulmonary nodules however most recent imaging on 03/20/2020 does show new liver dome lesion.  He denies chest pain, shortness of breaty, cough, copy that you my abdominal pain/distension, edema.  He does have moderate alcohol use but denies history of liver disease/cirrhosis.  He used tobacco quit 20 years ago.  He notes good energy and appetite without unintentional weight loss.    Review of Systems    ?   A comprehensive 14-point review of systems was reviewed with patient and was negative other than as specified above.   ?   PAST MEDICAL HISTORY:   Past Medical History:   Diagnosis Date    Atherosclerotic PVD with intermittent claudication 8/16/2019    Atrial fibrillation     pt unaware of this    Back pain     Bilateral inguinal hernia     CT  ABdomen/pelvis 3/9/2015 (care everywhere)---Bilateral inguinal hernias containing fat.      Calcified granuloma of lung 10/3/2017    CT CHest 3/26/2018---There are calcified lymph nodes in the mediastinum and left hilum.    CT chest 2017 Calcified left hilar and subcarinal granulomas are noted  ;Calcified granuloma noted within the posterior segment of the right hepatic lobe.    Diverticulosis     Diverticulosis 10/28/13    Colonoscopy     ED (erectile dysfunction)     Elevated PSA     Granuloma of liver     ct chest 3/26/2018---There is a small calcified granuloma posteriorly in the right lobe of the liver    Hyperlipidemia     Hypertension     Moderate persistent asthma without complication 2017    Obesity     Tobacco dependence     resolved    Uncomplicated alcohol dependence 2019    Urinary tract infection     ?     PAST SURGICAL HISTORY:   Past Surgical History:   Procedure Laterality Date    AORTOGRAPHY WITH SERIALOGRAPHY N/A 10/21/2019    Procedure: AORTOGRAM, WITH RUNOFF;  Surgeon: Chencho Ross MD;  Location: [facility] CATH LAB;  Service: Cardiology;  Laterality: N/A;  pt requesting 9am arrival    AORTOGRAPHY WITH SERIALOGRAPHY N/A 2019    Procedure: AORTOGRAM, WITH RUNOFF;  Surgeon: Chencho Ross MD;  Location: [facility] CATH LAB;  Service: Cardiology;  Laterality: N/A;    HERNIA REPAIR      x2    PROSTATE BIOPSY      reported per patient around  or  which was reportedly negative      ?   ALLERGIES:   Allergies as of 2020    (No Known Allergies)      ?   MEDICATIONS:?   No outpatient medications have been marked as taking for the 3/23/20 encounter (Appointment) with Sakshi Jaime MD.      ?   SOCIAL HISTORY:?   Social History     Tobacco Use    Smoking status: Former Smoker     Packs/day: 2.00     Years: 15.00     Pack years: 30.00     Types: Cigarettes     Start date:      Last attempt to quit: 1985     Years since quittin.6     Smokeless tobacco: Never Used   Substance Use Topics    Alcohol use: Yes     Alcohol/week: 12.0 standard drinks     Types: 12 Cans of beer per week      ?      ?   FAMILY HISTORY:   family history includes Cancer in his brother, father, and mother; Cancer (age of onset: 64) in his sister; Prostate cancer in his brother.   ?        Objective:      Physical Exam      ?   There were no vitals filed for this visit.   ?   Note: Unable to perform complete exam due to virtual visit with COVID-19.  Neurologic: alert and oriented  Psychiatric: pleasant and appropriate, normal insight and judgement, asking appropriate questions, normal mood and affect    Laboratory:  ?   No visits with results within 1 Day(s) from this visit.   Latest known visit with results is:   Hospital Outpatient Visit on 03/10/2020   Component Date Value Ref Range Status    Left arm BP 03/10/2020 123  mmHg Final    Right arm BP 03/10/2020 130  mmHg Final    Left posterior tibial 03/10/2020 91  mmHg Final    Right posterior tibial 03/10/2020 152  mmHg Final    Left dorsalis pedis 03/10/2020 71  mmHg Final    Right dorsalis pedis 03/10/2020 152  mmHg Final    Left FREDY 03/10/2020 0.70   Final    Right FREDY 03/10/2020 1.17   Final    Left toe pressure 03/10/2020 48  mmHg Final    Right toe pressure 03/10/2020 68  mmHg Final    Left TBI 03/10/2020 0.37   Final    Right TBI 03/10/2020 0.52   Final        Lab Results   Component Value Date    WBC 6.09 11/07/2019    HGB 13.2 (L) 11/07/2019    HCT 43.3 11/07/2019    MCV 88 11/07/2019     11/07/2019         Chemistry        Component Value Date/Time     03/10/2020 0718    K 4.4 03/10/2020 0718     03/10/2020 0718    CO2 27 03/10/2020 0718    BUN 12 03/10/2020 0718    CREATININE 0.8 03/10/2020 0718    GLU 91 03/10/2020 0718        Component Value Date/Time    CALCIUM 9.7 03/10/2020 0718    ALKPHOS 109 03/10/2020 0718    AST 13 03/10/2020 0718    ALT 13 03/10/2020 0718    BILITOT 0.2  03/10/2020 0718    ESTGFRAFRICA >60.0 03/10/2020 0718    EGFRNONAA >60.0 03/10/2020 0718        TUMOR MARKERS  None.    IMAGING    MRI ABDOMEN W WO CONTRAST    CLINICAL HISTORY:  new mass hepatic dome seen on 3/12/2020 CT chest, further evaluate;  Hepatomegaly, not elsewhere classified    TECHNIQUE:  Multiplanar multisequence MR imaging of the abdomen is performed with and without contrast.  7.5 cc of Gadavist was administered without immediate complication.    COMPARISON:  CT of the chest from 03/12/2020    FINDINGS:  There is a large mass noted within the dome of the liver segment 8 that measures 4.9 x 4.0 cm in the greatest axial dimension.  The mass is I so to mildly hyperintense on T2 weighted images with a more central area of significant T2 hyperintensity.  On the T1 weighted images the mass is iso to mildly hypointense with the central portion more hypointense.  Postcontrast images demonstrate what appears to represent mild enhancement involving the majority of the solid component of the mass with the more central area of the mass thought to be necrotic and demonstrate no enhancement.  The differential for this lesion is quite broad but would include metastatic disease along with primary malignancies of the liver including an atypical appearance of an HCC and cholangiocarcinoma.  The remote possibility of hepatic abscess is thought much less likely given the lack of a suspicious history in no obvious perilesional edema seen on the T2 weighted images.    The gallbladder is present and unremarkable in appearance.  No biliary ductal dilation.  The pancreas demonstrates no focal abnormality.  The adrenals are unremarkable.  The spleen is not enlarged.  There is a nonenhancing upper pole left renal lesion that measures approximately 7 mm most consistent with a cyst.      Impression       1. Large lesion within the dome of the liver is new when compared to a prior CT chest dating back to 2018.  Findings are highly  suspicious for malignancy.  Other possibilities discussed above.  2. Remaining findings as discussed above.       ?   Assessment/Plan:       1. Liver lesion    2. Multiple pulmonary nodules    3. Alcohol dependence, daily use    4. History of colon polyps    5. Elevated PSA, between 10 and less than 20 ng/ml    6. Atherosclerotic PVD with intermittent claudication    7. Mixed hyperlipidemia    8. Essential hypertension          Plan:      # liver lesion:  Discussed concern for malignancy.  I reviewed his imaging including CT chest and MRI abdomen;  Imaging characteristics atypical for primary hepatocellular carcinoma.  He does have history of BPH (he notes benign prostate biopsy in the past; I do not have these records in pathology section of Epic) with elevated PSA relatively stable recently followed by urology.  He does have history of colonoscopy over 5 years ago and noted history of polyps.  He is clinically asymptomatic without GI concerns for bleeding and labs normal including synthetic liver function and no anemia.  I recommend further evaluation with tumor markers, hepatitis B/C panel and staging with PET-CT.  I will have virtual visit follow-up with him to discuss these results and next steps including biopsy but discussed prefer as long as clinically stable to delay at this time given COVID-19 concerns.     # pulmonary nodules:  Bilateral 7 mm pulmonary nodules stable on CT chest surveillance including most recently 03/20/2020.  Continue monitor and follow-up with pulmonology.    # atherosclerosis, PVD, hypertension, hyperlipidemia:  Follow-up with primary care for optimal control.    # hypoalbuminemia:  Abnormal serum chemistry, may reflect abnormal liver synthetic function or malnutrition setting of malignancy.  Further workup per above.    Follow-Up:   Labs and PET-CT at [facility] per pt request  RV as televisit about a week after     I have spent 20 minutes in this televisit in the direct communication  with the patient, with greater than 50% of the time spend in the counseling and coordination of care.   ?   An additional 30 minutes was spent before and after the visit in reviewing medical history, imaging, lab work and arranging for follow-up care.

## 2020-03-23 NOTE — LETTER
March 23, 2020      Dominik Hernandez MD  [address]  [place] [address]            [facility]  [address] [facility] [address]  Phone: [phone]  Fax: [phone]          Patient: Alex Milner   MR Number: [id number]   YOB: 1945   Date of Visit: 3/23/2020       Dear Dr. Dominik Hernandez:    Thank you for referring Alex Milner to me for evaluation. Attached you will find relevant portions of my assessment and plan of care.    If you have questions, please do not hesitate to call me. I look forward to following Alex Milner along with you.    Sincerely,    Sakshi Jaime MD    Enclosure  CC:  No Recipients    If you would like to receive this communication electronically, please contact [email or web address] or [phone] to request more information on [employer] access.    For providers and/or their staff who would like to refer a patient to [facility], please contact us through our one-stop-shop provider referral line, [facility] , at [phone].    If you feel you have received this communication in error or would no longer like to receive these types of communications, please e-mail [email or web address]

## 2020-03-27 ENCOUNTER — DOCUMENTATION ONLY (OUTPATIENT)
Dept: HEMATOLOGY/ONCOLOGY | Facility: CLINIC | Age: 75
End: 2020-03-27

## 2020-03-27 NOTE — PROGRESS NOTES
Nurse called for Mr Murdock left message to call clinic back in order to set up his CT Scan, as of today no calls has been returned from previous messages on cell phone number listed in epic from mr murdock.

## 2020-03-31 ENCOUNTER — PATIENT OUTREACH (OUTPATIENT)
Dept: ADMINISTRATIVE | Facility: OTHER | Age: 75
End: 2020-03-31

## 2020-03-31 ENCOUNTER — LAB VISIT (OUTPATIENT)
Dept: LAB | Facility: HOSPITAL | Age: 75
End: 2020-03-31
Attending: INTERNAL MEDICINE
Payer: MEDICARE

## 2020-03-31 ENCOUNTER — OFFICE VISIT (OUTPATIENT)
Dept: GASTROENTEROLOGY | Facility: CLINIC | Age: 75
End: 2020-03-31
Payer: MEDICARE

## 2020-03-31 VITALS
WEIGHT: 164.69 LBS | HEIGHT: 66 IN | DIASTOLIC BLOOD PRESSURE: 52 MMHG | SYSTOLIC BLOOD PRESSURE: 132 MMHG | BODY MASS INDEX: 26.47 KG/M2 | HEART RATE: 58 BPM

## 2020-03-31 DIAGNOSIS — C22.7 OTHER SPECIFIED CARCINOMAS OF LIVER: ICD-10-CM

## 2020-03-31 DIAGNOSIS — Z86.010 HISTORY OF COLON POLYPS: ICD-10-CM

## 2020-03-31 DIAGNOSIS — R97.20 ELEVATED PSA, BETWEEN 10 AND LESS THAN 20 NG/ML: ICD-10-CM

## 2020-03-31 DIAGNOSIS — R91.8 MULTIPLE PULMONARY NODULES: ICD-10-CM

## 2020-03-31 DIAGNOSIS — R16.0 LIVER MASS: Primary | ICD-10-CM

## 2020-03-31 DIAGNOSIS — Z12.5 ENCOUNTER FOR SCREENING FOR MALIGNANT NEOPLASM OF PROSTATE: ICD-10-CM

## 2020-03-31 DIAGNOSIS — F10.20 ALCOHOL DEPENDENCE, DAILY USE: Chronic | ICD-10-CM

## 2020-03-31 DIAGNOSIS — F10.20 ALCOHOL DEPENDENCE, DAILY USE: ICD-10-CM

## 2020-03-31 DIAGNOSIS — R16.0 LIVER MASS: ICD-10-CM

## 2020-03-31 DIAGNOSIS — C78.7 SECONDARY MALIGNANT NEOPLASM OF LIVER AND INTRAHEPATIC BILE DUCT: ICD-10-CM

## 2020-03-31 DIAGNOSIS — K76.9 LIVER LESION: ICD-10-CM

## 2020-03-31 DIAGNOSIS — R79.89 OTHER SPECIFIED ABNORMAL FINDINGS OF BLOOD CHEMISTRY: ICD-10-CM

## 2020-03-31 LAB
AFP SERPL-MCNC: 3.4 NG/ML (ref 0–8.4)
ALBUMIN SERPL BCP-MCNC: 3.5 G/DL (ref 3.5–5.2)
ALP SERPL-CCNC: 106 U/L (ref 55–135)
ALT SERPL W/O P-5'-P-CCNC: 22 U/L (ref 10–44)
ANION GAP SERPL CALC-SCNC: 8 MMOL/L (ref 8–16)
AST SERPL-CCNC: 18 U/L (ref 10–40)
BASOPHILS # BLD AUTO: 0.04 K/UL (ref 0–0.2)
BASOPHILS NFR BLD: 0.8 % (ref 0–1.9)
BILIRUB SERPL-MCNC: 0.2 MG/DL (ref 0.1–1)
BUN SERPL-MCNC: 10 MG/DL (ref 8–23)
CALCIUM SERPL-MCNC: 9.1 MG/DL (ref 8.7–10.5)
CANCER AG19-9 SERPL-ACNC: 50 U/ML (ref 2–40)
CEA SERPL-MCNC: 1.4 NG/ML (ref 0–5)
CHLORIDE SERPL-SCNC: 106 MMOL/L (ref 95–110)
CO2 SERPL-SCNC: 28 MMOL/L (ref 23–29)
COMPLEXED PSA SERPL-MCNC: 12.5 NG/ML (ref 0–4)
CREAT SERPL-MCNC: 0.8 MG/DL (ref 0.5–1.4)
DIFFERENTIAL METHOD: ABNORMAL
EOSINOPHIL # BLD AUTO: 0.2 K/UL (ref 0–0.5)
EOSINOPHIL NFR BLD: 4.7 % (ref 0–8)
ERYTHROCYTE [DISTWIDTH] IN BLOOD BY AUTOMATED COUNT: 15.6 % (ref 11.5–14.5)
EST. GFR  (AFRICAN AMERICAN): >60 ML/MIN/1.73 M^2
EST. GFR  (NON AFRICAN AMERICAN): >60 ML/MIN/1.73 M^2
GLUCOSE SERPL-MCNC: 94 MG/DL (ref 70–110)
HCT VFR BLD AUTO: 41.5 % (ref 40–54)
HGB BLD-MCNC: 12.7 G/DL (ref 14–18)
IMM GRANULOCYTES # BLD AUTO: 0.01 K/UL (ref 0–0.04)
IMM GRANULOCYTES NFR BLD AUTO: 0.2 % (ref 0–0.5)
INR PPP: 0.9 (ref 0.8–1.2)
LYMPHOCYTES # BLD AUTO: 1.5 K/UL (ref 1–4.8)
LYMPHOCYTES NFR BLD: 28.8 % (ref 18–48)
MCH RBC QN AUTO: 25.5 PG (ref 27–31)
MCHC RBC AUTO-ENTMCNC: 30.6 G/DL (ref 32–36)
MCV RBC AUTO: 83 FL (ref 82–98)
MONOCYTES # BLD AUTO: 0.6 K/UL (ref 0.3–1)
MONOCYTES NFR BLD: 11.4 % (ref 4–15)
NEUTROPHILS # BLD AUTO: 2.8 K/UL (ref 1.8–7.7)
NEUTROPHILS NFR BLD: 54.3 % (ref 38–73)
NRBC BLD-RTO: 0 /100 WBC
PLATELET # BLD AUTO: 281 K/UL (ref 150–350)
PMV BLD AUTO: 8.6 FL (ref 9.2–12.9)
POTASSIUM SERPL-SCNC: 3.9 MMOL/L (ref 3.5–5.1)
PROT SERPL-MCNC: 7.2 G/DL (ref 6–8.4)
PROTHROMBIN TIME: 10.2 SEC (ref 9–12.5)
RBC # BLD AUTO: 4.99 M/UL (ref 4.6–6.2)
SODIUM SERPL-SCNC: 142 MMOL/L (ref 136–145)
WBC # BLD AUTO: 5.07 K/UL (ref 3.9–12.7)

## 2020-03-31 PROCEDURE — 3075F SYST BP GE 130 - 139MM HG: CPT | Mod: HCNC,CPTII,S$GLB, | Performed by: NURSE PRACTITIONER

## 2020-03-31 PROCEDURE — 86803 HEPATITIS C AB TEST: CPT | Mod: HCNC

## 2020-03-31 PROCEDURE — 99999 PR PBB SHADOW E&M-EST. PATIENT-LVL IV: ICD-10-PCS | Mod: PBBFAC,HCNC,, | Performed by: NURSE PRACTITIONER

## 2020-03-31 PROCEDURE — 86301 IMMUNOASSAY TUMOR CA 19-9: CPT | Mod: HCNC

## 2020-03-31 PROCEDURE — 87340 HEPATITIS B SURFACE AG IA: CPT | Mod: HCNC

## 2020-03-31 PROCEDURE — 1101F PT FALLS ASSESS-DOCD LE1/YR: CPT | Mod: HCNC,CPTII,S$GLB, | Performed by: NURSE PRACTITIONER

## 2020-03-31 PROCEDURE — 1159F MED LIST DOCD IN RCRD: CPT | Mod: HCNC,S$GLB,, | Performed by: NURSE PRACTITIONER

## 2020-03-31 PROCEDURE — 99499 UNLISTED E&M SERVICE: CPT | Mod: HCNC,S$GLB,, | Performed by: NURSE PRACTITIONER

## 2020-03-31 PROCEDURE — 99999 PR PBB SHADOW E&M-EST. PATIENT-LVL IV: CPT | Mod: PBBFAC,HCNC,, | Performed by: NURSE PRACTITIONER

## 2020-03-31 PROCEDURE — 84153 ASSAY OF PSA TOTAL: CPT | Mod: HCNC

## 2020-03-31 PROCEDURE — 3078F DIAST BP <80 MM HG: CPT | Mod: HCNC,CPTII,S$GLB, | Performed by: NURSE PRACTITIONER

## 2020-03-31 PROCEDURE — 3075F PR MOST RECENT SYSTOLIC BLOOD PRESS GE 130-139MM HG: ICD-10-PCS | Mod: HCNC,CPTII,S$GLB, | Performed by: NURSE PRACTITIONER

## 2020-03-31 PROCEDURE — 1101F PR PT FALLS ASSESS DOC 0-1 FALLS W/OUT INJ PAST YR: ICD-10-PCS | Mod: HCNC,CPTII,S$GLB, | Performed by: NURSE PRACTITIONER

## 2020-03-31 PROCEDURE — 82378 CARCINOEMBRYONIC ANTIGEN: CPT | Mod: HCNC

## 2020-03-31 PROCEDURE — 85610 PROTHROMBIN TIME: CPT | Mod: HCNC

## 2020-03-31 PROCEDURE — 85025 COMPLETE CBC W/AUTO DIFF WBC: CPT | Mod: HCNC

## 2020-03-31 PROCEDURE — 80053 COMPREHEN METABOLIC PANEL: CPT | Mod: HCNC

## 2020-03-31 PROCEDURE — 1126F AMNT PAIN NOTED NONE PRSNT: CPT | Mod: HCNC,S$GLB,, | Performed by: NURSE PRACTITIONER

## 2020-03-31 PROCEDURE — 99204 OFFICE O/P NEW MOD 45 MIN: CPT | Mod: HCNC,S$GLB,, | Performed by: NURSE PRACTITIONER

## 2020-03-31 PROCEDURE — 1126F PR PAIN SEVERITY QUANTIFIED, NO PAIN PRESENT: ICD-10-PCS | Mod: HCNC,S$GLB,, | Performed by: NURSE PRACTITIONER

## 2020-03-31 PROCEDURE — 99204 PR OFFICE/OUTPT VISIT, NEW, LEVL IV, 45-59 MIN: ICD-10-PCS | Mod: HCNC,S$GLB,, | Performed by: NURSE PRACTITIONER

## 2020-03-31 PROCEDURE — 1159F PR MEDICATION LIST DOCUMENTED IN MEDICAL RECORD: ICD-10-PCS | Mod: HCNC,S$GLB,, | Performed by: NURSE PRACTITIONER

## 2020-03-31 PROCEDURE — 82105 ALPHA-FETOPROTEIN SERUM: CPT | Mod: HCNC

## 2020-03-31 PROCEDURE — 3078F PR MOST RECENT DIASTOLIC BLOOD PRESSURE < 80 MM HG: ICD-10-PCS | Mod: HCNC,CPTII,S$GLB, | Performed by: NURSE PRACTITIONER

## 2020-03-31 PROCEDURE — 99499 RISK ADDL DX/OHS AUDIT: ICD-10-PCS | Mod: HCNC,S$GLB,, | Performed by: NURSE PRACTITIONER

## 2020-03-31 PROCEDURE — 36415 COLL VENOUS BLD VENIPUNCTURE: CPT | Mod: HCNC

## 2020-03-31 NOTE — PROGRESS NOTES
Clinic Consult:  [facility] Consultation Note    Reason for Consult:  The primary encounter diagnosis was Liver mass. Diagnoses of Alcohol dependence, daily use, History of colon polyps, Multiple pulmonary nodules, and Elevated PSA, between 10 and less than 20 ng/ml were also pertinent to this visit.    PCP: Loi Patel   [address] [facility] / [place] [address]    HPI:  This is a 74 y.o. male here for evaluation of the above  Pt was recently seen by pulmonary for regular management of asthma and follow up of pulmonary nodules.   At that time, a CT chest was completed which showed Limited imaging through the upper abdomen demonstrates an indeterminate mass in the patent dome on image 70 of series 2 which measures up to 5.1 cm   MRI abd was then completed which showed large mass noted within the dome of the liver segment 8 that measures 4.9 x 4.0 cm in the greatest axial dimension.  The mass is I so to mildly hyperintense on T2 weighted images with a more central area of significant T2 hyperintensity.  On the T1 weighted images the mass is iso to mildly hypointense with the central portion more hypointense.  Postcontrast images demonstrate what appears to represent mild enhancement involving the majority of the solid component of the mass with the more central area of the mass thought to be necrotic and demonstrate no enhancement.   HCC not excluded vs malignancy vs abscess  Pt denies any previously known liver disease.  He currently drinks 3-4 beers daily with a hx of heavier consumption in the past.   He also has a hx of BPH, recent PSA stably elevated.   He denies any abdominal pain.  No jaundice.  No weight loss reported per pt, however, per chart review, he has had a 14 pound loss since November 2019.   Last colonoscopy 2013 with polyps, hyperplastic on path, repeat 10 years      Review of Systems   Constitutional: Positive for weight loss. Negative for chills, fever and malaise/fatigue.    Respiratory: Negative for cough.    Cardiovascular: Negative for chest pain.   Gastrointestinal:        Per HPI   Musculoskeletal: Negative for myalgias.   Skin: Negative for itching and rash.   Neurological: Negative for headaches.   Psychiatric/Behavioral: The patient is not nervous/anxious.        Medical History:   Past Medical History:   Diagnosis Date    Atherosclerotic PVD with intermittent claudication 8/16/2019    Atrial fibrillation     pt unaware of this    Back pain     Bilateral inguinal hernia     CT ABdomen/pelvis 3/9/2015 (care everywhere)---Bilateral inguinal hernias containing fat.      Calcified granuloma of lung 10/3/2017    CT CHest 3/26/2018---There are calcified lymph nodes in the mediastinum and left hilum.    CT chest 9/11/ 2017 Calcified left hilar and subcarinal granulomas are noted  ;Calcified granuloma noted within the posterior segment of the right hepatic lobe.    Diverticulosis     Diverticulosis 10/28/13    Colonoscopy     ED (erectile dysfunction)     Elevated PSA     Granuloma of liver     ct chest 3/26/2018---There is a small calcified granuloma posteriorly in the right lobe of the liver    Hyperlipidemia     Hypertension     Moderate persistent asthma without complication 9/26/2017    Obesity     Tobacco dependence     resolved    Uncomplicated alcohol dependence 11/27/2019    Urinary tract infection        Surgical History:  Past Surgical History:   Procedure Laterality Date    AORTOGRAPHY WITH SERIALOGRAPHY N/A 10/21/2019    Procedure: AORTOGRAM, WITH RUNOFF;  Surgeon: Chencho Ross MD;  Location: [facility] CATH LAB;  Service: Cardiology;  Laterality: N/A;  pt requesting 9am arrival    AORTOGRAPHY WITH SERIALOGRAPHY N/A 11/14/2019    Procedure: AORTOGRAM, WITH RUNOFF;  Surgeon: Chencho Ross MD;  Location: [facility] CATH LAB;  Service: Cardiology;  Laterality: N/A;    HERNIA REPAIR      x2    PROSTATE BIOPSY      reported per patient around 2014 or 2015 which  was reportedly negative       Family History:   Family History   Problem Relation Age of Onset    Cancer Mother         Unknown primary    Cancer Father     Cancer Brother         prostate     Prostate cancer Brother     Cancer Sister 64        pancreatic     Diabetes Neg Hx     Heart disease Neg Hx     Kidney disease Neg Hx     Stroke Neg Hx     Hyperlipidemia Neg Hx     Hypertension Neg Hx        Social History:   Social History     Tobacco Use    Smoking status: Former Smoker     Packs/day: 2.00     Years: 15.00     Pack years: 30.00     Types: Cigarettes     Start date:      Last attempt to quit: 1985     Years since quittin.6    Smokeless tobacco: Never Used   Substance Use Topics    Alcohol use: Yes     Alcohol/week: 12.0 standard drinks     Types: 12 Cans of beer per week    Drug use: No       Allergies: Reviewed    Home Medications:   Current Outpatient Medications on File Prior to Visit   Medication Sig Dispense Refill    amLODIPine (NORVASC) 2.5 MG tablet Take 1 tablet (2.5 mg total) by mouth once daily. 30 tablet 11    aspirin (ECOTRIN) 81 MG EC tablet Take 1 tablet (81 mg total) by mouth once daily. 30 tablet 6    atorvastatin (LIPITOR) 80 MG tablet Take 1 tablet (80 mg total) by mouth once daily. 30 tablet 11    benzonatate (TESSALON) 200 MG capsule Take 1 capsule (200 mg total) by mouth 3 (three) times daily as needed for Cough. 21 capsule 0    brimonidine 0.15 % OPTH DROP (ALPHAGAN) 0.15 % ophthalmic solution LOCATION: BOTH EYES. INSTILL ONE DROP IN BOTH EYES TWICE A DAY X 90 DAYS  1    cilostazol (PLETAL) 50 MG Tab Take 1 tablet (50 mg total) by mouth 2 (two) times daily. 60 tablet 11    ezetimibe (ZETIA) 10 mg tablet Take 1 tablet (10 mg total) by mouth once daily. 90 tablet 3    finasteride (PROSCAR) 5 mg tablet TAKE 1 TABLET BY MOUTH EVERY DAY 90 tablet 3    latanoprost 0.005 % ophthalmic solution Place 1 drop into both eyes once daily. 1 Bottle 5     "lisinopril (PRINIVIL,ZESTRIL) 20 MG tablet Take 1 tablet (20 mg total) by mouth once daily. 90 tablet 3    clopidogrel (PLAVIX) 75 mg tablet Take 1 tablet (75 mg total) by mouth once daily. (Patient not taking: Reported on 3/31/2020) 30 tablet 11    sildenafil (VIAGRA) 100 MG tablet Take 1 tablet (100 mg total) by mouth daily as needed. (Patient not taking: Reported on 3/31/2020) 30 tablet 11    sildenafil (VIAGRA) 100 MG tablet Take 1 tablet (100 mg total) by mouth daily as needed. (Patient not taking: Reported on 3/31/2020) 30 tablet 11     No current facility-administered medications on file prior to visit.        Physical Exam:  Vital Signs:  BP (!) 132/52   Pulse (!) 58   Ht 5' 6" (1.676 m)   Wt 74.7 kg (164 lb 10.9 oz)   BMI 26.58 kg/m²   Body mass index is 26.58 kg/m².  Physical Exam   Constitutional: He is oriented to person, place, and time. He appears well-developed and well-nourished.   HENT:   Head: Normocephalic.   Eyes: No scleral icterus.   Neck: Normal range of motion.   Cardiovascular: Normal rate.   Pulmonary/Chest: Effort normal.   Abdominal: He exhibits no distension.   Musculoskeletal: Normal range of motion.   Neurological: He is alert and oriented to person, place, and time.   Skin: Skin is dry.   Psychiatric: He has a normal mood and affect.   Vitals reviewed.      Labs: Pertinent labs reviewed.  Assessment:  1. Liver mass    2. Alcohol dependence, daily use    3. History of colon polyps    4. Multiple pulmonary nodules    5. Elevated PSA, between 10 and less than 20 ng/ml         Recommendations:  Concern for HCC vs malignancy.  - will plan for labs today, some previously ordered per oncology.   - Will likely need a biopsy to determine origin of mass  - continue POC per pulmonary and oncology  - once labs resulted with discuss with Dr. Phillips for further intervention needs.  If CA is suspected, this will be an urgent case that needs to be determined sooner than May for timely " treatment.       Follow up to be determined by results of above.        Thank you so much for allowing me to participate in the care of GABRIELLE Talamantes

## 2020-03-31 NOTE — LETTER
March 31, 2020      [place]  [address]  [place] [address]           [facility]  [address]  [place] [address]  Phone: [phone]  Fax: [phone]          Patient: Alex Milner   MR Number: [id number]   YOB: 1945   Date of Visit: 3/31/2020       Dear Shwetha Ocasio:    Thank you for referring Alex Milner to me for evaluation. Attached you will find relevant portions of my assessment and plan of care.    If you have questions, please do not hesitate to call me. I look forward to following Alex Milner along with you.    Sincerely,    Emilee Hsieh, [facility]    Enclosure  CC:  No Recipients    If you would like to receive this communication electronically, please contact [email or web address] or [phone] to request more information on [employer] Link access.    For providers and/or their staff who would like to refer a patient to [facility], please contact us through our one-stop-shop provider referral line, [facility], at [phone].    If you feel you have received this communication in error or would no longer like to receive these types of communications, please e-mail [email or web address]

## 2020-03-31 NOTE — H&P (VIEW-ONLY)
Clinic Consult:  [facility] Consultation Note    Reason for Consult:  The primary encounter diagnosis was Liver mass. Diagnoses of Alcohol dependence, daily use, History of colon polyps, Multiple pulmonary nodules, and Elevated PSA, between 10 and less than 20 ng/ml were also pertinent to this visit.    PCP: Loi Patel   [address] [facility] / [place] [address]    HPI:  This is a 74 y.o. male here for evaluation of the above  Pt was recently seen by pulmonary for regular management of asthma and follow up of pulmonary nodules.   At that time, a CT chest was completed which showed Limited imaging through the upper abdomen demonstrates an indeterminate mass in the patent dome on image 70 of series 2 which measures up to 5.1 cm   MRI abd was then completed which showed large mass noted within the dome of the liver segment 8 that measures 4.9 x 4.0 cm in the greatest axial dimension.  The mass is I so to mildly hyperintense on T2 weighted images with a more central area of significant T2 hyperintensity.  On the T1 weighted images the mass is iso to mildly hypointense with the central portion more hypointense.  Postcontrast images demonstrate what appears to represent mild enhancement involving the majority of the solid component of the mass with the more central area of the mass thought to be necrotic and demonstrate no enhancement.   HCC not excluded vs malignancy vs abscess  Pt denies any previously known liver disease.  He currently drinks 3-4 beers daily with a hx of heavier consumption in the past.   He also has a hx of BPH, recent PSA stably elevated.   He denies any abdominal pain.  No jaundice.  No weight loss reported per pt, however, per chart review, he has had a 14 pound loss since November 2019.   Last colonoscopy 2013 with polyps, hyperplastic on path, repeat 10 years      Review of Systems   Constitutional: Positive for weight loss. Negative for chills, fever and malaise/fatigue.    Respiratory: Negative for cough.    Cardiovascular: Negative for chest pain.   Gastrointestinal:        Per HPI   Musculoskeletal: Negative for myalgias.   Skin: Negative for itching and rash.   Neurological: Negative for headaches.   Psychiatric/Behavioral: The patient is not nervous/anxious.        Medical History:   Past Medical History:   Diagnosis Date    Atherosclerotic PVD with intermittent claudication 8/16/2019    Atrial fibrillation     pt unaware of this    Back pain     Bilateral inguinal hernia     CT ABdomen/pelvis 3/9/2015 (care everywhere)---Bilateral inguinal hernias containing fat.      Calcified granuloma of lung 10/3/2017    CT CHest 3/26/2018---There are calcified lymph nodes in the mediastinum and left hilum.    CT chest 9/11/ 2017 Calcified left hilar and subcarinal granulomas are noted  ;Calcified granuloma noted within the posterior segment of the right hepatic lobe.    Diverticulosis     Diverticulosis 10/28/13    Colonoscopy     ED (erectile dysfunction)     Elevated PSA     Granuloma of liver     ct chest 3/26/2018---There is a small calcified granuloma posteriorly in the right lobe of the liver    Hyperlipidemia     Hypertension     Moderate persistent asthma without complication 9/26/2017    Obesity     Tobacco dependence     resolved    Uncomplicated alcohol dependence 11/27/2019    Urinary tract infection        Surgical History:  Past Surgical History:   Procedure Laterality Date    AORTOGRAPHY WITH SERIALOGRAPHY N/A 10/21/2019    Procedure: AORTOGRAM, WITH RUNOFF;  Surgeon: Chencho Ross MD;  Location: [facility] CATH LAB;  Service: Cardiology;  Laterality: N/A;  pt requesting 9am arrival    AORTOGRAPHY WITH SERIALOGRAPHY N/A 11/14/2019    Procedure: AORTOGRAM, WITH RUNOFF;  Surgeon: Chencho Ross MD;  Location: [facility] CATH LAB;  Service: Cardiology;  Laterality: N/A;    HERNIA REPAIR      x2    PROSTATE BIOPSY      reported per patient around 2014 or 2015 which  was reportedly negative       Family History:   Family History   Problem Relation Age of Onset    Cancer Mother         Unknown primary    Cancer Father     Cancer Brother         prostate     Prostate cancer Brother     Cancer Sister 64        pancreatic     Diabetes Neg Hx     Heart disease Neg Hx     Kidney disease Neg Hx     Stroke Neg Hx     Hyperlipidemia Neg Hx     Hypertension Neg Hx        Social History:   Social History     Tobacco Use    Smoking status: Former Smoker     Packs/day: 2.00     Years: 15.00     Pack years: 30.00     Types: Cigarettes     Start date:      Last attempt to quit: 1985     Years since quittin.6    Smokeless tobacco: Never Used   Substance Use Topics    Alcohol use: Yes     Alcohol/week: 12.0 standard drinks     Types: 12 Cans of beer per week    Drug use: No       Allergies: Reviewed    Home Medications:   Current Outpatient Medications on File Prior to Visit   Medication Sig Dispense Refill    amLODIPine (NORVASC) 2.5 MG tablet Take 1 tablet (2.5 mg total) by mouth once daily. 30 tablet 11    aspirin (ECOTRIN) 81 MG EC tablet Take 1 tablet (81 mg total) by mouth once daily. 30 tablet 6    atorvastatin (LIPITOR) 80 MG tablet Take 1 tablet (80 mg total) by mouth once daily. 30 tablet 11    benzonatate (TESSALON) 200 MG capsule Take 1 capsule (200 mg total) by mouth 3 (three) times daily as needed for Cough. 21 capsule 0    brimonidine 0.15 % OPTH DROP (ALPHAGAN) 0.15 % ophthalmic solution LOCATION: BOTH EYES. INSTILL ONE DROP IN BOTH EYES TWICE A DAY X 90 DAYS  1    cilostazol (PLETAL) 50 MG Tab Take 1 tablet (50 mg total) by mouth 2 (two) times daily. 60 tablet 11    ezetimibe (ZETIA) 10 mg tablet Take 1 tablet (10 mg total) by mouth once daily. 90 tablet 3    finasteride (PROSCAR) 5 mg tablet TAKE 1 TABLET BY MOUTH EVERY DAY 90 tablet 3    latanoprost 0.005 % ophthalmic solution Place 1 drop into both eyes once daily. 1 Bottle 5     "lisinopril (PRINIVIL,ZESTRIL) 20 MG tablet Take 1 tablet (20 mg total) by mouth once daily. 90 tablet 3    clopidogrel (PLAVIX) 75 mg tablet Take 1 tablet (75 mg total) by mouth once daily. (Patient not taking: Reported on 3/31/2020) 30 tablet 11    sildenafil (VIAGRA) 100 MG tablet Take 1 tablet (100 mg total) by mouth daily as needed. (Patient not taking: Reported on 3/31/2020) 30 tablet 11    sildenafil (VIAGRA) 100 MG tablet Take 1 tablet (100 mg total) by mouth daily as needed. (Patient not taking: Reported on 3/31/2020) 30 tablet 11     No current facility-administered medications on file prior to visit.        Physical Exam:  Vital Signs:  BP (!) 132/52   Pulse (!) 58   Ht 5' 6" (1.676 m)   Wt 74.7 kg (164 lb 10.9 oz)   BMI 26.58 kg/m²   Body mass index is 26.58 kg/m².  Physical Exam   Constitutional: He is oriented to person, place, and time. He appears well-developed and well-nourished.   HENT:   Head: Normocephalic.   Eyes: No scleral icterus.   Neck: Normal range of motion.   Cardiovascular: Normal rate.   Pulmonary/Chest: Effort normal.   Abdominal: He exhibits no distension.   Musculoskeletal: Normal range of motion.   Neurological: He is alert and oriented to person, place, and time.   Skin: Skin is dry.   Psychiatric: He has a normal mood and affect.   Vitals reviewed.      Labs: Pertinent labs reviewed.  Assessment:  1. Liver mass    2. Alcohol dependence, daily use    3. History of colon polyps    4. Multiple pulmonary nodules    5. Elevated PSA, between 10 and less than 20 ng/ml         Recommendations:  Concern for HCC vs malignancy.  - will plan for labs today, some previously ordered per oncology.   - Will likely need a biopsy to determine origin of mass  - continue POC per pulmonary and oncology  - once labs resulted with discuss with Dr. Phillips for further intervention needs.  If CA is suspected, this will be an urgent case that needs to be determined sooner than May for timely " treatment.       Follow up to be determined by results of above.        Thank you so much for allowing me to participate in the care of GABRIELLE Talamantes

## 2020-04-01 ENCOUNTER — TELEPHONE (OUTPATIENT)
Dept: CARDIOLOGY | Facility: CLINIC | Age: 75
End: 2020-04-01

## 2020-04-01 ENCOUNTER — TELEPHONE (OUTPATIENT)
Dept: GASTROENTEROLOGY | Facility: CLINIC | Age: 75
End: 2020-04-01

## 2020-04-01 DIAGNOSIS — R16.0 LIVER MASS: Primary | ICD-10-CM

## 2020-04-01 DIAGNOSIS — R97.8 ELEVATED CA 19-9 LEVEL: ICD-10-CM

## 2020-04-01 DIAGNOSIS — R97.20 ELEVATED PSA: ICD-10-CM

## 2020-04-01 LAB
HBV SURFACE AG SERPL QL IA: NEGATIVE
HCV AB SERPL QL IA: NEGATIVE

## 2020-04-01 NOTE — TELEPHONE ENCOUNTER
----- Message from Chencho Ross MD sent at 4/1/2020 11:24 AM CDT -----  OK NO PROBLEM  ----- Message -----  From: Mirtha Juárez LPN  Sent: 4/1/2020  10:54 AM CDT  To: MD Dr. Cody Lawrence,  Please advise  ----- Message -----  From: Rosemary Gonzalez MA  Sent: 4/1/2020  10:40 AM CDT  To: Ivonne Little, Cody FORD Staff    Patient is needing a liver biopsy due to a liver mass. Patient is going to need the clearance to hold Plavix and Aspirin for 7 days prior to biopsy. Please advise that this is ok.     Thank you

## 2020-04-01 NOTE — TELEPHONE ENCOUNTER
Dr. Ross received message for okay to hold Plavix and Aspirin for 7 days and patient is okay to hold for Liver Biopsy.

## 2020-04-01 NOTE — TELEPHONE ENCOUNTER
Called to schedule Liver biopsy. Patient order needs to say STAT or ASAP provider notified. Also patient needs cardiology clearance for Plavix and Aspirin to be held 7 days prior. That has been sent as well waiting on there response and response to order being changed and will call and schedule with Radiology.

## 2020-04-01 NOTE — PROGRESS NOTES
Labs reviewed and discussed with Dr. Clarke.  Will plan for liver biopsy of both the normal liver and the mass.  Depending on result, may need EGD/EUS and colonoscopy

## 2020-04-02 ENCOUNTER — TELEPHONE (OUTPATIENT)
Dept: CARDIOLOGY | Facility: HOSPITAL | Age: 75
End: 2020-04-02

## 2020-04-02 ENCOUNTER — TELEPHONE (OUTPATIENT)
Dept: RADIOLOGY | Facility: HOSPITAL | Age: 75
End: 2020-04-02

## 2020-04-02 ENCOUNTER — TELEPHONE (OUTPATIENT)
Dept: GASTROENTEROLOGY | Facility: CLINIC | Age: 75
End: 2020-04-02

## 2020-04-02 NOTE — TELEPHONE ENCOUNTER
----- Message from ADALGISA Maldonado sent at 4/1/2020  9:59 AM CDT -----  Needs liver biopsy ASAP

## 2020-04-02 NOTE — TELEPHONE ENCOUNTER
----- Message from Kathya Bone LPN sent at 4/1/2020  3:10 PM CDT -----  Patient was last seen by  on 2/20/20. Please advise. Thanks.  ----- Message -----  From: Rosemary Gonzalez MA  Sent: 4/1/2020  10:40 AM CDT  To: Ivonne Tejada Staff, Cody FORD Staff    Patient is needing a liver biopsy due to a liver mass. Patient is going to need the clearance to hold Plavix and Aspirin for 7 days prior to biopsy. Please advise that this is ok.     Thank you

## 2020-04-02 NOTE — TELEPHONE ENCOUNTER
Called patient to go over instructions with him regarding Liver Biopsy. Patient is scheduled for 04/08 at 9am. Patient must hold Aspirin and Plavix starting on 04/04. Clearance from Cardiology has been obtained. Patient also needs instructions regarding NPO status, blood pressure medication etc.

## 2020-04-06 ENCOUNTER — TELEPHONE (OUTPATIENT)
Dept: GASTROENTEROLOGY | Facility: CLINIC | Age: 75
End: 2020-04-06

## 2020-04-06 NOTE — TELEPHONE ENCOUNTER
Called patient to go over Liver biopsy that is scheduled for 04/08. Patient has not held plavix or aspirin but a clearance has been obtained from Cardiology. If patient calls back and has not held either medications we need to reschedule with IR. Patient liver biopsy is urgent and needs to be scheduled within the next couple of days. I spoke with IR and they are pretty open for urgent biopsies.

## 2020-04-07 ENCOUNTER — TELEPHONE (OUTPATIENT)
Dept: GASTROENTEROLOGY | Facility: CLINIC | Age: 75
End: 2020-04-07

## 2020-04-07 ENCOUNTER — TELEPHONE (OUTPATIENT)
Dept: RADIOLOGY | Facility: HOSPITAL | Age: 75
End: 2020-04-07

## 2020-04-07 NOTE — TELEPHONE ENCOUNTER
Spoke with patients wife in regards to upcoming procedure. Patient schedule [facility] Patients wife had questions concerning the test itself. Patient's wife given the number to the radiology department and instructed to call.

## 2020-04-07 NOTE — TELEPHONE ENCOUNTER
Interventional Radiology:  Spoke with patient and confirmed appt for procedure tomorrow, patient to be here at 0845, must have a ride d/t sedation, ride will not be allowed inside.  Patient stated he has been off Plavix for a month and Aspirin for 4 days.  Patient is to be nothing by mouth after midnight, but will take BP meds in AM with a small sip of water.

## 2020-04-07 NOTE — TELEPHONE ENCOUNTER
----- Message from Ann Weinberg sent at 4/7/2020  4:25 PM CDT -----  Contact: wife Jesika  Calling concerning patient Biopsy. Please call patient ASAP TODAY URGENT!! @ [phone]. Thanks, colton

## 2020-04-08 ENCOUNTER — HOSPITAL ENCOUNTER (OUTPATIENT)
Dept: RADIOLOGY | Facility: HOSPITAL | Age: 75
Discharge: HOME | End: 2020-04-08
Attending: NURSE PRACTITIONER
Payer: MEDICARE

## 2020-04-08 VITALS
HEIGHT: 66 IN | RESPIRATION RATE: 17 BRPM | BODY MASS INDEX: 26.52 KG/M2 | SYSTOLIC BLOOD PRESSURE: 138 MMHG | WEIGHT: 165 LBS | OXYGEN SATURATION: 97 % | HEART RATE: 49 BPM | DIASTOLIC BLOOD PRESSURE: 64 MMHG

## 2020-04-08 DIAGNOSIS — R16.0 LIVER MASS: ICD-10-CM

## 2020-04-08 DIAGNOSIS — R97.8 ELEVATED CA 19-9 LEVEL: ICD-10-CM

## 2020-04-08 DIAGNOSIS — R97.20 ELEVATED PSA: ICD-10-CM

## 2020-04-08 DIAGNOSIS — F10.20 ALCOHOL DEPENDENCE, DAILY USE: ICD-10-CM

## 2020-04-08 DIAGNOSIS — R16.0 LIVER MASS: Primary | ICD-10-CM

## 2020-04-08 PROCEDURE — 88313 PR  SPECIAL STAINS,GROUP II: ICD-10-PCS | Mod: 26,,, | Performed by: PATHOLOGY

## 2020-04-08 PROCEDURE — 88342 IMHCHEM/IMCYTCHM 1ST ANTB: CPT | Mod: 91,HCNC | Performed by: PATHOLOGY

## 2020-04-08 PROCEDURE — 88307 TISSUE EXAM BY PATHOLOGIST: CPT | Mod: 26,,, | Performed by: PATHOLOGY

## 2020-04-08 PROCEDURE — 88341 IMHCHEM/IMCYTCHM EA ADD ANTB: CPT | Mod: 59 | Performed by: PATHOLOGY

## 2020-04-08 PROCEDURE — 88342 IMHCHEM/IMCYTCHM 1ST ANTB: CPT | Performed by: PATHOLOGY

## 2020-04-08 PROCEDURE — 77012 CT SCAN FOR NEEDLE BIOPSY: CPT | Mod: TC,HCNC

## 2020-04-08 PROCEDURE — 88342 CHG IMMUNOCYTOCHEMISTRY: ICD-10-PCS | Mod: 26,,, | Performed by: PATHOLOGY

## 2020-04-08 PROCEDURE — 63600175 PHARM REV CODE 636 W HCPCS: Mod: HCNC | Performed by: RADIOLOGY

## 2020-04-08 PROCEDURE — 88341 IMHCHEM/IMCYTCHM EA ADD ANTB: CPT | Mod: 59,HCNC | Performed by: PATHOLOGY

## 2020-04-08 PROCEDURE — 88341 PR IHC OR ICC EACH ADD'L SINGLE ANTIBODY  STAINPR: ICD-10-PCS | Mod: 26,,, | Performed by: PATHOLOGY

## 2020-04-08 PROCEDURE — 88364 CHG INSITU HYBRIDIZATION (FISH: ICD-10-PCS | Mod: 26,,, | Performed by: PATHOLOGY

## 2020-04-08 PROCEDURE — 88364 INSITU HYBRIDIZATION (FISH): CPT | Mod: 26,,, | Performed by: PATHOLOGY

## 2020-04-08 PROCEDURE — 88307 TISSUE EXAM BY PATHOLOGIST: CPT | Mod: 59 | Performed by: PATHOLOGY

## 2020-04-08 PROCEDURE — 47000 NEEDLE BIOPSY OF LIVER PERQ: CPT | Mod: HCNC

## 2020-04-08 PROCEDURE — 88313 SPECIAL STAINS GROUP 2: CPT | Mod: 26,,, | Performed by: PATHOLOGY

## 2020-04-08 PROCEDURE — 81264 IGK REARRANGEABN CLONAL POP: CPT | Mod: HCNC | Performed by: PATHOLOGY

## 2020-04-08 PROCEDURE — 88341 IMHCHEM/IMCYTCHM EA ADD ANTB: CPT | Mod: 26,,, | Performed by: PATHOLOGY

## 2020-04-08 PROCEDURE — 88365 INSITU HYBRIDIZATION (FISH): CPT | Performed by: PATHOLOGY

## 2020-04-08 PROCEDURE — 88365 INSITU HYBRIDIZATION (FISH): CPT | Mod: 26,,, | Performed by: PATHOLOGY

## 2020-04-08 PROCEDURE — 81261 IGH GENE REARRANGE AMP METH: CPT | Mod: HCNC | Performed by: PATHOLOGY

## 2020-04-08 PROCEDURE — 88313 SPECIAL STAINS GROUP 2: CPT | Mod: 59 | Performed by: PATHOLOGY

## 2020-04-08 PROCEDURE — 88365 PR  TISSUE HYBRIDIZATION: ICD-10-PCS | Mod: 26,,, | Performed by: PATHOLOGY

## 2020-04-08 PROCEDURE — 88307 PR  SURG PATH,LEVEL V: ICD-10-PCS | Mod: 26,,, | Performed by: PATHOLOGY

## 2020-04-08 PROCEDURE — 88342 IMHCHEM/IMCYTCHM 1ST ANTB: CPT | Mod: 26,,, | Performed by: PATHOLOGY

## 2020-04-08 PROCEDURE — 88364 INSITU HYBRIDIZATION (FISH): CPT | Performed by: PATHOLOGY

## 2020-04-08 RX ORDER — MIDAZOLAM HYDROCHLORIDE 1 MG/ML
INJECTION INTRAMUSCULAR; INTRAVENOUS CODE/TRAUMA/SEDATION MEDICATION
Status: COMPLETED | OUTPATIENT
Start: 2020-04-08 | End: 2020-04-08

## 2020-04-08 RX ORDER — FENTANYL CITRATE 50 UG/ML
INJECTION, SOLUTION INTRAMUSCULAR; INTRAVENOUS CODE/TRAUMA/SEDATION MEDICATION
Status: COMPLETED | OUTPATIENT
Start: 2020-04-08 | End: 2020-04-08

## 2020-04-08 RX ADMIN — FENTANYL CITRATE 25 MCG: 50 INJECTION, SOLUTION INTRAMUSCULAR; INTRAVENOUS at 10:04

## 2020-04-08 RX ADMIN — MIDAZOLAM HYDROCHLORIDE 1 MG: 1 INJECTION, SOLUTION INTRAMUSCULAR; INTRAVENOUS at 09:04

## 2020-04-08 RX ADMIN — MIDAZOLAM HYDROCHLORIDE 0.5 MG: 1 INJECTION, SOLUTION INTRAMUSCULAR; INTRAVENOUS at 10:04

## 2020-04-08 RX ADMIN — FENTANYL CITRATE 50 MCG: 50 INJECTION, SOLUTION INTRAMUSCULAR; INTRAVENOUS at 09:04

## 2020-04-08 NOTE — DISCHARGE SUMMARY
Pre Op Diagnosis: liver mass and elevated LFTs     Post Op Diagnosis: same     Procedure:  Liver mass biopsy and general liver biopsy     Procedure performed by: Haleigh YUSUF, Duncan TORRES     Written Informed Consent Obtained: Yes     Specimen Removed:  yes     Estimated Blood Loss:  minimal     Findings: Local anesthesia and moderate sedation were used.     The patient tolerated the procedure well and there were no complications.      Sterile technique was performed in the RUQ, lidocaine was used as a local anesthetic.  Multiple samples taken from the liver mass and the general liver.  Pt tolerated the procedure well without immediate complications.  Please see radiologist report for details. F/u with PCP and/or ordering physician.

## 2020-04-08 NOTE — SEDATION DOCUMENTATION
Procedure complete at this time. Band aid to right flank C/D/I with no bleeding noted. VSS, no acute distress noted. Pt tolerated procedure well. Pt transferred to stretcher and lying on right side and transported back to recovery bay.

## 2020-04-08 NOTE — SEDATION DOCUMENTATION
Pt placed supine on CT table at this time with arms above his head. VSS. Pt verbalizes understanding of procedure.

## 2020-04-08 NOTE — PLAN OF CARE
Discharge instructions given to and reviewed with pt and verbalized understanding of all. Band aid to right side with no s/s of redness/swelling/bleeding noted at this time. No c/o pain at this time. Pt discharged home, taken out via wheelchair, and driven home by wife.

## 2020-04-08 NOTE — PLAN OF CARE
Called pt's wife at this time and informed her that the pt did well during the procedure, that he is resting comfortably, and that he will be discharged at 12:15pm and to be outside at the front of the hospital. She verbalized understanding of all.

## 2020-04-08 NOTE — DISCHARGE INSTRUCTIONS
Discharge Instructions for Liver Biopsy  You had a procedure called liver biopsy. A healthcare provider used a special needle to remove a small piece of tissue from your liver. Then it was examined for signs of damage or disease. A liver biopsy is ordered after other tests have shown that your liver is not working properly. You may also have a liver biopsy when liver disease is suspected, to determine whether there is too much iron in the liver, or to rule out cancer.  Home care  Recommendations include the following:   · Because you had anesthesia, you should not drive until the day after your biopsy.   · Remove the bandage covering the biopsy site 48 hours after the procedure.  · Rest for 6 hours and take it easy when you arrive home.  · Dont shower for 24 hours after the biopsy. If you wish, you may wash yourself with a sponge or washcloth. When you are able to shower, dont scrub the site. Gently wash the area and pat it dry.  · Dont lift anything heavier than 10 pounds for up to 1 week after the procedure, or as advised by your healthcare provider.  · Don't do strenuous activities or exercises for up to 1 week after the procedure.  · Ask your healthcare provider when you can return to work.  · Do not start taking blood thinners without clear instructions from your healthcare provider.  Follow-up care  Make a follow-up appointment as directed by our staff.     When to call your healthcare provider  Call your healthcare provider immediately if you have any of the following:  · Bleeding from the biopsy site  · Dizziness or lightheadedness  · Sudden or increased shortness of breath  · Sudden chest pain  · Fever of 100.4°F (38.0°C) or higher, or as directed by your healthcare provider  · Shaking chills  · Yellow eyes or skin  · Increasing redness, tenderness, or swelling at the biopsy site  · Drainage from the biopsy site  · Opening of the biopsy site  · Vomiting blood  · Rectal bleeding or bloody  stools  · Increasing pain, with or without activity, in the liver or belly area, or pain shooting to the right shoulder   Date Last Reviewed: 7/1/2016 © 2000-2017 [employer]. [address] [place] [address]. All rights reserved. This information is not intended as a substitute for professional medical care. Always follow your healthcare professional's instructions.        Recovery After Procedural Sedation (Adult)  You have been given medicine by vein to make you sleep during your surgery. This may have included both a pain medicine and sleeping medicine. Most of the effects have worn off. But you may still have some drowsiness for the next 6 to 8 hours.  Home care  Follow these guidelines when you get home:  · For the next 8 hours, you should be watched by a responsible adult. This person should make sure your condition is not getting worse.  · Don't drink any alcohol for the next 24 hours.  · Don't drive, operate dangerous machinery, or make important business or personal decisions during the next 24 hours.  Note: Your healthcare provider may tell you not to take any medicine by mouth for pain or sleep in the next 4 hours. These medicines may react with the medicines you were given in the hospital. This could cause a much stronger response than usual.  Follow-up care  Follow up with your healthcare provider if you are not alert and back to your usual level of activity within 12 hours.  When to seek medical advice  Call your healthcare provider right away if any of these occur:  · Drowsiness gets worse  · Weakness or dizziness gets worse  · Repeated vomiting  · You can't be awakened   Date Last Reviewed: 10/18/2016  © 2471-8072 [employer]. [address] [place] [address]. All rights reserved. This information is not intended as a substitute for professional medical care. Always follow your healthcare professional's instructions.

## 2020-04-20 ENCOUNTER — TELEPHONE (OUTPATIENT)
Dept: GASTROENTEROLOGY | Facility: CLINIC | Age: 75
End: 2020-04-20

## 2020-04-20 NOTE — TELEPHONE ENCOUNTER
----- Message from Dodie Mejía sent at 4/20/2020  3:53 PM CDT -----  Contact: Pt  Type:  Test Results    Who Called: PT  Name of Test (Lab/Mammo/Etc): CT SCAN  Date of Test: 04/08  Ordering Provider: BERTRAM  Where the test was performed: [facility]  Would the patient rather a call back or a response via [facility]? CALL BACK  Best Call Back Number: [phone]  Additional Information:

## 2020-04-23 LAB
FINAL PATHOLOGIC DIAGNOSIS: NORMAL
FINAL PATHOLOGIC DIAGNOSIS: NORMAL
GROSS: NORMAL
GROSS: NORMAL
Lab: NORMAL

## 2020-05-06 ENCOUNTER — TELEPHONE (OUTPATIENT)
Dept: HEMATOLOGY/ONCOLOGY | Facility: CLINIC | Age: 75
End: 2020-05-06

## 2020-05-06 NOTE — TELEPHONE ENCOUNTER
----- Message from Sakshi Jaime MD sent at 5/5/2020 10:23 AM CDT -----  Interesting, plasmacytic neoplasm potentially, will need further workup.    Kristin, can you please arrange for follow-up in my clinic    ----- Message -----  From: ADALGISA Maldonado  Sent: 5/5/2020   8:31 AM CDT  To: Sakshi Jaime MD    Good morning  His liver biopsy pathology has finally been reported.  It is not an HCC or cholangio.    I had Dr. Phillips review and she said to send to oncology.    Looks like the pt has an appt with you later this month.    I have not spoken to the pt yet as I am not sure what this pathology report really means in a cancer-world.    Let me know what I can do to help     Thanks  GABRIELLE Maddox

## 2020-05-15 ENCOUNTER — TELEPHONE (OUTPATIENT)
Dept: RADIOLOGY | Facility: HOSPITAL | Age: 75
End: 2020-05-15

## 2020-05-15 ENCOUNTER — TELEPHONE (OUTPATIENT)
Dept: HEMATOLOGY/ONCOLOGY | Facility: CLINIC | Age: 75
End: 2020-05-15

## 2020-05-18 DIAGNOSIS — I70.219 ATHEROSCLEROTIC PVD WITH INTERMITTENT CLAUDICATION: Primary | ICD-10-CM

## 2020-05-19 ENCOUNTER — PATIENT OUTREACH (OUTPATIENT)
Dept: ADMINISTRATIVE | Facility: OTHER | Age: 75
End: 2020-05-19

## 2020-05-19 NOTE — PROGRESS NOTES
Chart reviewed.   Immunizations: Triggered Imm Registry     Orders placed: n/a  Upcoming appts to satisfy ROSA topics: n/a

## 2020-05-21 ENCOUNTER — OFFICE VISIT (OUTPATIENT)
Dept: INTERNAL MEDICINE | Facility: CLINIC | Age: 75
End: 2020-05-21
Payer: MEDICARE

## 2020-05-21 ENCOUNTER — OFFICE VISIT (OUTPATIENT)
Dept: HEMATOLOGY/ONCOLOGY | Facility: CLINIC | Age: 75
End: 2020-05-21
Payer: MEDICARE

## 2020-05-21 ENCOUNTER — OFFICE VISIT (OUTPATIENT)
Dept: CARDIOLOGY | Facility: CLINIC | Age: 75
End: 2020-05-21
Payer: MEDICARE

## 2020-05-21 ENCOUNTER — HOSPITAL ENCOUNTER (OUTPATIENT)
Dept: CARDIOLOGY | Facility: HOSPITAL | Age: 75
Discharge: HOME | End: 2020-05-21
Attending: INTERNAL MEDICINE
Payer: MEDICARE

## 2020-05-21 ENCOUNTER — TELEPHONE (OUTPATIENT)
Dept: PHARMACY | Facility: CLINIC | Age: 75
End: 2020-05-21

## 2020-05-21 VITALS
WEIGHT: 175.69 LBS | HEART RATE: 62 BPM | SYSTOLIC BLOOD PRESSURE: 138 MMHG | DIASTOLIC BLOOD PRESSURE: 60 MMHG | HEIGHT: 66 IN | BODY MASS INDEX: 28.23 KG/M2 | TEMPERATURE: 97 F | OXYGEN SATURATION: 98 %

## 2020-05-21 VITALS
OXYGEN SATURATION: 98 % | SYSTOLIC BLOOD PRESSURE: 138 MMHG | BODY MASS INDEX: 28.23 KG/M2 | DIASTOLIC BLOOD PRESSURE: 58 MMHG | HEIGHT: 66 IN | HEART RATE: 62 BPM | WEIGHT: 175.69 LBS

## 2020-05-21 VITALS
HEART RATE: 58 BPM | RESPIRATION RATE: 16 BRPM | HEIGHT: 66 IN | BODY MASS INDEX: 28.14 KG/M2 | WEIGHT: 175.06 LBS | DIASTOLIC BLOOD PRESSURE: 68 MMHG | SYSTOLIC BLOOD PRESSURE: 151 MMHG | TEMPERATURE: 97 F | OXYGEN SATURATION: 99 %

## 2020-05-21 DIAGNOSIS — I70.0 AORTIC CALCIFICATION: ICD-10-CM

## 2020-05-21 DIAGNOSIS — J45.20 ASTHMA, MILD INTERMITTENT, WELL-CONTROLLED: ICD-10-CM

## 2020-05-21 DIAGNOSIS — I73.9 CLAUDICATION: ICD-10-CM

## 2020-05-21 DIAGNOSIS — D64.9 ANEMIA, UNSPECIFIED TYPE: ICD-10-CM

## 2020-05-21 DIAGNOSIS — I70.219 ATHEROSCLEROTIC PVD WITH INTERMITTENT CLAUDICATION: Primary | ICD-10-CM

## 2020-05-21 DIAGNOSIS — D53.9 NUTRITIONAL ANEMIA, UNSPECIFIED: ICD-10-CM

## 2020-05-21 DIAGNOSIS — Z86.010 HISTORY OF COLON POLYPS: ICD-10-CM

## 2020-05-21 DIAGNOSIS — I10 ESSENTIAL HYPERTENSION: Chronic | ICD-10-CM

## 2020-05-21 DIAGNOSIS — R16.0 LIVER MASS: ICD-10-CM

## 2020-05-21 DIAGNOSIS — I10 ESSENTIAL HYPERTENSION: ICD-10-CM

## 2020-05-21 DIAGNOSIS — R97.20 ELEVATED PSA, BETWEEN 10 AND LESS THAN 20 NG/ML: ICD-10-CM

## 2020-05-21 DIAGNOSIS — F10.20 ALCOHOL DEPENDENCE, DAILY USE: Chronic | ICD-10-CM

## 2020-05-21 DIAGNOSIS — K76.9 LIVER LESION: Primary | ICD-10-CM

## 2020-05-21 DIAGNOSIS — I70.219 ATHEROSCLEROTIC PVD WITH INTERMITTENT CLAUDICATION: ICD-10-CM

## 2020-05-21 DIAGNOSIS — E78.2 MIXED HYPERLIPIDEMIA: ICD-10-CM

## 2020-05-21 DIAGNOSIS — R91.8 MULTIPLE PULMONARY NODULES: ICD-10-CM

## 2020-05-21 DIAGNOSIS — R71.8 RED BLOOD CELL ABNORMALITY: ICD-10-CM

## 2020-05-21 DIAGNOSIS — Z00.00 ENCOUNTER FOR PREVENTIVE HEALTH EXAMINATION: Primary | ICD-10-CM

## 2020-05-21 PROCEDURE — 3075F SYST BP GE 130 - 139MM HG: CPT | Mod: HCNC,CPTII,S$GLB, | Performed by: INTERNAL MEDICINE

## 2020-05-21 PROCEDURE — 93010 EKG 12-LEAD: ICD-10-PCS | Mod: HCNC,,, | Performed by: INTERNAL MEDICINE

## 2020-05-21 PROCEDURE — 1101F PT FALLS ASSESS-DOCD LE1/YR: CPT | Mod: HCNC,CPTII,S$GLB, | Performed by: INTERNAL MEDICINE

## 2020-05-21 PROCEDURE — 1126F PR PAIN SEVERITY QUANTIFIED, NO PAIN PRESENT: ICD-10-PCS | Mod: HCNC,S$GLB,, | Performed by: INTERNAL MEDICINE

## 2020-05-21 PROCEDURE — 99499 UNLISTED E&M SERVICE: CPT | Mod: S$GLB,,, | Performed by: INTERNAL MEDICINE

## 2020-05-21 PROCEDURE — 1126F AMNT PAIN NOTED NONE PRSNT: CPT | Mod: HCNC,S$GLB,, | Performed by: INTERNAL MEDICINE

## 2020-05-21 PROCEDURE — G0439 PR MEDICARE ANNUAL WELLNESS SUBSEQUENT VISIT: ICD-10-PCS | Mod: HCNC,S$GLB,, | Performed by: NURSE PRACTITIONER

## 2020-05-21 PROCEDURE — 3078F DIAST BP <80 MM HG: CPT | Mod: HCNC,CPTII,S$GLB, | Performed by: INTERNAL MEDICINE

## 2020-05-21 PROCEDURE — 3078F DIAST BP <80 MM HG: CPT | Mod: HCNC,CPTII,S$GLB, | Performed by: NURSE PRACTITIONER

## 2020-05-21 PROCEDURE — 99215 OFFICE O/P EST HI 40 MIN: CPT | Mod: HCNC,S$GLB,, | Performed by: INTERNAL MEDICINE

## 2020-05-21 PROCEDURE — 3075F PR MOST RECENT SYSTOLIC BLOOD PRESS GE 130-139MM HG: ICD-10-PCS | Mod: HCNC,CPTII,S$GLB, | Performed by: INTERNAL MEDICINE

## 2020-05-21 PROCEDURE — 3078F PR MOST RECENT DIASTOLIC BLOOD PRESSURE < 80 MM HG: ICD-10-PCS | Mod: HCNC,CPTII,S$GLB, | Performed by: INTERNAL MEDICINE

## 2020-05-21 PROCEDURE — 3074F PR MOST RECENT SYSTOLIC BLOOD PRESSURE < 130 MM HG: ICD-10-PCS | Mod: HCNC,CPTII,S$GLB, | Performed by: INTERNAL MEDICINE

## 2020-05-21 PROCEDURE — 99214 PR OFFICE/OUTPT VISIT, EST, LEVL IV, 30-39 MIN: ICD-10-PCS | Mod: 25,HCNC,S$GLB, | Performed by: INTERNAL MEDICINE

## 2020-05-21 PROCEDURE — 99999 PR PBB SHADOW E&M-EST. PATIENT-LVL IV: CPT | Mod: PBBFAC,HCNC,, | Performed by: NURSE PRACTITIONER

## 2020-05-21 PROCEDURE — 1159F PR MEDICATION LIST DOCUMENTED IN MEDICAL RECORD: ICD-10-PCS | Mod: HCNC,S$GLB,, | Performed by: INTERNAL MEDICINE

## 2020-05-21 PROCEDURE — 99999 PR PBB SHADOW E&M-EST. PATIENT-LVL IV: CPT | Mod: PBBFAC,HCNC,, | Performed by: INTERNAL MEDICINE

## 2020-05-21 PROCEDURE — 1101F PR PT FALLS ASSESS DOC 0-1 FALLS W/OUT INJ PAST YR: ICD-10-PCS | Mod: HCNC,CPTII,S$GLB, | Performed by: INTERNAL MEDICINE

## 2020-05-21 PROCEDURE — 1159F MED LIST DOCD IN RCRD: CPT | Mod: HCNC,S$GLB,, | Performed by: INTERNAL MEDICINE

## 2020-05-21 PROCEDURE — 99999 PR PBB SHADOW E&M-EST. PATIENT-LVL IV: ICD-10-PCS | Mod: PBBFAC,HCNC,, | Performed by: NURSE PRACTITIONER

## 2020-05-21 PROCEDURE — 3075F SYST BP GE 130 - 139MM HG: CPT | Mod: HCNC,CPTII,S$GLB, | Performed by: NURSE PRACTITIONER

## 2020-05-21 PROCEDURE — 99999 PR PBB SHADOW E&M-EST. PATIENT-LVL IV: ICD-10-PCS | Mod: PBBFAC,HCNC,, | Performed by: INTERNAL MEDICINE

## 2020-05-21 PROCEDURE — 93005 ELECTROCARDIOGRAM TRACING: CPT | Mod: HCNC

## 2020-05-21 PROCEDURE — 99215 PR OFFICE/OUTPT VISIT, EST, LEVL V, 40-54 MIN: ICD-10-PCS | Mod: HCNC,S$GLB,, | Performed by: INTERNAL MEDICINE

## 2020-05-21 PROCEDURE — 99499 RISK ADDL DX/OHS AUDIT: ICD-10-PCS | Mod: S$GLB,,, | Performed by: INTERNAL MEDICINE

## 2020-05-21 PROCEDURE — 3078F PR MOST RECENT DIASTOLIC BLOOD PRESSURE < 80 MM HG: ICD-10-PCS | Mod: HCNC,CPTII,S$GLB, | Performed by: NURSE PRACTITIONER

## 2020-05-21 PROCEDURE — 99999 PR PBB SHADOW E&M-EST. PATIENT-LVL III: ICD-10-PCS | Mod: PBBFAC,HCNC,, | Performed by: INTERNAL MEDICINE

## 2020-05-21 PROCEDURE — 99999 PR PBB SHADOW E&M-EST. PATIENT-LVL III: CPT | Mod: PBBFAC,HCNC,, | Performed by: INTERNAL MEDICINE

## 2020-05-21 PROCEDURE — G0439 PPPS, SUBSEQ VISIT: HCPCS | Mod: HCNC,S$GLB,, | Performed by: NURSE PRACTITIONER

## 2020-05-21 PROCEDURE — 3074F SYST BP LT 130 MM HG: CPT | Mod: HCNC,CPTII,S$GLB, | Performed by: INTERNAL MEDICINE

## 2020-05-21 PROCEDURE — 3075F PR MOST RECENT SYSTOLIC BLOOD PRESS GE 130-139MM HG: ICD-10-PCS | Mod: HCNC,CPTII,S$GLB, | Performed by: NURSE PRACTITIONER

## 2020-05-21 PROCEDURE — 99214 OFFICE O/P EST MOD 30 MIN: CPT | Mod: 25,HCNC,S$GLB, | Performed by: INTERNAL MEDICINE

## 2020-05-21 PROCEDURE — 93010 ELECTROCARDIOGRAM REPORT: CPT | Mod: HCNC,,, | Performed by: INTERNAL MEDICINE

## 2020-05-21 RX ORDER — CILOSTAZOL 100 MG/1
100 TABLET ORAL 2 TIMES DAILY
Qty: 60 TABLET | Refills: 11 | Status: SHIPPED | OUTPATIENT
Start: 2020-05-21 | End: 2020-11-12 | Stop reason: SDUPTHER

## 2020-05-21 NOTE — PATIENT INSTRUCTIONS
Counseling and Referral of Other Preventative  (Italic type indicates deductible and co-insurance are waived)    Patient Name: Alex Milner  Today's Date: 5/21/2020    Health Maintenance       Date Due Completion Date    TETANUS VACCINE 08/21/1961 ---    Shingles Vaccine (1 of 2) 08/21/1993 ---    Colonoscopy 11/15/2018 11/15/2013    Override on 10/23/2008: Done    Lipid Panel 03/10/2021 3/10/2020    PROSTATE-SPECIFIC ANTIGEN 03/31/2021 3/31/2020    Override on 7/16/2013: Done    Override on 4/16/2013: Done    Aspirin/Antiplatelet Therapy 05/21/2021 5/21/2020        No orders of the defined types were placed in this encounter.    The following information is provided to all patients.  This information is to help you find resources for any of the problems found today that may be affecting your health:                Living healthy guide: www.[facility].[place].[facility]      Understanding Diabetes: www.diabetes.org      Eating healthy: www.cdc.gov/healthyweight      [facility] home safety checklist: www.cdc.gov/steadi/patient.html      Agency on Aging: www.goea.[place].[facility]      Alcoholics anonymous (AA): www.aa.org      Physical Activity: www.raya.nih.gov/ri8lrga      Tobacco use: www.quitwithusla.org

## 2020-05-21 NOTE — PROGRESS NOTES
Subjective:   Patient ID:  Alex Milner is a 74 y.o. male who presents for follow up of Essential hypertension (3 month f/u)      73 yo male, 3 months f/u  PMH PAD, HTN, HLP, PVD, obesity, ETOH use  S/p  AO with RO in Nov 2019 with Dr. Ross with successful PTA of RT SFA.  No chest pain sob, dizziness, palpitation,  Resolved claudication after angiogram. Walks 2 miles a day. Only knee pain, No muscle weakness, pain and numbness  No smoking/drinking  Med compliance  EKG today NSR   repeat FREDY left 0.7 and right 1.17 and LE arterial b/l distal disease      Past Medical History:   Diagnosis Date    Atherosclerotic PVD with intermittent claudication 8/16/2019    Atrial fibrillation     pt unaware of this    Back pain     Bilateral inguinal hernia     CT ABdomen/pelvis 3/9/2015 (care everywhere)---Bilateral inguinal hernias containing fat.      Calcified granuloma of lung 10/3/2017    CT CHest 3/26/2018---There are calcified lymph nodes in the mediastinum and left hilum.    CT chest 9/11/ 2017 Calcified left hilar and subcarinal granulomas are noted  ;Calcified granuloma noted within the posterior segment of the right hepatic lobe.    Diverticulosis     Diverticulosis 10/28/13    Colonoscopy     ED (erectile dysfunction)     Elevated PSA     Granuloma of liver     ct chest 3/26/2018---There is a small calcified granuloma posteriorly in the right lobe of the liver    Hyperlipidemia     Hypertension     Moderate persistent asthma without complication 9/26/2017    Obesity     Tobacco dependence     resolved    Uncomplicated alcohol dependence 11/27/2019    Urinary tract infection        Past Surgical History:   Procedure Laterality Date    AORTOGRAPHY WITH SERIALOGRAPHY N/A 10/21/2019    Procedure: AORTOGRAM, WITH RUNOFF;  Surgeon: Chencho Ross MD;  Location: [facility] CATH LAB;  Service: Cardiology;  Laterality: N/A;  pt requesting 9am arrival    AORTOGRAPHY WITH SERIALOGRAPHY N/A 11/14/2019     Procedure: AORTOGRAM, WITH RUNOFF;  Surgeon: Chencho Ross MD;  Location: [facility] CATH LAB;  Service: Cardiology;  Laterality: N/A;    HERNIA REPAIR      x2    PROSTATE BIOPSY      reported per patient around  or  which was reportedly negative       Social History     Tobacco Use    Smoking status: Former Smoker     Packs/day: 2.00     Years: 15.00     Pack years: 30.00     Types: Cigarettes     Start date:      Last attempt to quit: 1985     Years since quittin.8    Smokeless tobacco: Never Used   Substance Use Topics    Alcohol use: Yes     Alcohol/week: 12.0 standard drinks     Types: 12 Cans of beer per week    Drug use: No       Family History   Problem Relation Age of Onset    Cancer Mother         Unknown primary    Cancer Father     Cancer Brother         prostate     Prostate cancer Brother     Cancer Sister 64        pancreatic     Diabetes Neg Hx     Heart disease Neg Hx     Kidney disease Neg Hx     Stroke Neg Hx     Hyperlipidemia Neg Hx     Hypertension Neg Hx          Review of Systems   Constitution: Negative for decreased appetite, diaphoresis, fever, malaise/fatigue and night sweats.   HENT: Negative for nosebleeds.    Eyes: Negative for blurred vision and double vision.   Cardiovascular: Negative for chest pain, claudication, dyspnea on exertion, irregular heartbeat, leg swelling, near-syncope, orthopnea, palpitations, paroxysmal nocturnal dyspnea and syncope.   Respiratory: Negative for cough, shortness of breath, sleep disturbances due to breathing, snoring, sputum production and wheezing.    Endocrine: Negative for cold intolerance and polyuria.   Hematologic/Lymphatic: Does not bruise/bleed easily.   Skin: Negative for rash.   Musculoskeletal: Negative for back pain, falls, joint pain, joint swelling and neck pain.   Gastrointestinal: Negative for abdominal pain, heartburn, nausea and vomiting.   Genitourinary: Negative for dysuria, frequency and  hematuria.   Neurological: Negative for difficulty with concentration, dizziness, focal weakness, headaches, light-headedness, numbness, seizures and weakness.   Psychiatric/Behavioral: Negative for depression, memory loss and substance abuse. The patient does not have insomnia.    Allergic/Immunologic: Negative for HIV exposure and hives.       Objective:   Physical Exam   Constitutional: He is oriented to person, place, and time. He appears well-nourished.   HENT:   Head: Normocephalic.   Eyes: Pupils are equal, round, and reactive to light.   Neck: Normal carotid pulses and no JVD present. Carotid bruit is not present. No thyromegaly present.   Cardiovascular: Normal rate, regular rhythm, normal heart sounds and normal pulses.  No extrasystoles are present. PMI is not displaced. Exam reveals no gallop and no S3.   No murmur heard.  Pulmonary/Chest: Breath sounds normal. No stridor. No respiratory distress.   Abdominal: Soft. Bowel sounds are normal. There is no tenderness. There is no rebound.   Musculoskeletal: Normal range of motion.   Neurological: He is alert and oriented to person, place, and time.   Skin: Skin is intact. No rash noted.   Psychiatric: His behavior is normal.       Lab Results   Component Value Date    CHOL 192 03/10/2020    CHOL 271 (H) 06/27/2019    CHOL 253 (H) 10/27/2017     Lab Results   Component Value Date    HDL 65 03/10/2020    HDL 81 (H) 06/27/2019    HDL 79 (H) 10/27/2017     Lab Results   Component Value Date    LDLCALC 112.2 03/10/2020    LDLCALC 173.2 (H) 06/27/2019    LDLCALC 157.0 10/27/2017     Lab Results   Component Value Date    TRIG 74 03/10/2020    TRIG 84 06/27/2019    TRIG 85 10/27/2017     Lab Results   Component Value Date    CHOLHDL 33.9 03/10/2020    CHOLHDL 29.9 06/27/2019    CHOLHDL 31.2 10/27/2017       Chemistry        Component Value Date/Time     05/21/2020 1152    K 4.4 05/21/2020 1152     05/21/2020 1152    CO2 28 05/21/2020 1152    BUN 15  05/21/2020 1152    CREATININE 1.0 05/21/2020 1152    GLU 98 05/21/2020 1152        Component Value Date/Time    CALCIUM 9.2 05/21/2020 1152    ALKPHOS 106 05/21/2020 1152    AST 15 05/21/2020 1152    ALT 13 05/21/2020 1152    BILITOT 0.4 05/21/2020 1152    ESTGFRAFRICA >60 05/21/2020 1152    EGFRNONAA >60 05/21/2020 1152          No results found for: LABA1C, HGBA1C  Lab Results   Component Value Date    TSH 0.911 06/27/2019     Lab Results   Component Value Date    INR 0.9 03/31/2020    INR 0.9 11/07/2019    INR 1.0 10/14/2019     Lab Results   Component Value Date    WBC 5.51 05/21/2020    HGB 14.3 05/21/2020    HCT 46.5 05/21/2020    MCV 86 05/21/2020     05/21/2020     BMP  Sodium   Date Value Ref Range Status   05/21/2020 141 136 - 145 mmol/L Final     Potassium   Date Value Ref Range Status   05/21/2020 4.4 3.5 - 5.1 mmol/L Final     Chloride   Date Value Ref Range Status   05/21/2020 105 95 - 110 mmol/L Final     CO2   Date Value Ref Range Status   05/21/2020 28 23 - 29 mmol/L Final     BUN, Bld   Date Value Ref Range Status   05/21/2020 15 8 - 23 mg/dL Final     Creatinine   Date Value Ref Range Status   05/21/2020 1.0 0.5 - 1.4 mg/dL Final     Calcium   Date Value Ref Range Status   05/21/2020 9.2 8.7 - 10.5 mg/dL Final     Anion Gap   Date Value Ref Range Status   05/21/2020 8 8 - 16 mmol/L Final     eGFR if    Date Value Ref Range Status   05/21/2020 >60 >60 mL/min/1.73 m^2 Final     eGFR if non    Date Value Ref Range Status   05/21/2020 >60 >60 mL/min/1.73 m^2 Final     Comment:     Calculation used to obtain the estimated glomerular filtration  rate (eGFR) is the CKD-EPI equation.        BNP  @LABRCNTIP(BNP,BNPTRIAGEBLO)@  @LABRCNTIP(troponini)@  Estimated Creatinine Clearance: 62.4 mL/min (based on SCr of 1 mg/dL).  No results found in the last 24 hours.  No results found in the last 24 hours.  No results found in the last 24 hours.    Assessment:      1.  Atherosclerotic PVD with intermittent claudication    2. Claudication    3. Aortic calcification    4. Mixed hyperlipidemia        Plan:   Continue ASA, Lipitor 80 mg, Zetia 10 mg  D/c Plavix  Increase Pletal from 50 mg bid to 100 mg bid  Add repatha  Continue Lisinopril and Amlodipine  Counseled DASH  Recommend heart-healthy diet, weight control and regular exercise.  Tasneem. Risk modification.   RTC in 6 months    I have reviewed all pertinent labs and cardiac studies. Plans and recommendations have been formulated under my direct supervision. All questions answered and patient voiced understanding. Patient to continue current medications.

## 2020-05-21 NOTE — PROGRESS NOTES
"Alex Milner presented for a  Medicare AWV and comprehensive Health Risk Assessment today. The following components were reviewed and updated:    · Medical history  · Family History  · Social history  · Allergies and Current Medications  · Health Risk Assessment  · Health Maintenance  · Care Team     ** See Completed Assessments for Annual Wellness Visit within the encounter summary.**       The following assessments were completed:  · Living Situation  · CAGE  · Depression Screening  · Timed Get Up and Go  · Whisper Test  · Cognitive Function Screening  · Nutrition Screening  · ADL Screening  · PAQ Screening    Vitals:    05/21/20 1102   BP: 138/60   BP Location: Left arm   Patient Position: Sitting   BP Method: Medium (Manual)   Pulse: 62   Temp: 96.5 °F (35.8 °C)   TempSrc: Tympanic   SpO2: 98%   Weight: 79.7 kg (175 lb 11.3 oz)   Height: 5' 6" (1.676 m)     Body mass index is 28.36 kg/m².  Physical Exam   Constitutional: He is oriented to person, place, and time.   HENT:   Head: Normocephalic and atraumatic.   Right Ear: Tympanic membrane normal.   Left Ear: Tympanic membrane normal.   Eyes: Conjunctivae and EOM are normal.   Neck: Normal range of motion. Neck supple.   Cardiovascular: Normal rate, regular rhythm, normal heart sounds and intact distal pulses.   Pulmonary/Chest: Effort normal and breath sounds normal.   Abdominal: Soft. Bowel sounds are normal.   Musculoskeletal: Normal range of motion.   Neurological: He is alert and oriented to person, place, and time.   Skin: Skin is warm and dry.         Diagnoses and health risks identified today and associated recommendations/orders:      1. Encounter for preventive health examination  Pt due to for colonoscopy since 2018 - wants to discuss with PCP further .reluctant to have completed     2. Essential hypertension  Continue current treatment plan as previously prescribed with your PCP.      3. Hyperlipidemia, unspecified hyperlipidemia type  No recent " check. He will follow up at upcoming annual exam.   Continue current treatment plan as previously prescribed with your PCP.      4. Aortic calcification  Ct abdomen/pelvis 3/9/2015 (care everywhere)  Discussed diagnosis and risk reduction.   follow up with PCP for further recommendations.      5. Elevated PSA, between 10 and less than 20 ng/ml  Continue current treatment plan as previously prescribed with your urologist.      6. Asthma, unspecified asthma severity, unspecified whether complicated, unspecified whether persistent  Continue current treatment plan as previously prescribed with your pulmonary NP.      7. Multiple pulmonary nodules  Ct chest 3/26/2018  Continue current treatment plan as previously prescribed with your pulmonary NP.      8. liver mass  Pt saw GI had biopsy of liver, sent to hematology. Evaluation ongoing. Pt still consumes 1-2 beers a day. Encouraged to quit        Provided Alex with a 5-10 year written screening schedule and personal prevention plan. Recommendations were developed using the USPSTF age appropriate recommendations. Education, counseling, and referrals were provided as needed. After Visit Summary printed and given to patient which includes a list of additional screenings\tests needed.    Follow up with PCP and specialists as scheduled  New labs/tests ordered today:  Upcoming health maintenance scheduled:  HRA follow up in 1 year    Julia Solis NP

## 2020-05-21 NOTE — PROGRESS NOTES
Subjective:      DATE OF VISIT: 5/21/20     Patient ID:?lAex Milenr is a 74 y.o. male.?? MR#: [id number]   ?   ? Primary Care Providers:  Loi Patel MD, MD (General)     CHIEF COMPLAINT: ?  Liver mass??   ?   HPI    Mr. Milner is a 74-year-old man with PVD, former tobacco use, BPH with elevated PSA followed by urology (past prostate biopsy per patient report).  He has been surveilled for bilateral pulmonary nodules since 2017, about 7 mm in size; serial CT chest in 2018 in 03/2020 show stability of pulmonary nodules however most recent imaging on 03/20/2020 does show new liver dome lesion.  He denies chest pain, shortness of breaty, cough, copy that you my abdominal pain/distension, edema.  He does have moderate alcohol use but denies history of liver disease/cirrhosis.  He used tobacco quit 20 years ago.  He notes good energy and appetite without unintentional weight loss.    INTERVAL EVENTS:    I AM MEETING HIM FOR THE 1ST TIME IN PERSON AS 1ST VISIT WAS OVER AUDIO ONLY TELE visit.    We reviewed his past medical history and he currently discusses that he is asymptomatic.  He was going for screening CT of the chest with incidental finding of liver lesion.  He denies any pain, weakness, numbness, tingling, headache, bony pain, fracture, nausea/vomiting, diarrhea/constipation, fever, chills, night sweats or unintentional weight loss.    Review of Systems    ?   A comprehensive 14-point review of systems was reviewed with patient and was negative other than as specified above.   ?   PAST MEDICAL HISTORY:   Past Medical History:   Diagnosis Date    Atherosclerotic PVD with intermittent claudication 8/16/2019    Atrial fibrillation     pt unaware of this    Back pain     Bilateral inguinal hernia     CT ABdomen/pelvis 3/9/2015 (care everywhere)---Bilateral inguinal hernias containing fat.      Calcified granuloma of lung 10/3/2017    CT CHest 3/26/2018---There are calcified lymph nodes in the  mediastinum and left hilum.    CT chest 9/11/ 2017 Calcified left hilar and subcarinal granulomas are noted  ;Calcified granuloma noted within the posterior segment of the right hepatic lobe.    Diverticulosis     Diverticulosis 10/28/13    Colonoscopy     ED (erectile dysfunction)     Elevated PSA     Granuloma of liver     ct chest 3/26/2018---There is a small calcified granuloma posteriorly in the right lobe of the liver    Hyperlipidemia     Hypertension     Moderate persistent asthma without complication 9/26/2017    Obesity     Tobacco dependence     resolved    Uncomplicated alcohol dependence 11/27/2019    Urinary tract infection     ?     PAST SURGICAL HISTORY:   Past Surgical History:   Procedure Laterality Date    AORTOGRAPHY WITH SERIALOGRAPHY N/A 10/21/2019    Procedure: AORTOGRAM, WITH RUNOFF;  Surgeon: Chencho Ross MD;  Location: [facility] CATH LAB;  Service: Cardiology;  Laterality: N/A;  pt requesting 9am arrival    AORTOGRAPHY WITH SERIALOGRAPHY N/A 11/14/2019    Procedure: AORTOGRAM, WITH RUNOFF;  Surgeon: Chencho Ross MD;  Location: [facility] CATH LAB;  Service: Cardiology;  Laterality: N/A;    HERNIA REPAIR      x2    PROSTATE BIOPSY      reported per patient around 2014 or 2015 which was reportedly negative      ?   ALLERGIES:   Allergies as of 05/21/2020    (No Known Allergies)      ?   MEDICATIONS:?   Outpatient Medications Marked as Taking for the 5/21/20 encounter (Office Visit) with Sakshi Jaime MD   Medication Sig Dispense Refill    amLODIPine (NORVASC) 2.5 MG tablet Take 1 tablet (2.5 mg total) by mouth once daily. 30 tablet 11    aspirin (ECOTRIN) 81 MG EC tablet Take 1 tablet (81 mg total) by mouth once daily. 30 tablet 6    atorvastatin (LIPITOR) 80 MG tablet Take 1 tablet (80 mg total) by mouth once daily. 30 tablet 11    benzonatate (TESSALON) 200 MG capsule Take 1 capsule (200 mg total) by mouth 3 (three) times daily as needed for Cough. 21 capsule 0     "brimonidine 0.15 % OPTH DROP (ALPHAGAN) 0.15 % ophthalmic solution LOCATION: BOTH EYES. INSTILL ONE DROP IN BOTH EYES TWICE A DAY X 90 DAYS  1    ezetimibe (ZETIA) 10 mg tablet Take 1 tablet (10 mg total) by mouth once daily. 90 tablet 3    finasteride (PROSCAR) 5 mg tablet TAKE 1 TABLET BY MOUTH EVERY DAY 90 tablet 3    latanoprost 0.005 % ophthalmic solution Place 1 drop into both eyes once daily. 1 Bottle 5    lisinopril (PRINIVIL,ZESTRIL) 20 MG tablet Take 1 tablet (20 mg total) by mouth once daily. 90 tablet 3    sildenafil (VIAGRA) 100 MG tablet Take 1 tablet (100 mg total) by mouth daily as needed. 30 tablet 11    sildenafiL (VIAGRA) 100 MG tablet Take 1 tablet (100 mg total) by mouth daily as needed. 30 tablet 11    [DISCONTINUED] cilostazol (PLETAL) 50 MG Tab Take 1 tablet (50 mg total) by mouth 2 (two) times daily. 60 tablet 11    [DISCONTINUED] clopidogrel (PLAVIX) 75 mg tablet Take 1 tablet (75 mg total) by mouth once daily. 30 tablet 11      ?   SOCIAL HISTORY:?   Social History     Tobacco Use    Smoking status: Former Smoker     Packs/day: 2.00     Years: 15.00     Pack years: 30.00     Types: Cigarettes     Start date:      Last attempt to quit: 1985     Years since quittin.8    Smokeless tobacco: Never Used   Substance Use Topics    Alcohol use: Yes     Alcohol/week: 12.0 standard drinks     Types: 12 Cans of beer per week      ?      ?   FAMILY HISTORY:   family history includes Cancer in his brother, father, and mother; Cancer (age of onset: 64) in his sister; Prostate cancer in his brother.   ?        Objective:      Physical Exam    BP (!) 151/68   Pulse (!) 58   Temp 97.1 °F (36.2 °C)   Resp 16   Ht 5' 6" (1.676 m)   Wt 79.4 kg (175 lb 0.7 oz)   SpO2 99%   BMI 28.25 kg/m²      General appearance: Generally well appearing, in no acute distress.   Head, eyes, ears, nose, and throat: Oropharynx clear with moist mucous membranes.   Cardiovascular: Regular rate and " rhythm, S1, S2, no audible murmurs.   Respiratory: Lungs clear to auscultation bilaterally.   Abdomen: nontender, nondistended.   Extremities: Warm, without edema.   Neurologic: Alert and oriented.  Cranial nerves 2-12 intact.  Strength and sensation 5/5 throughout upper and lower extremities.  Skin: No rashes, ecchymoses or petechial lesion.   Psychiatric: normal mood and affect, conversant and appropriate    Laboratory:  ?   Lab Visit on 05/21/2020   Component Date Value Ref Range Status    Ferritin 05/21/2020 189  20.0 - 300.0 ng/mL Final    WBC 05/21/2020 5.51  3.90 - 12.70 K/uL Final    RBC 05/21/2020 5.39  4.60 - 6.20 M/uL Final    Hemoglobin 05/21/2020 14.3  14.0 - 18.0 g/dL Final    Hematocrit 05/21/2020 46.5  40.0 - 54.0 % Final    Mean Corpuscular Volume 05/21/2020 86  82 - 98 fL Final    Mean Corpuscular Hemoglobin 05/21/2020 26.5* 27.0 - 31.0 pg Final    Mean Corpuscular Hemoglobin Conc 05/21/2020 30.8* 32.0 - 36.0 g/dL Final    RDW 05/21/2020 16.6* 11.5 - 14.5 % Final    Platelets 05/21/2020 254  150 - 350 K/uL Final    MPV 05/21/2020 8.8* 9.2 - 12.9 fL Final    Immature Granulocytes 05/21/2020 0.2  0.0 - 0.5 % Final    Gran # (ANC) 05/21/2020 2.9  1.8 - 7.7 K/uL Final    Immature Grans (Abs) 05/21/2020 0.01  0.00 - 0.04 K/uL Final    Lymph # 05/21/2020 1.8  1.0 - 4.8 K/uL Final    Mono # 05/21/2020 0.6  0.3 - 1.0 K/uL Final    Eos # 05/21/2020 0.2  0.0 - 0.5 K/uL Final    Baso # 05/21/2020 0.06  0.00 - 0.20 K/uL Final    nRBC 05/21/2020 0  0 /100 WBC Final    Gran% 05/21/2020 52.1  38.0 - 73.0 % Final    Lymph% 05/21/2020 32.5  18.0 - 48.0 % Final    Mono% 05/21/2020 10.5  4.0 - 15.0 % Final    Eosinophil% 05/21/2020 3.8  0.0 - 8.0 % Final    Basophil% 05/21/2020 1.1  0.0 - 1.9 % Final    Differential Method 05/21/2020 Automated   Final    Iron 05/21/2020 81  45 - 160 ug/dL Final    Transferrin 05/21/2020 194* 200 - 375 mg/dL Final    TIBC 05/21/2020 287  250 - 450 ug/dL  Final    Saturated Iron 05/21/2020 28  20 - 50 % Final    Pathologist Review 05/21/2020 Review required   Final    Sodium 05/21/2020 141  136 - 145 mmol/L Final    Potassium 05/21/2020 4.4  3.5 - 5.1 mmol/L Final    Chloride 05/21/2020 105  95 - 110 mmol/L Final    CO2 05/21/2020 28  23 - 29 mmol/L Final    Glucose 05/21/2020 98  70 - 110 mg/dL Final    BUN, Bld 05/21/2020 15  8 - 23 mg/dL Final    Creatinine 05/21/2020 1.0  0.5 - 1.4 mg/dL Final    Calcium 05/21/2020 9.2  8.7 - 10.5 mg/dL Final    Total Protein 05/21/2020 7.4  6.0 - 8.4 g/dL Final    Albumin 05/21/2020 3.9  3.5 - 5.2 g/dL Final    Total Bilirubin 05/21/2020 0.4  0.1 - 1.0 mg/dL Final    Alkaline Phosphatase 05/21/2020 106  55 - 135 U/L Final    AST 05/21/2020 15  10 - 40 U/L Final    ALT 05/21/2020 13  10 - 44 U/L Final    Anion Gap 05/21/2020 8  8 - 16 mmol/L Final    eGFR if African American 05/21/2020 >60  >60 mL/min/1.73 m^2 Final    eGFR if non African American 05/21/2020 >60  >60 mL/min/1.73 m^2 Final    Vitamin B-12 05/21/2020 236  210 - 950 pg/mL Final    Folate 05/21/2020 10.3  4.0 - 24.0 ng/mL Final    Protein, Serum 05/21/2020 7.2  6.0 - 8.4 g/dL Final    IgG - Serum 05/21/2020 1281  650 - 1600 mg/dL Final    IgA 05/21/2020 289  40 - 350 mg/dL Final    IgM 05/21/2020 59  50 - 300 mg/dL Final        Lab Results   Component Value Date    WBC 6.09 11/07/2019    HGB 13.2 (L) 11/07/2019    HCT 43.3 11/07/2019    MCV 88 11/07/2019     11/07/2019         Chemistry        Component Value Date/Time     05/21/2020 1152    K 4.4 05/21/2020 1152     05/21/2020 1152    CO2 28 05/21/2020 1152    BUN 15 05/21/2020 1152    CREATININE 1.0 05/21/2020 1152    GLU 98 05/21/2020 1152        Component Value Date/Time    CALCIUM 9.2 05/21/2020 1152    ALKPHOS 106 05/21/2020 1152    AST 15 05/21/2020 1152    ALT 13 05/21/2020 1152    BILITOT 0.4 05/21/2020 1152    ESTGFRAFRICA >60 05/21/2020 1152    EGFRNONAA >60  05/21/2020 1152        TUMOR MARKERS  None.    IMAGING    MRI ABDOMEN W WO CONTRAST    CLINICAL HISTORY:  new mass hepatic dome seen on 3/12/2020 CT chest, further evaluate;  Hepatomegaly, not elsewhere classified    TECHNIQUE:  Multiplanar multisequence MR imaging of the abdomen is performed with and without contrast.  7.5 cc of Gadavist was administered without immediate complication.    COMPARISON:  CT of the chest from 03/12/2020    FINDINGS:  There is a large mass noted within the dome of the liver segment 8 that measures 4.9 x 4.0 cm in the greatest axial dimension.  The mass is I so to mildly hyperintense on T2 weighted images with a more central area of significant T2 hyperintensity.  On the T1 weighted images the mass is iso to mildly hypointense with the central portion more hypointense.  Postcontrast images demonstrate what appears to represent mild enhancement involving the majority of the solid component of the mass with the more central area of the mass thought to be necrotic and demonstrate no enhancement.  The differential for this lesion is quite broad but would include metastatic disease along with primary malignancies of the liver including an atypical appearance of an HCC and cholangiocarcinoma.  The remote possibility of hepatic abscess is thought much less likely given the lack of a suspicious history in no obvious perilesional edema seen on the T2 weighted images.    The gallbladder is present and unremarkable in appearance.  No biliary ductal dilation.  The pancreas demonstrates no focal abnormality.  The adrenals are unremarkable.  The spleen is not enlarged.  There is a nonenhancing upper pole left renal lesion that measures approximately 7 mm most consistent with a cyst.      Impression       1. Large lesion within the dome of the liver is new when compared to a prior CT chest dating back to 2018.  Findings are highly suspicious for malignancy.  Other possibilities discussed above.  2.  Remaining findings as discussed above.     PATHOLOGY    04/08/2020 liver biopsy with pathology positive for fibroinflammatory lesion with plasmacytic infiltration and slight increase in IgG4 positive cells.  Immunostains for IgG and IgG4 show slight increase in number of IgG 4 positive cells.  Immunohistochemical stains are performed with adequate controls.   positive plasma cells show kappa light chain predominance by immunoglobulin kappa and lambda light chain in situ hybridization.  Immunoglobulin gene rearrangement performed at [facility] fails to detect clonal  rearrangement. There is no definite evidence of lymphoma or plasma cell neoplasm at this time.  ?   Assessment/Plan:       1. Liver lesion    2. History of colon polyps    3. Multiple pulmonary nodules    4. Essential hypertension    5. Anemia, unspecified type    6. Nutritional anemia, unspecified     7. Red blood cell abnormality          Plan:      # liver lesion:  Pathology showing fibroinflammatory lesion with plasmacytic infiltration slight IgG4 positive increase.  Thorough review systems and physical exam and lab work is without any concerning findings.  Pathology is without evidence of clonal abnormality to suggest malignant process.  I discussed entity of IgG4 disease which can present as lesions although liver is an atypical location.  It can be associated with Castleman's disease however he has no signs or symptoms of this.  I recommended full-body imaging to assess for any additional lesions as well as additional lab work for myeloma screening with peripheral blood smear review, immunoglobulin and serum protein electrophoresis.  If non concerning recommend close surveillance of liver lesion with short interval follow-up scan.    Elevated RDW:  No evidence of anemia.  iron studies, folate and vitamin B12 within normal limits.  Continue to monitor.    # pulmonary nodules:  Bilateral 7 mm pulmonary nodules stable on CT chest  surveillance including most recently 03/20/2020.  Continue monitor and follow-up with pulmonology.    # atherosclerosis, PVD, hypertension, hyperlipidemia:  Follow-up with primary care for optimal control.    # hypoalbuminemia:  Abnormal serum chemistry, may reflect abnormal liver synthetic function or malnutrition setting of malignancy.  Further workup per above.    Follow-Up:   Labs today-scheduled at the [facility]   PET-CT--  June 1, 2020 AT 7:30AM ( NO FOOD OR DRINKS AFTER MIDNIGHT) WATER ONLY   Telehealth PHONE CALL ONLY with results June 8, 2020 at  3:20pm   RV in 4 months with repeat labs/imaging pending PET results

## 2020-05-21 NOTE — PATIENT INSTRUCTIONS
Labs today-scheduled at the [facility]   PET-CT--  June 1, 2020 AT 7:30AM ( NO FOOD OR DRINKS AFTER MIDNIGHT) WATER ONLY   Telehealth PHONE CALL ONLY with results June 8, 2020 at  3:20pm   RV in 4 months with repeat labs/imaging pending PET results

## 2020-05-25 NOTE — TELEPHONE ENCOUNTER
DOCUMENTATION ONLY:  Prior authorization for Ziggy Lindo approved from 5/21/2020 to 5/21/2021.  Case ID# [id number]    Co-pay: $310.00    Patient Assistance IS required.     Forward to patient assistance for review.

## 2020-05-26 ENCOUNTER — OFFICE VISIT (OUTPATIENT)
Dept: OPHTHALMOLOGY | Facility: CLINIC | Age: 75
End: 2020-05-26
Payer: COMMERCIAL

## 2020-05-26 DIAGNOSIS — H25.13 NUCLEAR SCLEROSIS, BILATERAL: Primary | ICD-10-CM

## 2020-05-26 DIAGNOSIS — H40.1132 PRIMARY OPEN ANGLE GLAUCOMA (POAG) OF BOTH EYES, MODERATE STAGE: ICD-10-CM

## 2020-05-26 DIAGNOSIS — H52.13 MYOPIA WITH ASTIGMATISM AND PRESBYOPIA, BILATERAL: ICD-10-CM

## 2020-05-26 DIAGNOSIS — H52.203 MYOPIA WITH ASTIGMATISM AND PRESBYOPIA, BILATERAL: ICD-10-CM

## 2020-05-26 DIAGNOSIS — H52.4 MYOPIA WITH ASTIGMATISM AND PRESBYOPIA, BILATERAL: ICD-10-CM

## 2020-05-26 PROCEDURE — 92015 DETERMINE REFRACTIVE STATE: CPT | Mod: HCNC,S$GLB,, | Performed by: OPTOMETRIST

## 2020-05-26 PROCEDURE — 92015 PR REFRACTION: ICD-10-PCS | Mod: HCNC,S$GLB,, | Performed by: OPTOMETRIST

## 2020-05-26 PROCEDURE — 99999 PR PBB SHADOW E&M-EST. PATIENT-LVL I: CPT | Mod: PBBFAC,HCNC,, | Performed by: OPTOMETRIST

## 2020-05-26 PROCEDURE — 92014 PR EYE EXAM, EST PATIENT,COMPREHESV: ICD-10-PCS | Mod: HCNC,S$GLB,, | Performed by: OPTOMETRIST

## 2020-05-26 PROCEDURE — 92014 COMPRE OPH EXAM EST PT 1/>: CPT | Mod: HCNC,S$GLB,, | Performed by: OPTOMETRIST

## 2020-05-26 PROCEDURE — 99999 PR PBB SHADOW E&M-EST. PATIENT-LVL I: ICD-10-PCS | Mod: PBBFAC,HCNC,, | Performed by: OPTOMETRIST

## 2020-05-26 PROCEDURE — 92133 POSTERIOR SEGMENT OCT OPTIC NERVE(OCULAR COHERENCE TOMOGRAPHY) - OU - BOTH EYES: ICD-10-PCS | Mod: HCNC,S$GLB,, | Performed by: OPTOMETRIST

## 2020-05-26 PROCEDURE — 92133 CPTRZD OPH DX IMG PST SGM ON: CPT | Mod: HCNC,S$GLB,, | Performed by: OPTOMETRIST

## 2020-05-26 NOTE — PROGRESS NOTES
HPI     Pt was last seen on 7/29/19 with [facility].  Medication eye drops if any: Latanoprost qhs OU  Last HVF: 7/29/19  Last gOCT: 5/26/20  Last SDP: none    HPI    Any vision changes since last exam: No  Eye pain: No  Other ocular symptoms: No    Do you wear currently wear glasses or contacts? Glasses    Interested in contacts today? No    Do you plan on getting new glasses today? Yes, if needed             Last edited by Lacy Christian on 5/26/2020  8:26 AM. (History)            Assessment /Plan     For exam results, see Encounter Report.    Nuclear sclerosis, bilateral  Surgery is not indicated at this time.   Monitor 12 months.    Primary open angle glaucoma (POAG) of both eyes, moderate stage  -     Posterior Segment OCT Optic Nerve- Both eyes    Thinner NFL on gOCT today compared to previous    Pt admits to not using Latanoprost every day  Will try better compliance  Will check IOP 2-3 weeks, may add another gtt vs SLT if IOP is not lower      Myopia with astigmatism and presbyopia, bilateral  Eyeglass Final Rx     Eyeglass Final Rx       Sphere Cylinder Axis Add    Right -0.25 +0.25 145 +2.50    Left -0.75   +2.50    Expiration Date:  5/27/2021                RTC for 24-2VF and IOP check or PRN  Discussed above and all questions were answered.

## 2020-05-28 ENCOUNTER — TELEPHONE (OUTPATIENT)
Dept: RADIOLOGY | Facility: HOSPITAL | Age: 75
End: 2020-05-28

## 2020-05-29 ENCOUNTER — TELEPHONE (OUTPATIENT)
Dept: RADIOLOGY | Facility: HOSPITAL | Age: 75
End: 2020-05-29

## 2020-06-01 ENCOUNTER — HOSPITAL ENCOUNTER (OUTPATIENT)
Dept: RADIOLOGY | Facility: HOSPITAL | Age: 75
Discharge: HOME | End: 2020-06-01
Attending: INTERNAL MEDICINE
Payer: MEDICARE

## 2020-06-01 DIAGNOSIS — K76.9 LIVER LESION: ICD-10-CM

## 2020-06-01 DIAGNOSIS — R91.8 MULTIPLE PULMONARY NODULES: ICD-10-CM

## 2020-06-01 PROCEDURE — 78815 PET IMAGE W/CT SKULL-THIGH: CPT | Mod: 26,PI,HCNC, | Performed by: RADIOLOGY

## 2020-06-01 PROCEDURE — 78815 PET IMAGE W/CT SKULL-THIGH: CPT | Mod: TC,HCNC

## 2020-06-01 PROCEDURE — 78815 NM PET CT ROUTINE: ICD-10-PCS | Mod: 26,PI,HCNC, | Performed by: RADIOLOGY

## 2020-06-01 PROCEDURE — A9552 F18 FDG: HCPCS | Mod: HCNC

## 2020-06-04 ENCOUNTER — TELEPHONE (OUTPATIENT)
Dept: PHARMACY | Facility: CLINIC | Age: 75
End: 2020-06-04

## 2020-06-04 NOTE — TELEPHONE ENCOUNTER
Initial Repatha Sureclick consult completed on  . Repatha will be shipped on  to arrive at patient's home on  via [employer]. $310.00 copay. Patient will start Repatha on . Address confirmed. Confirmed 2 patient identifiers - name and . Therapy Appropriate.    --Injection experience: None    Counseled patient on administration directions:  - Inject 140mg into the skin every 14 days.   - Monthly RX will come with gauze, bandaids, and alcohol swabs.    Storage: keep refrigerated, do not freeze. Take out of the refrigerator 30-60 minutes prior to injection.    - Wash hands before and after injection.  - Monthly RX will come with gauze, bandaids, and alcohol swabs.  - Patient may inject in either the tops of the thighs, abdomen- but at least 2 inches away from belly button, or the outer part of upper arm (with assistance).   - Patient is to wipe down the injection site with the alcohol pad, wait to dry.    - Remove white cap from pen  - Gently squeeze OR stretch the area of the cleaned skin and hold it firmly.  Place the pen flat against the skin then push down on the purple button and release - there will be an initial click; in 10-15 seconds you will hear a second click and the window will go from from clear to yellow, indicating injection is complete.  - It is recommended to rotate injection sites; never inject into irritated skin.     Disposal  - Patient will use sharps container; once full, per [place] law, she/ he may lock the sharps container and place in trash. Pharmacy will replace the sharps at no additional charge.    Patient was counseled on possible side effects:  - Injection site reaction: redness, soreness, itching, bruising, which should resolve within 3-5 days.  - flu-like symptoms    DDI: Medication list reviewed and potential DDIs addressed. No DDIs. Patient MUST contact myself or provider prior to starting any new OTC, herbal, or prescription drugs to avoid potential DDIs.    Storage: advised to  keep refrigerated for stability until expiration on the box, but room temperature is ok if used within 30 days. Pt advised to keep in the fridge in an area that will not freeze or get too warm.    Patient did not have any further questions. She was advised to keep a calendar to stay compliant. Consultation included: indication; goals of treatment; administration; storage and handling; side effects; how to handle side effects; the importance of compliance; how to handle missed doses; the importance of laboratory monitoring; the importance of keeping all follow up appointments.  Patient understands to report any medication changes to [facility] and provider. All questions answered and addressed to patients satisfaction. I will f/u with her in 1 week from start, [facility] to contact patient in 3 weeks for refills.     Luis Fernando Bone, PharmD  Clinical Pharmacist  [facility]  P: [phone]

## 2020-06-18 ENCOUNTER — PATIENT OUTREACH (OUTPATIENT)
Dept: ADMINISTRATIVE | Facility: OTHER | Age: 75
End: 2020-06-18

## 2020-06-19 ENCOUNTER — OFFICE VISIT (OUTPATIENT)
Dept: OPHTHALMOLOGY | Facility: CLINIC | Age: 75
End: 2020-06-19
Payer: MEDICARE

## 2020-06-19 DIAGNOSIS — H40.1132 PRIMARY OPEN ANGLE GLAUCOMA (POAG) OF BOTH EYES, MODERATE STAGE: Primary | ICD-10-CM

## 2020-06-19 PROCEDURE — 92012 INTRM OPH EXAM EST PATIENT: CPT | Mod: HCNC,S$GLB,, | Performed by: OPTOMETRIST

## 2020-06-19 PROCEDURE — 99999 PR PBB SHADOW E&M-EST. PATIENT-LVL III: ICD-10-PCS | Mod: PBBFAC,HCNC,, | Performed by: OPTOMETRIST

## 2020-06-19 PROCEDURE — 92083 EXTENDED VISUAL FIELD XM: CPT | Mod: HCNC,S$GLB,, | Performed by: OPTOMETRIST

## 2020-06-19 PROCEDURE — 92012 PR EYE EXAM, EST PATIENT,INTERMED: ICD-10-PCS | Mod: HCNC,S$GLB,, | Performed by: OPTOMETRIST

## 2020-06-19 PROCEDURE — 99999 PR PBB SHADOW E&M-EST. PATIENT-LVL III: CPT | Mod: PBBFAC,HCNC,, | Performed by: OPTOMETRIST

## 2020-06-19 PROCEDURE — 92083 HUMPHREY VISUAL FIELD - OU - BOTH EYES: ICD-10-PCS | Mod: HCNC,S$GLB,, | Performed by: OPTOMETRIST

## 2020-06-19 RX ORDER — DORZOLAMIDE HCL 20 MG/ML
1 SOLUTION/ DROPS OPHTHALMIC 3 TIMES DAILY
Qty: 10 ML | Refills: 1 | Status: SHIPPED | OUTPATIENT
Start: 2020-06-19 | End: 2020-07-14

## 2020-06-19 NOTE — PROGRESS NOTES
HPI     Glaucoma     Comments: HVF and IOP check              Comments     PT was last seen on 5/26/2020 with DNL for Yearly. PT was told to RTC  2-3 weeks for HVF and IOP check.  Medication eye drops if any: Latanoprost qhs OU  Last HVF: 6/19/20  Last gOCT: 5/26/20  Last SDPs:None            Last edited by Samra Jimenez, PCT on 6/19/2020  3:32 PM. (History)              Assessment /Plan     For exam results, see Encounter Report.    Primary open angle glaucoma (POAG) of both eyes, moderate stage  -     Coe Visual Field - OU - Extended - Both Eyes    Other orders  -     dorzolamide (TRUSOPT) 2 % ophthalmic solution; Place 1 drop into both eyes 3 (three) times daily.  Dispense: 10 mL; Refill: 1    IOP not within acceptable range relative to goal  Mild progression noted on gOCT at last visit  Today VF shows mild progression OD as well    Begin Trusopt tid OU  Continue Latanoprost qhs OU    RTC 1 month for IOP check or PRN  Discussed above and all questions were answered.

## 2020-06-19 NOTE — PROGRESS NOTES
Chart reviewed.   Immunizations: Triggered Imm Registry     Orders placed: n/a  Upcoming appts to satisfy ROSA topics: n/a        
No

## 2020-07-02 ENCOUNTER — TELEPHONE (OUTPATIENT)
Dept: PHARMACY | Facility: CLINIC | Age: 75
End: 2020-07-02

## 2020-07-10 ENCOUNTER — DOCUMENTATION ONLY (OUTPATIENT)
Dept: HEMATOLOGY/ONCOLOGY | Facility: CLINIC | Age: 75
End: 2020-07-10

## 2020-07-10 ENCOUNTER — TELEPHONE (OUTPATIENT)
Dept: HEMATOLOGY/ONCOLOGY | Facility: CLINIC | Age: 75
End: 2020-07-10

## 2020-07-10 NOTE — PROGRESS NOTES
Hematology Nurse tried to contact pt regarding a missed video appointment in the Department today, nurse attempted calling  phones numbers listed in epic, no answer, message left for pt to call office to reschedule.  unsuccessful contact # 1

## 2020-07-10 NOTE — PROGRESS NOTES
Hematology Nurse tried to contact pt regarding a missed appointment in the Department today, nurse attempted calling  phones numbers listed in epic, no answer, unable to leave message,   unsuccessful contact # 3

## 2020-07-15 ENCOUNTER — LAB VISIT (OUTPATIENT)
Dept: LAB | Facility: HOSPITAL | Age: 75
End: 2020-07-15
Attending: UROLOGY
Payer: MEDICARE

## 2020-07-15 DIAGNOSIS — N52.9 ERECTILE DYSFUNCTION, UNSPECIFIED ERECTILE DYSFUNCTION TYPE: ICD-10-CM

## 2020-07-15 DIAGNOSIS — N40.0 BENIGN PROSTATIC HYPERPLASIA, UNSPECIFIED WHETHER LOWER URINARY TRACT SYMPTOMS PRESENT: ICD-10-CM

## 2020-07-15 DIAGNOSIS — I10 HYPERTENSION, UNSPECIFIED TYPE: ICD-10-CM

## 2020-07-15 DIAGNOSIS — Z12.5 PROSTATE CANCER SCREENING: ICD-10-CM

## 2020-07-15 PROCEDURE — 36415 COLL VENOUS BLD VENIPUNCTURE: CPT | Mod: HCNC,PO

## 2020-07-15 PROCEDURE — 84153 ASSAY OF PSA TOTAL: CPT | Mod: HCNC

## 2020-07-16 LAB — COMPLEXED PSA SERPL-MCNC: 19.3 NG/ML (ref 0–4)

## 2020-07-22 ENCOUNTER — PATIENT OUTREACH (OUTPATIENT)
Dept: ADMINISTRATIVE | Facility: OTHER | Age: 75
End: 2020-07-22

## 2020-07-22 NOTE — PROGRESS NOTES
Requested updates within Care Everywhere.  Patient's chart was reviewed for overdue ROSA topics.  Immunizations reconciled.    JHON sent to  for colonoscopy results.

## 2020-07-22 NOTE — LETTER
July 22, 2020        Olegario Branham MD  [address] [facility] [address]             [facility]  [address]  [place] [address]  Phone: [phone]   Patient: Alex Milner   MR Number: [id number]   YOB: 1945           Dear Dr. Branham:    Alex Milner is a patient of Dr. Patel. (PCP) at [facility]. While reviewing his/her chart, it has come to our attention that there is an outstanding lab/procedure. Please help keep our Health Maintenance records as accurate and as up to date as possible by supplying the following:     Colonoscopy results from 2018-present.                                                                             Please fax to [facility] @ [phone].    Thank you for your assistance in our patient's healthcare.     Sincerely,       Merlyn Coronado MA           CC  No Recipients

## 2020-07-24 ENCOUNTER — OFFICE VISIT (OUTPATIENT)
Dept: OPHTHALMOLOGY | Facility: CLINIC | Age: 75
End: 2020-07-24
Payer: MEDICARE

## 2020-07-24 DIAGNOSIS — H40.1134 PRIMARY OPEN ANGLE GLAUCOMA OF BOTH EYES, INDETERMINATE STAGE: ICD-10-CM

## 2020-07-24 PROCEDURE — 99999 PR PBB SHADOW E&M-EST. PATIENT-LVL III: ICD-10-PCS | Mod: PBBFAC,HCNC,, | Performed by: OPTOMETRIST

## 2020-07-24 PROCEDURE — 99499 UNLISTED E&M SERVICE: CPT | Mod: HCNC,S$GLB,, | Performed by: OPTOMETRIST

## 2020-07-24 PROCEDURE — 99999 PR PBB SHADOW E&M-EST. PATIENT-LVL III: CPT | Mod: PBBFAC,HCNC,, | Performed by: OPTOMETRIST

## 2020-07-24 PROCEDURE — 92012 INTRM OPH EXAM EST PATIENT: CPT | Mod: HCNC,S$GLB,, | Performed by: OPTOMETRIST

## 2020-07-24 PROCEDURE — 92012 PR EYE EXAM, EST PATIENT,INTERMED: ICD-10-PCS | Mod: HCNC,S$GLB,, | Performed by: OPTOMETRIST

## 2020-07-24 PROCEDURE — 99499 RISK ADDL DX/OHS AUDIT: ICD-10-PCS | Mod: HCNC,S$GLB,, | Performed by: OPTOMETRIST

## 2020-07-24 RX ORDER — LATANOPROST 50 UG/ML
1 SOLUTION/ DROPS OPHTHALMIC DAILY
Qty: 1 BOTTLE | Refills: 5 | Status: SHIPPED | OUTPATIENT
Start: 2020-07-24 | End: 2022-07-11

## 2020-07-24 NOTE — PROGRESS NOTES
HPI     Pt was last seen on 6/19/20 for HVF and IOP check. PT was told to rtc 1   month for IOP check  Medication eye drops if any: Latanoprost qhs OU, Trusopt tid (pt states   uses bid)  Last HVF: 6/19/20  Last gOCT: 5/26/20  Last SDPs:None    Last edited by Vicky Yin MA on 7/24/2020  1:06 PM. (History)            Assessment /Plan     For exam results, see Encounter Report.    Primary open angle glaucoma of both eyes, indeterminate stage  -     latanoprost 0.005 % ophthalmic solution; Place 1 drop into both eyes once daily.  Dispense: 1 Bottle; Refill: 5      IOP stable today and within acceptable range OU  Continue current treatment  Monitor 3 months    Latanoprost qhs OU  trusopt bid OU    RTC 3 months for IOP check or PRN  Discussed above and all questions were answered.

## 2020-07-30 ENCOUNTER — TELEPHONE (OUTPATIENT)
Dept: PHARMACY | Facility: CLINIC | Age: 75
End: 2020-07-30

## 2020-07-30 NOTE — TELEPHONE ENCOUNTER
Refill call regarding Repatha at [facility]. Will prepare for shipment with patient consent on 8/3 to arrive .Copay 60.00 CCOF ending in 3822. Patient has not started any new medications including OTC drugs. Patient has not had any medication/ dose or instruction changes. No new allergies or side effects reported with this shipment. Medication is being taken as prescribed by physician and properly stored. Two patient identifiers:  and Address verified. Patient has no questions or concerns for RPH. Next injection , no sharps needed.

## 2020-08-21 DIAGNOSIS — I10 ESSENTIAL HYPERTENSION: Chronic | ICD-10-CM

## 2020-08-21 RX ORDER — AMLODIPINE BESYLATE 2.5 MG/1
2.5 TABLET ORAL DAILY
Qty: 30 TABLET | Refills: 11 | Status: SHIPPED | OUTPATIENT
Start: 2020-08-21 | End: 2020-11-12 | Stop reason: SDUPTHER

## 2020-08-26 ENCOUNTER — TELEPHONE (OUTPATIENT)
Dept: PHARMACY | Facility: CLINIC | Age: 75
End: 2020-08-26

## 2020-09-24 ENCOUNTER — TELEPHONE (OUTPATIENT)
Dept: PHARMACY | Facility: CLINIC | Age: 75
End: 2020-09-24

## 2020-10-24 ENCOUNTER — TELEPHONE (OUTPATIENT)
Dept: PHARMACY | Facility: CLINIC | Age: 75
End: 2020-10-24

## 2020-10-24 NOTE — TELEPHONE ENCOUNTER
Emergency Refill plan: Repatha shipping 10/26 to arrive 10/27, Copay $60 CCOF (1412).  Patient next injection due 10/28, patient has not started any new medications nor is patient experiencing any new side effects.  No questions or concerns for the Pharmacist at this time. No sharps needed.

## 2020-10-27 ENCOUNTER — OFFICE VISIT (OUTPATIENT)
Dept: OPHTHALMOLOGY | Facility: CLINIC | Age: 75
End: 2020-10-27
Payer: MEDICARE

## 2020-10-27 DIAGNOSIS — H40.1134 PRIMARY OPEN ANGLE GLAUCOMA OF BOTH EYES, INDETERMINATE STAGE: Primary | ICD-10-CM

## 2020-10-27 PROCEDURE — 92012 PR EYE EXAM, EST PATIENT,INTERMED: ICD-10-PCS | Mod: HCNC,S$GLB,, | Performed by: OPTOMETRIST

## 2020-10-27 PROCEDURE — 92012 INTRM OPH EXAM EST PATIENT: CPT | Mod: HCNC,S$GLB,, | Performed by: OPTOMETRIST

## 2020-10-27 PROCEDURE — 99999 PR PBB SHADOW E&M-EST. PATIENT-LVL III: CPT | Mod: PBBFAC,HCNC,, | Performed by: OPTOMETRIST

## 2020-10-27 PROCEDURE — 99999 PR PBB SHADOW E&M-EST. PATIENT-LVL III: ICD-10-PCS | Mod: PBBFAC,HCNC,, | Performed by: OPTOMETRIST

## 2020-10-27 RX ORDER — BRIMONIDINE TARTRATE 1.5 MG/ML
1 SOLUTION/ DROPS OPHTHALMIC 3 TIMES DAILY
Qty: 15 ML | Refills: 4 | Status: SHIPPED | OUTPATIENT
Start: 2020-10-27 | End: 2022-09-07

## 2020-10-27 NOTE — PROGRESS NOTES
HPI     Patient last visit with DNL on 07/24/2020 for 1 month IOP Check.  Was told to rtc 3 months for IOP Check.  Medication eye drops if any: Latanoprost QHS OU and Trusopt BID OU  Last HVF: 6/19/20  Last gOCT: 5/26/20  Last SDPs: None    Last edited by Lacy Christian on 10/27/2020  9:52 AM. (History)            Assessment /Plan     For exam results, see Encounter Report.    Primary open angle glaucoma of both eyes, indeterminate stage  -     brimonidine 0.15 % OPTH DROP (ALPHAGAN) 0.15 % ophthalmic solution; Place 1 drop into both eyes 3 (three) times daily.  Dispense: 15 mL; Refill: 4  IOP elevated today OD>OS  Continue Latanoprost qhs OU  Start Trusopt tid OU  Begin brimonidine tid OU      RTC 1 month for IOP check or PRN  Discussed above and all questions were answered.

## 2020-11-11 ENCOUNTER — PATIENT OUTREACH (OUTPATIENT)
Dept: ADMINISTRATIVE | Facility: OTHER | Age: 75
End: 2020-11-11

## 2020-11-12 ENCOUNTER — OFFICE VISIT (OUTPATIENT)
Dept: CARDIOLOGY | Facility: CLINIC | Age: 75
End: 2020-11-12
Payer: MEDICARE

## 2020-11-12 VITALS
OXYGEN SATURATION: 99 % | WEIGHT: 176.38 LBS | HEART RATE: 57 BPM | SYSTOLIC BLOOD PRESSURE: 145 MMHG | BODY MASS INDEX: 28.47 KG/M2 | DIASTOLIC BLOOD PRESSURE: 47 MMHG

## 2020-11-12 DIAGNOSIS — I10 ESSENTIAL HYPERTENSION: Chronic | ICD-10-CM

## 2020-11-12 DIAGNOSIS — I70.219 ATHEROSCLEROTIC PVD WITH INTERMITTENT CLAUDICATION: Primary | ICD-10-CM

## 2020-11-12 DIAGNOSIS — I73.9 CLAUDICATION: ICD-10-CM

## 2020-11-12 DIAGNOSIS — E78.2 MIXED HYPERLIPIDEMIA: ICD-10-CM

## 2020-11-12 DIAGNOSIS — I70.0 AORTIC CALCIFICATION: ICD-10-CM

## 2020-11-12 DIAGNOSIS — I77.89 OTHER SPECIFIED DISORDERS OF ARTERIES AND ARTERIOLES: ICD-10-CM

## 2020-11-12 PROCEDURE — 99999 PR PBB SHADOW E&M-EST. PATIENT-LVL III: CPT | Mod: PBBFAC,HCNC,, | Performed by: INTERNAL MEDICINE

## 2020-11-12 PROCEDURE — 3077F PR MOST RECENT SYSTOLIC BLOOD PRESSURE >= 140 MM HG: ICD-10-PCS | Mod: HCNC,CPTII,S$GLB, | Performed by: INTERNAL MEDICINE

## 2020-11-12 PROCEDURE — 3078F PR MOST RECENT DIASTOLIC BLOOD PRESSURE < 80 MM HG: ICD-10-PCS | Mod: HCNC,CPTII,S$GLB, | Performed by: INTERNAL MEDICINE

## 2020-11-12 PROCEDURE — 99214 OFFICE O/P EST MOD 30 MIN: CPT | Mod: HCNC,S$GLB,, | Performed by: INTERNAL MEDICINE

## 2020-11-12 PROCEDURE — 3077F SYST BP >= 140 MM HG: CPT | Mod: HCNC,CPTII,S$GLB, | Performed by: INTERNAL MEDICINE

## 2020-11-12 PROCEDURE — 1159F MED LIST DOCD IN RCRD: CPT | Mod: HCNC,S$GLB,, | Performed by: INTERNAL MEDICINE

## 2020-11-12 PROCEDURE — 99214 PR OFFICE/OUTPT VISIT, EST, LEVL IV, 30-39 MIN: ICD-10-PCS | Mod: HCNC,S$GLB,, | Performed by: INTERNAL MEDICINE

## 2020-11-12 PROCEDURE — 99999 PR PBB SHADOW E&M-EST. PATIENT-LVL III: ICD-10-PCS | Mod: PBBFAC,HCNC,, | Performed by: INTERNAL MEDICINE

## 2020-11-12 PROCEDURE — 1159F PR MEDICATION LIST DOCUMENTED IN MEDICAL RECORD: ICD-10-PCS | Mod: HCNC,S$GLB,, | Performed by: INTERNAL MEDICINE

## 2020-11-12 PROCEDURE — 3078F DIAST BP <80 MM HG: CPT | Mod: HCNC,CPTII,S$GLB, | Performed by: INTERNAL MEDICINE

## 2020-11-12 RX ORDER — AMLODIPINE BESYLATE 10 MG/1
10 TABLET ORAL DAILY
Qty: 30 TABLET | Refills: 11 | Status: SHIPPED | OUTPATIENT
Start: 2020-11-12 | End: 2023-06-13

## 2020-11-12 RX ORDER — ASPIRIN 81 MG/1
81 TABLET ORAL DAILY
Qty: 30 TABLET | Refills: 6 | Status: SHIPPED | OUTPATIENT
Start: 2020-11-12 | End: 2021-09-21

## 2020-11-12 RX ORDER — CILOSTAZOL 100 MG/1
100 TABLET ORAL 2 TIMES DAILY
Qty: 60 TABLET | Refills: 11 | Status: SHIPPED | OUTPATIENT
Start: 2020-11-12 | End: 2022-05-24 | Stop reason: SDUPTHER

## 2020-11-12 NOTE — PROGRESS NOTES
Subjective:   Patient ID:  Alex Milner is a 75 y.o. male who presents for follow up of Follow-up      76 yo male, 6 months f/u  PMH PAD, HTN, HLP, PVD, obesity, ETOH use PAULA on CPAP. No smoking  C/o bilateral thigh and buttock pain with walking and some time long time sitting, and back pain at night,. No calf pain   left FREDY 0.7 and right 1.1. LE arterial US showed left infrapopliteal stenosis and occluded AT   AO showed left infrapopliteal . Distal left SFA PTCA and  AO right SFA PTCA   Chest CT wo contrast showed mild aorta calcification  No chest pain sob, dizziness, palpitation,  Resolved claudication after angiogram. Walks 2 miles a day. Only knee pain, No muscle weakness, pain and numbness  No smoking/drinking  Med compliance          Past Medical History:   Diagnosis Date    Atherosclerotic PVD with intermittent claudication 8/16/2019    Atrial fibrillation     pt unaware of this    Back pain     Bilateral inguinal hernia     CT ABdomen/pelvis 3/9/2015 (care everywhere)---Bilateral inguinal hernias containing fat.      Calcified granuloma of lung 10/3/2017    CT CHest 3/26/2018---There are calcified lymph nodes in the mediastinum and left hilum.    CT chest 9/11/ 2017 Calcified left hilar and subcarinal granulomas are noted  ;Calcified granuloma noted within the posterior segment of the right hepatic lobe.    Diverticulosis     Diverticulosis 10/28/13    Colonoscopy     ED (erectile dysfunction)     Elevated PSA     Granuloma of liver     ct chest 3/26/2018---There is a small calcified granuloma posteriorly in the right lobe of the liver    Hyperlipidemia     Hypertension     Moderate persistent asthma without complication 9/26/2017    Obesity     Tobacco dependence     resolved    Uncomplicated alcohol dependence 11/27/2019    Urinary tract infection        Past Surgical History:   Procedure Laterality Date    AORTOGRAPHY WITH SERIALOGRAPHY N/A  10/21/2019    Procedure: AORTOGRAM, WITH RUNOFF;  Surgeon: Chencho Ross MD;  Location: [facility] CATH LAB;  Service: Cardiology;  Laterality: N/A;  pt requesting 9am arrival    AORTOGRAPHY WITH SERIALOGRAPHY N/A 2019    Procedure: AORTOGRAM, WITH RUNOFF;  Surgeon: Chencho Ross MD;  Location: [facility] CATH LAB;  Service: Cardiology;  Laterality: N/A;    HERNIA REPAIR      x2    PROSTATE BIOPSY      reported per patient around  or  which was reportedly negative       Social History     Tobacco Use    Smoking status: Former Smoker     Packs/day: 2.00     Years: 15.00     Pack years: 30.00     Types: Cigarettes     Start date:      Quit date: 1985     Years since quittin.2    Smokeless tobacco: Never Used   Substance Use Topics    Alcohol use: Yes     Alcohol/week: 12.0 standard drinks     Types: 12 Cans of beer per week    Drug use: No       Family History   Problem Relation Age of Onset    Cancer Mother         Unknown primary    Cancer Father     Cancer Brother         prostate     Prostate cancer Brother     Cancer Sister 64        pancreatic     Diabetes Neg Hx     Heart disease Neg Hx     Kidney disease Neg Hx     Stroke Neg Hx     Hyperlipidemia Neg Hx     Hypertension Neg Hx          Review of Systems   Constitution: Negative for decreased appetite, diaphoresis, fever, malaise/fatigue and night sweats.   HENT: Negative for nosebleeds.    Eyes: Negative for blurred vision and double vision.   Cardiovascular: Negative for chest pain, claudication, dyspnea on exertion, irregular heartbeat, leg swelling, near-syncope, orthopnea, palpitations, paroxysmal nocturnal dyspnea and syncope.   Respiratory: Negative for cough, shortness of breath, sleep disturbances due to breathing, snoring, sputum production and wheezing.    Endocrine: Negative for cold intolerance and polyuria.   Hematologic/Lymphatic: Does not bruise/bleed easily.   Skin: Negative for rash.   Musculoskeletal:  Negative for back pain, falls, joint pain, joint swelling and neck pain.   Gastrointestinal: Negative for abdominal pain, heartburn, nausea and vomiting.   Genitourinary: Negative for dysuria, frequency and hematuria.   Neurological: Negative for difficulty with concentration, dizziness, focal weakness, headaches, light-headedness, numbness, seizures and weakness.   Psychiatric/Behavioral: Negative for depression, memory loss and substance abuse. The patient does not have insomnia.    Allergic/Immunologic: Negative for HIV exposure and hives.       Objective:   Physical Exam   Constitutional: He is oriented to person, place, and time. He appears well-nourished.   HENT:   Head: Normocephalic.   Eyes: Pupils are equal, round, and reactive to light.   Neck: Normal carotid pulses and no JVD present. Carotid bruit is not present. No thyromegaly present.   Cardiovascular: Normal rate, regular rhythm, normal heart sounds and normal pulses.  No extrasystoles are present. PMI is not displaced. Exam reveals no gallop and no S3.   No murmur heard.  Pulmonary/Chest: Breath sounds normal. No stridor. No respiratory distress.   Abdominal: Soft. Bowel sounds are normal. There is no abdominal tenderness. There is no rebound.   Musculoskeletal: Normal range of motion.   Neurological: He is alert and oriented to person, place, and time.   Skin: Skin is intact. No rash noted.   Psychiatric: His behavior is normal.       Lab Results   Component Value Date    CHOL 192 03/10/2020    CHOL 271 (H) 06/27/2019    CHOL 253 (H) 10/27/2017     Lab Results   Component Value Date    HDL 65 03/10/2020    HDL 81 (H) 06/27/2019    HDL 79 (H) 10/27/2017     Lab Results   Component Value Date    LDLCALC 112.2 03/10/2020    LDLCALC 173.2 (H) 06/27/2019    LDLCALC 157.0 10/27/2017     Lab Results   Component Value Date    TRIG 74 03/10/2020    TRIG 84 06/27/2019    TRIG 85 10/27/2017     Lab Results   Component Value Date    CHOLHDL 33.9 03/10/2020     CHOLHDL 29.9 06/27/2019    CHOLHDL 31.2 10/27/2017       Chemistry        Component Value Date/Time     05/21/2020 1152    K 4.4 05/21/2020 1152     05/21/2020 1152    CO2 28 05/21/2020 1152    BUN 15 05/21/2020 1152    CREATININE 1.0 05/21/2020 1152    GLU 98 05/21/2020 1152        Component Value Date/Time    CALCIUM 9.2 05/21/2020 1152    ALKPHOS 106 05/21/2020 1152    AST 15 05/21/2020 1152    ALT 13 05/21/2020 1152    BILITOT 0.4 05/21/2020 1152    ESTGFRAFRICA >60 05/21/2020 1152    EGFRNONAA >60 05/21/2020 1152          No results found for: LABA1C, HGBA1C  Lab Results   Component Value Date    TSH 0.911 06/27/2019     Lab Results   Component Value Date    INR 0.9 03/31/2020    INR 0.9 11/07/2019    INR 1.0 10/14/2019     Lab Results   Component Value Date    WBC 5.51 05/21/2020    HGB 14.3 05/21/2020    HCT 46.5 05/21/2020    MCV 86 05/21/2020     05/21/2020     BMP  Sodium   Date Value Ref Range Status   05/21/2020 141 136 - 145 mmol/L Final     Potassium   Date Value Ref Range Status   05/21/2020 4.4 3.5 - 5.1 mmol/L Final     Chloride   Date Value Ref Range Status   05/21/2020 105 95 - 110 mmol/L Final     CO2   Date Value Ref Range Status   05/21/2020 28 23 - 29 mmol/L Final     BUN   Date Value Ref Range Status   05/21/2020 15 8 - 23 mg/dL Final     Creatinine   Date Value Ref Range Status   05/21/2020 1.0 0.5 - 1.4 mg/dL Final     Calcium   Date Value Ref Range Status   05/21/2020 9.2 8.7 - 10.5 mg/dL Final     Anion Gap   Date Value Ref Range Status   05/21/2020 8 8 - 16 mmol/L Final     eGFR if    Date Value Ref Range Status   05/21/2020 >60 >60 mL/min/1.73 m^2 Final     eGFR if non    Date Value Ref Range Status   05/21/2020 >60 >60 mL/min/1.73 m^2 Final     Comment:     Calculation used to obtain the estimated glomerular filtration  rate (eGFR) is the CKD-EPI equation.        BNP  @LABRCNTIP(BNP,BNPTRIAGEBLO)@  @LABRCNTIP(troponini)@  CrCl cannot  be calculated (Patient's most recent lab result is older than the maximum 7 days allowed.).  No results found in the last 24 hours.  No results found in the last 24 hours.  No results found in the last 24 hours.    Assessment:      1. Atherosclerotic PVD with intermittent claudication    2. Claudication    3. Aortic calcification    4. Mixed hyperlipidemia    5. Essential hypertension    6. Other specified disorders of arteries and arterioles       Lower back pain fron  Plan:   Increase Amlodipine to 10 mg daily for HTN  Resume Pletal 100 mg bid and ASA 81mg  Continue Lipitor Zetia and repatha  Repeat Lipid profile  Continue Lisinopril and prostate med  Carotid US ordered    Counseled DASH  Check Lipid profile in 6 months  Recommend heart-healthy diet, weight control and regular exercise.  Tasneem. Risk modification.   I have reviewed all pertinent labs and cardiac studies independently. Plans and recommendations have been formulated under my direct supervision. All questions answered and patient voiced understanding.   If symptoms persist go to the ED  RTC in 6 months

## 2020-11-16 ENCOUNTER — TELEPHONE (OUTPATIENT)
Dept: CARDIOLOGY | Facility: HOSPITAL | Age: 75
End: 2020-11-16

## 2020-11-16 ENCOUNTER — LAB VISIT (OUTPATIENT)
Dept: LAB | Facility: HOSPITAL | Age: 75
End: 2020-11-16
Attending: INTERNAL MEDICINE
Payer: MEDICARE

## 2020-11-16 DIAGNOSIS — E78.2 MIXED HYPERLIPIDEMIA: ICD-10-CM

## 2020-11-16 LAB
CHOLEST SERPL-MCNC: 181 MG/DL (ref 120–199)
CHOLEST/HDLC SERPL: 2.2 {RATIO} (ref 2–5)
HDLC SERPL-MCNC: 81 MG/DL (ref 40–75)
HDLC SERPL: 44.8 % (ref 20–50)
LDLC SERPL CALC-MCNC: 84.8 MG/DL (ref 63–159)
NONHDLC SERPL-MCNC: 100 MG/DL
TRIGL SERPL-MCNC: 76 MG/DL (ref 30–150)

## 2020-11-16 PROCEDURE — 80061 LIPID PANEL: CPT | Mod: HCNC

## 2020-11-16 PROCEDURE — 36415 COLL VENOUS BLD VENIPUNCTURE: CPT | Mod: HCNC

## 2020-11-18 ENCOUNTER — SPECIALTY PHARMACY (OUTPATIENT)
Dept: PHARMACY | Facility: CLINIC | Age: 75
End: 2020-11-18

## 2020-11-18 ENCOUNTER — TELEPHONE (OUTPATIENT)
Dept: CARDIOLOGY | Facility: CLINIC | Age: 75
End: 2020-11-18

## 2020-11-18 RX ORDER — EVOLOCUMAB 140 MG/ML
140 INJECTION, SOLUTION SUBCUTANEOUS
Qty: 2 SYRINGE | Refills: 5 | Status: SHIPPED | OUTPATIENT
Start: 2020-11-18 | End: 2021-01-22

## 2020-11-18 NOTE — TELEPHONE ENCOUNTER
Pt contacted about results, pt verbalized understanding.            ----- Message from Zackary Upton MD sent at 11/17/2020 10:02 PM CST -----  Ok

## 2020-11-19 ENCOUNTER — HOSPITAL ENCOUNTER (OUTPATIENT)
Dept: CARDIOLOGY | Facility: HOSPITAL | Age: 75
Discharge: HOME | End: 2020-11-19
Attending: INTERNAL MEDICINE
Payer: MEDICARE

## 2020-11-19 VITALS
HEIGHT: 66 IN | DIASTOLIC BLOOD PRESSURE: 72 MMHG | SYSTOLIC BLOOD PRESSURE: 168 MMHG | BODY MASS INDEX: 28.28 KG/M2 | WEIGHT: 176 LBS

## 2020-11-19 DIAGNOSIS — I77.89 OTHER SPECIFIED DISORDERS OF ARTERIES AND ARTERIOLES: ICD-10-CM

## 2020-11-19 DIAGNOSIS — I70.219 ATHEROSCLEROTIC PVD WITH INTERMITTENT CLAUDICATION: ICD-10-CM

## 2020-11-19 LAB
LEFT ARM DIASTOLIC BLOOD PRESSURE: 72 MMHG
LEFT ARM SYSTOLIC BLOOD PRESSURE: 168 MMHG
LEFT CBA DIAS: 9 CM/S
LEFT CBA SYS: 62 CM/S
LEFT CCA DIST DIAS: 13 CM/S
LEFT CCA DIST SYS: 96 CM/S
LEFT CCA MID DIAS: 8 CM/S
LEFT CCA MID SYS: 89 CM/S
LEFT CCA PROX DIAS: 12 CM/S
LEFT CCA PROX SYS: 121 CM/S
LEFT ECA DIAS: 2 CM/S
LEFT ECA SYS: 122 CM/S
LEFT ICA DIST DIAS: 21 CM/S
LEFT ICA DIST SYS: 99 CM/S
LEFT ICA MID DIAS: 22 CM/S
LEFT ICA MID SYS: 106 CM/S
LEFT ICA PROX DIAS: 21 CM/S
LEFT ICA PROX SYS: 99 CM/S
LEFT VERTEBRAL DIAS: 10 CM/S
LEFT VERTEBRAL SYS: 79 CM/S
OHS CV CAROTID RIGHT ICA EDV HIGHEST: 18
OHS CV CAROTID ULTRASOUND LEFT ICA/CCA RATIO: 1.1
OHS CV CAROTID ULTRASOUND RIGHT ICA/CCA RATIO: 1.05
OHS CV PV CAROTID LEFT HIGHEST CCA: 121
OHS CV PV CAROTID LEFT HIGHEST ICA: 106
OHS CV PV CAROTID RIGHT HIGHEST CCA: 96
OHS CV PV CAROTID RIGHT HIGHEST ICA: 86
OHS CV US CAROTID LEFT HIGHEST EDV: 22
RIGHT ARM DIASTOLIC BLOOD PRESSURE: 67 MMHG
RIGHT ARM SYSTOLIC BLOOD PRESSURE: 145 MMHG
RIGHT CBA DIAS: 12 CM/S
RIGHT CBA SYS: 70 CM/S
RIGHT CCA DIST DIAS: 10 CM/S
RIGHT CCA DIST SYS: 82 CM/S
RIGHT CCA MID DIAS: 9 CM/S
RIGHT CCA MID SYS: 95 CM/S
RIGHT CCA PROX DIAS: 7 CM/S
RIGHT CCA PROX SYS: 96 CM/S
RIGHT ECA DIAS: 6 CM/S
RIGHT ECA SYS: 138 CM/S
RIGHT ICA DIST DIAS: 18 CM/S
RIGHT ICA DIST SYS: 86 CM/S
RIGHT ICA MID DIAS: 15 CM/S
RIGHT ICA MID SYS: 72 CM/S
RIGHT ICA PROX DIAS: 12 CM/S
RIGHT ICA PROX SYS: 74 CM/S
RIGHT VERTEBRAL DIAS: 8 CM/S
RIGHT VERTEBRAL SYS: 82 CM/S

## 2020-11-19 PROCEDURE — 93880 EXTRACRANIAL BILAT STUDY: CPT | Mod: 26,HCNC,, | Performed by: INTERNAL MEDICINE

## 2020-11-19 PROCEDURE — 93880 EXTRACRANIAL BILAT STUDY: CPT | Mod: HCNC

## 2020-11-19 PROCEDURE — 93880 CV US DOPPLER CAROTID (CUPID ONLY): ICD-10-PCS | Mod: 26,HCNC,, | Performed by: INTERNAL MEDICINE

## 2020-11-20 ENCOUNTER — TELEPHONE (OUTPATIENT)
Dept: CARDIOLOGY | Facility: CLINIC | Age: 75
End: 2020-11-20

## 2020-11-20 NOTE — TELEPHONE ENCOUNTER
Pt contacted about results, lvm for pt to call back.          ----- Message from Zackary Upton MD sent at 11/19/2020  9:27 PM CST -----  Carotid US mild Dz

## 2020-11-24 NOTE — TELEPHONE ENCOUNTER
Specialty Pharmacy - Refill Coordination    Specialty Medication Orders Linked to Encounter      Most Recent Value   Medication #1  evolocumab (REPATHA SURECLICK) 140 mg/mL PnIj (Order#[id number], Rx#[id number])          Refill Questions - Documented Responses      Most Recent Value   Relationship to patient of person spoken to?  Self   HIPAA/medical authority confirmed?  Yes   How will the patient receive the medication?  Mail   When does the patient need to receive the medication?  11/25/20   Expected Copay ($)  60   Payment Method  CC on file   Days supply of Refill  28   Would patient like to speak to a pharmacist?  No   Refill activity plan  Refill scheduled   Shipment/Pickup Date:  11/24/20          Current Outpatient Medications   Medication Sig    amLODIPine (NORVASC) 10 MG tablet Take 1 tablet (10 mg total) by mouth once daily.    aspirin (ECOTRIN) 81 MG EC tablet Take 1 tablet (81 mg total) by mouth once daily.    atorvastatin (LIPITOR) 80 MG tablet Take 1 tablet (80 mg total) by mouth once daily.    benzonatate (TESSALON) 200 MG capsule Take 1 capsule (200 mg total) by mouth 3 (three) times daily as needed for Cough.    brimonidine 0.15 % OPTH DROP (ALPHAGAN) 0.15 % ophthalmic solution Place 1 drop into both eyes 3 (three) times daily.    cilostazoL (PLETAL) 100 MG Tab Take 1 tablet (100 mg total) by mouth 2 (two) times daily.    dorzolamide (TRUSOPT) 2 % ophthalmic solution INSTILL 1 DROP INTO BOTH EYES 3 TIMES A DAY    evolocumab (REPATHA SURECLICK) 140 mg/mL PnIj Inject 1 mL (140 mg total) into the skin every 14 (fourteen) days.    ezetimibe (ZETIA) 10 mg tablet Take 1 tablet (10 mg total) by mouth once daily.    finasteride (PROSCAR) 5 mg tablet TAKE 1 TABLET BY MOUTH EVERY DAY    latanoprost 0.005 % ophthalmic solution Place 1 drop into both eyes once daily.    lisinopril (PRINIVIL,ZESTRIL) 20 MG tablet Take 1 tablet (20 mg total) by mouth once daily.    sildenafil (VIAGRA) 100 MG tablet  Take 1 tablet (100 mg total) by mouth daily as needed.    sildenafiL (VIAGRA) 100 MG tablet Take 1 tablet (100 mg total) by mouth daily as needed.    tamsulosin (FLOMAX) 0.4 mg Cap    Last reviewed on 11/15/2020  1:13 PM by Zackary Upton MD    Review of patient's allergies indicates:  No Known Allergies Last reviewed on  11/15/2020 1:13 PM by Zackary Upton      Tasks added this encounter   No tasks added.   Tasks due within next 3 months   11/18/2020 - Refill Call     [facility]  [address] [place] [id number]  Phone: [phone]  Fax: [phone]

## 2020-12-16 ENCOUNTER — SPECIALTY PHARMACY (OUTPATIENT)
Dept: PHARMACY | Facility: CLINIC | Age: 75
End: 2020-12-16

## 2020-12-16 NOTE — TELEPHONE ENCOUNTER
Patient answered and stated his next injection was due on 12/23, and he requested that we ship out on 12/21 for him to receive on 12/22. Patient also needs a sharps container. Insurance will not cover the medication until 12/17, so I informed the patient to give us a call tomorrow and we can finish setting up the refill then.

## 2020-12-17 ENCOUNTER — SPECIALTY PHARMACY (OUTPATIENT)
Dept: PHARMACY | Facility: CLINIC | Age: 75
End: 2020-12-17

## 2020-12-17 NOTE — TELEPHONE ENCOUNTER
Specialty Pharmacy - Refill Coordination    Specialty Medication Orders Linked to Encounter      Most Recent Value   Medication #1  evolocumab (REPATHA SURECLICK) 140 mg/mL PnIj (Order#[id number], Rx#[id number])          Refill Questions - Documented Responses      Most Recent Value   Relationship to patient of person spoken to?  Self   HIPAA/medical authority confirmed?  Yes   How many doses did the patient miss in the past 4 weeks or since the last fill?  0   How many doses does the patient have on hand?  0   How many days does the patient report on hand quantity will last?  0   Does the number of doses/days supply remaining match pharmacy expected amounts?  Yes   How will the patient receive the medication?  Mail   When does the patient need to receive the medication?  12/22/20   Shipping Address  Home   Address in [facility] confirmed and updated if neccessary?  Yes   Expected Copay ($)  60   Is the patient able to afford the medication copay?  Yes   Payment Method  CC on file   Days supply of Refill  28   Refill activity completed?  Yes   Refill activity plan  Refill scheduled   Shipment/Pickup Date:  12/21/20          Current Outpatient Medications   Medication Sig    amLODIPine (NORVASC) 10 MG tablet Take 1 tablet (10 mg total) by mouth once daily.    aspirin (ECOTRIN) 81 MG EC tablet Take 1 tablet (81 mg total) by mouth once daily.    atorvastatin (LIPITOR) 80 MG tablet Take 1 tablet (80 mg total) by mouth once daily.    benzonatate (TESSALON) 200 MG capsule Take 1 capsule (200 mg total) by mouth 3 (three) times daily as needed for Cough.    brimonidine 0.15 % OPTH DROP (ALPHAGAN) 0.15 % ophthalmic solution Place 1 drop into both eyes 3 (three) times daily.    cilostazoL (PLETAL) 100 MG Tab Take 1 tablet (100 mg total) by mouth 2 (two) times daily.    dorzolamide (TRUSOPT) 2 % ophthalmic solution INSTILL 1 DROP INTO BOTH EYES 3 TIMES A DAY    evolocumab (REPATHA SURECLICK) 140 mg/mL PnIj  Inject 1 mL (140 mg total) into the skin every 14 (fourteen) days.    ezetimibe (ZETIA) 10 mg tablet Take 1 tablet (10 mg total) by mouth once daily.    finasteride (PROSCAR) 5 mg tablet TAKE 1 TABLET BY MOUTH EVERY DAY    latanoprost 0.005 % ophthalmic solution Place 1 drop into both eyes once daily.    lisinopril (PRINIVIL,ZESTRIL) 20 MG tablet Take 1 tablet (20 mg total) by mouth once daily.    sildenafil (VIAGRA) 100 MG tablet Take 1 tablet (100 mg total) by mouth daily as needed.    sildenafiL (VIAGRA) 100 MG tablet Take 1 tablet (100 mg total) by mouth daily as needed.    tamsulosin (FLOMAX) 0.4 mg Cap    Last reviewed on 11/15/2020  1:13 PM by Zackary Upton MD    Review of patient's allergies indicates:  No Known Allergies Last reviewed on  11/15/2020 1:13 PM by Zackary Upton      Tasks added this encounter   1/9/2021 - Refill Call (Auto Added)   Tasks due within next 3 months   No tasks due.     Jadyn Schofield  [facility]  [address] [facility] [address]  Phone: [phone]  Fax: [phone]

## 2020-12-18 ENCOUNTER — TELEPHONE (OUTPATIENT)
Dept: HEMATOLOGY/ONCOLOGY | Facility: CLINIC | Age: 75
End: 2020-12-18

## 2020-12-18 DIAGNOSIS — D64.9 ANEMIA, UNSPECIFIED TYPE: Primary | ICD-10-CM

## 2020-12-18 NOTE — TELEPHONE ENCOUNTER
Nurse called pt to scheduled his follow up, according to his last two apt he cancelled one and no showed for the other for PET results, pt stated he would like to come in January 2021 he was scheduled in [facility] apt sheet mailed.

## 2021-01-13 ENCOUNTER — SPECIALTY PHARMACY (OUTPATIENT)
Dept: PHARMACY | Facility: CLINIC | Age: 76
End: 2021-01-13

## 2021-01-15 ENCOUNTER — LAB VISIT (OUTPATIENT)
Dept: LAB | Facility: HOSPITAL | Age: 76
End: 2021-01-15
Attending: UROLOGY
Payer: MEDICARE

## 2021-01-15 DIAGNOSIS — N40.0 BENIGN PROSTATIC HYPERPLASIA, UNSPECIFIED WHETHER LOWER URINARY TRACT SYMPTOMS PRESENT: ICD-10-CM

## 2021-01-15 DIAGNOSIS — N52.9 ERECTILE DYSFUNCTION, UNSPECIFIED ERECTILE DYSFUNCTION TYPE: ICD-10-CM

## 2021-01-15 DIAGNOSIS — Z12.5 PROSTATE CANCER SCREENING: ICD-10-CM

## 2021-01-15 DIAGNOSIS — I10 HYPERTENSION, UNSPECIFIED TYPE: ICD-10-CM

## 2021-01-15 DIAGNOSIS — F10.20 ALCOHOL DEPENDENCE, DAILY USE: Chronic | ICD-10-CM

## 2021-01-15 DIAGNOSIS — D64.9 ANEMIA, UNSPECIFIED TYPE: ICD-10-CM

## 2021-01-15 LAB
ALBUMIN SERPL BCP-MCNC: 3.9 G/DL (ref 3.5–5.2)
ALP SERPL-CCNC: 93 U/L (ref 55–135)
ALT SERPL W/O P-5'-P-CCNC: 14 U/L (ref 10–44)
ANION GAP SERPL CALC-SCNC: 9 MMOL/L (ref 8–16)
AST SERPL-CCNC: 17 U/L (ref 10–40)
BASOPHILS # BLD AUTO: 0.08 K/UL (ref 0–0.2)
BASOPHILS NFR BLD: 1.5 % (ref 0–1.9)
BILIRUB SERPL-MCNC: 0.3 MG/DL (ref 0.1–1)
BUN SERPL-MCNC: 11 MG/DL (ref 8–23)
CALCIUM SERPL-MCNC: 9.1 MG/DL (ref 8.7–10.5)
CHLORIDE SERPL-SCNC: 105 MMOL/L (ref 95–110)
CO2 SERPL-SCNC: 28 MMOL/L (ref 23–29)
CREAT SERPL-MCNC: 1.1 MG/DL (ref 0.5–1.4)
DIFFERENTIAL METHOD: ABNORMAL
EOSINOPHIL # BLD AUTO: 0.6 K/UL (ref 0–0.5)
EOSINOPHIL NFR BLD: 11.8 % (ref 0–8)
ERYTHROCYTE [DISTWIDTH] IN BLOOD BY AUTOMATED COUNT: 13.8 % (ref 11.5–14.5)
EST. GFR  (AFRICAN AMERICAN): >60 ML/MIN/1.73 M^2
EST. GFR  (NON AFRICAN AMERICAN): >60 ML/MIN/1.73 M^2
GLUCOSE SERPL-MCNC: 96 MG/DL (ref 70–110)
HCT VFR BLD AUTO: 48.8 % (ref 40–54)
HGB BLD-MCNC: 15 G/DL (ref 14–18)
IMM GRANULOCYTES # BLD AUTO: 0.02 K/UL (ref 0–0.04)
IMM GRANULOCYTES NFR BLD AUTO: 0.4 % (ref 0–0.5)
LYMPHOCYTES # BLD AUTO: 1.8 K/UL (ref 1–4.8)
LYMPHOCYTES NFR BLD: 32.8 % (ref 18–48)
MCH RBC QN AUTO: 26.8 PG (ref 27–31)
MCHC RBC AUTO-ENTMCNC: 30.7 G/DL (ref 32–36)
MCV RBC AUTO: 87 FL (ref 82–98)
MONOCYTES # BLD AUTO: 0.6 K/UL (ref 0.3–1)
MONOCYTES NFR BLD: 11.6 % (ref 4–15)
NEUTROPHILS # BLD AUTO: 2.2 K/UL (ref 1.8–7.7)
NEUTROPHILS NFR BLD: 41.9 % (ref 38–73)
NRBC BLD-RTO: 0 /100 WBC
PLATELET # BLD AUTO: 263 K/UL (ref 150–350)
PMV BLD AUTO: 9.7 FL (ref 9.2–12.9)
POTASSIUM SERPL-SCNC: 4.3 MMOL/L (ref 3.5–5.1)
PROT SERPL-MCNC: 7 G/DL (ref 6–8.4)
RBC # BLD AUTO: 5.59 M/UL (ref 4.6–6.2)
SODIUM SERPL-SCNC: 142 MMOL/L (ref 136–145)
WBC # BLD AUTO: 5.33 K/UL (ref 3.9–12.7)

## 2021-01-15 PROCEDURE — 86704 HEP B CORE ANTIBODY TOTAL: CPT | Mod: HCNC

## 2021-01-15 PROCEDURE — 83520 IMMUNOASSAY QUANT NOS NONAB: CPT | Mod: HCNC

## 2021-01-15 PROCEDURE — 86334 PATHOLOGIST INTERPRETATION IFE: ICD-10-PCS | Mod: 26,HCNC,, | Performed by: PATHOLOGY

## 2021-01-15 PROCEDURE — 84153 ASSAY OF PSA TOTAL: CPT | Mod: HCNC

## 2021-01-15 PROCEDURE — 84165 PATHOLOGIST INTERPRETATION SPE: ICD-10-PCS | Mod: 26,HCNC,, | Performed by: PATHOLOGY

## 2021-01-15 PROCEDURE — 84165 PROTEIN E-PHORESIS SERUM: CPT | Mod: HCNC

## 2021-01-15 PROCEDURE — 80053 COMPREHEN METABOLIC PANEL: CPT | Mod: HCNC

## 2021-01-15 PROCEDURE — 36415 COLL VENOUS BLD VENIPUNCTURE: CPT | Mod: HCNC,PO

## 2021-01-15 PROCEDURE — 86334 IMMUNOFIX E-PHORESIS SERUM: CPT | Mod: HCNC

## 2021-01-15 PROCEDURE — 86334 IMMUNOFIX E-PHORESIS SERUM: CPT | Mod: 26,HCNC,, | Performed by: PATHOLOGY

## 2021-01-15 PROCEDURE — 85025 COMPLETE CBC W/AUTO DIFF WBC: CPT | Mod: HCNC

## 2021-01-15 PROCEDURE — 84165 PROTEIN E-PHORESIS SERUM: CPT | Mod: 26,HCNC,, | Performed by: PATHOLOGY

## 2021-01-15 PROCEDURE — 82784 ASSAY IGA/IGD/IGG/IGM EACH: CPT | Mod: 59,HCNC

## 2021-01-16 LAB
COMPLEXED PSA SERPL-MCNC: 21.9 NG/ML (ref 0–4)
IGA SERPL-MCNC: 273 MG/DL (ref 40–350)
IGG SERPL-MCNC: 1184 MG/DL (ref 650–1600)
IGM SERPL-MCNC: 57 MG/DL (ref 50–300)

## 2021-01-18 LAB
ALBUMIN SERPL ELPH-MCNC: 3.92 G/DL (ref 3.35–5.55)
ALPHA1 GLOB SERPL ELPH-MCNC: 0.26 G/DL (ref 0.17–0.41)
ALPHA2 GLOB SERPL ELPH-MCNC: 0.67 G/DL (ref 0.43–0.99)
B-GLOBULIN SERPL ELPH-MCNC: 0.82 G/DL (ref 0.5–1.1)
GAMMA GLOB SERPL ELPH-MCNC: 1.13 G/DL (ref 0.67–1.58)
HBV CORE AB SERPL QL IA: NEGATIVE
INTERPRETATION SERPL IFE-IMP: NORMAL
PROT SERPL-MCNC: 6.8 G/DL (ref 6–8.4)

## 2021-01-19 ENCOUNTER — SPECIALTY PHARMACY (OUTPATIENT)
Dept: PHARMACY | Facility: CLINIC | Age: 76
End: 2021-01-19

## 2021-01-19 LAB
KAPPA LC SER QL IA: 2.87 MG/DL (ref 0.33–1.94)
KAPPA LC/LAMBDA SER IA: 2.04 (ref 0.26–1.65)
LAMBDA LC SER QL IA: 1.41 MG/DL (ref 0.57–2.63)
PATHOLOGIST INTERPRETATION IFE: NORMAL
PATHOLOGIST INTERPRETATION SPE: NORMAL

## 2021-01-21 ENCOUNTER — SPECIALTY PHARMACY (OUTPATIENT)
Dept: PHARMACY | Facility: CLINIC | Age: 76
End: 2021-01-21

## 2021-01-21 NOTE — TELEPHONE ENCOUNTER
1/21 [facility]   refill attempt for repatha, pt has a copay for 310.00.[facility] was unable to help with financial assistance. Pt. Reports that he has a Appt. With his doctor on 1/22. Pt. Request a call back to follow up on 1/25.

## 2021-01-22 ENCOUNTER — OFFICE VISIT (OUTPATIENT)
Dept: HEMATOLOGY/ONCOLOGY | Facility: CLINIC | Age: 76
End: 2021-01-22
Payer: MEDICARE

## 2021-01-22 ENCOUNTER — PATIENT MESSAGE (OUTPATIENT)
Dept: ADMINISTRATIVE | Facility: OTHER | Age: 76
End: 2021-01-22

## 2021-01-22 VITALS
BODY MASS INDEX: 28.22 KG/M2 | HEART RATE: 61 BPM | WEIGHT: 174.81 LBS | OXYGEN SATURATION: 99 % | DIASTOLIC BLOOD PRESSURE: 66 MMHG | SYSTOLIC BLOOD PRESSURE: 176 MMHG

## 2021-01-22 DIAGNOSIS — R97.20 ELEVATED PSA, BETWEEN 10 AND LESS THAN 20 NG/ML: ICD-10-CM

## 2021-01-22 DIAGNOSIS — R16.0 LIVER MASS: Primary | ICD-10-CM

## 2021-01-22 PROCEDURE — 1126F AMNT PAIN NOTED NONE PRSNT: CPT | Mod: HCNC,S$GLB,, | Performed by: INTERNAL MEDICINE

## 2021-01-22 PROCEDURE — 99999 PR PBB SHADOW E&M-EST. PATIENT-LVL II: ICD-10-PCS | Mod: PBBFAC,HCNC,, | Performed by: INTERNAL MEDICINE

## 2021-01-22 PROCEDURE — 99213 OFFICE O/P EST LOW 20 MIN: CPT | Mod: HCNC,S$GLB,, | Performed by: INTERNAL MEDICINE

## 2021-01-22 PROCEDURE — 99213 PR OFFICE/OUTPT VISIT, EST, LEVL III, 20-29 MIN: ICD-10-PCS | Mod: HCNC,S$GLB,, | Performed by: INTERNAL MEDICINE

## 2021-01-22 PROCEDURE — 99999 PR PBB SHADOW E&M-EST. PATIENT-LVL II: CPT | Mod: PBBFAC,HCNC,, | Performed by: INTERNAL MEDICINE

## 2021-01-22 PROCEDURE — 1101F PT FALLS ASSESS-DOCD LE1/YR: CPT | Mod: HCNC,CPTII,S$GLB, | Performed by: INTERNAL MEDICINE

## 2021-01-22 PROCEDURE — 3288F FALL RISK ASSESSMENT DOCD: CPT | Mod: HCNC,CPTII,S$GLB, | Performed by: INTERNAL MEDICINE

## 2021-01-22 PROCEDURE — 1159F PR MEDICATION LIST DOCUMENTED IN MEDICAL RECORD: ICD-10-PCS | Mod: HCNC,S$GLB,, | Performed by: INTERNAL MEDICINE

## 2021-01-22 PROCEDURE — 1126F PR PAIN SEVERITY QUANTIFIED, NO PAIN PRESENT: ICD-10-PCS | Mod: HCNC,S$GLB,, | Performed by: INTERNAL MEDICINE

## 2021-01-22 PROCEDURE — 3077F SYST BP >= 140 MM HG: CPT | Mod: HCNC,CPTII,S$GLB, | Performed by: INTERNAL MEDICINE

## 2021-01-22 PROCEDURE — 1159F MED LIST DOCD IN RCRD: CPT | Mod: HCNC,S$GLB,, | Performed by: INTERNAL MEDICINE

## 2021-01-22 PROCEDURE — 99499 RISK ADDL DX/OHS AUDIT: ICD-10-PCS | Mod: S$GLB,,, | Performed by: INTERNAL MEDICINE

## 2021-01-22 PROCEDURE — 3077F PR MOST RECENT SYSTOLIC BLOOD PRESSURE >= 140 MM HG: ICD-10-PCS | Mod: HCNC,CPTII,S$GLB, | Performed by: INTERNAL MEDICINE

## 2021-01-22 PROCEDURE — 1101F PR PT FALLS ASSESS DOC 0-1 FALLS W/OUT INJ PAST YR: ICD-10-PCS | Mod: HCNC,CPTII,S$GLB, | Performed by: INTERNAL MEDICINE

## 2021-01-22 PROCEDURE — 3078F DIAST BP <80 MM HG: CPT | Mod: HCNC,CPTII,S$GLB, | Performed by: INTERNAL MEDICINE

## 2021-01-22 PROCEDURE — 3078F PR MOST RECENT DIASTOLIC BLOOD PRESSURE < 80 MM HG: ICD-10-PCS | Mod: HCNC,CPTII,S$GLB, | Performed by: INTERNAL MEDICINE

## 2021-01-22 PROCEDURE — 3288F PR FALLS RISK ASSESSMENT DOCUMENTED: ICD-10-PCS | Mod: HCNC,CPTII,S$GLB, | Performed by: INTERNAL MEDICINE

## 2021-01-22 PROCEDURE — 99499 UNLISTED E&M SERVICE: CPT | Mod: S$GLB,,, | Performed by: INTERNAL MEDICINE

## 2021-01-22 RX ORDER — IBUPROFEN 800 MG/1
800 TABLET ORAL EVERY 6 HOURS PRN
COMMUNITY
Start: 2020-11-25 | End: 2022-07-11

## 2021-02-03 ENCOUNTER — OFFICE VISIT (OUTPATIENT)
Dept: UROLOGY | Facility: CLINIC | Age: 76
End: 2021-02-03
Payer: MEDICARE

## 2021-02-03 VITALS
TEMPERATURE: 98 F | DIASTOLIC BLOOD PRESSURE: 68 MMHG | WEIGHT: 177.13 LBS | BODY MASS INDEX: 28.59 KG/M2 | SYSTOLIC BLOOD PRESSURE: 144 MMHG

## 2021-02-03 DIAGNOSIS — N40.0 BENIGN PROSTATIC HYPERPLASIA, UNSPECIFIED WHETHER LOWER URINARY TRACT SYMPTOMS PRESENT: ICD-10-CM

## 2021-02-03 DIAGNOSIS — N52.9 ERECTILE DYSFUNCTION, UNSPECIFIED ERECTILE DYSFUNCTION TYPE: ICD-10-CM

## 2021-02-03 DIAGNOSIS — I10 HYPERTENSION, UNSPECIFIED TYPE: ICD-10-CM

## 2021-02-03 DIAGNOSIS — R97.20 ELEVATED PSA: Primary | ICD-10-CM

## 2021-02-03 PROCEDURE — 3078F PR MOST RECENT DIASTOLIC BLOOD PRESSURE < 80 MM HG: ICD-10-PCS | Mod: HCNC,CPTII,S$GLB, | Performed by: UROLOGY

## 2021-02-03 PROCEDURE — 3077F SYST BP >= 140 MM HG: CPT | Mod: HCNC,CPTII,S$GLB, | Performed by: UROLOGY

## 2021-02-03 PROCEDURE — 99999 PR PBB SHADOW E&M-EST. PATIENT-LVL III: CPT | Mod: PBBFAC,HCNC,, | Performed by: UROLOGY

## 2021-02-03 PROCEDURE — 1159F MED LIST DOCD IN RCRD: CPT | Mod: HCNC,S$GLB,, | Performed by: UROLOGY

## 2021-02-03 PROCEDURE — 3077F PR MOST RECENT SYSTOLIC BLOOD PRESSURE >= 140 MM HG: ICD-10-PCS | Mod: HCNC,CPTII,S$GLB, | Performed by: UROLOGY

## 2021-02-03 PROCEDURE — 1126F PR PAIN SEVERITY QUANTIFIED, NO PAIN PRESENT: ICD-10-PCS | Mod: HCNC,S$GLB,, | Performed by: UROLOGY

## 2021-02-03 PROCEDURE — 99214 OFFICE O/P EST MOD 30 MIN: CPT | Mod: HCNC,S$GLB,, | Performed by: UROLOGY

## 2021-02-03 PROCEDURE — 1126F AMNT PAIN NOTED NONE PRSNT: CPT | Mod: HCNC,S$GLB,, | Performed by: UROLOGY

## 2021-02-03 PROCEDURE — 99214 PR OFFICE/OUTPT VISIT, EST, LEVL IV, 30-39 MIN: ICD-10-PCS | Mod: HCNC,S$GLB,, | Performed by: UROLOGY

## 2021-02-03 PROCEDURE — 3078F DIAST BP <80 MM HG: CPT | Mod: HCNC,CPTII,S$GLB, | Performed by: UROLOGY

## 2021-02-03 PROCEDURE — 1159F PR MEDICATION LIST DOCUMENTED IN MEDICAL RECORD: ICD-10-PCS | Mod: HCNC,S$GLB,, | Performed by: UROLOGY

## 2021-02-03 PROCEDURE — 99999 PR PBB SHADOW E&M-EST. PATIENT-LVL III: ICD-10-PCS | Mod: PBBFAC,HCNC,, | Performed by: UROLOGY

## 2021-02-03 RX ORDER — CIPROFLOXACIN 500 MG/1
500 TABLET ORAL EVERY 12 HOURS
Qty: 3 TABLET | Refills: 0 | Status: SHIPPED | OUTPATIENT
Start: 2021-02-03 | End: 2021-06-03

## 2021-02-25 ENCOUNTER — HOSPITAL ENCOUNTER (OUTPATIENT)
Dept: RADIOLOGY | Facility: HOSPITAL | Age: 76
Discharge: HOME | End: 2021-02-25
Attending: UROLOGY
Payer: MEDICARE

## 2021-02-25 DIAGNOSIS — N40.0 BENIGN PROSTATIC HYPERPLASIA, UNSPECIFIED WHETHER LOWER URINARY TRACT SYMPTOMS PRESENT: ICD-10-CM

## 2021-02-25 DIAGNOSIS — N52.9 ERECTILE DYSFUNCTION, UNSPECIFIED ERECTILE DYSFUNCTION TYPE: ICD-10-CM

## 2021-02-25 DIAGNOSIS — I10 HYPERTENSION, UNSPECIFIED TYPE: ICD-10-CM

## 2021-02-25 DIAGNOSIS — R97.20 ELEVATED PSA: ICD-10-CM

## 2021-02-25 PROCEDURE — A9585 GADOBUTROL INJECTION: HCPCS | Performed by: UROLOGY

## 2021-02-25 PROCEDURE — 72197 MRI PELVIS W/O & W/DYE: CPT | Mod: TC

## 2021-02-25 PROCEDURE — 25500020 PHARM REV CODE 255: Performed by: UROLOGY

## 2021-02-25 PROCEDURE — 72197 MRI PROSTATE W W/O CONTRAST: ICD-10-PCS | Mod: 26,,, | Performed by: RADIOLOGY

## 2021-02-25 PROCEDURE — 72197 MRI PELVIS W/O & W/DYE: CPT | Mod: 26,,, | Performed by: RADIOLOGY

## 2021-02-25 RX ORDER — GADOBUTROL 604.72 MG/ML
8 INJECTION INTRAVENOUS
Status: COMPLETED | OUTPATIENT
Start: 2021-02-25 | End: 2021-02-25

## 2021-02-25 RX ADMIN — GADOBUTROL 8 ML: 604.72 INJECTION INTRAVENOUS at 09:02

## 2021-03-08 ENCOUNTER — PROCEDURE VISIT (OUTPATIENT)
Dept: UROLOGY | Facility: CLINIC | Age: 76
End: 2021-03-08
Payer: MEDICARE

## 2021-03-08 VITALS — HEIGHT: 66 IN | WEIGHT: 177 LBS | BODY MASS INDEX: 28.45 KG/M2

## 2021-03-08 DIAGNOSIS — R97.20 ELEVATED PSA: Primary | ICD-10-CM

## 2021-03-08 DIAGNOSIS — J45.20 ASTHMA, MILD INTERMITTENT, WELL-CONTROLLED: Primary | ICD-10-CM

## 2021-03-08 PROCEDURE — 88341 IMHCHEM/IMCYTCHM EA ADD ANTB: CPT | Mod: 59 | Performed by: PATHOLOGY

## 2021-03-08 PROCEDURE — 76872 PR US TRANSRECTAL: ICD-10-PCS | Mod: 26,S$GLB,, | Performed by: UROLOGY

## 2021-03-08 PROCEDURE — 55700 PR BIOPSY OF PROSTATE,NEEDLE/PUNCH: CPT | Mod: S$GLB,,, | Performed by: UROLOGY

## 2021-03-08 PROCEDURE — 88342 IMHCHEM/IMCYTCHM 1ST ANTB: CPT | Mod: 59 | Performed by: PATHOLOGY

## 2021-03-08 PROCEDURE — 88305 TISSUE EXAM BY PATHOLOGIST: CPT | Mod: 59 | Performed by: PATHOLOGY

## 2021-03-08 PROCEDURE — 88305 TISSUE EXAM BY PATHOLOGIST: ICD-10-PCS | Mod: 26,,, | Performed by: PATHOLOGY

## 2021-03-08 PROCEDURE — 55700 PR BIOPSY OF PROSTATE,NEEDLE/PUNCH: ICD-10-PCS | Mod: S$GLB,,, | Performed by: UROLOGY

## 2021-03-08 PROCEDURE — 76872 US TRANSRECTAL: CPT | Mod: 26,S$GLB,, | Performed by: UROLOGY

## 2021-03-08 PROCEDURE — 88342 IMHCHEM/IMCYTCHM 1ST ANTB: CPT | Mod: 26,,, | Performed by: PATHOLOGY

## 2021-03-08 PROCEDURE — 88341 IMHCHEM/IMCYTCHM EA ADD ANTB: CPT | Mod: 26,,, | Performed by: PATHOLOGY

## 2021-03-08 PROCEDURE — 88341 PR IHC OR ICC EACH ADD'L SINGLE ANTIBODY  STAINPR: ICD-10-PCS | Mod: 26,,, | Performed by: PATHOLOGY

## 2021-03-08 PROCEDURE — 88342 CHG IMMUNOCYTOCHEMISTRY: ICD-10-PCS | Mod: 26,,, | Performed by: PATHOLOGY

## 2021-03-08 PROCEDURE — 88305 TISSUE EXAM BY PATHOLOGIST: CPT | Mod: 26,,, | Performed by: PATHOLOGY

## 2021-03-08 RX ORDER — LIDOCAINE HYDROCHLORIDE 20 MG/ML
JELLY TOPICAL
Status: COMPLETED | OUTPATIENT
Start: 2021-03-08 | End: 2021-03-08

## 2021-03-08 RX ADMIN — LIDOCAINE HYDROCHLORIDE 10 ML: 20 JELLY TOPICAL at 08:03

## 2021-03-14 LAB
FINAL PATHOLOGIC DIAGNOSIS: NORMAL
GROSS: NORMAL
Lab: NORMAL

## 2021-03-16 ENCOUNTER — LAB VISIT (OUTPATIENT)
Dept: OTOLARYNGOLOGY | Facility: CLINIC | Age: 76
End: 2021-03-16
Payer: MEDICARE

## 2021-03-16 DIAGNOSIS — J45.20 ASTHMA, MILD INTERMITTENT, WELL-CONTROLLED: ICD-10-CM

## 2021-03-16 PROCEDURE — U0005 INFEC AGEN DETEC AMPLI PROBE: HCPCS | Performed by: INTERNAL MEDICINE

## 2021-03-16 PROCEDURE — U0003 INFECTIOUS AGENT DETECTION BY NUCLEIC ACID (DNA OR RNA); SEVERE ACUTE RESPIRATORY SYNDROME CORONAVIRUS 2 (SARS-COV-2) (CORONAVIRUS DISEASE [COVID-19]), AMPLIFIED PROBE TECHNIQUE, MAKING USE OF HIGH THROUGHPUT TECHNOLOGIES AS DESCRIBED BY CMS-2020-01-R: HCPCS | Performed by: INTERNAL MEDICINE

## 2021-03-17 LAB — SARS-COV-2 RNA RESP QL NAA+PROBE: NOT DETECTED

## 2021-03-18 ENCOUNTER — PATIENT OUTREACH (OUTPATIENT)
Dept: ADMINISTRATIVE | Facility: OTHER | Age: 76
End: 2021-03-18

## 2021-03-19 ENCOUNTER — CLINICAL SUPPORT (OUTPATIENT)
Dept: PULMONOLOGY | Facility: CLINIC | Age: 76
End: 2021-03-19
Payer: MEDICARE

## 2021-03-19 ENCOUNTER — OFFICE VISIT (OUTPATIENT)
Dept: PULMONOLOGY | Facility: CLINIC | Age: 76
End: 2021-03-19
Payer: MEDICARE

## 2021-03-19 ENCOUNTER — LAB VISIT (OUTPATIENT)
Dept: LAB | Facility: HOSPITAL | Age: 76
End: 2021-03-19
Attending: INTERNAL MEDICINE
Payer: MEDICARE

## 2021-03-19 VITALS
BODY MASS INDEX: 28.34 KG/M2 | WEIGHT: 176.38 LBS | HEART RATE: 51 BPM | RESPIRATION RATE: 18 BRPM | OXYGEN SATURATION: 96 % | DIASTOLIC BLOOD PRESSURE: 80 MMHG | HEIGHT: 66 IN | SYSTOLIC BLOOD PRESSURE: 120 MMHG

## 2021-03-19 DIAGNOSIS — J45.20 ASTHMA, MILD INTERMITTENT, WELL-CONTROLLED: ICD-10-CM

## 2021-03-19 DIAGNOSIS — J45.20 ASTHMA, MILD INTERMITTENT, WELL-CONTROLLED: Primary | ICD-10-CM

## 2021-03-19 DIAGNOSIS — E78.2 MIXED HYPERLIPIDEMIA: ICD-10-CM

## 2021-03-19 DIAGNOSIS — I10 ESSENTIAL HYPERTENSION: Chronic | ICD-10-CM

## 2021-03-19 DIAGNOSIS — R91.8 MULTIPLE PULMONARY NODULES: ICD-10-CM

## 2021-03-19 LAB
BRPFT: NORMAL
FEF 25 75 LLN: 0.51
FEF 25 75 PRE REF: 54.1 %
FEF 25 75 REF: 1.63
FEV1 FVC LLN: 62
FEV1 FVC PRE REF: 91.9 %
FEV1 FVC REF: 76
FEV1 LLN: 1.42
FEV1 PRE REF: 66.8 %
FEV1 REF: 2.14
FVC LLN: 1.97
FVC PRE REF: 72.5 %
FVC REF: 2.8
PEF LLN: 4.24
PEF PRE REF: 89.6 %
PEF REF: 6.52
PRE FEF 25 75: 0.88 L/S (ref 0.51–2.74)
PRE FET 100: 6.97 SEC
PRE FEV1 FVC: 70.23 % (ref 62.31–90.59)
PRE FEV1: 1.43 L (ref 1.42–2.85)
PRE FVC: 2.03 L (ref 1.97–3.63)
PRE PEF: 5.84 L/S (ref 4.24–8.81)

## 2021-03-19 PROCEDURE — 3074F SYST BP LT 130 MM HG: CPT | Mod: CPTII,S$GLB,, | Performed by: INTERNAL MEDICINE

## 2021-03-19 PROCEDURE — 99499 UNLISTED E&M SERVICE: CPT | Mod: S$GLB,,, | Performed by: INTERNAL MEDICINE

## 2021-03-19 PROCEDURE — 3288F PR FALLS RISK ASSESSMENT DOCUMENTED: ICD-10-PCS | Mod: CPTII,S$GLB,, | Performed by: INTERNAL MEDICINE

## 2021-03-19 PROCEDURE — 99499 RISK ADDL DX/OHS AUDIT: ICD-10-PCS | Mod: S$GLB,,, | Performed by: INTERNAL MEDICINE

## 2021-03-19 PROCEDURE — 94010 BREATHING CAPACITY TEST: ICD-10-PCS | Mod: S$GLB,,, | Performed by: INTERNAL MEDICINE

## 2021-03-19 PROCEDURE — 82785 ASSAY OF IGE: CPT | Performed by: INTERNAL MEDICINE

## 2021-03-19 PROCEDURE — 99214 OFFICE O/P EST MOD 30 MIN: CPT | Mod: 25,S$GLB,, | Performed by: INTERNAL MEDICINE

## 2021-03-19 PROCEDURE — 3079F PR MOST RECENT DIASTOLIC BLOOD PRESSURE 80-89 MM HG: ICD-10-PCS | Mod: CPTII,S$GLB,, | Performed by: INTERNAL MEDICINE

## 2021-03-19 PROCEDURE — 3079F DIAST BP 80-89 MM HG: CPT | Mod: CPTII,S$GLB,, | Performed by: INTERNAL MEDICINE

## 2021-03-19 PROCEDURE — 3288F FALL RISK ASSESSMENT DOCD: CPT | Mod: CPTII,S$GLB,, | Performed by: INTERNAL MEDICINE

## 2021-03-19 PROCEDURE — 94010 BREATHING CAPACITY TEST: CPT | Mod: S$GLB,,, | Performed by: INTERNAL MEDICINE

## 2021-03-19 PROCEDURE — 99214 PR OFFICE/OUTPT VISIT, EST, LEVL IV, 30-39 MIN: ICD-10-PCS | Mod: 25,S$GLB,, | Performed by: INTERNAL MEDICINE

## 2021-03-19 PROCEDURE — 36415 COLL VENOUS BLD VENIPUNCTURE: CPT | Performed by: INTERNAL MEDICINE

## 2021-03-19 PROCEDURE — 99999 PR PBB SHADOW E&M-EST. PATIENT-LVL V: ICD-10-PCS | Mod: PBBFAC,,, | Performed by: INTERNAL MEDICINE

## 2021-03-19 PROCEDURE — 1159F PR MEDICATION LIST DOCUMENTED IN MEDICAL RECORD: ICD-10-PCS | Mod: S$GLB,,, | Performed by: INTERNAL MEDICINE

## 2021-03-19 PROCEDURE — 3074F PR MOST RECENT SYSTOLIC BLOOD PRESSURE < 130 MM HG: ICD-10-PCS | Mod: CPTII,S$GLB,, | Performed by: INTERNAL MEDICINE

## 2021-03-19 PROCEDURE — 1159F MED LIST DOCD IN RCRD: CPT | Mod: S$GLB,,, | Performed by: INTERNAL MEDICINE

## 2021-03-19 PROCEDURE — 99999 PR PBB SHADOW E&M-EST. PATIENT-LVL V: CPT | Mod: PBBFAC,,, | Performed by: INTERNAL MEDICINE

## 2021-03-19 PROCEDURE — 1101F PT FALLS ASSESS-DOCD LE1/YR: CPT | Mod: CPTII,S$GLB,, | Performed by: INTERNAL MEDICINE

## 2021-03-19 PROCEDURE — 1101F PR PT FALLS ASSESS DOC 0-1 FALLS W/OUT INJ PAST YR: ICD-10-PCS | Mod: CPTII,S$GLB,, | Performed by: INTERNAL MEDICINE

## 2021-03-19 RX ORDER — FLUTICASONE FUROATE AND VILANTEROL TRIFENATATE 200; 25 UG/1; UG/1
1 POWDER RESPIRATORY (INHALATION) DAILY
Qty: 60 EACH | Refills: 11 | Status: SHIPPED | OUTPATIENT
Start: 2021-03-19 | End: 2021-06-03 | Stop reason: SDUPTHER

## 2021-03-19 RX ORDER — BENZONATATE 200 MG/1
200 CAPSULE ORAL 3 TIMES DAILY PRN
Qty: 90 CAPSULE | Refills: 0 | Status: SHIPPED | OUTPATIENT
Start: 2021-03-19 | End: 2021-04-18

## 2021-03-19 RX ORDER — ALBUTEROL SULFATE 90 UG/1
2 AEROSOL, METERED RESPIRATORY (INHALATION) EVERY 6 HOURS PRN
Qty: 16 G | Refills: 3 | Status: SHIPPED | OUTPATIENT
Start: 2021-03-19 | End: 2021-06-03 | Stop reason: SDUPTHER

## 2021-03-20 LAB — IGE SERPL-ACNC: <35 IU/ML (ref 0–100)

## 2021-03-22 ENCOUNTER — TELEPHONE (OUTPATIENT)
Dept: PULMONOLOGY | Facility: CLINIC | Age: 76
End: 2021-03-22

## 2021-04-05 ENCOUNTER — OFFICE VISIT (OUTPATIENT)
Dept: UROLOGY | Facility: CLINIC | Age: 76
End: 2021-04-05
Payer: MEDICARE

## 2021-04-05 VITALS
BODY MASS INDEX: 28.08 KG/M2 | SYSTOLIC BLOOD PRESSURE: 140 MMHG | DIASTOLIC BLOOD PRESSURE: 80 MMHG | WEIGHT: 173.94 LBS

## 2021-04-05 DIAGNOSIS — R97.20 ELEVATED PSA: Primary | ICD-10-CM

## 2021-04-05 LAB
BILIRUB SERPL-MCNC: NORMAL MG/DL
BLOOD URINE, POC: NORMAL
CLARITY, POC UA: CLEAR
COLOR, POC UA: YELLOW
GLUCOSE UR QL STRIP: NORMAL
KETONES UR QL STRIP: NORMAL
LEUKOCYTE ESTERASE URINE, POC: NORMAL
NITRITE, POC UA: NORMAL
PH, POC UA: 5
PROTEIN, POC: NORMAL
SPECIFIC GRAVITY, POC UA: 1.01
UROBILINOGEN, POC UA: NORMAL

## 2021-04-05 PROCEDURE — 1159F PR MEDICATION LIST DOCUMENTED IN MEDICAL RECORD: ICD-10-PCS | Mod: S$GLB,,, | Performed by: UROLOGY

## 2021-04-05 PROCEDURE — 99213 PR OFFICE/OUTPT VISIT, EST, LEVL III, 20-29 MIN: ICD-10-PCS | Mod: 25,S$GLB,, | Performed by: UROLOGY

## 2021-04-05 PROCEDURE — 99999 PR PBB SHADOW E&M-EST. PATIENT-LVL III: ICD-10-PCS | Mod: PBBFAC,,, | Performed by: UROLOGY

## 2021-04-05 PROCEDURE — 81002 POCT URINE DIPSTICK WITHOUT MICROSCOPE: ICD-10-PCS | Mod: S$GLB,,, | Performed by: UROLOGY

## 2021-04-05 PROCEDURE — 99213 OFFICE O/P EST LOW 20 MIN: CPT | Mod: 25,S$GLB,, | Performed by: UROLOGY

## 2021-04-05 PROCEDURE — 99999 PR PBB SHADOW E&M-EST. PATIENT-LVL III: CPT | Mod: PBBFAC,,, | Performed by: UROLOGY

## 2021-04-05 PROCEDURE — 3077F SYST BP >= 140 MM HG: CPT | Mod: CPTII,S$GLB,, | Performed by: UROLOGY

## 2021-04-05 PROCEDURE — 81002 URINALYSIS NONAUTO W/O SCOPE: CPT | Mod: S$GLB,,, | Performed by: UROLOGY

## 2021-04-05 PROCEDURE — 3079F PR MOST RECENT DIASTOLIC BLOOD PRESSURE 80-89 MM HG: ICD-10-PCS | Mod: CPTII,S$GLB,, | Performed by: UROLOGY

## 2021-04-05 PROCEDURE — 3079F DIAST BP 80-89 MM HG: CPT | Mod: CPTII,S$GLB,, | Performed by: UROLOGY

## 2021-04-05 PROCEDURE — 1159F MED LIST DOCD IN RCRD: CPT | Mod: S$GLB,,, | Performed by: UROLOGY

## 2021-04-05 PROCEDURE — 3077F PR MOST RECENT SYSTOLIC BLOOD PRESSURE >= 140 MM HG: ICD-10-PCS | Mod: CPTII,S$GLB,, | Performed by: UROLOGY

## 2021-04-08 ENCOUNTER — TELEPHONE (OUTPATIENT)
Dept: PULMONOLOGY | Facility: CLINIC | Age: 76
End: 2021-04-08

## 2021-04-09 ENCOUNTER — CLINICAL SUPPORT (OUTPATIENT)
Dept: PULMONOLOGY | Facility: CLINIC | Age: 76
End: 2021-04-09
Payer: MEDICARE

## 2021-04-09 VITALS
RESPIRATION RATE: 16 BRPM | HEIGHT: 66 IN | HEART RATE: 66 BPM | BODY MASS INDEX: 27.95 KG/M2 | OXYGEN SATURATION: 95 % | WEIGHT: 173.94 LBS

## 2021-04-09 DIAGNOSIS — J45.20 ASTHMA, MILD INTERMITTENT, WELL-CONTROLLED: ICD-10-CM

## 2021-04-09 PROCEDURE — 99999 PR PBB SHADOW E&M-EST. PATIENT-LVL II: CPT | Mod: PBBFAC,,,

## 2021-04-09 PROCEDURE — 99999 PR PBB SHADOW E&M-EST. PATIENT-LVL II: ICD-10-PCS | Mod: PBBFAC,,,

## 2021-05-14 DIAGNOSIS — I10 ESSENTIAL HYPERTENSION: Primary | ICD-10-CM

## 2021-05-17 ENCOUNTER — PATIENT OUTREACH (OUTPATIENT)
Dept: PULMONOLOGY | Facility: CLINIC | Age: 76
End: 2021-05-17

## 2021-05-19 ENCOUNTER — PATIENT OUTREACH (OUTPATIENT)
Dept: ADMINISTRATIVE | Facility: OTHER | Age: 76
End: 2021-05-19

## 2021-05-20 ENCOUNTER — HOSPITAL ENCOUNTER (OUTPATIENT)
Dept: CARDIOLOGY | Facility: HOSPITAL | Age: 76
Discharge: HOME | End: 2021-05-20
Attending: INTERNAL MEDICINE
Payer: MEDICARE

## 2021-05-20 ENCOUNTER — OFFICE VISIT (OUTPATIENT)
Dept: CARDIOLOGY | Facility: CLINIC | Age: 76
End: 2021-05-20
Payer: MEDICARE

## 2021-05-20 VITALS — SYSTOLIC BLOOD PRESSURE: 128 MMHG | BODY MASS INDEX: 28.08 KG/M2 | WEIGHT: 174 LBS | DIASTOLIC BLOOD PRESSURE: 62 MMHG

## 2021-05-20 DIAGNOSIS — I70.219 ATHEROSCLEROTIC PVD WITH INTERMITTENT CLAUDICATION: Primary | ICD-10-CM

## 2021-05-20 DIAGNOSIS — E78.2 MIXED HYPERLIPIDEMIA: ICD-10-CM

## 2021-05-20 DIAGNOSIS — I10 ESSENTIAL HYPERTENSION: Chronic | ICD-10-CM

## 2021-05-20 DIAGNOSIS — I10 ESSENTIAL HYPERTENSION: ICD-10-CM

## 2021-05-20 DIAGNOSIS — I70.0 AORTIC CALCIFICATION: ICD-10-CM

## 2021-05-20 PROCEDURE — 93010 ELECTROCARDIOGRAM REPORT: CPT | Mod: ,,, | Performed by: INTERNAL MEDICINE

## 2021-05-20 PROCEDURE — 99214 OFFICE O/P EST MOD 30 MIN: CPT | Mod: S$GLB,,, | Performed by: INTERNAL MEDICINE

## 2021-05-20 PROCEDURE — 93010 EKG 12-LEAD: ICD-10-PCS | Mod: ,,, | Performed by: INTERNAL MEDICINE

## 2021-05-20 PROCEDURE — 1159F PR MEDICATION LIST DOCUMENTED IN MEDICAL RECORD: ICD-10-PCS | Mod: S$GLB,,, | Performed by: INTERNAL MEDICINE

## 2021-05-20 PROCEDURE — 99999 PR PBB SHADOW E&M-EST. PATIENT-LVL III: ICD-10-PCS | Mod: PBBFAC,,, | Performed by: INTERNAL MEDICINE

## 2021-05-20 PROCEDURE — 1159F MED LIST DOCD IN RCRD: CPT | Mod: S$GLB,,, | Performed by: INTERNAL MEDICINE

## 2021-05-20 PROCEDURE — 99214 PR OFFICE/OUTPT VISIT, EST, LEVL IV, 30-39 MIN: ICD-10-PCS | Mod: S$GLB,,, | Performed by: INTERNAL MEDICINE

## 2021-05-20 PROCEDURE — 99999 PR PBB SHADOW E&M-EST. PATIENT-LVL III: CPT | Mod: PBBFAC,,, | Performed by: INTERNAL MEDICINE

## 2021-05-20 PROCEDURE — 93005 ELECTROCARDIOGRAM TRACING: CPT

## 2021-05-24 ENCOUNTER — LAB VISIT (OUTPATIENT)
Dept: LAB | Facility: HOSPITAL | Age: 76
End: 2021-05-24
Attending: INTERNAL MEDICINE
Payer: MEDICARE

## 2021-05-24 DIAGNOSIS — E78.2 MIXED HYPERLIPIDEMIA: ICD-10-CM

## 2021-05-24 DIAGNOSIS — I70.219 ATHEROSCLEROTIC PVD WITH INTERMITTENT CLAUDICATION: ICD-10-CM

## 2021-05-24 LAB
ALBUMIN SERPL BCP-MCNC: 3.6 G/DL (ref 3.5–5.2)
ALP SERPL-CCNC: 81 U/L (ref 55–135)
ALT SERPL W/O P-5'-P-CCNC: 12 U/L (ref 10–44)
ANION GAP SERPL CALC-SCNC: 8 MMOL/L (ref 8–16)
AST SERPL-CCNC: 15 U/L (ref 10–40)
BILIRUB SERPL-MCNC: 0.3 MG/DL (ref 0.1–1)
BUN SERPL-MCNC: 13 MG/DL (ref 8–23)
CALCIUM SERPL-MCNC: 9.1 MG/DL (ref 8.7–10.5)
CHLORIDE SERPL-SCNC: 106 MMOL/L (ref 95–110)
CHOLEST SERPL-MCNC: 299 MG/DL (ref 120–199)
CHOLEST/HDLC SERPL: 3.6 {RATIO} (ref 2–5)
CO2 SERPL-SCNC: 25 MMOL/L (ref 23–29)
CREAT SERPL-MCNC: 1.1 MG/DL (ref 0.5–1.4)
EST. GFR  (AFRICAN AMERICAN): >60 ML/MIN/1.73 M^2
EST. GFR  (NON AFRICAN AMERICAN): >60 ML/MIN/1.73 M^2
GLUCOSE SERPL-MCNC: 104 MG/DL (ref 70–110)
HDLC SERPL-MCNC: 82 MG/DL (ref 40–75)
HDLC SERPL: 27.4 % (ref 20–50)
LDLC SERPL CALC-MCNC: 197.8 MG/DL (ref 63–159)
NONHDLC SERPL-MCNC: 217 MG/DL
POTASSIUM SERPL-SCNC: 4.1 MMOL/L (ref 3.5–5.1)
PROT SERPL-MCNC: 7.1 G/DL (ref 6–8.4)
SODIUM SERPL-SCNC: 139 MMOL/L (ref 136–145)
TRIGL SERPL-MCNC: 96 MG/DL (ref 30–150)

## 2021-05-24 PROCEDURE — 36415 COLL VENOUS BLD VENIPUNCTURE: CPT | Performed by: INTERNAL MEDICINE

## 2021-05-24 PROCEDURE — 80053 COMPREHEN METABOLIC PANEL: CPT | Performed by: INTERNAL MEDICINE

## 2021-05-24 PROCEDURE — 80061 LIPID PANEL: CPT | Performed by: INTERNAL MEDICINE

## 2021-05-27 ENCOUNTER — TELEPHONE (OUTPATIENT)
Dept: CARDIOLOGY | Facility: CLINIC | Age: 76
End: 2021-05-27

## 2021-05-27 DIAGNOSIS — E78.2 MIXED HYPERLIPIDEMIA: Primary | ICD-10-CM

## 2021-05-28 ENCOUNTER — TELEPHONE (OUTPATIENT)
Dept: PULMONOLOGY | Facility: CLINIC | Age: 76
End: 2021-05-28

## 2021-06-03 ENCOUNTER — OFFICE VISIT (OUTPATIENT)
Dept: PULMONOLOGY | Facility: CLINIC | Age: 76
End: 2021-06-03
Payer: MEDICARE

## 2021-06-03 ENCOUNTER — HOSPITAL ENCOUNTER (OUTPATIENT)
Dept: RADIOLOGY | Facility: HOSPITAL | Age: 76
Discharge: HOME | End: 2021-06-03
Attending: INTERNAL MEDICINE
Payer: MEDICARE

## 2021-06-03 DIAGNOSIS — R91.8 MULTIPLE PULMONARY NODULES: ICD-10-CM

## 2021-06-03 DIAGNOSIS — J45.20 ASTHMA, MILD INTERMITTENT, WELL-CONTROLLED: Primary | ICD-10-CM

## 2021-06-03 DIAGNOSIS — R91.8 MULTIPLE PULMONARY NODULES: Primary | ICD-10-CM

## 2021-06-03 DIAGNOSIS — J45.20 ASTHMA, MILD INTERMITTENT, WELL-CONTROLLED: ICD-10-CM

## 2021-06-03 PROCEDURE — 99499 UNLISTED E&M SERVICE: CPT | Mod: S$GLB,,, | Performed by: INTERNAL MEDICINE

## 2021-06-03 PROCEDURE — 3288F PR FALLS RISK ASSESSMENT DOCUMENTED: ICD-10-PCS | Mod: CPTII,S$GLB,, | Performed by: INTERNAL MEDICINE

## 2021-06-03 PROCEDURE — 1159F PR MEDICATION LIST DOCUMENTED IN MEDICAL RECORD: ICD-10-PCS | Mod: S$GLB,,, | Performed by: INTERNAL MEDICINE

## 2021-06-03 PROCEDURE — 3288F FALL RISK ASSESSMENT DOCD: CPT | Mod: CPTII,S$GLB,, | Performed by: INTERNAL MEDICINE

## 2021-06-03 PROCEDURE — 1101F PR PT FALLS ASSESS DOC 0-1 FALLS W/OUT INJ PAST YR: ICD-10-PCS | Mod: CPTII,S$GLB,, | Performed by: INTERNAL MEDICINE

## 2021-06-03 PROCEDURE — 99214 OFFICE O/P EST MOD 30 MIN: CPT | Mod: S$GLB,,, | Performed by: INTERNAL MEDICINE

## 2021-06-03 PROCEDURE — 99499 RISK ADDL DX/OHS AUDIT: ICD-10-PCS | Mod: S$GLB,,, | Performed by: INTERNAL MEDICINE

## 2021-06-03 PROCEDURE — 71250 CT THORAX DX C-: CPT | Mod: 26,,, | Performed by: RADIOLOGY

## 2021-06-03 PROCEDURE — 1159F MED LIST DOCD IN RCRD: CPT | Mod: S$GLB,,, | Performed by: INTERNAL MEDICINE

## 2021-06-03 PROCEDURE — 99214 PR OFFICE/OUTPT VISIT, EST, LEVL IV, 30-39 MIN: ICD-10-PCS | Mod: S$GLB,,, | Performed by: INTERNAL MEDICINE

## 2021-06-03 PROCEDURE — 71250 CT CHEST WITHOUT CONTRAST: ICD-10-PCS | Mod: 26,,, | Performed by: RADIOLOGY

## 2021-06-03 PROCEDURE — 71250 CT THORAX DX C-: CPT | Mod: TC

## 2021-06-03 PROCEDURE — 1101F PT FALLS ASSESS-DOCD LE1/YR: CPT | Mod: CPTII,S$GLB,, | Performed by: INTERNAL MEDICINE

## 2021-06-03 PROCEDURE — 99999 PR PBB SHADOW E&M-EST. PATIENT-LVL II: CPT | Mod: PBBFAC,,, | Performed by: INTERNAL MEDICINE

## 2021-06-03 PROCEDURE — 99999 PR PBB SHADOW E&M-EST. PATIENT-LVL II: ICD-10-PCS | Mod: PBBFAC,,, | Performed by: INTERNAL MEDICINE

## 2021-06-03 RX ORDER — ALBUTEROL SULFATE 90 UG/1
2 AEROSOL, METERED RESPIRATORY (INHALATION) EVERY 6 HOURS PRN
Qty: 18 G | Refills: 11 | Status: SHIPPED | OUTPATIENT
Start: 2021-06-03 | End: 2021-11-29

## 2021-06-03 RX ORDER — FLUTICASONE FUROATE AND VILANTEROL TRIFENATATE 200; 25 UG/1; UG/1
1 POWDER RESPIRATORY (INHALATION) DAILY
Qty: 180 EACH | Refills: 3 | Status: SHIPPED | OUTPATIENT
Start: 2021-06-03 | End: 2022-01-24 | Stop reason: SDUPTHER

## 2021-06-09 ENCOUNTER — HOSPITAL ENCOUNTER (OUTPATIENT)
Dept: CARDIOLOGY | Facility: HOSPITAL | Age: 76
Discharge: HOME | End: 2021-06-09
Attending: INTERNAL MEDICINE
Payer: MEDICARE

## 2021-06-09 ENCOUNTER — TELEPHONE (OUTPATIENT)
Dept: PULMONOLOGY | Facility: CLINIC | Age: 76
End: 2021-06-09

## 2021-06-09 VITALS
BODY MASS INDEX: 27.97 KG/M2 | WEIGHT: 174 LBS | SYSTOLIC BLOOD PRESSURE: 158 MMHG | HEIGHT: 66 IN | DIASTOLIC BLOOD PRESSURE: 67 MMHG

## 2021-06-09 DIAGNOSIS — E78.2 MIXED HYPERLIPIDEMIA: ICD-10-CM

## 2021-06-09 DIAGNOSIS — I70.219 ATHEROSCLEROTIC PVD WITH INTERMITTENT CLAUDICATION: ICD-10-CM

## 2021-06-09 LAB
ABI CLAUDICATION TIME: 1.45 MIN
ABI POST MINUTES1: 4 MIN
IMMEDIATE ARM BP: 165 MMHG
IMMEDIATE LEFT ABI: 0.76
IMMEDIATE LEFT TIBIAL: 126 MMHG
IMMEDIATE RIGHT ABI: 1.03
IMMEDIATE RIGHT TIBIAL: 170 MMHG
LEFT ABI: 0.72
LEFT ARM BP: 143 MMHG
LEFT DORSALIS PEDIS: 114 MMHG
LEFT POSTERIOR TIBIAL: 114 MMHG
LEFT TBI: 0.28
LEFT TOE PRESSURE: 45 MMHG
POST1 ARM BP: 156 MMHG
POST1 LEFT ABI: 0.74
POST1 LEFT TIBIAL: 116 MMHG
POST1 RIGHT ABI: 0.97
POST1 RIGHT TIBIAL: 152 MMHG
RIGHT ABI: 0.94
RIGHT ARM BP: 158 MMHG
RIGHT DORSALIS PEDIS: 149 MMHG
RIGHT POSTERIOR TIBIAL: 129 MMHG
RIGHT TBI: 0.41
RIGHT TOE PRESSURE: 65 MMHG

## 2021-06-09 PROCEDURE — 93924 LWR XTR VASC STDY BILAT: CPT

## 2021-06-09 PROCEDURE — 93924 ANKLE BRACHIAL INDICES (ABI): ICD-10-PCS | Mod: 26,,, | Performed by: INTERNAL MEDICINE

## 2021-06-09 PROCEDURE — 93924 LWR XTR VASC STDY BILAT: CPT | Mod: 26,,, | Performed by: INTERNAL MEDICINE

## 2021-06-11 ENCOUNTER — TELEPHONE (OUTPATIENT)
Dept: CARDIOLOGY | Facility: CLINIC | Age: 76
End: 2021-06-11

## 2021-06-25 ENCOUNTER — LAB VISIT (OUTPATIENT)
Dept: LAB | Facility: HOSPITAL | Age: 76
End: 2021-06-25
Attending: INTERNAL MEDICINE
Payer: MEDICARE

## 2021-06-25 DIAGNOSIS — E78.2 MIXED HYPERLIPIDEMIA: ICD-10-CM

## 2021-06-25 LAB
CHOLEST SERPL-MCNC: 300 MG/DL (ref 120–199)
CHOLEST/HDLC SERPL: 3.3 {RATIO} (ref 2–5)
HDLC SERPL-MCNC: 91 MG/DL (ref 40–75)
HDLC SERPL: 30.3 % (ref 20–50)
LDLC SERPL CALC-MCNC: 188.2 MG/DL (ref 63–159)
NONHDLC SERPL-MCNC: 209 MG/DL
TRIGL SERPL-MCNC: 104 MG/DL (ref 30–150)

## 2021-06-25 PROCEDURE — 36415 COLL VENOUS BLD VENIPUNCTURE: CPT | Mod: PO | Performed by: INTERNAL MEDICINE

## 2021-06-25 PROCEDURE — 80061 LIPID PANEL: CPT | Performed by: INTERNAL MEDICINE

## 2021-06-28 ENCOUNTER — TELEPHONE (OUTPATIENT)
Dept: CARDIOLOGY | Facility: CLINIC | Age: 76
End: 2021-06-28

## 2021-06-28 DIAGNOSIS — I70.219 ATHEROSCLEROTIC PVD WITH INTERMITTENT CLAUDICATION: ICD-10-CM

## 2021-06-28 DIAGNOSIS — E78.2 MIXED HYPERLIPIDEMIA: ICD-10-CM

## 2021-06-28 RX ORDER — EZETIMIBE 10 MG/1
10 TABLET ORAL DAILY
Qty: 90 TABLET | Refills: 3 | Status: SHIPPED | OUTPATIENT
Start: 2021-06-28 | End: 2022-10-13

## 2021-06-28 RX ORDER — ATORVASTATIN CALCIUM 80 MG/1
80 TABLET, FILM COATED ORAL DAILY
Qty: 30 TABLET | Refills: 11 | Status: SHIPPED | OUTPATIENT
Start: 2021-06-28 | End: 2021-11-23 | Stop reason: SDUPTHER

## 2021-06-29 ENCOUNTER — PATIENT OUTREACH (OUTPATIENT)
Dept: PULMONOLOGY | Facility: CLINIC | Age: 76
End: 2021-06-29

## 2021-09-20 ENCOUNTER — OFFICE VISIT (OUTPATIENT)
Dept: URGENT CARE | Facility: CLINIC | Age: 76
End: 2021-09-20
Payer: MEDICARE

## 2021-09-20 VITALS
OXYGEN SATURATION: 97 % | BODY MASS INDEX: 27.64 KG/M2 | DIASTOLIC BLOOD PRESSURE: 65 MMHG | WEIGHT: 172 LBS | HEIGHT: 66 IN | HEART RATE: 66 BPM | SYSTOLIC BLOOD PRESSURE: 148 MMHG | RESPIRATION RATE: 14 BRPM | TEMPERATURE: 97 F
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
## 2021-09-20 PROCEDURE — 99213 OFFICE O/P EST LOW 20 MIN: CPT | Mod: S$GLB,,, | Performed by: PHYSICIAN ASSISTANT

## 2021-09-20 PROCEDURE — 1159F MED LIST DOCD IN RCRD: CPT | Mod: CPTII,S$GLB,, | Performed by: PHYSICIAN ASSISTANT

## 2021-09-20 PROCEDURE — 1160F RVW MEDS BY RX/DR IN RCRD: CPT | Mod: CPTII,S$GLB,, | Performed by: PHYSICIAN ASSISTANT

## 2021-09-20 PROCEDURE — 3077F SYST BP >= 140 MM HG: CPT | Mod: CPTII,S$GLB,, | Performed by: PHYSICIAN ASSISTANT

## 2021-09-20 PROCEDURE — 1160F PR REVIEW ALL MEDS BY PRESCRIBER/CLIN PHARMACIST DOCUMENTED: ICD-10-PCS | Mod: CPTII,S$GLB,, | Performed by: PHYSICIAN ASSISTANT

## 2021-09-20 PROCEDURE — 99213 PR OFFICE/OUTPT VISIT, EST, LEVL III, 20-29 MIN: ICD-10-PCS | Mod: S$GLB,,, | Performed by: PHYSICIAN ASSISTANT

## 2021-09-20 PROCEDURE — 1159F PR MEDICATION LIST DOCUMENTED IN MEDICAL RECORD: ICD-10-PCS | Mod: CPTII,S$GLB,, | Performed by: PHYSICIAN ASSISTANT

## 2021-09-20 PROCEDURE — 3078F DIAST BP <80 MM HG: CPT | Mod: CPTII,S$GLB,, | Performed by: PHYSICIAN ASSISTANT

## 2021-09-20 PROCEDURE — 3078F PR MOST RECENT DIASTOLIC BLOOD PRESSURE < 80 MM HG: ICD-10-PCS | Mod: CPTII,S$GLB,, | Performed by: PHYSICIAN ASSISTANT

## 2021-09-20 PROCEDURE — 1126F PR PAIN SEVERITY QUANTIFIED, NO PAIN PRESENT: ICD-10-PCS | Mod: CPTII,S$GLB,, | Performed by: PHYSICIAN ASSISTANT

## 2021-09-20 PROCEDURE — 3077F PR MOST RECENT SYSTOLIC BLOOD PRESSURE >= 140 MM HG: ICD-10-PCS | Mod: CPTII,S$GLB,, | Performed by: PHYSICIAN ASSISTANT

## 2021-09-20 PROCEDURE — 1126F AMNT PAIN NOTED NONE PRSNT: CPT | Mod: CPTII,S$GLB,, | Performed by: PHYSICIAN ASSISTANT

## 2021-09-20 RX ORDER — MUPIROCIN 20 MG/G
OINTMENT TOPICAL 3 TIMES DAILY
Qty: 1 TUBE | Refills: 0 | Status: SHIPPED | OUTPATIENT
Start: 2021-09-20 | End: 2022-07-11

## 2021-09-20 RX ORDER — MUPIROCIN 20 MG/G
OINTMENT TOPICAL
Status: DISCONTINUED | OUTPATIENT
Start: 2021-09-20 | End: 2021-09-20

## 2021-09-23 ENCOUNTER — TELEPHONE (OUTPATIENT)
Dept: URGENT CARE | Facility: CLINIC | Age: 76
End: 2021-09-23

## 2021-09-27 ENCOUNTER — LAB VISIT (OUTPATIENT)
Dept: LAB | Facility: HOSPITAL | Age: 76
End: 2021-09-27
Attending: UROLOGY
Payer: MEDICARE

## 2021-09-27 DIAGNOSIS — R97.20 ELEVATED PSA: ICD-10-CM

## 2021-09-27 LAB — COMPLEXED PSA SERPL-MCNC: 41.2 NG/ML (ref 0–4)

## 2021-09-27 PROCEDURE — 36415 COLL VENOUS BLD VENIPUNCTURE: CPT | Performed by: UROLOGY

## 2021-09-27 PROCEDURE — 84153 ASSAY OF PSA TOTAL: CPT | Performed by: UROLOGY

## 2021-10-04 ENCOUNTER — TELEPHONE (OUTPATIENT)
Dept: PULMONOLOGY | Facility: CLINIC | Age: 76
End: 2021-10-04

## 2021-10-04 DIAGNOSIS — R91.8 MULTIPLE PULMONARY NODULES: Primary | ICD-10-CM

## 2021-10-07 ENCOUNTER — OFFICE VISIT (OUTPATIENT)
Dept: UROLOGY | Facility: CLINIC | Age: 76
End: 2021-10-07
Payer: MEDICARE

## 2021-10-07 VITALS
BODY MASS INDEX: 27.7 KG/M2 | WEIGHT: 172.38 LBS | DIASTOLIC BLOOD PRESSURE: 76 MMHG | SYSTOLIC BLOOD PRESSURE: 156 MMHG | HEART RATE: 65 BPM | TEMPERATURE: 97 F | HEIGHT: 66 IN

## 2021-10-07 DIAGNOSIS — R97.20 ELEVATED PSA: Primary | ICD-10-CM

## 2021-10-07 DIAGNOSIS — N52.9 ERECTILE DYSFUNCTION, UNSPECIFIED ERECTILE DYSFUNCTION TYPE: ICD-10-CM

## 2021-10-07 DIAGNOSIS — N40.0 BENIGN PROSTATIC HYPERPLASIA, UNSPECIFIED WHETHER LOWER URINARY TRACT SYMPTOMS PRESENT: ICD-10-CM

## 2021-10-07 PROCEDURE — 3077F SYST BP >= 140 MM HG: CPT | Mod: CPTII,S$GLB,, | Performed by: UROLOGY

## 2021-10-07 PROCEDURE — 1126F PR PAIN SEVERITY QUANTIFIED, NO PAIN PRESENT: ICD-10-PCS | Mod: CPTII,S$GLB,, | Performed by: UROLOGY

## 2021-10-07 PROCEDURE — 3288F PR FALLS RISK ASSESSMENT DOCUMENTED: ICD-10-PCS | Mod: CPTII,S$GLB,, | Performed by: UROLOGY

## 2021-10-07 PROCEDURE — 99214 OFFICE O/P EST MOD 30 MIN: CPT | Mod: S$GLB,,, | Performed by: UROLOGY

## 2021-10-07 PROCEDURE — 1159F PR MEDICATION LIST DOCUMENTED IN MEDICAL RECORD: ICD-10-PCS | Mod: CPTII,S$GLB,, | Performed by: UROLOGY

## 2021-10-07 PROCEDURE — 3078F DIAST BP <80 MM HG: CPT | Mod: CPTII,S$GLB,, | Performed by: UROLOGY

## 2021-10-07 PROCEDURE — 1160F PR REVIEW ALL MEDS BY PRESCRIBER/CLIN PHARMACIST DOCUMENTED: ICD-10-PCS | Mod: CPTII,S$GLB,, | Performed by: UROLOGY

## 2021-10-07 PROCEDURE — 99214 PR OFFICE/OUTPT VISIT, EST, LEVL IV, 30-39 MIN: ICD-10-PCS | Mod: S$GLB,,, | Performed by: UROLOGY

## 2021-10-07 PROCEDURE — 3288F FALL RISK ASSESSMENT DOCD: CPT | Mod: CPTII,S$GLB,, | Performed by: UROLOGY

## 2021-10-07 PROCEDURE — 3078F PR MOST RECENT DIASTOLIC BLOOD PRESSURE < 80 MM HG: ICD-10-PCS | Mod: CPTII,S$GLB,, | Performed by: UROLOGY

## 2021-10-07 PROCEDURE — 3077F PR MOST RECENT SYSTOLIC BLOOD PRESSURE >= 140 MM HG: ICD-10-PCS | Mod: CPTII,S$GLB,, | Performed by: UROLOGY

## 2021-10-07 PROCEDURE — 1160F RVW MEDS BY RX/DR IN RCRD: CPT | Mod: CPTII,S$GLB,, | Performed by: UROLOGY

## 2021-10-07 PROCEDURE — 99999 PR PBB SHADOW E&M-EST. PATIENT-LVL IV: CPT | Mod: PBBFAC,,, | Performed by: UROLOGY

## 2021-10-07 PROCEDURE — 1126F AMNT PAIN NOTED NONE PRSNT: CPT | Mod: CPTII,S$GLB,, | Performed by: UROLOGY

## 2021-10-07 PROCEDURE — 1159F MED LIST DOCD IN RCRD: CPT | Mod: CPTII,S$GLB,, | Performed by: UROLOGY

## 2021-10-07 PROCEDURE — 1101F PR PT FALLS ASSESS DOC 0-1 FALLS W/OUT INJ PAST YR: ICD-10-PCS | Mod: CPTII,S$GLB,, | Performed by: UROLOGY

## 2021-10-07 PROCEDURE — 1101F PT FALLS ASSESS-DOCD LE1/YR: CPT | Mod: CPTII,S$GLB,, | Performed by: UROLOGY

## 2021-10-07 PROCEDURE — 99999 PR PBB SHADOW E&M-EST. PATIENT-LVL IV: ICD-10-PCS | Mod: PBBFAC,,, | Performed by: UROLOGY

## 2021-10-07 RX ORDER — FINASTERIDE 5 MG/1
5 TABLET, FILM COATED ORAL DAILY
Qty: 90 TABLET | Refills: 3 | Status: SHIPPED | OUTPATIENT
Start: 2021-10-07 | End: 2022-10-07

## 2021-10-07 RX ORDER — TAMSULOSIN HYDROCHLORIDE 0.4 MG/1
0.4 CAPSULE ORAL DAILY
Qty: 90 CAPSULE | Refills: 3 | Status: SHIPPED | OUTPATIENT
Start: 2021-10-07 | End: 2022-10-07

## 2021-10-12 ENCOUNTER — TELEPHONE (OUTPATIENT)
Dept: PULMONOLOGY | Facility: CLINIC | Age: 76
End: 2021-10-12

## 2021-10-13 ENCOUNTER — CLINICAL SUPPORT (OUTPATIENT)
Dept: PULMONOLOGY | Facility: CLINIC | Age: 76
End: 2021-10-13
Payer: MEDICARE

## 2021-10-13 VITALS
OXYGEN SATURATION: 98 % | BODY MASS INDEX: 27.46 KG/M2 | HEIGHT: 66 IN | RESPIRATION RATE: 16 BRPM | HEART RATE: 60 BPM | WEIGHT: 170.88 LBS

## 2021-10-13 DIAGNOSIS — J45.909 ASTHMA, WELL CONTROLLED: Primary | ICD-10-CM

## 2021-10-13 PROCEDURE — 99999 PR PBB SHADOW E&M-EST. PATIENT-LVL II: ICD-10-PCS | Mod: PBBFAC,,,

## 2021-10-13 PROCEDURE — 99999 PR PBB SHADOW E&M-EST. PATIENT-LVL II: CPT | Mod: PBBFAC,,,

## 2021-11-17 ENCOUNTER — OFFICE VISIT (OUTPATIENT)
Dept: URGENT CARE | Facility: CLINIC | Age: 76
End: 2021-11-17
Payer: MEDICARE

## 2021-11-17 VITALS
SYSTOLIC BLOOD PRESSURE: 171 MMHG | DIASTOLIC BLOOD PRESSURE: 72 MMHG | RESPIRATION RATE: 18 BRPM | BODY MASS INDEX: 27.32 KG/M2 | WEIGHT: 170 LBS | HEART RATE: 57 BPM | HEIGHT: 66 IN | OXYGEN SATURATION: 98 % | TEMPERATURE: 97 F

## 2021-11-17 DIAGNOSIS — J30.9 ALLERGIC RHINITIS WITH POSTNASAL DRIP: Primary | ICD-10-CM

## 2021-11-17 DIAGNOSIS — R09.82 ALLERGIC RHINITIS WITH POSTNASAL DRIP: Primary | ICD-10-CM

## 2021-11-17 DIAGNOSIS — R05.9 COUGH: ICD-10-CM

## 2021-11-17 PROCEDURE — 99214 PR OFFICE/OUTPT VISIT, EST, LEVL IV, 30-39 MIN: ICD-10-PCS | Mod: S$GLB,,, | Performed by: NURSE PRACTITIONER

## 2021-11-17 PROCEDURE — 3077F SYST BP >= 140 MM HG: CPT | Mod: CPTII,S$GLB,, | Performed by: NURSE PRACTITIONER

## 2021-11-17 PROCEDURE — 3078F DIAST BP <80 MM HG: CPT | Mod: CPTII,S$GLB,, | Performed by: NURSE PRACTITIONER

## 2021-11-17 PROCEDURE — 99214 OFFICE O/P EST MOD 30 MIN: CPT | Mod: S$GLB,,, | Performed by: NURSE PRACTITIONER

## 2021-11-17 PROCEDURE — 1126F PR PAIN SEVERITY QUANTIFIED, NO PAIN PRESENT: ICD-10-PCS | Mod: CPTII,S$GLB,, | Performed by: NURSE PRACTITIONER

## 2021-11-17 PROCEDURE — 3077F PR MOST RECENT SYSTOLIC BLOOD PRESSURE >= 140 MM HG: ICD-10-PCS | Mod: CPTII,S$GLB,, | Performed by: NURSE PRACTITIONER

## 2021-11-17 PROCEDURE — 1160F PR REVIEW ALL MEDS BY PRESCRIBER/CLIN PHARMACIST DOCUMENTED: ICD-10-PCS | Mod: CPTII,S$GLB,, | Performed by: NURSE PRACTITIONER

## 2021-11-17 PROCEDURE — 1160F RVW MEDS BY RX/DR IN RCRD: CPT | Mod: CPTII,S$GLB,, | Performed by: NURSE PRACTITIONER

## 2021-11-17 PROCEDURE — 1159F MED LIST DOCD IN RCRD: CPT | Mod: CPTII,S$GLB,, | Performed by: NURSE PRACTITIONER

## 2021-11-17 PROCEDURE — 1159F PR MEDICATION LIST DOCUMENTED IN MEDICAL RECORD: ICD-10-PCS | Mod: CPTII,S$GLB,, | Performed by: NURSE PRACTITIONER

## 2021-11-17 PROCEDURE — 3078F PR MOST RECENT DIASTOLIC BLOOD PRESSURE < 80 MM HG: ICD-10-PCS | Mod: CPTII,S$GLB,, | Performed by: NURSE PRACTITIONER

## 2021-11-17 PROCEDURE — 1126F AMNT PAIN NOTED NONE PRSNT: CPT | Mod: CPTII,S$GLB,, | Performed by: NURSE PRACTITIONER

## 2021-11-17 RX ORDER — FLUTICASONE PROPIONATE 50 MCG
2 SPRAY, SUSPENSION (ML) NASAL DAILY
Qty: 16 G | Refills: 0 | Status: SHIPPED | OUTPATIENT
Start: 2021-11-17 | End: 2022-07-11

## 2021-11-17 RX ORDER — BENZONATATE 200 MG/1
200 CAPSULE ORAL 3 TIMES DAILY PRN
Qty: 30 CAPSULE | Refills: 0 | Status: SHIPPED | OUTPATIENT
Start: 2021-11-17 | End: 2021-11-27

## 2021-11-19 DIAGNOSIS — I10 ESSENTIAL HYPERTENSION: Primary | ICD-10-CM

## 2021-11-20 ENCOUNTER — TELEPHONE (OUTPATIENT)
Dept: URGENT CARE | Facility: CLINIC | Age: 76
End: 2021-11-20
Payer: MEDICARE

## 2021-11-23 ENCOUNTER — PATIENT OUTREACH (OUTPATIENT)
Dept: PULMONOLOGY | Facility: CLINIC | Age: 76
End: 2021-11-23
Payer: MEDICARE

## 2021-11-23 ENCOUNTER — HOSPITAL ENCOUNTER (OUTPATIENT)
Dept: CARDIOLOGY | Facility: HOSPITAL | Age: 76
Discharge: HOME | End: 2021-11-23
Attending: INTERNAL MEDICINE
Payer: MEDICARE

## 2021-11-23 ENCOUNTER — OFFICE VISIT (OUTPATIENT)
Dept: CARDIOLOGY | Facility: CLINIC | Age: 76
End: 2021-11-23
Payer: MEDICARE

## 2021-11-23 VITALS
WEIGHT: 172 LBS | SYSTOLIC BLOOD PRESSURE: 134 MMHG | BODY MASS INDEX: 27.76 KG/M2 | OXYGEN SATURATION: 98 % | HEART RATE: 60 BPM | DIASTOLIC BLOOD PRESSURE: 62 MMHG

## 2021-11-23 DIAGNOSIS — I10 PRIMARY HYPERTENSION: Chronic | ICD-10-CM

## 2021-11-23 DIAGNOSIS — E78.2 MIXED HYPERLIPIDEMIA: ICD-10-CM

## 2021-11-23 DIAGNOSIS — I10 ESSENTIAL HYPERTENSION: ICD-10-CM

## 2021-11-23 DIAGNOSIS — I70.0 AORTIC CALCIFICATION: ICD-10-CM

## 2021-11-23 DIAGNOSIS — I70.219 ATHEROSCLEROTIC PVD WITH INTERMITTENT CLAUDICATION: Primary | ICD-10-CM

## 2021-11-23 PROCEDURE — 99999 PR PBB SHADOW E&M-EST. PATIENT-LVL III: ICD-10-PCS | Mod: PBBFAC,HCNC,, | Performed by: INTERNAL MEDICINE

## 2021-11-23 PROCEDURE — 99214 PR OFFICE/OUTPT VISIT, EST, LEVL IV, 30-39 MIN: ICD-10-PCS | Mod: HCNC,S$GLB,, | Performed by: INTERNAL MEDICINE

## 2021-11-23 PROCEDURE — 99214 OFFICE O/P EST MOD 30 MIN: CPT | Mod: HCNC,S$GLB,, | Performed by: INTERNAL MEDICINE

## 2021-11-23 PROCEDURE — 99999 PR PBB SHADOW E&M-EST. PATIENT-LVL III: CPT | Mod: PBBFAC,HCNC,, | Performed by: INTERNAL MEDICINE

## 2021-11-23 PROCEDURE — 93010 EKG 12-LEAD: ICD-10-PCS | Mod: HCNC,,, | Performed by: INTERNAL MEDICINE

## 2021-11-23 PROCEDURE — 93010 ELECTROCARDIOGRAM REPORT: CPT | Mod: HCNC,,, | Performed by: INTERNAL MEDICINE

## 2021-11-23 PROCEDURE — 93005 ELECTROCARDIOGRAM TRACING: CPT | Mod: HCNC

## 2021-11-23 RX ORDER — ATORVASTATIN CALCIUM 80 MG/1
80 TABLET, FILM COATED ORAL DAILY
Qty: 90 TABLET | Refills: 3 | Status: SHIPPED | OUTPATIENT
Start: 2021-11-23

## 2021-12-09 ENCOUNTER — PATIENT OUTREACH (OUTPATIENT)
Dept: ADMINISTRATIVE | Facility: HOSPITAL | Age: 76
End: 2021-12-09
Payer: MEDICARE

## 2021-12-09 ENCOUNTER — TELEPHONE (OUTPATIENT)
Dept: INTERNAL MEDICINE | Facility: CLINIC | Age: 76
End: 2021-12-09
Payer: MEDICARE

## 2021-12-10 ENCOUNTER — TELEPHONE (OUTPATIENT)
Dept: INTERNAL MEDICINE | Facility: CLINIC | Age: 76
End: 2021-12-10
Payer: MEDICARE

## 2022-01-04 ENCOUNTER — LAB VISIT (OUTPATIENT)
Dept: LAB | Facility: HOSPITAL | Age: 77
End: 2022-01-04
Attending: INTERNAL MEDICINE
Payer: MEDICARE

## 2022-01-04 DIAGNOSIS — I10 PRIMARY HYPERTENSION: Chronic | ICD-10-CM

## 2022-01-04 DIAGNOSIS — I70.219 ATHEROSCLEROTIC PVD WITH INTERMITTENT CLAUDICATION: ICD-10-CM

## 2022-01-04 LAB
ALBUMIN SERPL BCP-MCNC: 3.3 G/DL (ref 3.5–5.2)
ALP SERPL-CCNC: 90 U/L (ref 55–135)
ALT SERPL W/O P-5'-P-CCNC: 12 U/L (ref 10–44)
ANION GAP SERPL CALC-SCNC: 5 MMOL/L (ref 8–16)
AST SERPL-CCNC: 16 U/L (ref 10–40)
BILIRUB SERPL-MCNC: 0.3 MG/DL (ref 0.1–1)
BUN SERPL-MCNC: 6 MG/DL (ref 8–23)
CALCIUM SERPL-MCNC: 9.2 MG/DL (ref 8.7–10.5)
CHLORIDE SERPL-SCNC: 103 MMOL/L (ref 95–110)
CHOLEST SERPL-MCNC: 201 MG/DL (ref 120–199)
CHOLEST/HDLC SERPL: 4.5 {RATIO} (ref 2–5)
CO2 SERPL-SCNC: 31 MMOL/L (ref 23–29)
CREAT SERPL-MCNC: 0.9 MG/DL (ref 0.5–1.4)
EST. GFR  (AFRICAN AMERICAN): >60 ML/MIN/1.73 M^2
EST. GFR  (NON AFRICAN AMERICAN): >60 ML/MIN/1.73 M^2
GLUCOSE SERPL-MCNC: 90 MG/DL (ref 70–110)
HDLC SERPL-MCNC: 45 MG/DL (ref 40–75)
HDLC SERPL: 22.4 % (ref 20–50)
LDLC SERPL CALC-MCNC: 134.2 MG/DL (ref 63–159)
NONHDLC SERPL-MCNC: 156 MG/DL
POTASSIUM SERPL-SCNC: 4.8 MMOL/L (ref 3.5–5.1)
PROT SERPL-MCNC: 7.2 G/DL (ref 6–8.4)
SODIUM SERPL-SCNC: 139 MMOL/L (ref 136–145)
TRIGL SERPL-MCNC: 109 MG/DL (ref 30–150)

## 2022-01-04 PROCEDURE — 36415 COLL VENOUS BLD VENIPUNCTURE: CPT | Mod: HCNC | Performed by: INTERNAL MEDICINE

## 2022-01-04 PROCEDURE — 80053 COMPREHEN METABOLIC PANEL: CPT | Mod: HCNC | Performed by: INTERNAL MEDICINE

## 2022-01-04 PROCEDURE — 80061 LIPID PANEL: CPT | Mod: HCNC | Performed by: INTERNAL MEDICINE

## 2022-01-06 ENCOUNTER — TELEPHONE (OUTPATIENT)
Dept: CARDIOLOGY | Facility: CLINIC | Age: 77
End: 2022-01-06
Payer: MEDICARE

## 2022-01-06 NOTE — TELEPHONE ENCOUNTER
Called patient and informed him to call to discuss results and medications.      ----- Message from Zackary Upton MD sent at 1/6/2022  7:57 AM CST -----  The lipid profile sowed improved Lipid profile but the level is still high  Taking Lipitor and zetia daily?

## 2022-01-10 ENCOUNTER — OFFICE VISIT (OUTPATIENT)
Dept: UROLOGY | Facility: CLINIC | Age: 77
End: 2022-01-10
Payer: MEDICARE

## 2022-01-10 ENCOUNTER — LAB VISIT (OUTPATIENT)
Dept: LAB | Facility: HOSPITAL | Age: 77
End: 2022-01-10
Attending: UROLOGY
Payer: MEDICARE

## 2022-01-10 VITALS
DIASTOLIC BLOOD PRESSURE: 78 MMHG | HEART RATE: 73 BPM | BODY MASS INDEX: 27.47 KG/M2 | SYSTOLIC BLOOD PRESSURE: 144 MMHG | WEIGHT: 170.19 LBS | TEMPERATURE: 98 F

## 2022-01-10 DIAGNOSIS — N40.0 BENIGN PROSTATIC HYPERPLASIA, UNSPECIFIED WHETHER LOWER URINARY TRACT SYMPTOMS PRESENT: ICD-10-CM

## 2022-01-10 DIAGNOSIS — R97.20 ELEVATED PSA: ICD-10-CM

## 2022-01-10 DIAGNOSIS — N52.9 ERECTILE DYSFUNCTION, UNSPECIFIED ERECTILE DYSFUNCTION TYPE: ICD-10-CM

## 2022-01-10 DIAGNOSIS — R97.20 ELEVATED PSA: Primary | ICD-10-CM

## 2022-01-10 LAB — COMPLEXED PSA SERPL-MCNC: 32.6 NG/ML (ref 0–4)

## 2022-01-10 PROCEDURE — 1101F PT FALLS ASSESS-DOCD LE1/YR: CPT | Mod: HCNC,CPTII,S$GLB, | Performed by: UROLOGY

## 2022-01-10 PROCEDURE — 3078F PR MOST RECENT DIASTOLIC BLOOD PRESSURE < 80 MM HG: ICD-10-PCS | Mod: HCNC,CPTII,S$GLB, | Performed by: UROLOGY

## 2022-01-10 PROCEDURE — 1159F PR MEDICATION LIST DOCUMENTED IN MEDICAL RECORD: ICD-10-PCS | Mod: HCNC,CPTII,S$GLB, | Performed by: UROLOGY

## 2022-01-10 PROCEDURE — 3077F SYST BP >= 140 MM HG: CPT | Mod: HCNC,CPTII,S$GLB, | Performed by: UROLOGY

## 2022-01-10 PROCEDURE — 99999 PR PBB SHADOW E&M-EST. PATIENT-LVL IV: CPT | Mod: PBBFAC,HCNC,, | Performed by: UROLOGY

## 2022-01-10 PROCEDURE — 1160F PR REVIEW ALL MEDS BY PRESCRIBER/CLIN PHARMACIST DOCUMENTED: ICD-10-PCS | Mod: HCNC,CPTII,S$GLB, | Performed by: UROLOGY

## 2022-01-10 PROCEDURE — 3288F FALL RISK ASSESSMENT DOCD: CPT | Mod: HCNC,CPTII,S$GLB, | Performed by: UROLOGY

## 2022-01-10 PROCEDURE — 1126F PR PAIN SEVERITY QUANTIFIED, NO PAIN PRESENT: ICD-10-PCS | Mod: HCNC,CPTII,S$GLB, | Performed by: UROLOGY

## 2022-01-10 PROCEDURE — 1159F MED LIST DOCD IN RCRD: CPT | Mod: HCNC,CPTII,S$GLB, | Performed by: UROLOGY

## 2022-01-10 PROCEDURE — 99999 PR PBB SHADOW E&M-EST. PATIENT-LVL IV: ICD-10-PCS | Mod: PBBFAC,HCNC,, | Performed by: UROLOGY

## 2022-01-10 PROCEDURE — 1101F PR PT FALLS ASSESS DOC 0-1 FALLS W/OUT INJ PAST YR: ICD-10-PCS | Mod: HCNC,CPTII,S$GLB, | Performed by: UROLOGY

## 2022-01-10 PROCEDURE — 99214 OFFICE O/P EST MOD 30 MIN: CPT | Mod: HCNC,S$GLB,, | Performed by: UROLOGY

## 2022-01-10 PROCEDURE — 36415 COLL VENOUS BLD VENIPUNCTURE: CPT | Mod: HCNC | Performed by: UROLOGY

## 2022-01-10 PROCEDURE — 1126F AMNT PAIN NOTED NONE PRSNT: CPT | Mod: HCNC,CPTII,S$GLB, | Performed by: UROLOGY

## 2022-01-10 PROCEDURE — 3288F PR FALLS RISK ASSESSMENT DOCUMENTED: ICD-10-PCS | Mod: HCNC,CPTII,S$GLB, | Performed by: UROLOGY

## 2022-01-10 PROCEDURE — 3078F DIAST BP <80 MM HG: CPT | Mod: HCNC,CPTII,S$GLB, | Performed by: UROLOGY

## 2022-01-10 PROCEDURE — 84153 ASSAY OF PSA TOTAL: CPT | Mod: HCNC | Performed by: UROLOGY

## 2022-01-10 PROCEDURE — 99214 PR OFFICE/OUTPT VISIT, EST, LEVL IV, 30-39 MIN: ICD-10-PCS | Mod: HCNC,S$GLB,, | Performed by: UROLOGY

## 2022-01-10 PROCEDURE — 1160F RVW MEDS BY RX/DR IN RCRD: CPT | Mod: HCNC,CPTII,S$GLB, | Performed by: UROLOGY

## 2022-01-10 PROCEDURE — 3077F PR MOST RECENT SYSTOLIC BLOOD PRESSURE >= 140 MM HG: ICD-10-PCS | Mod: HCNC,CPTII,S$GLB, | Performed by: UROLOGY

## 2022-01-24 ENCOUNTER — PATIENT OUTREACH (OUTPATIENT)
Dept: PULMONOLOGY | Facility: CLINIC | Age: 77
End: 2022-01-24
Payer: MEDICARE

## 2022-01-24 DIAGNOSIS — J45.20 ASTHMA, MILD INTERMITTENT, WELL-CONTROLLED: ICD-10-CM

## 2022-01-24 RX ORDER — FLUTICASONE FUROATE AND VILANTEROL TRIFENATATE 200; 25 UG/1; UG/1
1 POWDER RESPIRATORY (INHALATION) DAILY
Qty: 180 EACH | Refills: 3 | Status: SHIPPED | OUTPATIENT
Start: 2022-01-24 | End: 2022-05-19 | Stop reason: SDUPTHER

## 2022-01-24 RX ORDER — ALBUTEROL SULFATE 90 UG/1
AEROSOL, METERED RESPIRATORY (INHALATION)
Qty: 18 G | Refills: 11 | Status: SHIPPED | OUTPATIENT
Start: 2022-01-24 | End: 2022-05-19 | Stop reason: SDUPTHER

## 2022-01-24 NOTE — TELEPHONE ENCOUNTER
Chronic Disease Management  Called patient to complete Pulmonary Disease Management Questionnaire.    Patient reports he is doing well at this time. Patient has not been able to obtain Breo from [facility] in quite some time. Patient has been getting inhalers from a friend.  Refill request submitted for Breo. Will follow up with the patient.     Time  spent with patient:  Minutes

## 2022-01-24 NOTE — TELEPHONE ENCOUNTER
Called patient to provide an update on medication coverage and cost information. Per [facility] Breo cost is $300.     Contacted [facility] for benefit information. According to the representative, patient has $0 deductible and medication cost should be $47. Provided patient with this information.

## 2022-03-10 ENCOUNTER — PATIENT OUTREACH (OUTPATIENT)
Dept: PULMONOLOGY | Facility: CLINIC | Age: 77
End: 2022-03-10
Payer: MEDICARE

## 2022-03-10 ENCOUNTER — TELEPHONE (OUTPATIENT)
Dept: PULMONOLOGY | Facility: CLINIC | Age: 77
End: 2022-03-10
Payer: MEDICARE

## 2022-03-10 DIAGNOSIS — J45.909 ASTHMA, WELL CONTROLLED, UNSPECIFIED ASTHMA SEVERITY, UNSPECIFIED WHETHER PERSISTENT: Primary | ICD-10-CM

## 2022-03-10 NOTE — TELEPHONE ENCOUNTER
Chronic Disease Management  Called patient to complete Pulmonary Disease Management Questionnaire.  The patient was able to get Breo.    Time  spent with patient:  Minutes

## 2022-04-05 ENCOUNTER — CLINICAL SUPPORT (OUTPATIENT)
Dept: PULMONOLOGY | Facility: CLINIC | Age: 77
End: 2022-04-05
Payer: MEDICARE

## 2022-04-05 VITALS
OXYGEN SATURATION: 97 % | WEIGHT: 173.31 LBS | RESPIRATION RATE: 16 BRPM | BODY MASS INDEX: 27.85 KG/M2 | HEART RATE: 88 BPM | HEIGHT: 66 IN

## 2022-04-05 DIAGNOSIS — J45.909 ASTHMA, WELL CONTROLLED: Primary | ICD-10-CM

## 2022-04-05 PROCEDURE — 99999 PR PBB SHADOW E&M-EST. PATIENT-LVL III: ICD-10-PCS | Mod: PBBFAC,,,

## 2022-04-05 PROCEDURE — 99999 PR PBB SHADOW E&M-EST. PATIENT-LVL III: CPT | Mod: PBBFAC,,,

## 2022-04-05 NOTE — PATIENT INSTRUCTIONS
Understanding Asthma         Asthma is a long-term (chronic) lung condition. It involves the airways (bronchial tubes). It happens when a trigger causes your airways to swell and become narrow. The muscles around your airways start to tighten. When your airways start to narrow, air can't move in and out of your lungs very well. Mucus also builds up along the airways. This makes it even harder to move air in and out of your lungs.  Experts are not exactly sure what causes asthma. It may be caused by a mix of inherited and environmental factors. People with asthma may have no symptoms until they are exposed to an allergen or trigger.  Healthy lungs  Inside your lungs there are branching airways made of stretchy tissue. Each airway is wrapped with bands of muscle. The airways are smaller as they go deeper into the lungs. The smallest airways end in clusters of tiny balloon-like air sacs (alveoli). These clusters are surrounded by blood vessels. When you breathe in (inhale), air enters the lungs. It travels down through the airways until it reaches the air sacs. When you breathe out (exhale), air travels up through the airways and out of the lungs. The airways make mucus that traps particles you breathe in. Normally, the mucus is then swept out of the lungs by tiny hairs (cilia) that line the airways. The mucus is swallowed or coughed up during your day.    What the lungs do  The air you inhale contains oxygen. When oxygen reaches the air sacs, it passes into the blood vessels around the sacs. Your blood then sends oxygen to all of your cells. As you exhale, carbon dioxide is removed in a similar way from the blood in the air sacs, and from your body.    When you have asthma  People with asthma have very sensitive airways. This means the airways react to certain things called triggers. Triggers can include pollen, dust, or smoke. Triggers cause inflammation. This makes the airways swell and become narrow. This is a  long-lasting (chronic) problem. Your airways may not always be narrow enough so that you notice breathing problems.  Symptoms of chronic inflammation include:   Coughing (chronic)   A feeling of tightness in your chest   Feeling short of breath   Wheezing (a whistling noise, especially when breathing out)   Low energy or feeling tired   In some people, over time chronic mild inflammation can lead to lasting (permanent) scarring of airways and loss of lung function.    Asthma flare-ups  When sensitive airways are irritated by a trigger, the muscles around the airways tighten. The lining of the airways swells. Thick, sticky mucus increases and partly clogs the airways. All of this makes it harder to breathe.  Symptoms of flare-ups may include:  Coughing, especially at night. You may not be able to sleep because of coughing.   Getting tired or out of breath easily   Wheezing   Chest tightness   Faster breathing when at rest   Flare-ups can be life-threatening. In a severe flare-up, the muscle tightening, swelling, and mucus are worse. Its very hard to breathe. Your body can't get enough oxygen and can't remove carbon dioxide. Waste gas is trapped in the alveoli. Gas exchange cant occur. The body is not getting enough oxygen. Without oxygen, body tissues, especially brain tissue, begin to get damaged. If this goes on for long, it can lead to severe brain damage or death.  Call 911 (or have someone call for you) if you have any of these symptoms and they are not relieved right away by taking your quick-relief medicine as prescribed:  Trouble breathing   Feeling too short of breath to talk or walk   Lips or fingers turning blue   Feeling lightheaded or dizzy, as though you are about to pass out   Peak flow less than 50% of your personal best, if you use a peak flow meter     Managing your asthma  Asthma is a long-term condition. So its important to work with your healthcare provider to manage it. If you have asthma,  you can prevent flare-ups. Develop an asthma action plan with your healthcare provider. It can help control your asthma and manage your symptoms. An asthma action plan also tells you and your family or friends what to do if your asthma flares up or gets worse.  Take your medicine as prescribed. Also learn about your asthma triggers. Knowing what causes your asthma to flare up in the first place can help you prevent future breathing problems.  If you smoke, get help to quit.   Asthma Trigger Checklist  Allergens, irritants, and other things may trigger your asthma. Check the box next to each of your triggers. After each trigger is a list of ways to avoid it.   Dust mites. Dust mites live in mattresses, bedding, carpets, curtains, and indoor dust.  To kill dust mites, wash bedding in hot water (130°F) each week.  Cover mattress and pillows with special dust-mite-proof cases.  Don't use upholstered furniture like sofas or chairs in the bedroom.  Use allergy-proof filters for air conditioners and furnaces. Replace or clean them as instructed.  If you can, replace carpeting with wood or tile monet, especially in the bedroom.  Animals. Animals with fur or feathers shed dander (allergens).  It's best to choose a pet that doesn't have fur or feathers, such as a fish or a reptile.  If you have pets, keep them off your bed and out of your bedroom.  Wash your hands and clothes after handling pets.    Mold. Mold grows in damp places, such as bathrooms, basements, and closets.  Ask someone to clean damp areas in your home every week. Or try wearing a face mask while you clean.  Run an exhaust fan while bathing. Or leave a window open in the bathroom.  Repair water leaks in or around your home.  Have someone else cut grass or rake leaves, if possible.  Don't use vaporizers or humidifiers. They encourage mold growth.    Pollen. Pollen from trees, grasses, and weeds is a common allergen. (Flower pollens are generally not a  problem).  Try to learn what types of pollen affect you most. Pollen levels vary depending on the plant, the season, and the time of day.  If possible, use air conditioning instead of opening the windows in your home or car.  Have someone else do yard work, if possible.    Cockroaches. Roaches are found in many homes and produce allergens.  Keep your kitchen clean and dry. A leaky faucet or drain can attract roaches.  Remove garbage from your home daily.  Store food in tightly sealed containers. Wash dishes as soon as they are used.  Use bait stations or traps to control roaches. Avoid using chemical sprays.    Smoke. Smoke may be from cigarettes, cigars, pipes, incense or candles, barbecues or grills, and fireplaces.  Don't smoke. And don't let people smoke in your home or car.  When you travel, ask for nonsmoking rental cars and hotel rooms.  Avoid fireplaces and wood stoves. If you can't, sit away from them. Make sure the smoke is directed outside.  Don't burn incense or use candles.  Move away from smoky outdoor cooking grills.    Smog.  Smog is from car exhaust and other pollution.  Read or listen to local air-quality reports. These let you know when air quality is poor.  Stay indoors as much as you can on smoggy days. If possible, use air conditioning instead of opening the windows.  In your car, set air conditioning to recirculate air, so less pollution gets in.    Strong odors. These include air fresheners, deodorizers, and cleaning products; perfume, deodorant, and other beauty products; incense and candles; and insect sprays and other sprays.  Use scent-free products like deodorant or body lotion.  Avoid using cleaning products with bleach and ammonia. Make your own cleaning solution with white vinegar, baking soda, or mild dish soap.  Use exhaust fans while cooking. Or open a window, if possible.   Avoid perfumes, air fresheners, potpourri, and other scented products.          Other irritants. These  include dust, aerosol sprays, and powders.  Wear a face mask while doing tasks like sanding, dusting, sweeping, and yard work. Open doors and windows if working indoors.  Use pump spray bottles instead of aerosols.  Pour liquid  onto a rag or cloth instead of spraying them.    Weather. Weather conditions can trigger symptoms or make them worse.  Watch for very high or low temperatures, very humid conditions, or a lot of wind, as these conditions can make symptoms worse.  Limit outdoor activity during the type of weather that affects you.  Wear a scarf over your mouth and nose in cold weather.    Colds, flu, and sinus infections. Upper respiratory infections can trigger asthma.  Wash your hands often with soap and warm water or use a hand  containing alcohol.  Get a yearly flu shot. And ask if you should get a pneumonia vaccine.  Take care of your general health. Get plenty of sleep. And eat a variety of healthy foods.    Food additives. Food additives can trigger asthma flare-ups in some people.  Check food labels for sulfites or other similar ingredients. These are often found in foods such as wine, beer, and dried fruits.  Avoid foods that contain these additives.    Medicine. Aspirin, NSAIDS like ibuprofen and naproxen, and heart medicines like beta-blockers may be triggers.  Tell your health care provider if you think certain medicines trigger symptoms.   Be sure to read the labels on over-the-counter medicines. They may have ingredients that cause symptoms for you.   , Emotions. Laughing, crying, or feeling excited are triggers for some people.   To help you stay calm: Try breathing in slowly through your nose for a count of 2 seconds. Close your lips and breathe out for 4 seconds. Repeat.  Try to focus on a soothing image in your mind. This will help relax you and calm your breathing.  Remember to take your daily controller medicines. When you're upset or under stress, it's easy to forget.     Exercise. For some people, exercise can trigger symptoms. Don't let this keep you from being active.   If you have not been exercising regularly, start slow and work up gradually.  Take all of your medicines as prescribed.  If you use quick-relief medicine, make sure you have it with you when you exercise.  Stop if you have any symptoms. Make sure you talk with your provider about these symptoms.  © 3208-6688 [employer]. [address] [place] [address], [place] [address]. All rights reserved. This information is not intended as a substitute for professional medical care. Always follow your healthcare professional's instructions.              Coronavirus Disease 2019 (COVID-19): Prevention  The best prevention is to have no contact with the SARS-CoV-2 virus. The FDA has approved vaccines to prevent COVID-19 in people older than 18 (one vaccine has been approved for people as young as 16). Pregnant or breastfeeding people may choose to be vaccinated. Expert groups, including ACOG and the CDC, advise pregnant or breastfeeding people to talk with their healthcare provider about the vaccine.   The vaccines are being rolled out to the public in phases. Check your local health department for your community's roll-out plans. The vaccines are given as a shot (injection) in the arm muscle. A1-dose or 2-dose vaccine may be given. If you get the 2-dose vaccine, the second dose is given several weeks after the first.   During a pandemic, it's especially important to keep up on recommended vaccines for other illnesses. This is more true if you're at higher risk for severe illness from COVID-19, the flu, or pneumonia. This includes older adults and those who have long-term (chronic) health conditions. Getting a yearly flu vaccine is advised for everyone 6 months old and older, with rare exceptions. Health experts advise the flu vaccine to protect you and others. The flu vaccine helps protect those at high-risk for serious  "illness, and lowers the strain on hospitals during the COVID-19 pandemic.   Canceling travel and other outings  Stay informed about COVID-19 in your area. Follow local instructions about being in public. Be aware of events in your community that may be postponed or canceled, such as school and sporting events. You may be advised not to attend public gatherings.    You will be advised to stay at least 6 feet from others as much as possible. This is called "social distancing." You may be advised to stay at home and isolate yourself as much as possible if COVID-19 is in your area. You may hear terms such as "self isolate, "quarantine," stay at home, and shelter in place.   The CDC advises that people should not travel to areas where there are COVID-19 outbreaks right now for any reason that is not urgent. For the most current CDC travel advisories, visit the CDC website at www.cdc.gov/coronavirus/2019-ncov/travelers. Dont go on cruises or do non-essential travel right now.      When you are at home  Wash your hands often. Use soap and clean, running water for at least 20 seconds.   If you don't have access to soap and water, use an alcohol-based hand  often. Make sure it has at least 60% alcohol.   Don't touch your eyes, nose, or mouth unless you have clean hands.   Dont kiss someone who is sick.   If you need to cough or sneeze, do it into a tissue. Then throw the tissue into the trash. If you don't have tissues, cough or sneeze into the bend of your elbow.   When possible, don't touch "high-touch" shared surfaces such as doorknobs and handles, cabinet handles, and light switches.   Clean frequently-touched home surfaces often with disinfectant. This includes desk surfaces, printers, phones, kitchen counters, tables, fridge door handle, bathroom surfaces, and any soiled surface. Closely follow disinfectant label instructions. Go to the [facility] [place]s detailed cleaning website at " "www.cdc.gov/coronavirus/2019-ncov/prepare/cleaning-disinfection.html.   Check your home supplies. Consider keeping a 2-week supply of medicines, food, and other needed household items.   Make a plan for childcare, work, and ways to stay in touch with others. Know who will help you if you get sick.   Don't be around people who are sick.   There is no evidence right now that animals spread SARS-CoV-2. But it's always a good idea to wash your hands after touching any animals. Don't touch animals that may be sick.   Dont share eating or drinking utensils with sick people.     If you leave home       Stay at least 6 feet away from all people. This is called "social distancing."   When possible, don't touch "high-touch" public surfaces such as doorknobs and handles, cabinet handles, and light switches. If you touch these surfaces, try to clean them first with a disinfecting wipe. Or touch them using a tissue or paper towel.   Use an alcohol-based hand  often. Make sure it has at least 60% alcohol.   Don't touch your eyes, nose, or mouth unless you have clean hands.   If you need to cough or sneeze, do it into a tissue. Then throw the tissue into the trash. If you don't have tissues, cough or sneeze into the bend of your elbow.   Don't attend public gatherings if possible. If you do attend public gatherings, follow social distancing rules. Don't share food or personal items such as water bottles.   The CDC advises wearing a cloth face mask with two or more layers of washable, breathable fabric while in public or when indoors with people who don't live with you. Or you can wear a disposable paper mask with a cloth mask over it. You can make a cloth face mask of your own. . The CDC has instructions on how to make a mask. Wear the mask so that it covers both your nose and mouth.   The CDC advises all people over age 2 to wear cloth face masks in public settings when around people outside of their household, " "especially when it's hard to socially distance. For example, wear a mask in populated places such as public transit, public protests and marches, and crowded stores, bars, and restaurants. Cloth masks may help prevent people who have COVID-19 form spreading the virus to others. Cloth masks are most likely to reduce COVID-19 spread when masks are widely used by people who are out in the public.   Certain people should not wear a face mask. This includes:   Children younger than 2 years old   Anyone with a health, developmental, or mental health condition that can be made worse by wearing a mask   Anyone who is unconscious or unable to remove the face covering without help. See the CDC's guidance on who should not wear a face mask.     If you are at a work site  If you haven't been fully vaccinated against COVID-19 and are exposed , go home and stay home if you feel sick in any way.   Tell your supervisor if you are well but live with someone who has COVID-19.   Stay at least 6 feet away from all people.   Don't shake hands with anyone.   Don't attend in-person meetings, or limit how many you attend. Meet over phone or video if possible.   Don't use other people's desks, phones, equipment, or offices, if possible.   Wash your hands often. Use soap and clean, running water for at least 20 seconds.   If you don't have access to soap and water, use an alcohol-based hand  often. Make sure it has at least 60% alcohol.   Don't touch your eyes, nose, or mouth unless you have clean hands.   The CDC advises wearing a cloth face mask with two or more layers of washable, breathable fabric while in public or when indoors with people who don't live with you. You can make a cloth face mask of your own. . The CDC has instructions on how to make a mask. Wear the mask so that it covers both your nose and mouth. .   When possible, don't touch "high-touch" public surfaces such as doorknobs and handles, cabinet handles, and " light switches. If you touch these surfaces, clean them first with a disinfecting wipe. Or touch them using a tissue or paper towel.   Use office tameka one person at a time.   Consider not having office coffee or tea, or group foods.   Dont have meals in groups.   Clean work surfaces often with disinfectant. This includes desk surfaces, photocopier, printer, phones, kitchen counters, fridge door handle, bathroom surfaces, and others.   Dont touch other peoples personal work tools, such as phones, keyboards, pens, and other items.   Dont touch other peoples eating or drinking utensils.   If you need to cough or sneeze, do it into a tissue. Then throw the tissue into the trash. If you don't have tissues, cough or sneeze into the bend of your elbow.     If you have been exposed to a person with COVID-19   If you've been fully vaccinated against COVID-19, you don't need to stay home away from others if you've been exposed.   If you haven't been fully vaccinated and d have been exposed to someone who is suspected of having COVID-19 or has tested positive for it:   Call your healthcare provider and follow all instructions. Stay home away from others and monitor your health. This is called quarantine. The CDC advises that you quarantine to help prevent the spread of COVID-19 once you've been exposed to someone with the infection. The CDC still supports quarantine for 14 days after exposure, but they recognize the hardship for many who need to work. The CDC now recommends two options for how long quarantine should last. If you have been exposed but don't have symptoms, you can stop quarantine:   10 days after exposure if you don't get a diagnostic (viral) test, or   7 days after getting a negative viral test result.   Take your temperature every morning and evening for 14 days after exposure. This is to check for fever. Keep a record of the readings. If possible, stay away from others, especially those who are at  higher risk of getting very sick from COVID-19.   Watch for symptoms of the virus such as cough or trouble breathing. If you develop any symptoms, isolate yourself right away. Call your provider first before going to any clinic or hospital. See the CDC's symptom .   Your limits are different if you've had COVID-19 in the last 3 months but are fully recovered without symptoms and you have been exposed to someone with COVID-19. If you are symptom-free, you don't need to quarantine or be retested. The CDC doesn't recommend retesting unless you have symptoms of COVID-19 and your new symptoms can't be linked to another illness. Contact your healthcare provider if you have any questions. If you develop symptoms, stay home. If you had COVID-19 more than 3 months ago and have been exposed again, treat it like you've never had COVID-19 and stay home, limit your contact with others, call your provider, and monitor for symptoms.     When to call your healthcare provider  Call your healthcare provider if you think you have COVID-19 symptoms. These can include fever, cough, and trouble breathing. They may also include body aches, headache, chills or repeated shaking with chills, sore throat, loss of taste or smell, or diarrhea. Dont go to a healthcare facility before speaking to a healthcare provider.  [id number]  Shortness of Breath: Maximizing Your Energy    Fear of shortness of breath may stop you from being as active as you once were. You don't have to live this way. Managing your time and pacing yourself can help you conserve energy and do more. It's even OK if you're short of breath sometimes. You can learn to work through this without limiting your activities.  Manage your time  Shortness of breath can make everyday tasks take longer. This means there's less time to do the things you enjoy. You can help prevent this by managing your time. Try these tips:  Plan ahead so your tasks are spaced throughout the day. As you  plan, keep in mind the times of day you tend to have the most energy.  Do only one thing at a time. Finish one task before starting another.  Gather everything you need before you start a task. This cuts out unneeded steps while you're working.  Think about what you really need to do. Be realistic about what you can get done in a day.      Balance activity and rest  When you're tired, your activities will take longer. Fatigue also makes you more likely to get an infection. Plan your day so that your tasks are spaced throughout the day. To have more energy:  Stop and rest when you need to. Don't wait until you're overtired.  Switch back and forth between hard tasks and easy ones.  Give yourself plenty of time for each task, so you don't have to hurry.  Take 20- to 30-minute rest breaks after meals and throughout the day.  If an activity takes a lot of energy, break it into smaller parts. For instance, fold the laundry first. Then take a break before putting it away.  Try not to exert yourself in extreme cold or heat.       Find ways to conserve energy  Conserving your energy can help you stay active and breathe better. The way you use your body during a task can help you conserve energy. Think of ways to make things easier, and take your time to ease shortness of breath.  For some tasks, you can also use special aids designed to reduce the amount of energy needed. Here are some tips:  Sit whenever possible, and keep your arms close to your body. Use slow, smooth motions.  Sit to dress and undress, shave, brush your teeth, and comb your hair. Use a long-handled reacher to pull on socks and shoes, and long-handled items like shoe horns.  Sit on a bench to shower. Use warm water, not hot. (Steam can make it harder to breathe.) Dry off by wrapping yourself in a terrycloth robe.  Use energy-saving appliances, such as an electric can opener, a power toothbrush, and a .  Use a cart with wheels to move groceries,  laundry, dishes, and other items around the house. Some carts have seats so you can rest when you need to.  Use lightweight, nonstick pots and pans to cook. Soak dirty dishes instead of scrubbing them. Air-dry dishes, or use a .  Mix, cook, serve, and store foods in the same dish.  Keep the things you use most at waist level, so you can get them without reaching or bending.  Use steps slowly, pausing at each step. If you have steps outside or in your home, think about adding ramps or stair lifts.  Think about ways that others can help you. You might get help from friends, family members, or home health aides.      Remember to breathe  Sometimes people with an illness or condition that affects the lungs try to rush through tasks so they won't get short of breath. This uses more energy and can actually increase shortness of breath. Instead, slow down and pace your breathing. These tips may help:  Move slowly during tasks that take a lot of effort, such as climbing stairs or pushing a shopping cart.  Use pursed-lip and diaphragmatic breathing while you go about a task.  Breathe out (exhale) when you exert effort. For example, breathe out as you lift up a grocery bag. Once you're holding the bag, breathe in. Ask the  at the [employer] to pack your grocery bags so they are light and easy to carry.  Focus on taking slow, deep breaths. If your breathing is shallow, you don't take in as much air.  Remember that it's OK to be short of breath. Just pace yourself and do pursed-lip breathing.      Talk with your healthcare provider about:  If you should use supplemental oxygen  If you need a referral to occupational and physical therapy. Therapists can help you with exercise and daily activities, and how to make things easier.      Pursed-lip breathing  This type of breathing helps you exhale better. Breathing this way during activity will help you reduce shortness of breath:  Relax your neck and shoulder  muscles. Breathe in (inhale) slowly through your nose for 2 counts or more.  Pucker your lips as if you are going to blow out a candle. Breathe out slowly and gently through your lips for at least twice as long as you inhaled. Chronic Lung Disease: Preventing Lung Infections  Chronic lung diseases include chronic obstructive pulmonary disease (COPD), which includes chronic bronchitis and emphysema. Other chronic lung diseases include pulmonary fibrosis, sarcoidosis, and other conditions. When you have chronic lung diseases, it's very important to protect yourself from respiratory infections, like colds, the flu, and lung infections. Infections may cause your lung condition to worsen. Although you can't completely avoid them, there are things you can do to lessen the chance of infections.    Take precautions  Taking the following precautions can help you avoid illness:  Remember to keep your hands away from your nose and mouth. Germs on your hands get into your respiratory system this way.  Wash your hands often. When you wash them:  Use soap and warm water.  Rub your hands together well for at least 20 seconds.  Make sure to rinse them well.  Dry your hands using clean towels or air-dry them.  Use hand  containing alcohol, if you are unable to wash your hands. Use the  after touching doorknobs, handles, and supermarket carts, for example, since lots of people touch them. Then wash your hands as soon as you can.  To help prevent the flu, get a flu vaccination every year. This may be given at your healthcare provider's office, a drugstore, or pharmacy, or at work. Get your flu shot as soon as the vaccines are available in your area. This is usually around September each year.  To help prevent pneumococcal pneumonia, get pneumonia vaccinations. Talk with your healthcare provider about which pneumococcal vaccinations you need.  Try to stay away from people with respiratory infections, such as colds or  the flu. Stay away from crowded places, like shopping centers or movie theatres during cold and flu season.  If you smoke, think about quitting. In addition to causing or worsening many lung conditions, the lung damage from smoking increases your risk of infections. Stay away from others who smoke, too. This is also harmful and increases your chance of infections.  © 2996-1164 [employer]. [address] [place] [address], [place] [address]. All rights reserved. This information is not intended as a substitute for professional medical care. Always follow your healthcare professional's instructions.   Using an Inhaler with a Spacer  To control asthma, you need to use your medicines the right way. Some medicines are inhaled using a device called a metered-dose inhaler (MDI). Metered-dose inhalers deliver medicine with a fine spray. You may be asked to use a spacer (holding tube) with your inhaler. The spacer helps make sure all the medicine you need goes into your lungs.   Steps for using an inhaler with a spacer  Step 1:  Remove the caps from the inhaler and spacer.  Shake the inhaler well and attach the spacer. If the inhaler is being used for the first time or has not been used for a while, prime it as directed by the product maker.  Step 2:  Breathe out normally.  Put the spacer between your teeth. Close your lips tightly around it.  Keep your chin up.  Step 3:  Spray 1 puff into the spacer by pressing down on the inhaler.  Then breathe in through your mouth as slowly and deeply as you can. This should take about 5-10 seconds. If you breathe too quickly, you may hear a whistling sound in certain spacers.  Step 4:  Take the spacer out of your mouth.  Hold your breath for a count of 10.  Then hold your lips together and slowly breathe out through your mouth ACTION PLAN    GREEN ZONE  My sputum is clear/white/usual color and easily cleared.  My breathing is no harder than usual.  I can do my usual activities.  I  can think clearly.   Take your usual medicines, including oxygen, as you are told to do so by your health care provider.   YELLOW ZONE  My sputum has change (color, thickness, amount).  I am more short of breath than usual.  I cough or wheeze more.  I weigh more and my legs/feet swell.  I cannot do my usual activities without resting.   Call your health care provider. You will probably be told to begin taking an antibiotic and prednisone. Have your pharmacy phone number available.   RED ZONE  I cannot cough out my sputum.  I am much more short of breath than normal.  I need to sit up to breathe  I cannot do my usual activities.  I am unable to speak more than one or two words at a time.  I am confused.   Call your health care provider. You may be asked to come in to be seen, told to go to the emergency room, or told to call 9-1-1.

## 2022-04-05 NOTE — PROGRESS NOTES
Pulmonary Disease Management  [facility]  Final Follow up Visit  Chronic Care Management    Alex Milner  was seen 4/5/2022  9:50 AM in the [facility] Management [facility] for evaluation, education, reinforcement of self management techniques and exacerbation action plan.    Jorje Gatica    Past Medical History:   Diagnosis Date    Atherosclerotic PVD with intermittent claudication 8/16/2019    Atrial fibrillation     pt unaware of this    Back pain     Bilateral inguinal hernia     CT ABdomen/pelvis 3/9/2015 (care everywhere)---Bilateral inguinal hernias containing fat.      Calcified granuloma of lung 10/3/2017    CT [facility] 3/26/2018---There are calcified lymph nodes in the mediastinum and left hilum.    CT chest 9/11/ 2017 Calcified left hilar and subcarinal granulomas are noted  ;Calcified granuloma noted within the posterior segment of the right hepatic lobe.    Diverticulosis     Diverticulosis 10/28/13    Colonoscopy     ED (erectile dysfunction)     Elevated PSA     Granuloma of liver     ct chest 3/26/2018---There is a small calcified granuloma posteriorly in the right lobe of the liver    Hyperlipidemia     Hypertension     Moderate persistent asthma without complication 9/26/2017    Obesity     Personal history of colonic polyps 2013    Tobacco dependence     resolved    Uncomplicated alcohol dependence 11/27/2019    Urinary tract infection        Patient's Medications   New Prescriptions    No medications on file   Previous Medications    ALBUTEROL (PROVENTIL/VENTOLIN HFA) 90 MCG/ACTUATION INHALER    INHALE 2 PUFFS INTO THE LUNGS EVERY 6 (SIX) HOURS AS NEEDED FOR WHEEZING OR SHORTNESS OF BREATH    AMLODIPINE (NORVASC) 10 MG TABLET    Take 1 tablet (10 mg total) by mouth once daily.    ASPIRIN (ECOTRIN) 81 MG EC TABLET    TAKE 1 TABLET BY MOUTH EVERY DAY    ATORVASTATIN (LIPITOR) 80 MG TABLET    Take 1 tablet (80 mg total) by mouth once daily.    BRIMONIDINE 0.15 %  OPTH DROP (ALPHAGAN) 0.15 % OPHTHALMIC SOLUTION    Place 1 drop into both eyes 3 (three) times daily.    CILOSTAZOL (PLETAL) 100 MG TAB    Take 1 tablet (100 mg total) by mouth 2 (two) times daily.    DORZOLAMIDE (TRUSOPT) 2 % OPHTHALMIC SOLUTION    INSTILL 1 DROP INTO BOTH EYES 3 TIMES A DAY    EZETIMIBE (ZETIA) 10 MG TABLET    Take 1 tablet (10 mg total) by mouth once daily.    FINASTERIDE (PROSCAR) 5 MG TABLET    Take 1 tablet (5 mg total) by mouth once daily.    FLUTICASONE FUROATE-VILANTEROL (BREO ELLIPTA) 200-25 MCG/DOSE DSDV DISKUS INHALER    Inhale 1 puff into the lungs once daily. Controller    FLUTICASONE PROPIONATE (FLONASE) 50 MCG/ACTUATION NASAL SPRAY    2 sprays (100 mcg total) by Each Nostril route once daily.    IBUPROFEN (ADVIL,MOTRIN) 800 MG TABLET    Take 800 mg by mouth every 6 (six) hours as needed.    LATANOPROST 0.005 % OPHTHALMIC SOLUTION    Place 1 drop into both eyes once daily.    LISINOPRIL (PRINIVIL,ZESTRIL) 20 MG TABLET    Take 1 tablet (20 mg total) by mouth once daily.    MUPIROCIN (BACTROBAN) 2 % OINTMENT    Apply topically 3 (three) times daily.    SILDENAFIL (VIAGRA) 100 MG TABLET    Take 1 tablet (100 mg total) by mouth daily as needed.    TAMSULOSIN (FLOMAX) 0.4 MG CAP    Take 1 capsule (0.4 mg total) by mouth once daily.   Modified Medications    No medications on file   Discontinued Medications    No medications on file         Educational assessment:   [x]            Good  []            Fair  []            Poor    Readiness to learn:   [x]            Good  []            Fair  []            Poor    Vision Status:   [x]            Good  []            Fair  []            Poor    Reading Ability:  [x]            Good  []            Fair  []            Poor    Knowledge of condition:   [x]            Good  []            Fair  []            Poor    Language Barriers:   []            Good  []            Fair  []            Poor  [x]            None    Cognitive/ Physical Barriers:    []            Good  []            Fair  []            Poor  [x]            None    Learning best by:                       [x]            Seeing  []            Hearing  []            Reading                         [x]            Doing    Describe any barrier /Limitation or financial implications of care choices identified     []            Financial  []            Emotional  []            Education  []            Vision/Hearing  []            Physical  [x]            None  []                TOPIC /CONTENT FOR TODAY:    [x]            MDI with or without spacer  [x]            Dry powder inhaler  []            Acapella   []           Peak Flow meter  [x]            Asthma action plan  []            Nebulizer use  []            Oxygen use safety  [x]            Breathing and cough techniques  [x]            Energy conservation  [x]            Infection prevention  []           OTHER________________________        Learner:    [x]            Patient   []            Caregiver    Method:    [x]            Verbal explanation  [x]            Audio visual    [x]            Literature  [x]            Teach back      Evaluation:    [x]            Teach back  [x]            Demonstrate  [x]            Follow up phone call    []            2 weeks     [x]            4 weeks   [x]            PRN    Additional comments:   Reviewed respiratory medication purpose and proper technique for use of inhalers. Patient practiced proper technique using MDI with valved holding chamber (spacer) and DPI inhalers. Reminded patient to rinse mouth thoroughly after ICS use.  Patient demonstrated understanding. Literature was given to patient.    Patient rarely utilizes short acting beta agonist for rescue therapy. Asthma controlled on Breo.     Asthma trigger checklist was verbally reviewed and literature given to patient.     Infection prevention was discussed. Patient's immunizations are current. Hand hygiene and cleaning of respiratory  equipment was also discussed. Patient verbalized understanding.      COPD action plan was reviewed and literature was given to patient. Patient verbalized understanding.

## 2022-04-11 ENCOUNTER — LAB VISIT (OUTPATIENT)
Dept: LAB | Facility: HOSPITAL | Age: 77
End: 2022-04-11
Attending: UROLOGY
Payer: MEDICARE

## 2022-04-11 DIAGNOSIS — R97.20 ELEVATED PSA: ICD-10-CM

## 2022-04-11 LAB — COMPLEXED PSA SERPL-MCNC: 30.1 NG/ML (ref 0–4)

## 2022-04-11 PROCEDURE — 84153 ASSAY OF PSA TOTAL: CPT | Performed by: UROLOGY

## 2022-04-11 PROCEDURE — 36415 COLL VENOUS BLD VENIPUNCTURE: CPT | Performed by: UROLOGY

## 2022-04-18 ENCOUNTER — OFFICE VISIT (OUTPATIENT)
Dept: UROLOGY | Facility: CLINIC | Age: 77
End: 2022-04-18
Payer: MEDICARE

## 2022-04-18 VITALS
TEMPERATURE: 97 F | SYSTOLIC BLOOD PRESSURE: 150 MMHG | HEART RATE: 56 BPM | WEIGHT: 173 LBS | BODY MASS INDEX: 27.8 KG/M2 | DIASTOLIC BLOOD PRESSURE: 72 MMHG | HEIGHT: 66 IN

## 2022-04-18 DIAGNOSIS — N40.0 BENIGN PROSTATIC HYPERPLASIA, UNSPECIFIED WHETHER LOWER URINARY TRACT SYMPTOMS PRESENT: ICD-10-CM

## 2022-04-18 DIAGNOSIS — N52.9 ERECTILE DYSFUNCTION, UNSPECIFIED ERECTILE DYSFUNCTION TYPE: ICD-10-CM

## 2022-04-18 DIAGNOSIS — R97.20 ELEVATED PSA: Primary | ICD-10-CM

## 2022-04-18 PROCEDURE — 3288F FALL RISK ASSESSMENT DOCD: CPT | Mod: CPTII,S$GLB,, | Performed by: UROLOGY

## 2022-04-18 PROCEDURE — 99214 PR OFFICE/OUTPT VISIT, EST, LEVL IV, 30-39 MIN: ICD-10-PCS | Mod: S$GLB,,, | Performed by: UROLOGY

## 2022-04-18 PROCEDURE — 3288F PR FALLS RISK ASSESSMENT DOCUMENTED: ICD-10-PCS | Mod: CPTII,S$GLB,, | Performed by: UROLOGY

## 2022-04-18 PROCEDURE — 3077F SYST BP >= 140 MM HG: CPT | Mod: CPTII,S$GLB,, | Performed by: UROLOGY

## 2022-04-18 PROCEDURE — 1159F PR MEDICATION LIST DOCUMENTED IN MEDICAL RECORD: ICD-10-PCS | Mod: CPTII,S$GLB,, | Performed by: UROLOGY

## 2022-04-18 PROCEDURE — 99999 PR PBB SHADOW E&M-EST. PATIENT-LVL IV: ICD-10-PCS | Mod: PBBFAC,,, | Performed by: UROLOGY

## 2022-04-18 PROCEDURE — 1101F PT FALLS ASSESS-DOCD LE1/YR: CPT | Mod: CPTII,S$GLB,, | Performed by: UROLOGY

## 2022-04-18 PROCEDURE — 1126F AMNT PAIN NOTED NONE PRSNT: CPT | Mod: CPTII,S$GLB,, | Performed by: UROLOGY

## 2022-04-18 PROCEDURE — 1126F PR PAIN SEVERITY QUANTIFIED, NO PAIN PRESENT: ICD-10-PCS | Mod: CPTII,S$GLB,, | Performed by: UROLOGY

## 2022-04-18 PROCEDURE — 3078F DIAST BP <80 MM HG: CPT | Mod: CPTII,S$GLB,, | Performed by: UROLOGY

## 2022-04-18 PROCEDURE — 3077F PR MOST RECENT SYSTOLIC BLOOD PRESSURE >= 140 MM HG: ICD-10-PCS | Mod: CPTII,S$GLB,, | Performed by: UROLOGY

## 2022-04-18 PROCEDURE — 1159F MED LIST DOCD IN RCRD: CPT | Mod: CPTII,S$GLB,, | Performed by: UROLOGY

## 2022-04-18 PROCEDURE — 99999 PR PBB SHADOW E&M-EST. PATIENT-LVL IV: CPT | Mod: PBBFAC,,, | Performed by: UROLOGY

## 2022-04-18 PROCEDURE — 3078F PR MOST RECENT DIASTOLIC BLOOD PRESSURE < 80 MM HG: ICD-10-PCS | Mod: CPTII,S$GLB,, | Performed by: UROLOGY

## 2022-04-18 PROCEDURE — 1101F PR PT FALLS ASSESS DOC 0-1 FALLS W/OUT INJ PAST YR: ICD-10-PCS | Mod: CPTII,S$GLB,, | Performed by: UROLOGY

## 2022-04-18 PROCEDURE — 99214 OFFICE O/P EST MOD 30 MIN: CPT | Mod: S$GLB,,, | Performed by: UROLOGY

## 2022-04-18 NOTE — PROGRESS NOTES
Chief Complaint:   Encounter Diagnoses   Name Primary?    Elevated PSA Yes    Benign prostatic hyperplasia, unspecified whether lower urinary tract symptoms present     Erectile dysfunction, unspecified erectile dysfunction type        HPI:   4/18/22- PSA is reducing, he wants to hold on another biopsy.  He is currently voiding well with stable erections on medical therapy.  1/8/18: Returns after long absence.  PSA >30.  Says he had a biopsy in 2016 with another urologist in town off of [facility].  Second one he had.  MRI report from 5/17 reassuring with 98gm prostate.  Reduced stream not on flomax/finasteride.  No abd/pelvic pain and no exac/rel factors.  No hematuria.  No urolithiasis.  3-4 cups coffee in AM.  Nocturia x3-4.    Allergies:  Patient has no known allergies.    Medications:  has a current medication list which includes the following prescription(s): albuterol, amlodipine, aspirin, atorvastatin, brimonidine 0.15 % opth drop, cilostazol, dorzolamide, ezetimibe, finasteride, breo ellipta, fluticasone propionate, ibuprofen, latanoprost, lisinopril, mupirocin, sildenafil, and tamsulosin.    Review of Systems:  General: No fever, chills, fatigability, or weight loss.  Skin: No rashes, itching, or changes in color or texture of skin.  Chest: Denies THOMPSON, cyanosis, wheezing, cough, and sputum production.  Abdomen: Appetite fine. No weight loss. Denies diarrhea, abdominal pain, hematemesis, or blood in stool.  Musculoskeletal: No joint stiffness or swelling. Some back pain.  : As above.  All other review of systems negative.    PMH:   has a past medical history of Atherosclerotic PVD with intermittent claudication (8/16/2019), Atrial fibrillation, Back pain, Bilateral inguinal hernia, Calcified granuloma of lung (10/3/2017), Diverticulosis, Diverticulosis (10/28/13), ED (erectile dysfunction), Elevated PSA, Granuloma of liver, Hyperlipidemia, Hypertension, Moderate persistent asthma without complication  (9/26/2017), Obesity, Personal history of colonic polyps (2013), Tobacco dependence, Uncomplicated alcohol dependence (11/27/2019), and Urinary tract infection.    PSH:   has a past surgical history that includes Hernia repair; Prostate biopsy; Aortography with serialography (N/A, 10/21/2019); and Aortography with serialography (N/A, 11/14/2019).    FamHx: family history includes Cancer in his brother, father, and mother; Cancer (age of onset: 64) in his sister; Prostate cancer in his brother.    SocHx:  reports that he quit smoking about 36 years ago. His smoking use included cigarettes. He started smoking about 62 years ago. He has a 30.00 pack-year smoking history. He has never used smokeless tobacco. He reports current alcohol use of about 12.0 standard drinks of alcohol per week. He reports that he does not use drugs.      Physical Exam:  There were no vitals filed for this visit.  General: A&Ox3, no apparent distress, no deformities  Neck: No masses, normal thyroid  Lungs: normal inspiration, no use of accessory muscles  Heart: normal pulse, no arrhythmias  Abdomen: Soft, NT, ND  Skin: The skin is warm and dry. No jaundice.  Ext: No c/c/e.    Labs/Studies:   pnbx negative 65g  3/8/21  Reported negative biopsy 2016 outside facility  PSA 30.1 4/22  PSA 32.6 1/22  PSA 41.2 9/21  PSA 21.9 1/21  PSA 19.3 7/20  MRI PI-RADS 4 2/21    Impression/Plan:      1. Elevated PSA- PSA is coming back down, he would rather hold on repeating another biopsy at this point.  Therefore obtain an MRI, and proceed as appropriate pending the results.  Otherwise see me in 3 months with a PSA, unless something changes or the MRI is abnormal.    2. BPH- tamsulosin and finasteride appear to be working well, call when he needs a refill.    3. Erectile dysfunction- sildenafil 100 mg works well for the patient, currently not issue.

## 2022-05-16 ENCOUNTER — LAB VISIT (OUTPATIENT)
Dept: PRIMARY CARE CLINIC | Facility: CLINIC | Age: 77
End: 2022-05-16
Payer: MEDICARE

## 2022-05-16 DIAGNOSIS — J45.909 ASTHMA, WELL CONTROLLED, UNSPECIFIED ASTHMA SEVERITY, UNSPECIFIED WHETHER PERSISTENT: ICD-10-CM

## 2022-05-16 PROCEDURE — U0003 INFECTIOUS AGENT DETECTION BY NUCLEIC ACID (DNA OR RNA); SEVERE ACUTE RESPIRATORY SYNDROME CORONAVIRUS 2 (SARS-COV-2) (CORONAVIRUS DISEASE [COVID-19]), AMPLIFIED PROBE TECHNIQUE, MAKING USE OF HIGH THROUGHPUT TECHNOLOGIES AS DESCRIBED BY CMS-2020-01-R: HCPCS | Performed by: INTERNAL MEDICINE

## 2022-05-16 PROCEDURE — U0005 INFEC AGEN DETEC AMPLI PROBE: HCPCS | Performed by: INTERNAL MEDICINE

## 2022-05-17 LAB — SARS-COV-2 RNA RESP QL NAA+PROBE: NOT DETECTED

## 2022-05-17 NOTE — TELEPHONE ENCOUNTER
----- Message from Marcia Boucher sent at 1/29/2018  8:30 AM CST -----  Contact: Jesika (pt's wife)   Jesika called and stated she needed to she needed to speak to the nurse. She stated that she was checking the status of paperwork that was faxed from [employer] to the office regarding the pt's blood pressure medication. She can be reached at [phone].    Thanks,  TF    Attending Only

## 2022-05-19 ENCOUNTER — CLINICAL SUPPORT (OUTPATIENT)
Dept: PULMONOLOGY | Facility: CLINIC | Age: 77
End: 2022-05-19
Payer: MEDICARE

## 2022-05-19 ENCOUNTER — HOSPITAL ENCOUNTER (OUTPATIENT)
Dept: RADIOLOGY | Facility: HOSPITAL | Age: 77
Discharge: HOME | End: 2022-05-19
Attending: INTERNAL MEDICINE
Payer: MEDICARE

## 2022-05-19 ENCOUNTER — OFFICE VISIT (OUTPATIENT)
Dept: SLEEP MEDICINE | Facility: CLINIC | Age: 77
End: 2022-05-19
Payer: MEDICARE

## 2022-05-19 VITALS
BODY MASS INDEX: 28.56 KG/M2 | HEIGHT: 66 IN | OXYGEN SATURATION: 99 % | SYSTOLIC BLOOD PRESSURE: 124 MMHG | HEART RATE: 62 BPM | WEIGHT: 177.69 LBS | RESPIRATION RATE: 16 BRPM | DIASTOLIC BLOOD PRESSURE: 76 MMHG

## 2022-05-19 DIAGNOSIS — J45.20 ASTHMA, MILD INTERMITTENT, WELL-CONTROLLED: ICD-10-CM

## 2022-05-19 DIAGNOSIS — E78.2 MIXED HYPERLIPIDEMIA: ICD-10-CM

## 2022-05-19 DIAGNOSIS — J45.20 ASTHMA, MILD INTERMITTENT, WELL-CONTROLLED: Primary | ICD-10-CM

## 2022-05-19 DIAGNOSIS — R91.8 MULTIPLE PULMONARY NODULES: ICD-10-CM

## 2022-05-19 DIAGNOSIS — I10 PRIMARY HYPERTENSION: Chronic | ICD-10-CM

## 2022-05-19 LAB
BRPFT: NORMAL
FEF 25 75 LLN: 0.49
FEF 25 75 PRE REF: 75.7 %
FEF 25 75 REF: 1.59
FEV1 FVC LLN: 62
FEV1 FVC PRE REF: 93.9 %
FEV1 FVC REF: 76
FEV1 LLN: 1.39
FEV1 PRE REF: 86.9 %
FEV1 REF: 2.11
FVC LLN: 1.95
FVC PRE REF: 92.1 %
FVC REF: 2.78
PEF LLN: 4.2
PEF PRE REF: 129.4 %
PEF REF: 6.48
PRE FEF 25 75: 1.21 L/S
PRE FET 100: 9.36 SEC
PRE FEV1 FVC: 71.65 %
PRE FEV1: 1.83 L
PRE FVC: 2.56 L
PRE PEF: 8.39 L/S

## 2022-05-19 PROCEDURE — 99999 PR PBB SHADOW E&M-EST. PATIENT-LVL III: CPT | Mod: PBBFAC,,, | Performed by: INTERNAL MEDICINE

## 2022-05-19 PROCEDURE — 3074F PR MOST RECENT SYSTOLIC BLOOD PRESSURE < 130 MM HG: ICD-10-PCS | Mod: CPTII,S$GLB,, | Performed by: INTERNAL MEDICINE

## 2022-05-19 PROCEDURE — 71046 X-RAY EXAM CHEST 2 VIEWS: CPT | Mod: 26,,, | Performed by: RADIOLOGY

## 2022-05-19 PROCEDURE — 94010 BREATHING CAPACITY TEST: ICD-10-PCS | Mod: S$GLB,,, | Performed by: INTERNAL MEDICINE

## 2022-05-19 PROCEDURE — 94010 BREATHING CAPACITY TEST: CPT | Mod: S$GLB,,, | Performed by: INTERNAL MEDICINE

## 2022-05-19 PROCEDURE — 99999 PR PBB SHADOW E&M-EST. PATIENT-LVL III: ICD-10-PCS | Mod: PBBFAC,,, | Performed by: INTERNAL MEDICINE

## 2022-05-19 PROCEDURE — 3078F PR MOST RECENT DIASTOLIC BLOOD PRESSURE < 80 MM HG: ICD-10-PCS | Mod: CPTII,S$GLB,, | Performed by: INTERNAL MEDICINE

## 2022-05-19 PROCEDURE — 3074F SYST BP LT 130 MM HG: CPT | Mod: CPTII,S$GLB,, | Performed by: INTERNAL MEDICINE

## 2022-05-19 PROCEDURE — 3288F FALL RISK ASSESSMENT DOCD: CPT | Mod: CPTII,S$GLB,, | Performed by: INTERNAL MEDICINE

## 2022-05-19 PROCEDURE — 1159F MED LIST DOCD IN RCRD: CPT | Mod: CPTII,S$GLB,, | Performed by: INTERNAL MEDICINE

## 2022-05-19 PROCEDURE — 71046 XR CHEST PA AND LATERAL: ICD-10-PCS | Mod: 26,,, | Performed by: RADIOLOGY

## 2022-05-19 PROCEDURE — 71046 X-RAY EXAM CHEST 2 VIEWS: CPT | Mod: TC

## 2022-05-19 PROCEDURE — 1159F PR MEDICATION LIST DOCUMENTED IN MEDICAL RECORD: ICD-10-PCS | Mod: CPTII,S$GLB,, | Performed by: INTERNAL MEDICINE

## 2022-05-19 PROCEDURE — 1101F PR PT FALLS ASSESS DOC 0-1 FALLS W/OUT INJ PAST YR: ICD-10-PCS | Mod: CPTII,S$GLB,, | Performed by: INTERNAL MEDICINE

## 2022-05-19 PROCEDURE — 99214 PR OFFICE/OUTPT VISIT, EST, LEVL IV, 30-39 MIN: ICD-10-PCS | Mod: 25,S$GLB,, | Performed by: INTERNAL MEDICINE

## 2022-05-19 PROCEDURE — 99214 OFFICE O/P EST MOD 30 MIN: CPT | Mod: 25,S$GLB,, | Performed by: INTERNAL MEDICINE

## 2022-05-19 PROCEDURE — 3288F PR FALLS RISK ASSESSMENT DOCUMENTED: ICD-10-PCS | Mod: CPTII,S$GLB,, | Performed by: INTERNAL MEDICINE

## 2022-05-19 PROCEDURE — 1160F RVW MEDS BY RX/DR IN RCRD: CPT | Mod: CPTII,S$GLB,, | Performed by: INTERNAL MEDICINE

## 2022-05-19 PROCEDURE — 1160F PR REVIEW ALL MEDS BY PRESCRIBER/CLIN PHARMACIST DOCUMENTED: ICD-10-PCS | Mod: CPTII,S$GLB,, | Performed by: INTERNAL MEDICINE

## 2022-05-19 PROCEDURE — 3078F DIAST BP <80 MM HG: CPT | Mod: CPTII,S$GLB,, | Performed by: INTERNAL MEDICINE

## 2022-05-19 PROCEDURE — 1101F PT FALLS ASSESS-DOCD LE1/YR: CPT | Mod: CPTII,S$GLB,, | Performed by: INTERNAL MEDICINE

## 2022-05-19 RX ORDER — ALBUTEROL SULFATE 90 UG/1
AEROSOL, METERED RESPIRATORY (INHALATION)
Qty: 18 G | Refills: 11 | Status: SHIPPED | OUTPATIENT
Start: 2022-05-19 | End: 2022-10-06 | Stop reason: SDUPTHER

## 2022-05-19 RX ORDER — FLUTICASONE FUROATE AND VILANTEROL TRIFENATATE 200; 25 UG/1; UG/1
1 POWDER RESPIRATORY (INHALATION) DAILY
Qty: 180 EACH | Refills: 3 | Status: SHIPPED | OUTPATIENT
Start: 2022-05-19 | End: 2022-10-06 | Stop reason: SDUPTHER

## 2022-05-19 NOTE — PROGRESS NOTES
Pulmonary Outpatient Follow Up Visit     Subjective:       Patient ID: Alex Milner is a 76 y.o. male.  The following portions of the patient's history were reviewed and updated as appropriate:   He  has a past medical history of Atherosclerotic PVD with intermittent claudication (8/16/2019), Atrial fibrillation, Back pain, Bilateral inguinal hernia, Calcified granuloma of lung (10/3/2017), Diverticulosis, Diverticulosis (10/28/13), ED (erectile dysfunction), Elevated PSA, Granuloma of liver, Hyperlipidemia, Hypertension, Moderate persistent asthma without complication (9/26/2017), Obesity, Personal history of colonic polyps (2013), Tobacco dependence, Uncomplicated alcohol dependence (11/27/2019), and Urinary tract infection.  He does not have any pertinent problems on file.  He  has a past surgical history that includes Hernia repair; Prostate biopsy; Aortography with serialography (N/A, 10/21/2019); and Aortography with serialography (N/A, 11/14/2019).  His family history includes Cancer in his brother, father, and mother; Cancer (age of onset: 64) in his sister; Prostate cancer in his brother.  He  reports that he quit smoking about 36 years ago. His smoking use included cigarettes. He started smoking about 62 years ago. He has a 30.00 pack-year smoking history. He has never used smokeless tobacco. He reports current alcohol use of about 12.0 standard drinks of alcohol per week. He reports that he does not use drugs.  He has a current medication list which includes the following prescription(s): aspirin, atorvastatin, brimonidine 0.15 % opth drop, dorzolamide, ezetimibe, finasteride, fluticasone propionate, ibuprofen, lisinopril, mupirocin, tamsulosin, albuterol, amlodipine, cilostazol, breo ellipta, latanoprost, and sildenafil.  Current Outpatient Medications on File Prior to Visit   Medication Sig Dispense Refill    aspirin (ECOTRIN) 81 MG EC tablet TAKE 1 TABLET  BY MOUTH EVERY DAY 90 tablet 2    atorvastatin (LIPITOR) 80 MG tablet Take 1 tablet (80 mg total) by mouth once daily. 90 tablet 3    brimonidine 0.15 % OPTH DROP (ALPHAGAN) 0.15 % ophthalmic solution Place 1 drop into both eyes 3 (three) times daily. 15 mL 4    dorzolamide (TRUSOPT) 2 % ophthalmic solution INSTILL 1 DROP INTO BOTH EYES 3 TIMES A DAY 30 mL 1    ezetimibe (ZETIA) 10 mg tablet Take 1 tablet (10 mg total) by mouth once daily. 90 tablet 3    finasteride (PROSCAR) 5 mg tablet Take 1 tablet (5 mg total) by mouth once daily. 90 tablet 3    fluticasone propionate (FLONASE) 50 mcg/actuation nasal spray 2 sprays (100 mcg total) by Each Nostril route once daily. 16 g 0    ibuprofen (ADVIL,MOTRIN) 800 MG tablet Take 800 mg by mouth every 6 (six) hours as needed.      lisinopril (PRINIVIL,ZESTRIL) 20 MG tablet Take 1 tablet (20 mg total) by mouth once daily. 90 tablet 3    mupirocin (BACTROBAN) 2 % ointment Apply topically 3 (three) times daily. 1 Tube 0    tamsulosin (FLOMAX) 0.4 mg Cap Take 1 capsule (0.4 mg total) by mouth once daily. 90 capsule 3    [DISCONTINUED] albuterol (PROVENTIL/VENTOLIN HFA) 90 mcg/actuation inhaler INHALE 2 PUFFS INTO THE LUNGS EVERY 6 (SIX) HOURS AS NEEDED FOR WHEEZING OR SHORTNESS OF BREATH 18 g 11    [DISCONTINUED] fluticasone furoate-vilanteroL (BREO ELLIPTA) 200-25 mcg/dose DsDv diskus inhaler Inhale 1 puff into the lungs once daily. Controller 180 each 3    amLODIPine (NORVASC) 10 MG tablet Take 1 tablet (10 mg total) by mouth once daily. 30 tablet 11    cilostazoL (PLETAL) 100 MG Tab Take 1 tablet (100 mg total) by mouth 2 (two) times daily. 60 tablet 11    latanoprost 0.005 % ophthalmic solution Place 1 drop into both eyes once daily. 1 Bottle 5    sildenafiL (VIAGRA) 100 MG tablet Take 1 tablet (100 mg total) by mouth daily as needed. 30 tablet 11     No current facility-administered medications on file prior to visit.     He has No Known Allergies..      Asthma Control Test  In the past 4  weeks, how much of the time did your asthma keep you from getting as much done at work, school or at home?: None of the time  During the past 4 weeks, how often have you had shortness of breath?: Not at all  During the past 4 weeks, how often did your asthma symptoms (wheezing, couging, shortness of breath, chest tightness or pain) wake you up at night or earlier than usual in the morning?: Not at all  During the past 4 weeks, how often have you used your rescue inhaler or nebulizer medication (such as albuterol)?: Not at all  How would you rate your asthma control during the past 4 weeks?: Well controlled  If your score is 19 or less, your asthma may not be under control: 24      Chief Complaint: Asthma      Alex Milner is 75 y.o.  Last visit 03/10/2020  Asthma and pulmonary nodules  Out of Inhalers  Act score 21  Subtle declined on FEV1 and FVC today  Triggers Allergies  Former smoker   : STATION 16    05/19/2022  Last visit 06/03/2021  Here to review Gunner  Normal  Improved FEV1 and FVC  No recent exacebation  FEV1 improved from 1.43L to 1.83L  FVC  Improved from 2.03L to 2.56L        Additional complaints:  Respiratory ROS: no cough, shortness of breath, or wheezing          Review of Systems   Respiratory: Negative for apnea, cough, hemoptysis, choking, shortness of breath, orthopnea, previous hospitialization due to pulmonary problems, asthma nighttime symptoms, pleurisy, dyspnea on extertion, use of rescue inhaler and Paroxysmal Nocturnal Dyspnea.    All other systems reviewed and are negative.      Outpatient Encounter Medications as of 5/19/2022   Medication Sig Dispense Refill    aspirin (ECOTRIN) 81 MG EC tablet TAKE 1 TABLET BY MOUTH EVERY DAY 90 tablet 2    atorvastatin (LIPITOR) 80 MG tablet Take 1 tablet (80 mg total) by mouth once daily. 90 tablet 3    brimonidine 0.15 % OPTH DROP (ALPHAGAN) 0.15 % ophthalmic solution Place 1 drop into  both eyes 3 (three) times daily. 15 mL 4    dorzolamide (TRUSOPT) 2 % ophthalmic solution INSTILL 1 DROP INTO BOTH EYES 3 TIMES A DAY 30 mL 1    ezetimibe (ZETIA) 10 mg tablet Take 1 tablet (10 mg total) by mouth once daily. 90 tablet 3    finasteride (PROSCAR) 5 mg tablet Take 1 tablet (5 mg total) by mouth once daily. 90 tablet 3    fluticasone propionate (FLONASE) 50 mcg/actuation nasal spray 2 sprays (100 mcg total) by Each Nostril route once daily. 16 g 0    ibuprofen (ADVIL,MOTRIN) 800 MG tablet Take 800 mg by mouth every 6 (six) hours as needed.      lisinopril (PRINIVIL,ZESTRIL) 20 MG tablet Take 1 tablet (20 mg total) by mouth once daily. 90 tablet 3    mupirocin (BACTROBAN) 2 % ointment Apply topically 3 (three) times daily. 1 Tube 0    tamsulosin (FLOMAX) 0.4 mg Cap Take 1 capsule (0.4 mg total) by mouth once daily. 90 capsule 3    [DISCONTINUED] albuterol (PROVENTIL/VENTOLIN HFA) 90 mcg/actuation inhaler INHALE 2 PUFFS INTO THE LUNGS EVERY 6 (SIX) HOURS AS NEEDED FOR WHEEZING OR SHORTNESS OF BREATH 18 g 11    [DISCONTINUED] fluticasone furoate-vilanteroL (BREO ELLIPTA) 200-25 mcg/dose DsDv diskus inhaler Inhale 1 puff into the lungs once daily. Controller 180 each 3    albuterol (PROVENTIL/VENTOLIN HFA) 90 mcg/actuation inhaler INHALE 2 PUFFS INTO THE LUNGS EVERY 6 (SIX) HOURS AS NEEDED FOR WHEEZING OR SHORTNESS OF BREATH 18 g 11    amLODIPine (NORVASC) 10 MG tablet Take 1 tablet (10 mg total) by mouth once daily. 30 tablet 11    cilostazoL (PLETAL) 100 MG Tab Take 1 tablet (100 mg total) by mouth 2 (two) times daily. 60 tablet 11    fluticasone furoate-vilanteroL (BREO ELLIPTA) 200-25 mcg/dose DsDv diskus inhaler Inhale 1 puff into the lungs once daily. Controller 180 each 3    latanoprost 0.005 % ophthalmic solution Place 1 drop into both eyes once daily. 1 Bottle 5    sildenafiL (VIAGRA) 100 MG tablet Take 1 tablet (100 mg total) by mouth daily as needed. 30 tablet 11     No  "facility-administered encounter medications on file as of 5/19/2022.       Objective:     Vital Signs (Most Recent)  Vital Signs  Pulse: 62  Resp: 16  SpO2: 99 %  BP: 124/76  Height and Weight  Height: 5' 6" (167.6 cm)  Weight: 80.6 kg (177 lb 11.1 oz)  BSA (Calculated - sq m): 1.94 sq meters  BMI (Calculated): 28.7  Weight in (lb) to have BMI = 25: 154.6]  Wt Readings from Last 2 Encounters:   05/19/22 80.6 kg (177 lb 11.1 oz)   04/18/22 78.5 kg (173 lb)       Physical Exam   Constitutional: He is oriented to person, place, and time. He appears well-developed and well-nourished.   HENT:   Head: Normocephalic.   Mouth/Throat: Mallampati Score: II.   Neck: No JVD present.   Cardiovascular: Normal rate and intact distal pulses.   No murmur heard.  Pulmonary/Chest: Normal expansion, effort normal and breath sounds normal.   Abdominal: Soft. Bowel sounds are normal.   Musculoskeletal:         General: No edema. Normal range of motion.      Cervical back: Normal range of motion and neck supple.   Lymphadenopathy:     He has no axillary adenopathy.   Neurological: He is alert and oriented to person, place, and time.   Skin: Skin is warm and dry.   Psychiatric: He has a normal mood and affect.   Nursing note and vitals reviewed.      Laboratory  Lab Results   Component Value Date    WBC 5.33 01/15/2021    RBC 5.59 01/15/2021    HGB 15.0 01/15/2021    HCT 48.8 01/15/2021    MCV 87 01/15/2021    MCH 26.8 (L) 01/15/2021    MCHC 30.7 (L) 01/15/2021    RDW 13.8 01/15/2021     01/15/2021    MPV 9.7 01/15/2021    GRAN 2.2 01/15/2021    GRAN 41.9 01/15/2021    LYMPH 1.8 01/15/2021    LYMPH 32.8 01/15/2021    MONO 0.6 01/15/2021    MONO 11.6 01/15/2021    EOS 0.6 (H) 01/15/2021    BASO 0.08 01/15/2021    EOSINOPHIL 11.8 (H) 01/15/2021    BASOPHIL 1.5 01/15/2021       BMP  Lab Results   Component Value Date     01/04/2022    K 4.8 01/04/2022     01/04/2022    CO2 31 (H) 01/04/2022    BUN 6 (L) 01/04/2022    " CREATININE 0.9 2022    CALCIUM 9.2 2022    ANIONGAP 5 (L) 2022    ESTGFRAFRICA >60.0 2022    EGFRNONAA >60.0 2022    AST 16 2022    ALT 12 2022    PROT 7.2 2022       No results found for: BNP    Lab Results   Component Value Date    TSH 0.911 2019       No results found for: SEDRATE    No results found for: CRP  Lab Results   Component Value Date    IGE <35 2021        No results found for: ASPERGILLUS  No results found for: AFUMIGATUSCL     No results found for: ACE     Diagnostic Results:  I have personally reviewed today the following studies:       ROBSON  FEV1: 1.83L( 86.9%), FVC 2.56L( 92.1%)  FEV1/FVC 72    Assessment/Plan:     Problem List Items Addressed This Visit     Hypertension (Chronic)     STABLE on LISINOPRIL NORVASC           Hyperlipidemia     STABLE ON ZETIA           Multiple pulmonary nodules     7 mm nodule  Follow CT in 2022           Asthma, well controlled - Primary     Controlled, ACT score 24, asthma scores 19 or greater indicate well controlled asthma .  Has breo and albuterol if needed   Normal spirometry today  Current immunisations             Relevant Medications    albuterol (PROVENTIL/VENTOLIN HFA) 90 mcg/actuation inhaler    fluticasone furoate-vilanteroL (BREO ELLIPTA) 200-25 mcg/dose DsDv diskus inhaler    Other Relevant Orders    Fraction of  Nitric Oxide        Requested Prescriptions     Signed Prescriptions Disp Refills    albuterol (PROVENTIL/VENTOLIN HFA) 90 mcg/actuation inhaler 18 g 11     Sig: INHALE 2 PUFFS INTO THE LUNGS EVERY 6 (SIX) HOURS AS NEEDED FOR WHEEZING OR SHORTNESS OF BREATH    fluticasone furoate-vilanteroL (BREO ELLIPTA) 200-25 mcg/dose DsDv diskus inhaler 180 each 3     Sig: Inhale 1 puff into the lungs once daily. Controller      Stable asthma  FEV1 and FVC improved    Follow up follow up in 2022 with chest CT as scheduled, FeNO.    This note was prepared using voice  recognition system and is likely to have sound alike errors that may have been overlooked even after proof reading.  Please call me with any questions    Discussed diagnosis, its evaluation, treatment and usual course. All questions answered.      Dominik Hernandez MD

## 2022-05-19 NOTE — ASSESSMENT & PLAN NOTE
Controlled, ACT score 24, asthma scores 19 or greater indicate well controlled asthma .  Has breo and albuterol if needed   Normal spirometry today  Current immunisations

## 2022-05-23 DIAGNOSIS — I10 ESSENTIAL HYPERTENSION: Primary | ICD-10-CM

## 2022-05-24 ENCOUNTER — HOSPITAL ENCOUNTER (OUTPATIENT)
Dept: CARDIOLOGY | Facility: HOSPITAL | Age: 77
Discharge: HOME | End: 2022-05-24
Attending: INTERNAL MEDICINE
Payer: MEDICARE

## 2022-05-24 ENCOUNTER — OFFICE VISIT (OUTPATIENT)
Dept: CARDIOLOGY | Facility: CLINIC | Age: 77
End: 2022-05-24
Payer: MEDICARE

## 2022-05-24 VITALS
BODY MASS INDEX: 28.41 KG/M2 | HEART RATE: 58 BPM | DIASTOLIC BLOOD PRESSURE: 64 MMHG | OXYGEN SATURATION: 97 % | SYSTOLIC BLOOD PRESSURE: 136 MMHG | WEIGHT: 176 LBS

## 2022-05-24 DIAGNOSIS — I70.219 ATHEROSCLEROTIC PVD WITH INTERMITTENT CLAUDICATION: ICD-10-CM

## 2022-05-24 DIAGNOSIS — I73.9 CLAUDICATION: ICD-10-CM

## 2022-05-24 DIAGNOSIS — I10 PRIMARY HYPERTENSION: Primary | Chronic | ICD-10-CM

## 2022-05-24 DIAGNOSIS — I10 ESSENTIAL HYPERTENSION: ICD-10-CM

## 2022-05-24 DIAGNOSIS — E78.2 MIXED HYPERLIPIDEMIA: ICD-10-CM

## 2022-05-24 PROCEDURE — 99999 PR PBB SHADOW E&M-EST. PATIENT-LVL III: CPT | Mod: PBBFAC,,, | Performed by: INTERNAL MEDICINE

## 2022-05-24 PROCEDURE — 99999 PR PBB SHADOW E&M-EST. PATIENT-LVL III: ICD-10-PCS | Mod: PBBFAC,,, | Performed by: INTERNAL MEDICINE

## 2022-05-24 PROCEDURE — 3075F PR MOST RECENT SYSTOLIC BLOOD PRESS GE 130-139MM HG: ICD-10-PCS | Mod: CPTII,S$GLB,, | Performed by: INTERNAL MEDICINE

## 2022-05-24 PROCEDURE — 3078F PR MOST RECENT DIASTOLIC BLOOD PRESSURE < 80 MM HG: ICD-10-PCS | Mod: CPTII,S$GLB,, | Performed by: INTERNAL MEDICINE

## 2022-05-24 PROCEDURE — 99214 PR OFFICE/OUTPT VISIT, EST, LEVL IV, 30-39 MIN: ICD-10-PCS | Mod: S$GLB,,, | Performed by: INTERNAL MEDICINE

## 2022-05-24 PROCEDURE — 1160F PR REVIEW ALL MEDS BY PRESCRIBER/CLIN PHARMACIST DOCUMENTED: ICD-10-PCS | Mod: CPTII,S$GLB,, | Performed by: INTERNAL MEDICINE

## 2022-05-24 PROCEDURE — 1159F PR MEDICATION LIST DOCUMENTED IN MEDICAL RECORD: ICD-10-PCS | Mod: CPTII,S$GLB,, | Performed by: INTERNAL MEDICINE

## 2022-05-24 PROCEDURE — 93010 EKG 12-LEAD: ICD-10-PCS | Mod: ,,, | Performed by: INTERNAL MEDICINE

## 2022-05-24 PROCEDURE — 3075F SYST BP GE 130 - 139MM HG: CPT | Mod: CPTII,S$GLB,, | Performed by: INTERNAL MEDICINE

## 2022-05-24 PROCEDURE — 99214 OFFICE O/P EST MOD 30 MIN: CPT | Mod: S$GLB,,, | Performed by: INTERNAL MEDICINE

## 2022-05-24 PROCEDURE — 3078F DIAST BP <80 MM HG: CPT | Mod: CPTII,S$GLB,, | Performed by: INTERNAL MEDICINE

## 2022-05-24 PROCEDURE — 1160F RVW MEDS BY RX/DR IN RCRD: CPT | Mod: CPTII,S$GLB,, | Performed by: INTERNAL MEDICINE

## 2022-05-24 PROCEDURE — 93010 ELECTROCARDIOGRAM REPORT: CPT | Mod: ,,, | Performed by: INTERNAL MEDICINE

## 2022-05-24 PROCEDURE — 1159F MED LIST DOCD IN RCRD: CPT | Mod: CPTII,S$GLB,, | Performed by: INTERNAL MEDICINE

## 2022-05-24 PROCEDURE — 93005 ELECTROCARDIOGRAM TRACING: CPT

## 2022-05-24 RX ORDER — CILOSTAZOL 100 MG/1
100 TABLET ORAL 2 TIMES DAILY
Qty: 60 TABLET | Refills: 11 | Status: SHIPPED | OUTPATIENT
Start: 2022-05-24 | End: 2023-06-13

## 2022-05-24 RX ORDER — ALIROCUMAB 150 MG/ML
150 INJECTION, SOLUTION SUBCUTANEOUS
Qty: 2 EACH | Refills: 5 | OUTPATIENT
Start: 2022-05-24 | End: 2022-07-08

## 2022-05-24 NOTE — PROGRESS NOTES
Subjective:   Patient ID:  Alex Milner is a 76 y.o. male who presents for follow up of No chief complaint on file.      77 yo male, 6 months f/u  PMH PAD, h/o AO showed left infrapopliteal . Distal left SFA PTCA and  AO right SFA PTCA by Dr. Ross,  HTN, HLP, PVD, obesity, ETOH use PAULA on CPAP. No smoking  C/o bilateral thigh and buttock pain with walking and some time long time sitting, and back pain at night,. No calf pain   left FREDY 0.7 and right 1.1. LE arterial US showed left infrapopliteal stenosis and occluded AT     Chest CT wo contrast showed mild aorta calcification  No chest pain sob, dizziness, palpitation,  Resolved claudication after angiogram. Walks 2 miles a day. Only knee pain, No muscle weakness, pain and numbness  No smoking/drinking  Med compliance    05/2021 visit  C/o both thigh muscle tightness at rest and night. Some sore on both thighs at walking.  Some lower back pain at night  No chest pain dyspnea palpitation, faint and dizziness  Today EKG NSR HR 54 bpm, no acute ctt change     visit  Started taking Lipitor and zetia daily about few months ago. Occasionally muscle pain at walking. No resting pain.   Some lower back pain . No chest pain dyspnea dizziness and faint    Interval history  No leg pain with a lot of walking. No chest pain dyspnea dizziness dan leg swelling  Med compliance and no active bleeding  ekg NSR and BP controlled. , Copay of repatha was high                Past Medical History:   Diagnosis Date    Atherosclerotic PVD with intermittent claudication 8/16/2019    Atrial fibrillation     pt unaware of this    Back pain     Bilateral inguinal hernia     CT ABdomen/pelvis 3/9/2015 (care everywhere)---Bilateral inguinal hernias containing fat.      Calcified granuloma of lung 10/3/2017    CT CHest 3/26/2018---There are calcified lymph nodes in the mediastinum and left hilum.    CT chest 9/11/ 2017 Calcified left hilar  and subcarinal granulomas are noted  ;Calcified granuloma noted within the posterior segment of the right hepatic lobe.    Diverticulosis     Diverticulosis 10/28/13    Colonoscopy     ED (erectile dysfunction)     Elevated PSA     Granuloma of liver     ct chest 3/26/2018---There is a small calcified granuloma posteriorly in the right lobe of the liver    Hyperlipidemia     Hypertension     Moderate persistent asthma without complication 2017    Obesity     Personal history of colonic polyps     Tobacco dependence     resolved    Uncomplicated alcohol dependence 2019    Urinary tract infection        Past Surgical History:   Procedure Laterality Date    AORTOGRAPHY WITH SERIALOGRAPHY N/A 10/21/2019    Procedure: AORTOGRAM, WITH RUNOFF;  Surgeon: Chencho Ross MD;  Location: [facility] CATH LAB;  Service: Cardiology;  Laterality: N/A;  pt requesting 9am arrival    AORTOGRAPHY WITH SERIALOGRAPHY N/A 2019    Procedure: AORTOGRAM, WITH RUNOFF;  Surgeon: Chencho Ross MD;  Location: [facility] CATH LAB;  Service: Cardiology;  Laterality: N/A;    HERNIA REPAIR      x2    PROSTATE BIOPSY      reported per patient around  or  which was reportedly negative       Social History     Tobacco Use    Smoking status: Former Smoker     Packs/day: 2.00     Years: 15.00     Pack years: 30.00     Types: Cigarettes     Start date:      Quit date: 1985     Years since quittin.8    Smokeless tobacco: Never Used   Substance Use Topics    Alcohol use: Yes     Alcohol/week: 12.0 standard drinks     Types: 12 Cans of beer per week    Drug use: No       Family History   Problem Relation Age of Onset    Cancer Mother         Unknown primary    Cancer Father     Cancer Brother         prostate     Prostate cancer Brother     Cancer Sister 64        pancreatic     Diabetes Neg Hx     Heart disease Neg Hx     Kidney disease Neg Hx     Stroke Neg Hx     Hyperlipidemia Neg Hx      Hypertension Neg Hx          Review of Systems   Constitutional: Negative for decreased appetite, diaphoresis, fever, malaise/fatigue and night sweats.   HENT: Negative for nosebleeds.    Eyes: Negative for blurred vision and double vision.   Cardiovascular: Negative for chest pain, claudication, dyspnea on exertion, irregular heartbeat, leg swelling, near-syncope, orthopnea, palpitations, paroxysmal nocturnal dyspnea and syncope.   Respiratory: Negative for cough, shortness of breath, sleep disturbances due to breathing, snoring, sputum production and wheezing.    Endocrine: Negative for cold intolerance and polyuria.   Hematologic/Lymphatic: Does not bruise/bleed easily.   Skin: Negative for rash.   Musculoskeletal: Negative for back pain, falls, joint pain, joint swelling and neck pain.   Gastrointestinal: Negative for abdominal pain, heartburn, nausea and vomiting.   Genitourinary: Negative for dysuria, frequency and hematuria.   Neurological: Negative for difficulty with concentration, dizziness, focal weakness, headaches, light-headedness, numbness, seizures and weakness.   Psychiatric/Behavioral: Negative for depression, memory loss and substance abuse. The patient does not have insomnia.    Allergic/Immunologic: Negative for HIV exposure and hives.       Objective:   Physical Exam  HENT:      Head: Normocephalic.   Eyes:      Pupils: Pupils are equal, round, and reactive to light.   Neck:      Thyroid: No thyromegaly.      Vascular: Normal carotid pulses. No carotid bruit or JVD.   Cardiovascular:      Rate and Rhythm: Normal rate and regular rhythm.  No extrasystoles are present.     Chest Wall: PMI is not displaced.      Pulses: Normal pulses.      Heart sounds: Normal heart sounds. No murmur heard.    No gallop. No S3 sounds.   Pulmonary:      Effort: No respiratory distress.      Breath sounds: Normal breath sounds. No stridor.   Abdominal:      General: Bowel sounds are normal.      Palpations: Abdomen  is soft.      Tenderness: There is no abdominal tenderness. There is no rebound.   Musculoskeletal:         General: Normal range of motion.   Skin:     Findings: No rash.   Neurological:      Mental Status: He is alert and oriented to person, place, and time.   Psychiatric:         Behavior: Behavior normal.         Lab Results   Component Value Date    CHOL 201 (H) 01/04/2022    CHOL 300 (H) 06/25/2021    CHOL 299 (H) 05/24/2021     Lab Results   Component Value Date    HDL 45 01/04/2022    HDL 91 (H) 06/25/2021    HDL 82 (H) 05/24/2021     Lab Results   Component Value Date    LDLCALC 134.2 01/04/2022    LDLCALC 188.2 (H) 06/25/2021    LDLCALC 197.8 (H) 05/24/2021     Lab Results   Component Value Date    TRIG 109 01/04/2022    TRIG 104 06/25/2021    TRIG 96 05/24/2021     Lab Results   Component Value Date    CHOLHDL 22.4 01/04/2022    CHOLHDL 30.3 06/25/2021    CHOLHDL 27.4 05/24/2021       Chemistry        Component Value Date/Time     01/04/2022 0942    K 4.8 01/04/2022 0942     01/04/2022 0942    CO2 31 (H) 01/04/2022 0942    BUN 6 (L) 01/04/2022 0942    CREATININE 0.9 01/04/2022 0942    GLU 90 01/04/2022 0942        Component Value Date/Time    CALCIUM 9.2 01/04/2022 0942    ALKPHOS 90 01/04/2022 0942    AST 16 01/04/2022 0942    ALT 12 01/04/2022 0942    BILITOT 0.3 01/04/2022 0942    ESTGFRAFRICA >60.0 01/04/2022 0942    EGFRNONAA >60.0 01/04/2022 0942          No results found for: LABA1C, HGBA1C  Lab Results   Component Value Date    TSH 0.911 06/27/2019     Lab Results   Component Value Date    INR 0.9 03/31/2020    INR 0.9 11/07/2019    INR 1.0 10/14/2019     Lab Results   Component Value Date    WBC 5.33 01/15/2021    HGB 15.0 01/15/2021    HCT 48.8 01/15/2021    MCV 87 01/15/2021     01/15/2021     BMP  Sodium   Date Value Ref Range Status   01/04/2022 139 136 - 145 mmol/L Final     Potassium   Date Value Ref Range Status   01/04/2022 4.8 3.5 - 5.1 mmol/L Final     Chloride   Date  Value Ref Range Status   01/04/2022 103 95 - 110 mmol/L Final     CO2   Date Value Ref Range Status   01/04/2022 31 (H) 23 - 29 mmol/L Final     BUN   Date Value Ref Range Status   01/04/2022 6 (L) 8 - 23 mg/dL Final     Creatinine   Date Value Ref Range Status   01/04/2022 0.9 0.5 - 1.4 mg/dL Final     Calcium   Date Value Ref Range Status   01/04/2022 9.2 8.7 - 10.5 mg/dL Final     Anion Gap   Date Value Ref Range Status   01/04/2022 5 (L) 8 - 16 mmol/L Final     eGFR if    Date Value Ref Range Status   01/04/2022 >60.0 >60 mL/min/1.73 m^2 Final     eGFR if non    Date Value Ref Range Status   01/04/2022 >60.0 >60 mL/min/1.73 m^2 Final     Comment:     Calculation used to obtain the estimated glomerular filtration  rate (eGFR) is the CKD-EPI equation.        BNP  @LABRCNTIP(BNP,BNPTRIAGEBLO)@  @LABRCNTIP(troponini)@  CrCl cannot be calculated (Patient's most recent lab result is older than the maximum 7 days allowed.).  No results found in the last 24 hours.  No results found in the last 24 hours.  No results found in the last 24 hours.    Assessment:      1. Primary hypertension    2. Claudication    3. Mixed hyperlipidemia    4. Atherosclerotic PVD with intermittent claudication        Plan:     Will send praluent   Continue Lipitor zetia ASA lisinopril and amlodipine  Resume Pletal 100 mg bid    Counseled DASH  Check Lipid profile in 6 months  Recommend heart-healthy diet, weight control and regular exercise.  Tasneem. Risk modification.   I have reviewed all pertinent labs and cardiac studies independently. Plans and recommendations have been formulated under my direct supervision. All questions answered and patient voiced understanding.   If symptoms persist go to the ED  RTC in 6 months

## 2022-05-25 ENCOUNTER — TELEPHONE (OUTPATIENT)
Dept: UROLOGY | Facility: CLINIC | Age: 77
End: 2022-05-25
Payer: MEDICARE

## 2022-05-25 DIAGNOSIS — R97.20 ELEVATED PSA: Primary | ICD-10-CM

## 2022-05-25 NOTE — TELEPHONE ENCOUNTER
----- Message from RT Rodo sent at 5/25/2022  9:18 AM CDT -----  Hey,     Can you please order a STAT creatine and schedule it an hour before the patients appointment which is 6/3/22 at 9:00.    Thank you!

## 2022-06-03 ENCOUNTER — HOSPITAL ENCOUNTER (OUTPATIENT)
Dept: RADIOLOGY | Facility: HOSPITAL | Age: 77
Discharge: HOME | End: 2022-06-03
Attending: UROLOGY
Payer: MEDICARE

## 2022-06-03 DIAGNOSIS — R97.20 ELEVATED PSA: ICD-10-CM

## 2022-06-03 PROCEDURE — 72197 MRI PELVIS W/O & W/DYE: CPT | Mod: TC

## 2022-06-03 PROCEDURE — 25500020 PHARM REV CODE 255: Performed by: UROLOGY

## 2022-06-03 PROCEDURE — A9585 GADOBUTROL INJECTION: HCPCS | Performed by: UROLOGY

## 2022-06-03 PROCEDURE — 72197 MRI PROSTATE W W/O CONTRAST: ICD-10-PCS | Mod: 26,,, | Performed by: RADIOLOGY

## 2022-06-03 PROCEDURE — 72197 MRI PELVIS W/O & W/DYE: CPT | Mod: 26,,, | Performed by: RADIOLOGY

## 2022-06-03 RX ORDER — GADOBUTROL 604.72 MG/ML
8 INJECTION INTRAVENOUS
Status: COMPLETED | OUTPATIENT
Start: 2022-06-03 | End: 2022-06-03

## 2022-06-03 RX ADMIN — GADOBUTROL 8 ML: 604.72 INJECTION INTRAVENOUS at 09:06

## 2022-06-14 ENCOUNTER — PATIENT OUTREACH (OUTPATIENT)
Dept: ADMINISTRATIVE | Facility: HOSPITAL | Age: 77
End: 2022-06-14
Payer: MEDICARE

## 2022-06-22 ENCOUNTER — SPECIALTY PHARMACY (OUTPATIENT)
Dept: PHARMACY | Facility: CLINIC | Age: 77
End: 2022-06-22

## 2022-06-22 DIAGNOSIS — E78.2 MIXED HYPERLIPIDEMIA: Primary | ICD-10-CM

## 2022-06-22 NOTE — TELEPHONE ENCOUNTER
Deepti Delgado is a 65 year old female here for  Chief Complaint   Patient presents with   • Cancer     Denies latex allergy or sensitivity.    Medication verified, no changes.  PCP and Pharmacy verified.    Social History     Tobacco Use   Smoking Status Former Smoker   • Packs/day: 1.00   • Years: 12.00   • Pack years: 12.00   • Quit date: 2/15/1988   • Years since quittin.6   Smokeless Tobacco Never Used     Advance Directives Filed: Yes    ECOG:   ECOG [21 0850]   ECOG Performance Status 0       Vitals:    There were no vitals taken for this visit.    These vital signs are:  Within defined parameters (Per Reference \"Defined Limits Hospital Outpatient Department (HOD)\")    Height: No.  Ht Readings from Last 1 Encounters:   21 5' 7.44\" (1.713 m)     Weight:Yes, shoes off.  Wt Readings from Last 3 Encounters:   21 91.7 kg (202 lb 2.6 oz)   21 92.8 kg (204 lb 9.4 oz)   21 92.7 kg (204 lb 6.4 oz)       BMI: There is no height or weight on file to calculate BMI.    REVIEW OF SYSTEMS  GENERAL:  Patient denies headache, fevers, chills, night sweats, change in appetite, weight loss, dizziness, but complains of: excessive fatigue  ALLERGIC/IMMUNOLOGIC: Verified allergies: Yes  EYES:  Patient denies significant visual difficulties, double vision, blurred vision  ENT/MOUTH: Patient denies problems with hearing, sore throat, sinus drainage, mouth sores  ENDOCRINE:  Patient denies diabetes, thyroid disease, hormone replacement, hot flashes  HEMATOLOGIC/LYMPHATIC: Patient denies easy bruising, bleeding, tender lymph nodes, swollen lymph nodes  BREASTS: Patient denies abnormal masses of breast, nipple discharge, pain  RESPIRATORY:  Patient denies lung pain with breathing, cough, coughing up blood, shortness of breath  CARDIOVASCULAR:  Patient denies anginal chest pain, palpitations, shortness of breath when lying flat, peripheral edema  GASTROINTESTINAL: Patient denies abdominal pain , nausea,  PA submitted to [employer] insurance via [facility] for PRALUENT medication. Waiting for clinical question set.    KEY: [id number]  CASE ID: [id number]    Will follow up.       vomiting, diarrhea, GI bleeding, constipation, change in bowel habits, heartburn, sensation of feeling full, difficulty swallowing  : Patient denies abnormal genital masses, blood in the urine, frequency, urgency, burning with urination, hesitancy, incontinence, vaginal bleeding, discharge  MUSCULOSKELETAL:  Patient denies joint pain, bone pain, joint swelling, redness, decreased range of motion  SKIN:  Patient denies chronic rashes, inflammation, ulcerations, skin changes, itching  NEUROLOGIC:  Patient denies loss of balance, areas of focal weakness, abnormal gait, sensory problems, numbness, tingling  PSYCHIATRIC: Patient denies insomnia, depression, anxiety    This patient reported abnormal symptoms that needed immediate verbal communication: No

## 2022-06-22 NOTE — TELEPHONE ENCOUNTER
PA for praluent denied. Not considered medically necessary per insurance. Patient was informed and wants provider to make the decision on how to proceed. Provider informed via messages.    Will follow up.

## 2022-06-22 NOTE — TELEPHONE ENCOUNTER
Lala, this is Zamzam Arroyo with [facility].  We are working on your prescription that your doctor has sent us. We will be working with your insurance to get this approved for you. We will be calling you along the way with updates on your medication.  If you have any questions, you can reach us at [phone].    Welcome call outcome: Patient/caregiver reached

## 2022-06-30 NOTE — TELEPHONE ENCOUNTER
Appeal for praluent was denied. Insurance states there is no clear medical reason why repatha cannot be utilized. Patient was unable to afford repatha in the past due to $310 copay. Will discuss with provider if he can send new repatha prescription and apply a  copay card.     Will follow up.

## 2022-07-08 ENCOUNTER — TELEPHONE (OUTPATIENT)
Dept: CARDIOLOGY | Facility: CLINIC | Age: 77
End: 2022-07-08
Payer: MEDICARE

## 2022-07-08 RX ORDER — EVOLOCUMAB 140 MG/ML
140 INJECTION, SOLUTION SUBCUTANEOUS
Qty: 2 EACH | Refills: 5 | Status: SHIPPED | OUTPATIENT
Start: 2022-07-08 | End: 2023-01-18 | Stop reason: SDUPTHER

## 2022-07-08 NOTE — TELEPHONE ENCOUNTER
Benefits investigation   Per [facility] website     Payor: [employer]  Annual Deductible Amount: $250 ($250 remaining  Annual Out of Packet (OOP) Maximum: $2500(2469.09 remaining)  Estimated Copay: $310  Network Status: in network     forwarding to FA

## 2022-07-08 NOTE — TELEPHONE ENCOUNTER
PA resubmitted for repatha via CM. Key: [id number]    CaseID: [id number]  PA approved 7/8/22 - 7/8/23    Copay $310. Patient gave consent to secure copay card.    Forward to BI/FA

## 2022-07-08 NOTE — TELEPHONE ENCOUNTER
Resent message to [facility] for new prescription for repatha if appropriate. Spoke to patient about copay card assistance if repatha approved by insurance. Patient verbalized understanding. Will follow up.

## 2022-07-11 ENCOUNTER — OFFICE VISIT (OUTPATIENT)
Dept: INTERNAL MEDICINE | Facility: CLINIC | Age: 77
End: 2022-07-11
Payer: MEDICARE

## 2022-07-11 VITALS
OXYGEN SATURATION: 96 % | SYSTOLIC BLOOD PRESSURE: 138 MMHG | DIASTOLIC BLOOD PRESSURE: 60 MMHG | HEIGHT: 66 IN | TEMPERATURE: 98 F | WEIGHT: 171.75 LBS | BODY MASS INDEX: 27.6 KG/M2 | HEART RATE: 65 BPM

## 2022-07-11 DIAGNOSIS — I10 ESSENTIAL HYPERTENSION: ICD-10-CM

## 2022-07-11 DIAGNOSIS — E78.2 MIXED HYPERLIPIDEMIA: ICD-10-CM

## 2022-07-11 DIAGNOSIS — R16.0 LIVER MASS: ICD-10-CM

## 2022-07-11 DIAGNOSIS — I70.219 ATHEROSCLEROTIC PVD WITH INTERMITTENT CLAUDICATION: Primary | ICD-10-CM

## 2022-07-11 DIAGNOSIS — J47.9 BRONCHIECTASIS, UNCOMPLICATED: ICD-10-CM

## 2022-07-11 DIAGNOSIS — R97.20 ELEVATED PSA: ICD-10-CM

## 2022-07-11 PROCEDURE — 3078F PR MOST RECENT DIASTOLIC BLOOD PRESSURE < 80 MM HG: ICD-10-PCS | Mod: CPTII,S$GLB,, | Performed by: INTERNAL MEDICINE

## 2022-07-11 PROCEDURE — 99214 PR OFFICE/OUTPT VISIT, EST, LEVL IV, 30-39 MIN: ICD-10-PCS | Mod: S$GLB,,, | Performed by: INTERNAL MEDICINE

## 2022-07-11 PROCEDURE — 1159F PR MEDICATION LIST DOCUMENTED IN MEDICAL RECORD: ICD-10-PCS | Mod: CPTII,S$GLB,, | Performed by: INTERNAL MEDICINE

## 2022-07-11 PROCEDURE — 99214 OFFICE O/P EST MOD 30 MIN: CPT | Mod: S$GLB,,, | Performed by: INTERNAL MEDICINE

## 2022-07-11 PROCEDURE — 1160F RVW MEDS BY RX/DR IN RCRD: CPT | Mod: CPTII,S$GLB,, | Performed by: INTERNAL MEDICINE

## 2022-07-11 PROCEDURE — 1126F AMNT PAIN NOTED NONE PRSNT: CPT | Mod: CPTII,S$GLB,, | Performed by: INTERNAL MEDICINE

## 2022-07-11 PROCEDURE — 1160F PR REVIEW ALL MEDS BY PRESCRIBER/CLIN PHARMACIST DOCUMENTED: ICD-10-PCS | Mod: CPTII,S$GLB,, | Performed by: INTERNAL MEDICINE

## 2022-07-11 PROCEDURE — 3075F SYST BP GE 130 - 139MM HG: CPT | Mod: CPTII,S$GLB,, | Performed by: INTERNAL MEDICINE

## 2022-07-11 PROCEDURE — 3078F DIAST BP <80 MM HG: CPT | Mod: CPTII,S$GLB,, | Performed by: INTERNAL MEDICINE

## 2022-07-11 PROCEDURE — 1101F PR PT FALLS ASSESS DOC 0-1 FALLS W/OUT INJ PAST YR: ICD-10-PCS | Mod: CPTII,S$GLB,, | Performed by: INTERNAL MEDICINE

## 2022-07-11 PROCEDURE — 3288F FALL RISK ASSESSMENT DOCD: CPT | Mod: CPTII,S$GLB,, | Performed by: INTERNAL MEDICINE

## 2022-07-11 PROCEDURE — 99999 PR PBB SHADOW E&M-EST. PATIENT-LVL IV: ICD-10-PCS | Mod: PBBFAC,,, | Performed by: INTERNAL MEDICINE

## 2022-07-11 PROCEDURE — 1101F PT FALLS ASSESS-DOCD LE1/YR: CPT | Mod: CPTII,S$GLB,, | Performed by: INTERNAL MEDICINE

## 2022-07-11 PROCEDURE — 3288F PR FALLS RISK ASSESSMENT DOCUMENTED: ICD-10-PCS | Mod: CPTII,S$GLB,, | Performed by: INTERNAL MEDICINE

## 2022-07-11 PROCEDURE — 3075F PR MOST RECENT SYSTOLIC BLOOD PRESS GE 130-139MM HG: ICD-10-PCS | Mod: CPTII,S$GLB,, | Performed by: INTERNAL MEDICINE

## 2022-07-11 PROCEDURE — 99999 PR PBB SHADOW E&M-EST. PATIENT-LVL IV: CPT | Mod: PBBFAC,,, | Performed by: INTERNAL MEDICINE

## 2022-07-11 PROCEDURE — 1126F PR PAIN SEVERITY QUANTIFIED, NO PAIN PRESENT: ICD-10-PCS | Mod: CPTII,S$GLB,, | Performed by: INTERNAL MEDICINE

## 2022-07-11 PROCEDURE — 1159F MED LIST DOCD IN RCRD: CPT | Mod: CPTII,S$GLB,, | Performed by: INTERNAL MEDICINE

## 2022-07-11 NOTE — TELEPHONE ENCOUNTER
Repatha copay card requires patient to Call [employer] at [phone] ([phone]). Patient not at home and requested call back tomorrow afternoon

## 2022-07-11 NOTE — PROGRESS NOTES
Subjective:       Patient ID: Alex Milner is a 76 y.o. male.    Chief Complaint: Follow up    HPI Patient is a 76-year-old male presenting today follow-up on chronic issues.  Patient has history of atherosclerotic disease in the peripheral arterial system in his legs with intermittent claudication.  Patient relates that he has been having more problems than typical with this issue.  Most recently he states he went to [employer] and get his fishing license and he had to have a cart to lean on because his legs hurt so bad walking from the front of the store to the sporting [place] section.  He states this is not uncommon.  He has half a block of walking before he will have claudication symptoms.  Patient has a prescription for Pletal but it does not appear that he is taking it is unclear if he is taking it or not.  He is not on a statin at this time as he did not tolerate and there were looking to get him on Repatha.  It is unclear which blood pressure medications he is taking as he is confused about his medications.    Patient has a history of hyperlipidemia and hypertension.  As noted his medicine list is little confusing as he has got medicines on list he is not sure he is taking and then those medicines on the LEs were does not look like he is filling them regularly.  We discussed the importance of taking his medications as prescribed and were going to work with him to try to figure out what he is taking of her story supposed to be taking.    He has had a history of liver mass on scan which on follow-up PET scanning was not no longer visible and then patient actually had biopsy done which did not show any evidence of malignancy.  There was some scarring but no other significant issues on the liver biopsy.  Labs have shown normal liver functions.  No further monitoring her workup is necessary on this issue.    He has a history of chronically elevated PSA.  He is followed by urology for this.    He has had a  "history of bronchiectasis and he follows with Pulmonary.  He relates no issues with shortness of breath at this time.    Review of Systems   Constitutional: Negative for fever and unexpected weight change.   Respiratory: Negative for cough, shortness of breath and wheezing.    Cardiovascular: Negative for chest pain and palpitations.   Gastrointestinal: Negative for constipation, diarrhea, nausea and vomiting.   Genitourinary: Negative for dysuria and hematuria.       Objective:   /60   Pulse 65   Temp 98 °F (36.7 °C) (Temporal)   Ht 5' 6" (1.676 m)   Wt 77.9 kg (171 lb 11.8 oz)   SpO2 96%   BMI 27.72 kg/m²      Physical Exam  Vitals reviewed.   Constitutional:       Appearance: He is well-developed.   HENT:      Head: Normocephalic and atraumatic.      Right Ear: Tympanic membrane, ear canal and external ear normal.      Left Ear: Tympanic membrane, ear canal and external ear normal.   Eyes:      Pupils: Pupils are equal, round, and reactive to light.   Neck:      Thyroid: No thyromegaly.   Cardiovascular:      Rate and Rhythm: Normal rate and regular rhythm.      Heart sounds: Normal heart sounds. No murmur heard.    No friction rub. No gallop.   Pulmonary:      Effort: Pulmonary effort is normal.      Breath sounds: Normal breath sounds. No wheezing or rales.   Abdominal:      General: Bowel sounds are normal. There is no distension.      Palpations: Abdomen is soft.      Tenderness: There is no abdominal tenderness.   Musculoskeletal:      Cervical back: Neck supple.   Psychiatric:         Mood and Affect: Mood normal.         No visits with results within 2 Week(s) from this visit.   Latest known visit with results is:   Lab Visit on 06/03/2022   Component Date Value    Creatinine 06/03/2022 1.0     eGFR if African American 06/03/2022 >60     eGFR if non African Amer* 06/03/2022 >60        Assessment:       1. Atherosclerotic PVD with intermittent claudication    2. Mixed hyperlipidemia    3. " Essential hypertension    4. Liver mass    5. Elevated PSA    6. Bronchiectasis, uncomplicated        Plan:   Liver mass  Negative biopsy when worked up.    Atherosclerotic PVD with intermittent claudication  Comments:  update fredy with exercise due to symptoms related today.  Orders:  -     Ankle Brachial Indices (FREDY); Future    Mixed hyperlipidemia  -     CBC Auto Differential; Future; Expected date: 07/12/2022  -     Comprehensive Metabolic Panel; Future; Expected date: 07/11/2022    Essential hypertension  Comments:  unclear if he is taking lisnopril or not. will have follow up and have him bring all pill bottles    Liver mass    Elevated PSA  Comments:  Following with DR. Copeland    Bronchiectasis, uncomplicated  Comments:  Following with pulmonary, no issues toay    Patient is going to follow-up in a couple weeks.  I have instructed him to bring all of his medications that he uses daily with him to that visit so we can update his med list and make sure it coincides with when he is taking.  He expressed understanding of this.  We will update some lab work on his way out today.  He will continue to follow up with Urology and Pulmonary.  We will reassess his peripheral vascular disease with an FREDY with exercise.      Follow up in about 2 weeks (around 7/25/2022) for HTN, with Hamida Laureano.

## 2022-07-14 NOTE — TELEPHONE ENCOUNTER
Repatha copay card requires patient to Call [employer] at [phone] ([phone]). Patient informed.

## 2022-07-15 NOTE — TELEPHONE ENCOUNTER
Called to check status on copay card. Patient will have wife call repatha. Agreed for follow up next week.

## 2022-07-19 ENCOUNTER — TELEPHONE (OUTPATIENT)
Dept: CARDIOLOGY | Facility: HOSPITAL | Age: 77
End: 2022-07-19
Payer: MEDICARE

## 2022-07-22 NOTE — TELEPHONE ENCOUNTER
Wife has not called copay card yet. Provided repatha copay card number again. Patient said he will have her call by next week. Will follow up.

## 2022-07-25 ENCOUNTER — OFFICE VISIT (OUTPATIENT)
Dept: INTERNAL MEDICINE | Facility: CLINIC | Age: 77
End: 2022-07-25
Payer: MEDICARE

## 2022-07-25 ENCOUNTER — OFFICE VISIT (OUTPATIENT)
Dept: UROLOGY | Facility: CLINIC | Age: 77
End: 2022-07-25
Payer: MEDICARE

## 2022-07-25 VITALS
WEIGHT: 171.94 LBS | HEART RATE: 62 BPM | DIASTOLIC BLOOD PRESSURE: 65 MMHG | TEMPERATURE: 98 F | SYSTOLIC BLOOD PRESSURE: 144 MMHG | BODY MASS INDEX: 27.75 KG/M2

## 2022-07-25 VITALS
WEIGHT: 170.88 LBS | OXYGEN SATURATION: 98 % | SYSTOLIC BLOOD PRESSURE: 130 MMHG | DIASTOLIC BLOOD PRESSURE: 68 MMHG | HEART RATE: 62 BPM | TEMPERATURE: 98 F | BODY MASS INDEX: 27.58 KG/M2

## 2022-07-25 DIAGNOSIS — R97.20 ELEVATED PSA: Primary | ICD-10-CM

## 2022-07-25 DIAGNOSIS — N40.0 BENIGN PROSTATIC HYPERPLASIA, UNSPECIFIED WHETHER LOWER URINARY TRACT SYMPTOMS PRESENT: ICD-10-CM

## 2022-07-25 DIAGNOSIS — R97.20 ELEVATED PSA: ICD-10-CM

## 2022-07-25 DIAGNOSIS — I10 ESSENTIAL HYPERTENSION: Primary | ICD-10-CM

## 2022-07-25 DIAGNOSIS — N52.9 ERECTILE DYSFUNCTION, UNSPECIFIED ERECTILE DYSFUNCTION TYPE: ICD-10-CM

## 2022-07-25 PROCEDURE — 3288F PR FALLS RISK ASSESSMENT DOCUMENTED: ICD-10-PCS | Mod: CPTII,S$GLB,, | Performed by: UROLOGY

## 2022-07-25 PROCEDURE — 3288F PR FALLS RISK ASSESSMENT DOCUMENTED: ICD-10-PCS | Mod: CPTII,S$GLB,, | Performed by: NURSE PRACTITIONER

## 2022-07-25 PROCEDURE — 1159F PR MEDICATION LIST DOCUMENTED IN MEDICAL RECORD: ICD-10-PCS | Mod: CPTII,S$GLB,, | Performed by: UROLOGY

## 2022-07-25 PROCEDURE — 99999 PR PBB SHADOW E&M-EST. PATIENT-LVL IV: ICD-10-PCS | Mod: PBBFAC,,, | Performed by: NURSE PRACTITIONER

## 2022-07-25 PROCEDURE — 1160F PR REVIEW ALL MEDS BY PRESCRIBER/CLIN PHARMACIST DOCUMENTED: ICD-10-PCS | Mod: CPTII,S$GLB,, | Performed by: UROLOGY

## 2022-07-25 PROCEDURE — 1126F AMNT PAIN NOTED NONE PRSNT: CPT | Mod: CPTII,S$GLB,, | Performed by: UROLOGY

## 2022-07-25 PROCEDURE — 99999 PR PBB SHADOW E&M-EST. PATIENT-LVL IV: CPT | Mod: PBBFAC,,, | Performed by: NURSE PRACTITIONER

## 2022-07-25 PROCEDURE — 3078F DIAST BP <80 MM HG: CPT | Mod: CPTII,S$GLB,, | Performed by: UROLOGY

## 2022-07-25 PROCEDURE — 1159F PR MEDICATION LIST DOCUMENTED IN MEDICAL RECORD: ICD-10-PCS | Mod: CPTII,S$GLB,, | Performed by: NURSE PRACTITIONER

## 2022-07-25 PROCEDURE — 99214 PR OFFICE/OUTPT VISIT, EST, LEVL IV, 30-39 MIN: ICD-10-PCS | Mod: S$GLB,,, | Performed by: NURSE PRACTITIONER

## 2022-07-25 PROCEDURE — 1159F MED LIST DOCD IN RCRD: CPT | Mod: CPTII,S$GLB,, | Performed by: UROLOGY

## 2022-07-25 PROCEDURE — 1101F PT FALLS ASSESS-DOCD LE1/YR: CPT | Mod: CPTII,S$GLB,, | Performed by: UROLOGY

## 2022-07-25 PROCEDURE — 3075F SYST BP GE 130 - 139MM HG: CPT | Mod: CPTII,S$GLB,, | Performed by: NURSE PRACTITIONER

## 2022-07-25 PROCEDURE — 1126F PR PAIN SEVERITY QUANTIFIED, NO PAIN PRESENT: ICD-10-PCS | Mod: CPTII,S$GLB,, | Performed by: UROLOGY

## 2022-07-25 PROCEDURE — 1101F PR PT FALLS ASSESS DOC 0-1 FALLS W/OUT INJ PAST YR: ICD-10-PCS | Mod: CPTII,S$GLB,, | Performed by: UROLOGY

## 2022-07-25 PROCEDURE — 99999 PR PBB SHADOW E&M-EST. PATIENT-LVL III: ICD-10-PCS | Mod: PBBFAC,,, | Performed by: UROLOGY

## 2022-07-25 PROCEDURE — 1101F PT FALLS ASSESS-DOCD LE1/YR: CPT | Mod: CPTII,S$GLB,, | Performed by: NURSE PRACTITIONER

## 2022-07-25 PROCEDURE — 3075F PR MOST RECENT SYSTOLIC BLOOD PRESS GE 130-139MM HG: ICD-10-PCS | Mod: CPTII,S$GLB,, | Performed by: NURSE PRACTITIONER

## 2022-07-25 PROCEDURE — 99999 PR PBB SHADOW E&M-EST. PATIENT-LVL III: CPT | Mod: PBBFAC,,, | Performed by: UROLOGY

## 2022-07-25 PROCEDURE — 1126F AMNT PAIN NOTED NONE PRSNT: CPT | Mod: CPTII,S$GLB,, | Performed by: NURSE PRACTITIONER

## 2022-07-25 PROCEDURE — 1160F RVW MEDS BY RX/DR IN RCRD: CPT | Mod: CPTII,S$GLB,, | Performed by: UROLOGY

## 2022-07-25 PROCEDURE — 1159F MED LIST DOCD IN RCRD: CPT | Mod: CPTII,S$GLB,, | Performed by: NURSE PRACTITIONER

## 2022-07-25 PROCEDURE — 1126F PR PAIN SEVERITY QUANTIFIED, NO PAIN PRESENT: ICD-10-PCS | Mod: CPTII,S$GLB,, | Performed by: NURSE PRACTITIONER

## 2022-07-25 PROCEDURE — 3078F PR MOST RECENT DIASTOLIC BLOOD PRESSURE < 80 MM HG: ICD-10-PCS | Mod: CPTII,S$GLB,, | Performed by: UROLOGY

## 2022-07-25 PROCEDURE — 3288F FALL RISK ASSESSMENT DOCD: CPT | Mod: CPTII,S$GLB,, | Performed by: UROLOGY

## 2022-07-25 PROCEDURE — 1160F RVW MEDS BY RX/DR IN RCRD: CPT | Mod: CPTII,S$GLB,, | Performed by: NURSE PRACTITIONER

## 2022-07-25 PROCEDURE — 99214 PR OFFICE/OUTPT VISIT, EST, LEVL IV, 30-39 MIN: ICD-10-PCS | Mod: S$GLB,,, | Performed by: UROLOGY

## 2022-07-25 PROCEDURE — 1101F PR PT FALLS ASSESS DOC 0-1 FALLS W/OUT INJ PAST YR: ICD-10-PCS | Mod: CPTII,S$GLB,, | Performed by: NURSE PRACTITIONER

## 2022-07-25 PROCEDURE — 1160F PR REVIEW ALL MEDS BY PRESCRIBER/CLIN PHARMACIST DOCUMENTED: ICD-10-PCS | Mod: CPTII,S$GLB,, | Performed by: NURSE PRACTITIONER

## 2022-07-25 PROCEDURE — 99214 OFFICE O/P EST MOD 30 MIN: CPT | Mod: S$GLB,,, | Performed by: NURSE PRACTITIONER

## 2022-07-25 PROCEDURE — 3078F PR MOST RECENT DIASTOLIC BLOOD PRESSURE < 80 MM HG: ICD-10-PCS | Mod: CPTII,S$GLB,, | Performed by: NURSE PRACTITIONER

## 2022-07-25 PROCEDURE — 3077F PR MOST RECENT SYSTOLIC BLOOD PRESSURE >= 140 MM HG: ICD-10-PCS | Mod: CPTII,S$GLB,, | Performed by: UROLOGY

## 2022-07-25 PROCEDURE — 99214 OFFICE O/P EST MOD 30 MIN: CPT | Mod: S$GLB,,, | Performed by: UROLOGY

## 2022-07-25 PROCEDURE — 3077F SYST BP >= 140 MM HG: CPT | Mod: CPTII,S$GLB,, | Performed by: UROLOGY

## 2022-07-25 PROCEDURE — 3288F FALL RISK ASSESSMENT DOCD: CPT | Mod: CPTII,S$GLB,, | Performed by: NURSE PRACTITIONER

## 2022-07-25 PROCEDURE — 3078F DIAST BP <80 MM HG: CPT | Mod: CPTII,S$GLB,, | Performed by: NURSE PRACTITIONER

## 2022-07-25 RX ORDER — LISINOPRIL 20 MG/1
20 TABLET ORAL DAILY
Qty: 90 TABLET | Refills: 1 | Status: SHIPPED | OUTPATIENT
Start: 2022-07-25

## 2022-07-25 NOTE — PROGRESS NOTES
Chief Complaint:   Encounter Diagnoses   Name Primary?    Elevated PSA Yes    Benign prostatic hyperplasia, unspecified whether lower urinary tract symptoms present     Erectile dysfunction, unspecified erectile dysfunction type        HPI:   7/25/22- patient returns today following MRI, voiding well and erections are stable on medical management.  1/8/18: Returns after long absence.  PSA >30.  Says he had a biopsy in 2016 with another urologist in town off of [facility].  Second one he had.  MRI report from 5/17 reassuring with 98gm prostate.  Reduced stream not on flomax/finasteride.  No abd/pelvic pain and no exac/rel factors.  No hematuria.  No urolithiasis.  3-4 cups coffee in AM.  Nocturia x3-4.    Allergies:  Patient has no known allergies.    Medications:  has a current medication list which includes the following prescription(s): albuterol, amlodipine, aspirin, atorvastatin, brimonidine 0.15 % opth drop, cilostazol, dorzolamide, repatha sureclick, ezetimibe, finasteride, breo ellipta, lisinopril, and tamsulosin.    Review of Systems:  General: No fever, chills, fatigability, or weight loss.  Skin: No rashes, itching, or changes in color or texture of skin.  Chest: Denies THOMPSON, cyanosis, wheezing, cough, and sputum production.  Abdomen: Appetite fine. No weight loss. Denies diarrhea, abdominal pain, hematemesis, or blood in stool.  Musculoskeletal: No joint stiffness or swelling. Some back pain.  : As above.  All other review of systems negative.    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    PSH:   has a past surgical history that includes Hernia repair; Prostate biopsy; Aortography with serialography (N/A, 10/21/2019); and Aortography with serialography (N/A, 11/14/2019).    FamHx: family history includes Cancer in his brother, father, and mother; Cancer (age of onset: 64) in his sister; Prostate cancer in his brother.    SocHx:  reports that he quit smoking about 36 years ago. His smoking use included cigarettes. He started smoking about 62 years ago. He has a 30.00 pack-year smoking history. He has never used smokeless tobacco. He reports current alcohol use of about 12.0 standard drinks of alcohol per week. He reports that he does not use drugs.      Physical Exam:  Vitals:    07/25/22 1332   BP: (!) 144/65   Pulse: 62   Temp: 98 °F (36.7 °C)     General: A&Ox3, no apparent distress, no deformities  Neck: No masses, normal thyroid  Lungs: normal inspiration, no use of accessory muscles  Heart: normal pulse, no arrhythmias  Abdomen: Soft, NT, ND  Skin: The skin is warm and dry. No jaundice.  Ext: No c/c/e.    Labs/Studies:   pnbx negative 65g  3/8/21  Reported negative biopsy 2016 outside facility  PSA 30.1 4/22  PSA 32.6 1/22  PSA 41.2 9/21  PSA 21.9 1/21  PSA 19.3 7/20  MRI PI-RADS 2 6/22  MRI PI-RADS 4 2/21    Impression/Plan:     1. Elevated PSA- PSA today, MRI demonstrates improved findings and report dictates that the previous abnormality may have been artifactual.  If stable see me in 6 months with a PSA, eventually moving back to yearly, hold on repeat biopsy at this point.    2. BPH- tamsulosin and finasteride appear to be working well, call when he needs a refill.    3. Erectile dysfunction- sildenafil 100 mg works well for the patient, currently not an issue.

## 2022-07-25 NOTE — PROGRESS NOTES
Subjective:       Patient ID: Alex Milner is a 76 y.o. male.    Chief Complaint: Follow-up    Pt presents to clinic today for follow up  He is unsure of his meds he is taking and was advised at previous visit with Dr. Patel to bring for review  Reports he takes BP at home and it has remained high.   He knows he is taking his amlodipine but doesn't think he has his lisinopril at home  Did not do labs that were ordered.    Follows with urology for elevated PSA. Has upcoming appt      /68 (BP Location: Left arm)   Pulse 62   Temp 97.7 °F (36.5 °C) (Temporal)   Wt 77.5 kg (170 lb 13.7 oz)   SpO2 98%   BMI 27.58 kg/m²     Review of Systems   Constitutional: Negative for activity change, appetite change, chills, diaphoresis, fatigue, fever and unexpected weight change.   HENT: Negative.    Eyes: Negative.    Respiratory: Negative for apnea, chest tightness, shortness of breath and stridor.    Cardiovascular: Negative for chest pain, palpitations and leg swelling.   Gastrointestinal: Negative.    Endocrine: Negative.    Genitourinary: Negative.    Musculoskeletal: Negative for arthralgias and myalgias.   Skin: Negative for color change, pallor, rash and wound.   Allergic/Immunologic: Negative.    Neurological: Negative for dizziness, facial asymmetry, light-headedness and headaches.   Hematological: Negative for adenopathy.   Psychiatric/Behavioral: Negative for agitation and behavioral problems.       Objective:      Physical Exam  Vitals and nursing note reviewed.   Constitutional:       General: He is not in acute distress.     Appearance: He is well-developed. He is not diaphoretic.   HENT:      Head: Normocephalic and atraumatic.   Cardiovascular:      Rate and Rhythm: Normal rate and regular rhythm.      Heart sounds: Normal heart sounds.   Pulmonary:      Effort: Pulmonary effort is normal. No respiratory distress.      Breath sounds: Normal breath sounds.   Skin:     General: Skin is warm and dry.       Findings: No rash.   Neurological:      Mental Status: He is alert and oriented to person, place, and time.   Psychiatric:         Behavior: Behavior normal.         Thought Content: Thought content normal.         Judgment: Judgment normal.         Assessment:       1. Essential hypertension    2. Elevated PSA    3. BMI 27.0-27.9,adult        Plan:       Alex was seen today for follow-up.    Diagnoses and all orders for this visit:    Essential hypertension  -     lisinopriL (PRINIVIL,ZESTRIL) 20 MG tablet; Take 1 tablet (20 mg total) by mouth once daily.  - Will restart lisinopril due to home readings being 140s/90s.  - bp follow up in 2 weeks    Elevated PSA        - Chronic, stable- continue to follow with urology    BMI 27.0-27.9,adult      Med compliance discussed.  Advised pt to do labs as ordered at last visit  BP check in 2 weesk

## 2022-07-26 ENCOUNTER — LAB VISIT (OUTPATIENT)
Dept: LAB | Facility: HOSPITAL | Age: 77
End: 2022-07-26
Attending: INTERNAL MEDICINE
Payer: MEDICARE

## 2022-07-26 DIAGNOSIS — E78.2 MIXED HYPERLIPIDEMIA: ICD-10-CM

## 2022-07-26 DIAGNOSIS — R97.20 ELEVATED PSA: ICD-10-CM

## 2022-07-26 LAB
ALBUMIN SERPL BCP-MCNC: 3.8 G/DL (ref 3.5–5.2)
ALP SERPL-CCNC: 82 U/L (ref 55–135)
ALT SERPL W/O P-5'-P-CCNC: 11 U/L (ref 10–44)
ANION GAP SERPL CALC-SCNC: 9 MMOL/L (ref 8–16)
AST SERPL-CCNC: 16 U/L (ref 10–40)
BASOPHILS # BLD AUTO: 0.05 K/UL (ref 0–0.2)
BASOPHILS NFR BLD: 1 % (ref 0–1.9)
BILIRUB SERPL-MCNC: 0.5 MG/DL (ref 0.1–1)
BUN SERPL-MCNC: 10 MG/DL (ref 8–23)
CALCIUM SERPL-MCNC: 9.2 MG/DL (ref 8.7–10.5)
CHLORIDE SERPL-SCNC: 107 MMOL/L (ref 95–110)
CO2 SERPL-SCNC: 28 MMOL/L (ref 23–29)
COMPLEXED PSA SERPL-MCNC: 27 NG/ML (ref 0–4)
CREAT SERPL-MCNC: 1 MG/DL (ref 0.5–1.4)
DIFFERENTIAL METHOD: ABNORMAL
EOSINOPHIL # BLD AUTO: 0.3 K/UL (ref 0–0.5)
EOSINOPHIL NFR BLD: 6.1 % (ref 0–8)
ERYTHROCYTE [DISTWIDTH] IN BLOOD BY AUTOMATED COUNT: 14.7 % (ref 11.5–14.5)
EST. GFR  (AFRICAN AMERICAN): >60 ML/MIN/1.73 M^2
EST. GFR  (NON AFRICAN AMERICAN): >60 ML/MIN/1.73 M^2
GLUCOSE SERPL-MCNC: 93 MG/DL (ref 70–110)
HCT VFR BLD AUTO: 44.8 % (ref 40–54)
HGB BLD-MCNC: 14.1 G/DL (ref 14–18)
IMM GRANULOCYTES # BLD AUTO: 0.01 K/UL (ref 0–0.04)
IMM GRANULOCYTES NFR BLD AUTO: 0.2 % (ref 0–0.5)
LYMPHOCYTES # BLD AUTO: 1.8 K/UL (ref 1–4.8)
LYMPHOCYTES NFR BLD: 36.8 % (ref 18–48)
MCH RBC QN AUTO: 26.4 PG (ref 27–31)
MCHC RBC AUTO-ENTMCNC: 31.5 G/DL (ref 32–36)
MCV RBC AUTO: 84 FL (ref 82–98)
MONOCYTES # BLD AUTO: 0.6 K/UL (ref 0.3–1)
MONOCYTES NFR BLD: 11.9 % (ref 4–15)
NEUTROPHILS # BLD AUTO: 2.1 K/UL (ref 1.8–7.7)
NEUTROPHILS NFR BLD: 44 % (ref 38–73)
NRBC BLD-RTO: 0 /100 WBC
PLATELET # BLD AUTO: 272 K/UL (ref 150–450)
PMV BLD AUTO: 10.1 FL (ref 9.2–12.9)
POTASSIUM SERPL-SCNC: 5.3 MMOL/L (ref 3.5–5.1)
PROT SERPL-MCNC: 6.6 G/DL (ref 6–8.4)
RBC # BLD AUTO: 5.34 M/UL (ref 4.6–6.2)
SODIUM SERPL-SCNC: 144 MMOL/L (ref 136–145)
WBC # BLD AUTO: 4.78 K/UL (ref 3.9–12.7)

## 2022-07-26 PROCEDURE — 85025 COMPLETE CBC W/AUTO DIFF WBC: CPT | Performed by: INTERNAL MEDICINE

## 2022-07-26 PROCEDURE — 84153 ASSAY OF PSA TOTAL: CPT | Performed by: UROLOGY

## 2022-07-26 PROCEDURE — 80053 COMPREHEN METABOLIC PANEL: CPT | Performed by: INTERNAL MEDICINE

## 2022-07-26 PROCEDURE — 36415 COLL VENOUS BLD VENIPUNCTURE: CPT | Mod: PO | Performed by: UROLOGY

## 2022-07-28 ENCOUNTER — SPECIALTY PHARMACY (OUTPATIENT)
Dept: PHARMACY | Facility: CLINIC | Age: 77
End: 2022-07-28
Payer: MEDICARE

## 2022-07-28 DIAGNOSIS — E78.2 MIXED HYPERLIPIDEMIA: Primary | ICD-10-CM

## 2022-07-28 NOTE — TELEPHONE ENCOUNTER
Repatha copay card secured. Patient responsibility $5. Copay will be $110 for 4th refill +    Bin: [id number]  Pcn: ximena  Grp: [id number]  ID: [id number]    Pending initial consult

## 2022-07-28 NOTE — TELEPHONE ENCOUNTER
Specialty Pharmacy - Initial Clinical Assessment    Specialty Medication Orders Linked to Encounter    Flowsheet Row Most Recent Value   Medication #1 evolocumab (REPATHA SURECLICK) 140 mg/mL PnIj (Order#[id number], Rx#[id number])        Patient Diagnosis   E78.2 - Mixed hyperlipidemia    Subjective    Alex Milner is a 76 y.o. male, who is followed by the specialty pharmacy service for management and education.    Recent Encounters     Date Type Provider Description    07/28/2022 Specialty Pharmacy Zamzam Arroyo PharmD Initial Clinical Assessment    06/22/2022 Specialty Pharmacy Zamzam Arroyo PharmD Referral Authorization    01/21/2021 Specialty Pharmacy Bere Smith     01/19/2021 Specialty Pharmacy Ernestina Ybarra Error    01/13/2021 Specialty Pharmacy Bere Smith         Clinical call attempts since last clinical assessment   No call attempts found.     Current Outpatient Medications   Medication Sig    albuterol (PROVENTIL/VENTOLIN HFA) 90 mcg/actuation inhaler INHALE 2 PUFFS INTO THE LUNGS EVERY 6 (SIX) HOURS AS NEEDED FOR WHEEZING OR SHORTNESS OF BREATH    amLODIPine (NORVASC) 10 MG tablet Take 1 tablet (10 mg total) by mouth once daily.    aspirin (ECOTRIN) 81 MG EC tablet TAKE 1 TABLET BY MOUTH EVERY DAY    atorvastatin (LIPITOR) 80 MG tablet Take 1 tablet (80 mg total) by mouth once daily.    brimonidine 0.15 % OPTH DROP (ALPHAGAN) 0.15 % ophthalmic solution Place 1 drop into both eyes 3 (three) times daily.    cilostazoL (PLETAL) 100 MG Tab Take 1 tablet (100 mg total) by mouth 2 (two) times daily.    dorzolamide (TRUSOPT) 2 % ophthalmic solution INSTILL 1 DROP INTO BOTH EYES 3 TIMES A DAY    evolocumab (REPATHA SURECLICK) 140 mg/mL PnIj Inject 1 mL (140 mg total) into the skin every 14 (fourteen) days.    ezetimibe (ZETIA) 10 mg tablet Take 1 tablet (10 mg total) by mouth once daily.    finasteride (PROSCAR) 5 mg tablet Take 1 tablet (5 mg total) by mouth once daily.     fluticasone furoate-vilanteroL (BREO ELLIPTA) 200-25 mcg/dose DsDv diskus inhaler Inhale 1 puff into the lungs once daily. Controller    lisinopriL (PRINIVIL,ZESTRIL) 20 MG tablet Take 1 tablet (20 mg total) by mouth once daily.    tamsulosin (FLOMAX) 0.4 mg Cap Take 1 capsule (0.4 mg total) by mouth once daily.   Last reviewed on 7/25/2022  1:40 PM by Davion Copeland MD    Review of patient's allergies indicates:  No Known AllergiesLast reviewed on  7/25/2022 1:40 PM by Davion Copeland    Drug Interactions    Drug interactions evaluated: no  Clinically relevant drug interactions identified: no  Provided the patient with educational material regarding drug interactions: not applicable         Adverse Effects    *All other systems reviewed and are negative       Assessment Questions - Documented Responses    Flowsheet Row Most Recent Value   Assessment    Medication Reconciliation completed for patient No   During the past 4 weeks, has patient missed any activities due to condition or medication? No   During the past 4 weeks, did patient have any of the following urgent care visits? None   Goals of Therapy Status Discussed (new start)   Status of the patients ability to self-administer: Is Able   All education points have been covered with patient? No, patient declined- printed education provided   Welcome packet contents reviewed and discussed with patient? No   Assesment completed? No   Plan Therapy continued   Do you need to open a clinical intervention (i-vent)? No   Do you want to schedule first shipment? Yes        Refill Questions - Documented Responses    Flowsheet Row Most Recent Value   Patient Availability and HIPAA Verification    Does patient want to proceed with activity? Yes   HIPAA/medical authority confirmed? Yes   Relationship to patient of person spoken to? Self   Refill Screening Questions    When does the patient need to receive the medication? 07/29/22   Refill Delivery Questions   "  How will the patient receive the medication? Delivery Erin   When does the patient need to receive the medication? 07/29/22   Shipping Address Home   Address in [facility] confirmed and updated if neccessary? Yes   Expected Copay ($) 5   Is the patient able to afford the medication copay? Yes   Payment Method CC on file   Days supply of Refill 28   Supplies needed? No supplies needed   Refill activity completed? 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    Tried/failed medications:     BP Readings from Last 4 Encounters:   07/25/22 (!) 144/65   07/25/22 130/68   07/11/22 138/60   05/24/22 136/64     Ht Readings from Last 4 Encounters:   07/11/22 5' 6" (1.676 m)   05/19/22 5' 6" (1.676 m)   04/18/22 5' 6" (1.676 m)   04/05/22 5' 6" (1.676 m)     Wt Readings from Last 4 Encounters:   07/25/22 78 kg (171 lb 15.3 oz)   07/25/22 77.5 kg (170 lb 13.7 oz)   07/11/22 77.9 kg (171 lb 11.8 oz)   05/24/22 79.8 kg (176 lb)       The goals of prescribed drug therapy management include:  · Supporting patient to meet the prescriber's medical treatment objectives  · Improving or maintaining quality of life  · Maintaining optimal therapy adherence  · Minimizing and managing side effects      Goals of Therapy Status: Discussed (new start)    Assessment/Plan  Patient plans to start therapy on 07/29/22      Indication, dosage, " appropriateness, effectiveness, safety and convenience of his specialty medication(s) were reviewed today.     Patient Education   Pharmacist offer to  patient was declined. Printed educational materials will be provided with medication.         Tasks added this encounter   8/19/2022 - Refill Call (Auto Added)   Tasks due within next 3 months   No tasks due.     Zamzam Arroyo, PharmD  [facility]  [address] [facility] [address]  Phone: [phone]  Fax: [phone]

## 2022-08-05 ENCOUNTER — TELEPHONE (OUTPATIENT)
Dept: ADMINISTRATIVE | Facility: HOSPITAL | Age: 77
End: 2022-08-05
Payer: MEDICARE

## 2022-08-08 ENCOUNTER — CLINICAL SUPPORT (OUTPATIENT)
Dept: INTERNAL MEDICINE | Facility: CLINIC | Age: 77
End: 2022-08-08
Payer: MEDICARE

## 2022-08-08 ENCOUNTER — HOSPITAL ENCOUNTER (OUTPATIENT)
Dept: CARDIOLOGY | Facility: HOSPITAL | Age: 77
Discharge: HOME | End: 2022-08-08
Attending: INTERNAL MEDICINE
Payer: MEDICARE

## 2022-08-08 VITALS — SYSTOLIC BLOOD PRESSURE: 124 MMHG | DIASTOLIC BLOOD PRESSURE: 64 MMHG

## 2022-08-08 VITALS
BODY MASS INDEX: 27.48 KG/M2 | DIASTOLIC BLOOD PRESSURE: 71 MMHG | WEIGHT: 171 LBS | SYSTOLIC BLOOD PRESSURE: 151 MMHG | HEIGHT: 66 IN

## 2022-08-08 DIAGNOSIS — I70.219 ATHEROSCLEROTIC PVD WITH INTERMITTENT CLAUDICATION: ICD-10-CM

## 2022-08-08 DIAGNOSIS — Z01.30 BP CHECK: Primary | ICD-10-CM

## 2022-08-08 LAB
ABI CLAUDICATION TIME: 1 MIN
ABI POST MINUTES1: 2 MIN
ABI POST MINUTES2: 5 MIN
IMMEDIATE ARM BP: 140 MMHG
IMMEDIATE LEFT ABI: 0.54
IMMEDIATE LEFT TIBIAL: 76 MMHG
IMMEDIATE RIGHT ABI: 0.51
IMMEDIATE RIGHT TIBIAL: 72 MMHG
LEFT ABI: 0.74
LEFT ARM BP: 150 MMHG
LEFT DORSALIS PEDIS: 78 MMHG
LEFT POSTERIOR TIBIAL: 112 MMHG
LEFT TBI: 0.25
LEFT TOE PRESSURE: 37 MMHG
POST1 ARM BP: 166 MMHG
POST1 LEFT ABI: 0.69
POST1 LEFT TIBIAL: 115 MMHG
POST1 RIGHT ABI: 0.91
POST1 RIGHT TIBIAL: 151 MMHG
POST2 ARM BP: 152 MMHG
POST2 LEFT ABI: 0.79
POST2 LEFT TIBIAL: 120 MMHG
POST2 RIGHT ABI: 1.04
POST2 RIGHT TIBIAL: 158 MMHG
RIGHT ABI: 0.99
RIGHT ARM BP: 151 MMHG
RIGHT DORSALIS PEDIS: 150 MMHG
RIGHT POSTERIOR TIBIAL: 138 MMHG
RIGHT TBI: 0.37
RIGHT TOE PRESSURE: 56 MMHG
TREADMILL GRADE: 10 %
TREADMILL SPEED: 2 MPH
TREADMILL TIME: 1 MIN

## 2022-08-08 PROCEDURE — 93924 ANKLE BRACHIAL INDICES (ABI): ICD-10-PCS | Mod: 26,,, | Performed by: INTERNAL MEDICINE

## 2022-08-08 PROCEDURE — 93924 LWR XTR VASC STDY BILAT: CPT | Mod: PO

## 2022-08-08 PROCEDURE — 93924 LWR XTR VASC STDY BILAT: CPT | Mod: 26,,, | Performed by: INTERNAL MEDICINE

## 2022-08-09 ENCOUNTER — TELEPHONE (OUTPATIENT)
Dept: INTERNAL MEDICINE | Facility: CLINIC | Age: 77
End: 2022-08-09
Payer: MEDICARE

## 2022-08-09 NOTE — TELEPHONE ENCOUNTER
The soonest appt  has is 09/09 at [facility]. Can the pt wait till then or do I need to send a message to their pool to see if he can be worked in?

## 2022-08-10 ENCOUNTER — OFFICE VISIT (OUTPATIENT)
Dept: INTERNAL MEDICINE | Facility: CLINIC | Age: 77
End: 2022-08-10
Payer: MEDICARE

## 2022-08-10 VITALS
DIASTOLIC BLOOD PRESSURE: 62 MMHG | WEIGHT: 170.88 LBS | BODY MASS INDEX: 28.47 KG/M2 | HEART RATE: 57 BPM | OXYGEN SATURATION: 99 % | SYSTOLIC BLOOD PRESSURE: 122 MMHG | HEIGHT: 65 IN

## 2022-08-10 DIAGNOSIS — I73.9 PVD (PERIPHERAL VASCULAR DISEASE): ICD-10-CM

## 2022-08-10 DIAGNOSIS — J47.9 BRONCHIECTASIS WITHOUT COMPLICATION: ICD-10-CM

## 2022-08-10 DIAGNOSIS — I27.9 PULMONARY HEART DISEASE: ICD-10-CM

## 2022-08-10 DIAGNOSIS — H91.90 HEARING DIFFICULTY, UNSPECIFIED LATERALITY: ICD-10-CM

## 2022-08-10 DIAGNOSIS — I10 HYPERTENSION, UNSPECIFIED TYPE: ICD-10-CM

## 2022-08-10 DIAGNOSIS — Z59.9 FINANCIAL DIFFICULTIES: ICD-10-CM

## 2022-08-10 DIAGNOSIS — R91.8 LUNG NODULES: ICD-10-CM

## 2022-08-10 DIAGNOSIS — J45.909 ASTHMA, UNSPECIFIED ASTHMA SEVERITY, UNSPECIFIED WHETHER COMPLICATED, UNSPECIFIED WHETHER PERSISTENT: ICD-10-CM

## 2022-08-10 DIAGNOSIS — E78.5 HYPERLIPIDEMIA, UNSPECIFIED HYPERLIPIDEMIA TYPE: ICD-10-CM

## 2022-08-10 DIAGNOSIS — Z86.010 PERSONAL HISTORY OF COLONIC POLYPS: ICD-10-CM

## 2022-08-10 DIAGNOSIS — I65.29 STENOSIS OF CAROTID ARTERY, UNSPECIFIED LATERALITY: ICD-10-CM

## 2022-08-10 DIAGNOSIS — I70.0 ATHEROSCLEROSIS OF AORTA: ICD-10-CM

## 2022-08-10 DIAGNOSIS — R97.20 ELEVATED PSA: ICD-10-CM

## 2022-08-10 DIAGNOSIS — I25.10 ATHEROSCLEROSIS OF CORONARY ARTERY, UNSPECIFIED VESSEL OR LESION TYPE, UNSPECIFIED WHETHER ANGINA PRESENT, UNSPECIFIED WHETHER NATIVE OR TRANSPLANTED HEART: ICD-10-CM

## 2022-08-10 DIAGNOSIS — Z00.00 ENCOUNTER FOR PREVENTIVE HEALTH EXAMINATION: Primary | ICD-10-CM

## 2022-08-10 PROCEDURE — 1170F PR FUNCTIONAL STATUS ASSESSED: ICD-10-PCS | Mod: CPTII,S$GLB,, | Performed by: NURSE PRACTITIONER

## 2022-08-10 PROCEDURE — 99499 UNLISTED E&M SERVICE: CPT | Mod: S$GLB,,, | Performed by: NURSE PRACTITIONER

## 2022-08-10 PROCEDURE — 3078F DIAST BP <80 MM HG: CPT | Mod: CPTII,S$GLB,, | Performed by: NURSE PRACTITIONER

## 2022-08-10 PROCEDURE — 3288F PR FALLS RISK ASSESSMENT DOCUMENTED: ICD-10-PCS | Mod: CPTII,S$GLB,, | Performed by: NURSE PRACTITIONER

## 2022-08-10 PROCEDURE — 3078F PR MOST RECENT DIASTOLIC BLOOD PRESSURE < 80 MM HG: ICD-10-PCS | Mod: CPTII,S$GLB,, | Performed by: NURSE PRACTITIONER

## 2022-08-10 PROCEDURE — 3288F FALL RISK ASSESSMENT DOCD: CPT | Mod: CPTII,S$GLB,, | Performed by: NURSE PRACTITIONER

## 2022-08-10 PROCEDURE — 99999 PR PBB SHADOW E&M-EST. PATIENT-LVL V: ICD-10-PCS | Mod: PBBFAC,,, | Performed by: NURSE PRACTITIONER

## 2022-08-10 PROCEDURE — 3074F PR MOST RECENT SYSTOLIC BLOOD PRESSURE < 130 MM HG: ICD-10-PCS | Mod: CPTII,S$GLB,, | Performed by: NURSE PRACTITIONER

## 2022-08-10 PROCEDURE — 1101F PT FALLS ASSESS-DOCD LE1/YR: CPT | Mod: CPTII,S$GLB,, | Performed by: NURSE PRACTITIONER

## 2022-08-10 PROCEDURE — 1160F RVW MEDS BY RX/DR IN RCRD: CPT | Mod: CPTII,S$GLB,, | Performed by: NURSE PRACTITIONER

## 2022-08-10 PROCEDURE — G0439 PR MEDICARE ANNUAL WELLNESS SUBSEQUENT VISIT: ICD-10-PCS | Mod: S$GLB,,, | Performed by: NURSE PRACTITIONER

## 2022-08-10 PROCEDURE — 1159F MED LIST DOCD IN RCRD: CPT | Mod: CPTII,S$GLB,, | Performed by: NURSE PRACTITIONER

## 2022-08-10 PROCEDURE — 1160F PR REVIEW ALL MEDS BY PRESCRIBER/CLIN PHARMACIST DOCUMENTED: ICD-10-PCS | Mod: CPTII,S$GLB,, | Performed by: NURSE PRACTITIONER

## 2022-08-10 PROCEDURE — 99499 RISK ADDL DX/OHS AUDIT: ICD-10-PCS | Mod: S$GLB,,, | Performed by: NURSE PRACTITIONER

## 2022-08-10 PROCEDURE — 1159F PR MEDICATION LIST DOCUMENTED IN MEDICAL RECORD: ICD-10-PCS | Mod: CPTII,S$GLB,, | Performed by: NURSE PRACTITIONER

## 2022-08-10 PROCEDURE — 3074F SYST BP LT 130 MM HG: CPT | Mod: CPTII,S$GLB,, | Performed by: NURSE PRACTITIONER

## 2022-08-10 PROCEDURE — 99999 PR PBB SHADOW E&M-EST. PATIENT-LVL V: CPT | Mod: PBBFAC,,, | Performed by: NURSE PRACTITIONER

## 2022-08-10 PROCEDURE — 1101F PR PT FALLS ASSESS DOC 0-1 FALLS W/OUT INJ PAST YR: ICD-10-PCS | Mod: CPTII,S$GLB,, | Performed by: NURSE PRACTITIONER

## 2022-08-10 PROCEDURE — 1126F AMNT PAIN NOTED NONE PRSNT: CPT | Mod: CPTII,S$GLB,, | Performed by: NURSE PRACTITIONER

## 2022-08-10 PROCEDURE — G0439 PPPS, SUBSEQ VISIT: HCPCS | Mod: S$GLB,,, | Performed by: NURSE PRACTITIONER

## 2022-08-10 PROCEDURE — 1126F PR PAIN SEVERITY QUANTIFIED, NO PAIN PRESENT: ICD-10-PCS | Mod: CPTII,S$GLB,, | Performed by: NURSE PRACTITIONER

## 2022-08-10 PROCEDURE — 1170F FXNL STATUS ASSESSED: CPT | Mod: CPTII,S$GLB,, | Performed by: NURSE PRACTITIONER

## 2022-08-10 SDOH — SOCIAL DETERMINANTS OF HEALTH (SDOH): PROBLEM RELATED TO HOUSING AND ECONOMIC CIRCUMSTANCES, UNSPECIFIED: Z59.9

## 2022-08-10 NOTE — PATIENT INSTRUCTIONS
Counseling and Referral of Other Preventative  (Italic type indicates deductible and co-insurance are waived)    Patient Name: Alex Milner  Today's Date: 8/10/2022    Health Maintenance       Date Due Completion Date    TETANUS VACCINE Never done ---    Shingles Vaccine (1 of 2) Never done ---    Colonoscopy 11/15/2018 11/15/2013    COVID-19 Vaccine (3 - Booster for Pfizer series) 07/23/2021 2/23/2021    Influenza Vaccine (1) 09/01/2022 10/28/2019    Lipid Panel 01/04/2023 1/4/2022    PROSTATE-SPECIFIC ANTIGEN 07/26/2023 7/26/2022    Override on 7/16/2013: Done    Override on 4/16/2013: Done    Aspirin/Antiplatelet Therapy 08/10/2023 8/10/2022        Orders Placed This Encounter   Procedures    Ambulatory referral/consult to Outpatient Case Management    Ambulatory referral/consult to Audiology     The following information is provided to all patients.  This information is to help you find resources for any of the problems found today that may be affecting your health:                Living healthy guide: www.[facility].[place].gov      Understanding Diabetes: www.diabetes.org      Eating healthy: www.cdc.gov/healthyweight      CDC home safety checklist: www.cdc.gov/steadi/patient.html      Agency on Aging: www.goea.[place].gov      Alcoholics anonymous (AA): www.aa.org      Physical Activity: www.raya.nih.gov/tk6tnwi      Tobacco use: www.quitwithusla.org

## 2022-08-10 NOTE — Clinical Note
Your patient was seen for AWV. Abnormalities bolded. Please review.  Thank you,  GABRIELLE Masterson

## 2022-08-10 NOTE — PROGRESS NOTES
"  Alex Milner presented for a  Medicare AWV and comprehensive Health Risk Assessment today. The following components were reviewed and updated:    · Medical history  · Family History  · Social history  · Allergies and Current Medications  · Health Risk Assessment  · Health Maintenance  · Care Team         ** See Completed Assessments for Annual Wellness Visit within the encounter summary.**         The following assessments were completed:  · Living Situation  · CAGE  · Depression Screening  · Timed Get Up and Go  · Whisper Test  · Cognitive Function Screening  · Nutrition Screening  · ADL Screening  · PAQ Screening        Vitals:    08/10/22 0835   BP: 122/62   BP Location: Left arm   Patient Position: Sitting   Pulse: (!) 57   SpO2: 99%   Weight: 77.5 kg (170 lb 13.7 oz)   Height: 5' 5" (1.651 m)     Body mass index is 28.43 kg/m².  Physical Exam  Vitals and nursing note reviewed.   Constitutional:       Appearance: He is well-developed.   HENT:      Head: Normocephalic.      Left Ear: There is impacted cerumen.   Cardiovascular:      Rate and Rhythm: Normal rate and regular rhythm.      Heart sounds: Normal heart sounds.   Pulmonary:      Effort: Pulmonary effort is normal. No respiratory distress.      Breath sounds: Normal breath sounds.   Abdominal:      Palpations: Abdomen is soft. There is no mass.      Tenderness: There is no abdominal tenderness.   Musculoskeletal:         General: Normal range of motion.   Skin:     General: Skin is warm and dry.   Neurological:      Mental Status: He is alert and oriented to person, place, and time.      Motor: No abnormal muscle tone.   Psychiatric:         Speech: Speech normal.         Behavior: Behavior normal.               Diagnoses and health risks identified today and associated recommendations/orders:    1. Encounter for preventive health examination  MA to schedule  Optometry     Discussed receiving Shingrix and Tetanus vaccine at pharmacy.    Discussed " locations to receive COVID booster    Reports he is at his respiratory baseline.       2. PVD (peripheral vascular disease)  Reports bilateral leg pain on exertion.   Cardiologist started Pletal in May, symptoms are improving.   Upcoming apt with Dr. Ross to discuss next steps  Red flag s/s discussed (denies any) and advised 911/ER if occur. Patient expressed understanding.    Advised to follow up with cardiologist for further evaluation and recommendations. Patient expressed understanding.      3. Atherosclerosis of aorta  Ct 6/21  Stable. Continue current treatment plan as previously prescribed with your  Cardiologist.     4. Stenosis of carotid artery, unspecified laterality  US 11/20  Advised to follow up with cardiologist for further evaluation and recommendations. Patient expressed understanding.      5. Pulmonary heart disease  Echo 8/19  Advised to follow up with PCP/cardiologist/pulmonologist for further evaluation and recommendations. Patient expressed understanding.      6. Bronchiectasis without complication  Ct 3/20  Stable. Continue current treatment plan as previously prescribed with your  Pulmonologist.     7. Atherosclerosis of coronary artery, unspecified vessel or lesion type, unspecified whether angina present, unspecified whether native or transplanted heart  Ct 6/21  Discussed diagnosis and risk reduction.   Advised to follow up with cardiologist for further recommendations. Patient expressed understanding.       8. Asthma, unspecified asthma severity, unspecified whether complicated, unspecified whether persistent  Continue current treatment plan as previously prescribed with your  Pulmonologist.     9. Lung nodules  Ct 6/21  Advised to follow up with pulmonologist for further evaluation and recommendations. Patient expressed understanding.      10. Hypertension, unspecified type  Continue current treatment plan as previously prescribed with your  pcp and cardiologist.     11. Hyperlipidemia,  unspecified hyperlipidemia type  Continue current treatment plan as previously prescribed with your  pcp and cardiologist.     12. Elevated PSA  Continue current treatment plan as previously prescribed with your  Urologist.     13. Financial difficulties  [facility] financial resources information page given to patient.    - Ambulatory referral/consult to Outpatient Case Management    14. Hearing difficulty, unspecified laterality  Abnormal whisper test.   Advised to follow up with PCP/ENT (MA to scheduled prior to audio) for further evaluation and recommendations. Patient expressed understanding.     - Ambulatory referral/consult to Audiology; Future    15. Personal history of colonic polyps  Declines colonoscopy referral at this time due to currently determining next steps for PVD. Will discuss with PCP at upcoming apt.       Provided Alex with a 5-10 year written screening schedule and personal prevention plan. Recommendations were developed using the USPSTF age appropriate recommendations. Education, counseling, and referrals were provided as needed. After Visit Summary printed and given to patient which includes a list of additional screenings\tests needed.    Follow up in about 1 year (around 8/10/2023) for awv.    Chacha Osborn NP  I offered to discuss advanced care planning, including how to pick a person who would make decisions for you if you were unable to make them for yourself, called a health care power of , and what kind of decisions you might make such as use of life sustaining treatments such as ventilators and tube feeding when faced with a life limiting illness recorded on a living will that they will need to know. (How you want to be cared for as you near the end of your natural life)     X Patient is interested in learning more about how to make advanced directives.  I provided them paperwork and offered to discuss this with them.

## 2022-08-16 ENCOUNTER — OUTPATIENT CASE MANAGEMENT (OUTPATIENT)
Dept: ADMINISTRATIVE | Facility: OTHER | Age: 77
End: 2022-08-16
Payer: MEDICARE

## 2022-08-16 NOTE — PROGRESS NOTES
8/16/2022      1st Attempt to complete Social Work Assessment for Outpatient Care Management; left message requesting a return call.  LCSW will reattempt at a later date.

## 2022-08-18 ENCOUNTER — PATIENT MESSAGE (OUTPATIENT)
Dept: ADMINISTRATIVE | Facility: OTHER | Age: 77
End: 2022-08-18
Payer: MEDICARE

## 2022-08-18 ENCOUNTER — OUTPATIENT CASE MANAGEMENT (OUTPATIENT)
Dept: ADMINISTRATIVE | Facility: OTHER | Age: 77
End: 2022-08-18
Payer: MEDICARE

## 2022-08-18 NOTE — PROGRESS NOTES
8/18/2022       Attempt to complete Social Work Assessment for Outpatient Care Management; left message requesting a return call.  LCSW will reattempt at a later date.

## 2022-08-19 ENCOUNTER — OUTPATIENT CASE MANAGEMENT (OUTPATIENT)
Dept: ADMINISTRATIVE | Facility: OTHER | Age: 77
End: 2022-08-19
Payer: MEDICARE

## 2022-08-19 ENCOUNTER — PATIENT MESSAGE (OUTPATIENT)
Dept: PHARMACY | Facility: CLINIC | Age: 77
End: 2022-08-19
Payer: MEDICARE

## 2022-08-19 NOTE — PROGRESS NOTES
Outpatient Care Management   - Low Risk Patient Assessment    Patient: Alex Milner  MRN:  [id number]  Date of Service:  8/19/2022  Completed by:  Gypsy Moraes LCSW  Referral Date: 08/10/2022    Reason for Visit   Patient presents with    Plan Of Care    OPCM Chart Review    Social Work Assessment - Low/Mod Risk     8/19/2022    Brief Summary:  completed SW assessment and  identified the following psycho-social need ACD. Care plan was created in collaboration with patient/caregiver input. SW will follow up with pt in one week.    Patient Summary     OPCM Social Work Assessment (Low/Moderate Risk)    General  Level of Caregiver support: Member independent and does not need caregiver assistance  Have you had to make a decision between paying for any of the following in the last 2 months?: None  Transportation means: Self  Employment status: Retired and not working  Current symptoms: None  Assessments  Was the PHQ Depression Screening completed this visit?: No  Was the SANJANA-7 Screening completed this visit?: No         Complex Care Plan     Care plan was discussed and completed today with input from patient and/or caregiver.    Patient Instructions     Follow up in about 1 week (around 8/26/2022).    [facility] OPC Self-Management Care Plan was developed with the patients/caregivers input and was based on identified barriers from todays assessment.  Goals were written today with the patient/caregiver and the patient has agreed to work towards these goals to improve his/her overall well-being. Patient verbalized understanding of the care plan, goals, and all of today's instructions. Encouraged patient/caregiver to communicate with his/her physician and health care team about health conditions and the treatment plan.  Provided my contact information today and encouraged patient/caregiver to call me with any questions as needed.

## 2022-08-22 ENCOUNTER — SPECIALTY PHARMACY (OUTPATIENT)
Dept: PHARMACY | Facility: CLINIC | Age: 77
End: 2022-08-22
Payer: MEDICARE

## 2022-08-22 NOTE — TELEPHONE ENCOUNTER
Specialty Pharmacy - Refill Coordination    Specialty Medication Orders Linked to Encounter    Flowsheet Row Most Recent Value   Medication #1 evolocumab (REPATHA SURECLICK) 140 mg/mL PnIj (Order#[id number], Rx#[id number])          Refill Questions - Documented Responses    Flowsheet Row Most Recent Value   Patient Availability and HIPAA Verification    Does patient want to proceed with activity? Yes   HIPAA/medical authority confirmed? Yes   Relationship to patient of person spoken to? Self   Refill Screening Questions    Would patient like to speak to a pharmacist? No   When does the patient need to receive the medication? 08/28/22   Refill Delivery Questions    How will the patient receive the medication? Delivery Erin   When does the patient need to receive the medication? 08/28/22   Shipping Address Home   Address in [facility] confirmed and updated if neccessary? Yes   Expected Copay ($) 5   Is the patient able to afford the medication copay? Yes   Payment Method CC on file   Days supply of Refill 28   Supplies needed? No supplies needed   Refill activity completed? Yes   Refill activity plan Refill scheduled   Shipment/Pickup Date: 08/24/22          Current Outpatient Medications   Medication Sig    albuterol (PROVENTIL/VENTOLIN HFA) 90 mcg/actuation inhaler INHALE 2 PUFFS INTO THE LUNGS EVERY 6 (SIX) HOURS AS NEEDED FOR WHEEZING OR SHORTNESS OF BREATH    amLODIPine (NORVASC) 10 MG tablet Take 1 tablet (10 mg total) by mouth once daily.    aspirin (ECOTRIN) 81 MG EC tablet TAKE 1 TABLET BY MOUTH EVERY DAY    atorvastatin (LIPITOR) 80 MG tablet Take 1 tablet (80 mg total) by mouth once daily.    brimonidine 0.15 % OPTH DROP (ALPHAGAN) 0.15 % ophthalmic solution Place 1 drop into both eyes 3 (three) times daily.    cilostazoL (PLETAL) 100 MG Tab Take 1 tablet (100 mg total) by mouth 2 (two) times daily.    dorzolamide (TRUSOPT) 2 % ophthalmic solution INSTILL 1 DROP INTO BOTH EYES 3 TIMES A DAY     evolocumab (REPATHA SURECLICK) 140 mg/mL PnIj Inject 1 mL (140 mg total) into the skin every 14 (fourteen) days.    ezetimibe (ZETIA) 10 mg tablet Take 1 tablet (10 mg total) by mouth once daily. (Patient not taking: Reported on 8/10/2022)    finasteride (PROSCAR) 5 mg tablet Take 1 tablet (5 mg total) by mouth once daily.    fluticasone furoate-vilanteroL (BREO ELLIPTA) 200-25 mcg/dose DsDv diskus inhaler Inhale 1 puff into the lungs once daily. Controller    lisinopriL (PRINIVIL,ZESTRIL) 20 MG tablet Take 1 tablet (20 mg total) by mouth once daily.    tamsulosin (FLOMAX) 0.4 mg Cap Take 1 capsule (0.4 mg total) by mouth once daily.   Last reviewed on 8/10/2022  9:13 AM by Chacha Osborn NP    Review of patient's allergies indicates:  No Known Allergies Last reviewed on  8/10/2022 9:01 AM by Chacha Osborn      Tasks added this encounter   9/18/2022 - Refill Call (Auto Added)   Tasks due within next 3 months   No tasks due.     Kaley Valerio, Patient Care Assistant  [facility]  [address]  [facility] [address]  Phone: [phone]  Fax: [phone]

## 2022-08-30 ENCOUNTER — OFFICE VISIT (OUTPATIENT)
Dept: OTOLARYNGOLOGY | Facility: CLINIC | Age: 77
End: 2022-08-30
Payer: MEDICARE

## 2022-08-30 ENCOUNTER — CLINICAL SUPPORT (OUTPATIENT)
Dept: AUDIOLOGY | Facility: CLINIC | Age: 77
End: 2022-08-30
Payer: MEDICARE

## 2022-08-30 VITALS — BODY MASS INDEX: 28.4 KG/M2 | TEMPERATURE: 98 F | WEIGHT: 170.63 LBS

## 2022-08-30 DIAGNOSIS — H61.20 IMPACTED CERUMEN, UNSPECIFIED LATERALITY: Primary | ICD-10-CM

## 2022-08-30 DIAGNOSIS — H90.3 SENSORINEURAL HEARING LOSS (SNHL) OF BOTH EARS: ICD-10-CM

## 2022-08-30 PROCEDURE — 3288F FALL RISK ASSESSMENT DOCD: CPT | Mod: CPTII,S$GLB,, | Performed by: PHYSICIAN ASSISTANT

## 2022-08-30 PROCEDURE — 1101F PR PT FALLS ASSESS DOC 0-1 FALLS W/OUT INJ PAST YR: ICD-10-PCS | Mod: CPTII,S$GLB,, | Performed by: PHYSICIAN ASSISTANT

## 2022-08-30 PROCEDURE — 99499 NO LOS: ICD-10-PCS | Mod: S$GLB,,, | Performed by: PHYSICIAN ASSISTANT

## 2022-08-30 PROCEDURE — 99999 PR PBB SHADOW E&M-EST. PATIENT-LVL III: CPT | Mod: PBBFAC,,, | Performed by: PHYSICIAN ASSISTANT

## 2022-08-30 PROCEDURE — 1126F PR PAIN SEVERITY QUANTIFIED, NO PAIN PRESENT: ICD-10-PCS | Mod: CPTII,S$GLB,, | Performed by: PHYSICIAN ASSISTANT

## 2022-08-30 PROCEDURE — 92567 TYMPANOMETRY: CPT | Mod: S$GLB,,,

## 2022-08-30 PROCEDURE — 1159F PR MEDICATION LIST DOCUMENTED IN MEDICAL RECORD: ICD-10-PCS | Mod: CPTII,S$GLB,, | Performed by: PHYSICIAN ASSISTANT

## 2022-08-30 PROCEDURE — 99999 PR PBB SHADOW E&M-EST. PATIENT-LVL III: ICD-10-PCS | Mod: PBBFAC,,, | Performed by: PHYSICIAN ASSISTANT

## 2022-08-30 PROCEDURE — 1126F AMNT PAIN NOTED NONE PRSNT: CPT | Mod: CPTII,S$GLB,, | Performed by: PHYSICIAN ASSISTANT

## 2022-08-30 PROCEDURE — 92557 COMPREHENSIVE HEARING TEST: CPT | Mod: S$GLB,,,

## 2022-08-30 PROCEDURE — 69210 PR REMOVAL IMPACTED CERUMEN REQUIRING INSTRUMENTATION, UNILATERAL: ICD-10-PCS | Mod: S$GLB,,, | Performed by: PHYSICIAN ASSISTANT

## 2022-08-30 PROCEDURE — 92557 PR COMPREHENSIVE HEARING TEST: ICD-10-PCS | Mod: S$GLB,,,

## 2022-08-30 PROCEDURE — 99499 UNLISTED E&M SERVICE: CPT | Mod: S$GLB,,, | Performed by: PHYSICIAN ASSISTANT

## 2022-08-30 PROCEDURE — 1101F PT FALLS ASSESS-DOCD LE1/YR: CPT | Mod: CPTII,S$GLB,, | Performed by: PHYSICIAN ASSISTANT

## 2022-08-30 PROCEDURE — 99999 PR PBB SHADOW E&M-EST. PATIENT-LVL I: CPT | Mod: PBBFAC,,,

## 2022-08-30 PROCEDURE — 99999 PR PBB SHADOW E&M-EST. PATIENT-LVL I: ICD-10-PCS | Mod: PBBFAC,,,

## 2022-08-30 PROCEDURE — 92567 PR TYMPA2METRY: ICD-10-PCS | Mod: S$GLB,,,

## 2022-08-30 PROCEDURE — 1159F MED LIST DOCD IN RCRD: CPT | Mod: CPTII,S$GLB,, | Performed by: PHYSICIAN ASSISTANT

## 2022-08-30 PROCEDURE — 3288F PR FALLS RISK ASSESSMENT DOCUMENTED: ICD-10-PCS | Mod: CPTII,S$GLB,, | Performed by: PHYSICIAN ASSISTANT

## 2022-08-30 PROCEDURE — 69210 REMOVE IMPACTED EAR WAX UNI: CPT | Mod: S$GLB,,, | Performed by: PHYSICIAN ASSISTANT

## 2022-08-30 NOTE — PROGRESS NOTES
Subjective:   Patient ID: Alex Milner is a 77 y.o. male.    Chief Complaint: Cerumen Impaction    Patient is here to see me today for evaluation of a possible wax impaction in bilateral ears.  He has complaints of hearing loss in the affected ears, but denies pain or drainage.  This has been an issue in the past.  The patient has not been using any sort of ear drop to soften the wax.     Review of patient's allergies indicates:  No Known Allergies        Review of Systems   HENT:  Positive for hearing loss.        Objective:   Temp 97.7 °F (36.5 °C) (Temporal)   Wt 77.4 kg (170 lb 10.2 oz)   BMI 28.40 kg/m²     Physical Exam  Constitutional:       General: He is not in acute distress.     Appearance: He is well-developed.   HENT:      Head: Normocephalic and atraumatic.      Jaw: No trismus.      Right Ear: Hearing, tympanic membrane, ear canal and external ear normal.      Left Ear: Hearing, tympanic membrane, ear canal and external ear normal. There is impacted cerumen.      Nose: Nose normal. No nasal deformity, septal deviation, mucosal edema or rhinorrhea.      Mouth/Throat:      Dentition: Normal dentition.      Pharynx: Uvula midline. No oropharyngeal exudate or uvula swelling.   Eyes:      General: No scleral icterus.     Conjunctiva/sclera: Conjunctivae normal.      Right eye: Right conjunctiva is not injected. No chemosis.     Left eye: Left conjunctiva is not injected. No chemosis.     Pupils: Pupils are equal, round, and reactive to light.   Neck:      Thyroid: No thyroid mass or thyromegaly.      Trachea: Trachea and phonation normal. No tracheal tenderness or tracheal deviation.   Pulmonary:      Effort: Pulmonary effort is normal. No accessory muscle usage or respiratory distress.      Breath sounds: No stridor.   Lymphadenopathy:      Head:      Right side of head: No submental, submandibular, preauricular or posterior auricular adenopathy.      Left side of head: No submental,  submandibular, preauricular or posterior auricular adenopathy.      Cervical: No cervical adenopathy.      Right cervical: No superficial or deep cervical adenopathy.     Left cervical: No superficial or deep cervical adenopathy.   Skin:     General: Skin is warm and dry.      Findings: No erythema or rash.   Neurological:      Mental Status: He is alert and oriented to person, place, and time.      Cranial Nerves: No cranial nerve deficit.   Psychiatric:         Behavior: Behavior normal.         Thought Content: Thought content normal.        Procedure Note    CHIEF COMPLAINT:  Cerumen Impaction    Description:  The patient was seated in an exam chair.  An ear speculum was placed in the right EAC and was examined under the microscope.  Suction and/or loop curettes were used to remove a large cerumen impaction.  The tympanic membrane was visualized and was normal in appearance.  The procedure was repeated on the left side in a similar fashion.  The TM was intact and normal on this side as well.  The patient tolerated the procedure well.     Assessment:     1. Impacted cerumen, unspecified laterality        Plan:     Impacted cerumen, unspecified laterality         Cerumen impaction:  Removed today without difficulty.  I would recommend the use of a wax softening drop, either over the counter Debrox or mineral oil, on a weekly basis.  I also instructed the patient to avoid Qtips.

## 2022-08-31 ENCOUNTER — OUTPATIENT CASE MANAGEMENT (OUTPATIENT)
Dept: ADMINISTRATIVE | Facility: OTHER | Age: 77
End: 2022-08-31
Payer: MEDICARE

## 2022-08-31 NOTE — PROGRESS NOTES
Outpatient Care Management   - Care Plan Follow Up    Patient: Alex Milner  MRN:  [id number]  Date of Service:  8/31/2022  Completed by:  Gypsy Moraes LCSW  Referral Date: 08/10/2022    Reason for Visit   Patient presents with    OPCM Chart Review    Update Plan Of Care    OPCM SW FOLLOW-UP      8/31/2022  Brief Summary: SW telephoned pt for a follow up and he reported that he has not yet receiced ACP documents. SW will follow up in two weeks.     Complex Care Plan     Care plan was discussed and completed today with input from patient and/or caregiver.    Patient Instructions     Follow up in about 2 weeks (around 9/14/2022).    Todays OPCM Self-Management Care Plan was developed with the patients/caregivers input and was based on identified barriers from todays assessment.  Goals were written today with the patient/caregiver and the patient has agreed to work towards these goals to improve his/her overall well-being. Patient verbalized understanding of the care plan, goals, and all of today's instructions. Encouraged patient/caregiver to communicate with his/her physician and health care team about health conditions and the treatment plan.  Provided my contact information today and encouraged patient/caregiver to call me with any questions as needed.

## 2022-09-01 NOTE — PROGRESS NOTES
Referring Provider: JAMAICA Osborn    Alex Milner was seen 09/01/2022 for an audiological evaluation. Patient is here after a failed hearing screening. He endorsed a history of occupational noise exposure with use of hearing protection. He denied difficulties hearing, tinnitus, vertigo, and family history of hearing loss.     Otoscopy revealed clear canals with visualization of the tympanic membrane in both ears. Tympanograms were Type Ad for the right ear and Type Ad for the left ear. Audiometry revealed normal hearing sensitivity sloping to a moderately-severe sensorineural hearing loss in both ears. Speech Reception Thresholds were  25 dBHL for the right ear and 25 dBHL for the left ear. Word recognition scores were good for the right ear and excellent for the left ear.    Patient was counseled on the above findings.    Recommendations:  Repeat audiological evaluation in one year, or sooner if needed.  Recommend bilateral amplification. Advised patient to contact insurance regarding hearing aid benefits.

## 2022-09-07 ENCOUNTER — OFFICE VISIT (OUTPATIENT)
Dept: OPHTHALMOLOGY | Facility: CLINIC | Age: 77
End: 2022-09-07
Payer: MEDICARE

## 2022-09-07 ENCOUNTER — OFFICE VISIT (OUTPATIENT)
Dept: CARDIOLOGY | Facility: CLINIC | Age: 77
End: 2022-09-07
Payer: MEDICARE

## 2022-09-07 VITALS
SYSTOLIC BLOOD PRESSURE: 130 MMHG | OXYGEN SATURATION: 98 % | BODY MASS INDEX: 27.92 KG/M2 | HEIGHT: 65 IN | HEART RATE: 60 BPM | WEIGHT: 167.56 LBS | DIASTOLIC BLOOD PRESSURE: 64 MMHG

## 2022-09-07 DIAGNOSIS — I70.0 AORTIC CALCIFICATION: ICD-10-CM

## 2022-09-07 DIAGNOSIS — H25.13 NUCLEAR SCLEROSIS, BILATERAL: ICD-10-CM

## 2022-09-07 DIAGNOSIS — F10.20 ALCOHOL DEPENDENCE, DAILY USE: Chronic | ICD-10-CM

## 2022-09-07 DIAGNOSIS — E78.5 HYPERLIPIDEMIA, UNSPECIFIED HYPERLIPIDEMIA TYPE: ICD-10-CM

## 2022-09-07 DIAGNOSIS — R91.8 MULTIPLE PULMONARY NODULES: ICD-10-CM

## 2022-09-07 DIAGNOSIS — H25.013 CORTICAL AGE-RELATED CATARACT OF BOTH EYES: ICD-10-CM

## 2022-09-07 DIAGNOSIS — I65.29 STENOSIS OF CAROTID ARTERY, UNSPECIFIED LATERALITY: ICD-10-CM

## 2022-09-07 DIAGNOSIS — I27.9 PULMONARY HEART DISEASE: ICD-10-CM

## 2022-09-07 DIAGNOSIS — J47.9 BRONCHIECTASIS, UNCOMPLICATED: ICD-10-CM

## 2022-09-07 DIAGNOSIS — I25.10 ATHEROSCLEROSIS OF CORONARY ARTERY, UNSPECIFIED VESSEL OR LESION TYPE, UNSPECIFIED WHETHER ANGINA PRESENT, UNSPECIFIED WHETHER NATIVE OR TRANSPLANTED HEART: ICD-10-CM

## 2022-09-07 DIAGNOSIS — I10 HYPERTENSION, UNSPECIFIED TYPE: Chronic | ICD-10-CM

## 2022-09-07 DIAGNOSIS — H40.1134 PRIMARY OPEN ANGLE GLAUCOMA OF BOTH EYES, INDETERMINATE STAGE: Primary | ICD-10-CM

## 2022-09-07 DIAGNOSIS — I70.219 ATHEROSCLEROTIC PVD WITH INTERMITTENT CLAUDICATION: Primary | ICD-10-CM

## 2022-09-07 PROCEDURE — 1159F MED LIST DOCD IN RCRD: CPT | Mod: CPTII,S$GLB,, | Performed by: INTERNAL MEDICINE

## 2022-09-07 PROCEDURE — 99214 PR OFFICE/OUTPT VISIT, EST, LEVL IV, 30-39 MIN: ICD-10-PCS | Mod: S$GLB,,, | Performed by: INTERNAL MEDICINE

## 2022-09-07 PROCEDURE — 3075F PR MOST RECENT SYSTOLIC BLOOD PRESS GE 130-139MM HG: ICD-10-PCS | Mod: CPTII,S$GLB,, | Performed by: INTERNAL MEDICINE

## 2022-09-07 PROCEDURE — 3288F FALL RISK ASSESSMENT DOCD: CPT | Mod: CPTII,S$GLB,, | Performed by: INTERNAL MEDICINE

## 2022-09-07 PROCEDURE — 1125F AMNT PAIN NOTED PAIN PRSNT: CPT | Mod: CPTII,S$GLB,, | Performed by: INTERNAL MEDICINE

## 2022-09-07 PROCEDURE — 1159F MED LIST DOCD IN RCRD: CPT | Mod: CPTII,S$GLB,, | Performed by: OPTOMETRIST

## 2022-09-07 PROCEDURE — 3075F SYST BP GE 130 - 139MM HG: CPT | Mod: CPTII,S$GLB,, | Performed by: INTERNAL MEDICINE

## 2022-09-07 PROCEDURE — 1125F PR PAIN SEVERITY QUANTIFIED, PAIN PRESENT: ICD-10-PCS | Mod: CPTII,S$GLB,, | Performed by: INTERNAL MEDICINE

## 2022-09-07 PROCEDURE — 3078F DIAST BP <80 MM HG: CPT | Mod: CPTII,S$GLB,, | Performed by: INTERNAL MEDICINE

## 2022-09-07 PROCEDURE — 3078F PR MOST RECENT DIASTOLIC BLOOD PRESSURE < 80 MM HG: ICD-10-PCS | Mod: CPTII,S$GLB,, | Performed by: INTERNAL MEDICINE

## 2022-09-07 PROCEDURE — 1101F PT FALLS ASSESS-DOCD LE1/YR: CPT | Mod: CPTII,S$GLB,, | Performed by: INTERNAL MEDICINE

## 2022-09-07 PROCEDURE — 99214 OFFICE O/P EST MOD 30 MIN: CPT | Mod: S$GLB,,, | Performed by: INTERNAL MEDICINE

## 2022-09-07 PROCEDURE — 92133 POSTERIOR SEGMENT OCT OPTIC NERVE(OCULAR COHERENCE TOMOGRAPHY) - OU - BOTH EYES: ICD-10-PCS | Mod: S$GLB,,, | Performed by: OPTOMETRIST

## 2022-09-07 PROCEDURE — 99999 PR PBB SHADOW E&M-EST. PATIENT-LVL IV: CPT | Mod: PBBFAC,,, | Performed by: INTERNAL MEDICINE

## 2022-09-07 PROCEDURE — 99999 PR PBB SHADOW E&M-EST. PATIENT-LVL II: CPT | Mod: PBBFAC,,, | Performed by: OPTOMETRIST

## 2022-09-07 PROCEDURE — 99999 PR PBB SHADOW E&M-EST. PATIENT-LVL IV: ICD-10-PCS | Mod: PBBFAC,,, | Performed by: INTERNAL MEDICINE

## 2022-09-07 PROCEDURE — 92133 CPTRZD OPH DX IMG PST SGM ON: CPT | Mod: S$GLB,,, | Performed by: OPTOMETRIST

## 2022-09-07 PROCEDURE — 1159F PR MEDICATION LIST DOCUMENTED IN MEDICAL RECORD: ICD-10-PCS | Mod: CPTII,S$GLB,, | Performed by: INTERNAL MEDICINE

## 2022-09-07 PROCEDURE — 1101F PR PT FALLS ASSESS DOC 0-1 FALLS W/OUT INJ PAST YR: ICD-10-PCS | Mod: CPTII,S$GLB,, | Performed by: INTERNAL MEDICINE

## 2022-09-07 PROCEDURE — 3288F PR FALLS RISK ASSESSMENT DOCUMENTED: ICD-10-PCS | Mod: CPTII,S$GLB,, | Performed by: INTERNAL MEDICINE

## 2022-09-07 PROCEDURE — 1160F RVW MEDS BY RX/DR IN RCRD: CPT | Mod: CPTII,S$GLB,, | Performed by: OPTOMETRIST

## 2022-09-07 PROCEDURE — 99999 PR PBB SHADOW E&M-EST. PATIENT-LVL II: ICD-10-PCS | Mod: PBBFAC,,, | Performed by: OPTOMETRIST

## 2022-09-07 PROCEDURE — 1160F PR REVIEW ALL MEDS BY PRESCRIBER/CLIN PHARMACIST DOCUMENTED: ICD-10-PCS | Mod: CPTII,S$GLB,, | Performed by: OPTOMETRIST

## 2022-09-07 PROCEDURE — 1159F PR MEDICATION LIST DOCUMENTED IN MEDICAL RECORD: ICD-10-PCS | Mod: CPTII,S$GLB,, | Performed by: OPTOMETRIST

## 2022-09-07 PROCEDURE — 92014 PR EYE EXAM, EST PATIENT,COMPREHESV: ICD-10-PCS | Mod: S$GLB,,, | Performed by: OPTOMETRIST

## 2022-09-07 PROCEDURE — 92014 COMPRE OPH EXAM EST PT 1/>: CPT | Mod: S$GLB,,, | Performed by: OPTOMETRIST

## 2022-09-07 RX ORDER — LATANOPROST 50 UG/ML
1 SOLUTION/ DROPS OPHTHALMIC NIGHTLY
Qty: 2 ML | Refills: 11 | Status: SHIPPED | OUTPATIENT
Start: 2022-09-07 | End: 2023-08-03 | Stop reason: SDUPTHER

## 2022-09-07 RX ORDER — BETAXOLOL HYDROCHLORIDE 5 MG/ML
1 SOLUTION/ DROPS OPHTHALMIC 2 TIMES DAILY
Qty: 10 ML | Refills: 4 | Status: SHIPPED | OUTPATIENT
Start: 2022-09-07 | End: 2022-10-11

## 2022-09-07 NOTE — PROGRESS NOTES
HPI     Glaucoma            Comments: Medications: Latanoprost QHS OU, Trusopt TID OU, Brimonidine   TID OU (Patient states only using one of the drops, not sure of the name)  Last HVF: 6/19/2020  Last gOCT: 09/07/2022  Last SDPs: None            Comments    Vision changes since last eye exam? No    Any eye pain today: No    Other ocular symptoms: No    Interested in contact lens fitting today? No                     Last edited by Lacy Christian on 9/7/2022 10:23 AM.            Assessment /Plan     For exam results, see Encounter Report.    Primary open angle glaucoma of both eyes, indeterminate stage  Other orders  -     betaxolol 0.5% (BETOPTIC-S) 0.5 % Drop; Place 1 drop into both eyes 2 (two) times daily.  Dispense: 10 mL; Refill: 4  -     latanoprost 0.005 % ophthalmic solution; Place 1 drop into both eyes every evening.  Dispense: 2 mL; Refill: 11  Slight progression on NFL scan OU  Worse on GCL OD   Start Betoptic bid OU and Latanoprost qhs OU    Nuclear sclerosis, bilateral  Cortical age-related cataract of both eyes  Cataracts not significantly affecting activities of daily living and therefore surgery is not indicated at this time.   Will continue to monitor over the next 12 months. Pt to call or RTC with any significant change in vision prior to next visit.         RTC 1 month for HVF 24-2 and review and IOP check or PRN for worsening symptoms  Discussed above and all questions were answered.

## 2022-09-07 NOTE — PROGRESS NOTES
Subjective:   Patient ID:  Alex Milner is a 77 y.o. male who presents for follow up of No chief complaint on file.      HPI  75 yo male, 6 months f/u  PMH PAD, h/o AO showed left infrapopliteal . Distal left SFA PTCA and  AO right SFA PTCA by Dr. Ross,  HTN, HLP, PVD, obesity, ETOH use PAULA on CPAP. No smoking  C/o bilateral thigh and buttock pain with walking and some time long time sitting, and back pain at night,. No calf pain   left FREDY 0.7 and right 1.1. LE arterial US showed left infrapopliteal stenosis and occluded AT      Chest CT wo contrast showed mild aorta calcification  No chest pain sob, dizziness, palpitation,  Resolved claudication after angiogram. Walks 2 miles a day. Only knee pain, No muscle weakness, pain and numbness  No smoking/drinking  Med compliance     05/2021 visit  C/o both thigh muscle tightness at rest and night. Some sore on both thighs at walking.  Some lower back pain at night  No chest pain dyspnea palpitation, faint and dizziness  Today EKG NSR HR 54 bpm, no acute ctt change      visit  Started taking Lipitor and zetia daily about few months ago. Occasionally muscle pain at walking. No resting pain.   Some lower back pain . No chest pain dyspnea dizziness and faint     Interval history  No leg pain with a lot of walking. No chest pain dyspnea dizziness dan leg swelling  Med compliance and no active bleeding  ekg NSR and BP controlled. , Copay of repatha was high     9/7/2022  Has non limiting claudication worse on left than right . He has no rest pain he has no discoloration. He is taking repatha. Has no chf symptoms he is compliant with meds and diet had fredy with exercise symptomatic recovered fast to baseline.  He is on asa and cilostazol.no bleeding or bruising .    He is on statins and [id number] for his hlp.     Past Medical History:   Diagnosis Date    Atherosclerosis of coronary artery 8/10/2022    Atherosclerotic PVD  with intermittent claudication 2019    Atrial fibrillation     pt unaware of this    Back pain     Bilateral inguinal hernia     CT ABdomen/pelvis 3/9/2015 (care everywhere)---Bilateral inguinal hernias containing fat.      Calcified granuloma of lung 10/3/2017    CT CHest 3/26/2018---There are calcified lymph nodes in the mediastinum and left hilum.    CT chest 2017 Calcified left hilar and subcarinal granulomas are noted  ;Calcified granuloma noted within the posterior segment of the right hepatic lobe.    Diverticulosis     Diverticulosis 10/28/13    Colonoscopy     ED (erectile dysfunction)     Elevated PSA     Granuloma of liver     ct chest 3/26/2018---There is a small calcified granuloma posteriorly in the right lobe of the liver    Hyperlipidemia     Hypertension     Moderate persistent asthma without complication 2017    Obesity     Personal history of colonic polyps     Pulmonary heart disease 8/10/2022    Tobacco dependence     resolved    Uncomplicated alcohol dependence 2019    Urinary tract infection        Past Surgical History:   Procedure Laterality Date    AORTOGRAPHY WITH SERIALOGRAPHY N/A 10/21/2019    Procedure: AORTOGRAM, WITH RUNOFF;  Surgeon: Chencho Ross MD;  Location: [facility] CATH LAB;  Service: Cardiology;  Laterality: N/A;  pt requesting 9am arrival    AORTOGRAPHY WITH SERIALOGRAPHY N/A 2019    Procedure: AORTOGRAM, WITH RUNOFF;  Surgeon: Chencho Ross MD;  Location: [facility] CATH LAB;  Service: Cardiology;  Laterality: N/A;    HERNIA REPAIR      x2    PROSTATE BIOPSY      reported per patient around  or  which was reportedly negative       Social History     Tobacco Use    Smoking status: Former     Packs/day: 2.00     Years: 15.00     Pack years: 30.00     Types: Cigarettes     Start date:      Quit date: 1985     Years since quittin.0    Smokeless tobacco: Never   Substance Use Topics    Alcohol use: Yes     Alcohol/week: 12.0  standard drinks     Types: 12 Cans of beer per week    Drug use: No       Family History   Problem Relation Age of Onset    Cancer Mother         Unknown primary    Cancer Father     Cancer Brother         prostate     Prostate cancer Brother     Cancer Sister 64        pancreatic     Diabetes Neg Hx     Heart disease Neg Hx     Kidney disease Neg Hx     Stroke Neg Hx     Hyperlipidemia Neg Hx     Hypertension Neg Hx        Current Outpatient Medications   Medication Sig    albuterol (PROVENTIL/VENTOLIN HFA) 90 mcg/actuation inhaler INHALE 2 PUFFS INTO THE LUNGS EVERY 6 (SIX) HOURS AS NEEDED FOR WHEEZING OR SHORTNESS OF BREATH    amLODIPine (NORVASC) 10 MG tablet Take 1 tablet (10 mg total) by mouth once daily.    aspirin (ECOTRIN) 81 MG EC tablet TAKE 1 TABLET BY MOUTH EVERY DAY    atorvastatin (LIPITOR) 80 MG tablet Take 1 tablet (80 mg total) by mouth once daily.    brimonidine 0.15 % OPTH DROP (ALPHAGAN) 0.15 % ophthalmic solution Place 1 drop into both eyes 3 (three) times daily.    cilostazoL (PLETAL) 100 MG Tab Take 1 tablet (100 mg total) by mouth 2 (two) times daily.    dorzolamide (TRUSOPT) 2 % ophthalmic solution INSTILL 1 DROP INTO BOTH EYES 3 TIMES A DAY    evolocumab (REPATHA SURECLICK) 140 mg/mL PnIj Inject 1 mL (140 mg total) into the skin every 14 (fourteen) days.    ezetimibe (ZETIA) 10 mg tablet Take 1 tablet (10 mg total) by mouth once daily. (Patient not taking: Reported on 8/10/2022)    finasteride (PROSCAR) 5 mg tablet Take 1 tablet (5 mg total) by mouth once daily.    fluticasone furoate-vilanteroL (BREO ELLIPTA) 200-25 mcg/dose DsDv diskus inhaler Inhale 1 puff into the lungs once daily. Controller    lisinopriL (PRINIVIL,ZESTRIL) 20 MG tablet Take 1 tablet (20 mg total) by mouth once daily.    tamsulosin (FLOMAX) 0.4 mg Cap Take 1 capsule (0.4 mg total) by mouth once daily.     No current facility-administered medications for this visit.     Current Outpatient Medications on File Prior to  Visit   Medication Sig    albuterol (PROVENTIL/VENTOLIN HFA) 90 mcg/actuation inhaler INHALE 2 PUFFS INTO THE LUNGS EVERY 6 (SIX) HOURS AS NEEDED FOR WHEEZING OR SHORTNESS OF BREATH    amLODIPine (NORVASC) 10 MG tablet Take 1 tablet (10 mg total) by mouth once daily.    aspirin (ECOTRIN) 81 MG EC tablet TAKE 1 TABLET BY MOUTH EVERY DAY    atorvastatin (LIPITOR) 80 MG tablet Take 1 tablet (80 mg total) by mouth once daily.    brimonidine 0.15 % OPTH DROP (ALPHAGAN) 0.15 % ophthalmic solution Place 1 drop into both eyes 3 (three) times daily.    cilostazoL (PLETAL) 100 MG Tab Take 1 tablet (100 mg total) by mouth 2 (two) times daily.    dorzolamide (TRUSOPT) 2 % ophthalmic solution INSTILL 1 DROP INTO BOTH EYES 3 TIMES A DAY    evolocumab (REPATHA SURECLICK) 140 mg/mL PnIj Inject 1 mL (140 mg total) into the skin every 14 (fourteen) days.    ezetimibe (ZETIA) 10 mg tablet Take 1 tablet (10 mg total) by mouth once daily. (Patient not taking: Reported on 8/10/2022)    finasteride (PROSCAR) 5 mg tablet Take 1 tablet (5 mg total) by mouth once daily.    fluticasone furoate-vilanteroL (BREO ELLIPTA) 200-25 mcg/dose DsDv diskus inhaler Inhale 1 puff into the lungs once daily. Controller    lisinopriL (PRINIVIL,ZESTRIL) 20 MG tablet Take 1 tablet (20 mg total) by mouth once daily.    tamsulosin (FLOMAX) 0.4 mg Cap Take 1 capsule (0.4 mg total) by mouth once daily.     No current facility-administered medications on file prior to visit.     Review of patient's allergies indicates:  No Known Allergies   Review of Systems   Constitutional: Negative for malaise/fatigue.   Eyes:  Negative for blurred vision.   Cardiovascular:  Positive for claudication. Negative for chest pain, cyanosis, dyspnea on exertion, irregular heartbeat, leg swelling, near-syncope, orthopnea, palpitations and paroxysmal nocturnal dyspnea.   Respiratory:  Negative for cough, hemoptysis and shortness of breath.    Hematologic/Lymphatic: Negative for  bleeding problem. Does not bruise/bleed easily.   Skin:  Negative for dry skin and itching.   Musculoskeletal:  Negative for falls, muscle weakness and myalgias.   Gastrointestinal:  Negative for abdominal pain, diarrhea, heartburn, hematemesis, hematochezia and melena.   Genitourinary:  Negative for flank pain and hematuria.   Neurological:  Negative for dizziness, focal weakness, headaches, light-headedness, numbness, paresthesias, seizures and weakness.   Psychiatric/Behavioral:  Negative for altered mental status and memory loss. The patient is not nervous/anxious.    Allergic/Immunologic: Negative for hives.     Objective:   Physical Exam  Vitals and nursing note reviewed.   Constitutional:       General: He is not in acute distress.     Appearance: He is well-developed. He is not diaphoretic.   HENT:      Head: Normocephalic and atraumatic.   Eyes:      General:         Right eye: No discharge.         Left eye: No discharge.      Pupils: Pupils are equal, round, and reactive to light.   Neck:      Thyroid: No thyromegaly.      Vascular: No JVD.   Cardiovascular:      Rate and Rhythm: Normal rate and regular rhythm.      Pulses: Intact distal pulses.           Carotid pulses are 2+ on the right side and 2+ on the left side.       Radial pulses are 2+ on the right side and 2+ on the left side.        Femoral pulses are 2+ on the right side and 2+ on the left side.       Popliteal pulses are 2+ on the right side and 2+ on the left side.        Dorsalis pedis pulses are 1+ on the right side and 1+ on the left side.        Posterior tibial pulses are 0 on the right side and 0 on the left side.      Heart sounds: Normal heart sounds. No murmur heard.    No friction rub. No gallop.   Pulmonary:      Effort: Pulmonary effort is normal. No respiratory distress.      Breath sounds: Normal breath sounds. No wheezing or rales.   Chest:      Chest wall: No tenderness.   Abdominal:      General: Bowel sounds are normal.  There is no distension.      Palpations: Abdomen is soft.      Tenderness: There is no abdominal tenderness.   Musculoskeletal:         General: Normal range of motion.      Cervical back: Neck supple.   Skin:     General: Skin is warm and dry.      Findings: No erythema or rash.   Neurological:      Mental Status: He is alert and oriented to person, place, and time.      Cranial Nerves: No cranial nerve deficit.   Psychiatric:         Behavior: Behavior normal.     There were no vitals filed for this visit.  Lab Results   Component Value Date    CHOL 201 (H) 01/04/2022    CHOL 300 (H) 06/25/2021    CHOL 299 (H) 05/24/2021     Lab Results   Component Value Date    HDL 45 01/04/2022    HDL 91 (H) 06/25/2021    HDL 82 (H) 05/24/2021     Lab Results   Component Value Date    LDLCALC 134.2 01/04/2022    LDLCALC 188.2 (H) 06/25/2021    LDLCALC 197.8 (H) 05/24/2021     Lab Results   Component Value Date    TRIG 109 01/04/2022    TRIG 104 06/25/2021    TRIG 96 05/24/2021     Lab Results   Component Value Date    CHOLHDL 22.4 01/04/2022    CHOLHDL 30.3 06/25/2021    CHOLHDL 27.4 05/24/2021       Chemistry        Component Value Date/Time     07/26/2022 0818    K 5.3 (H) 07/26/2022 0818     07/26/2022 0818    CO2 28 07/26/2022 0818    BUN 10 07/26/2022 0818    CREATININE 1.0 07/26/2022 0818    GLU 93 07/26/2022 0818        Component Value Date/Time    CALCIUM 9.2 07/26/2022 0818    ALKPHOS 82 07/26/2022 0818    AST 16 07/26/2022 0818    ALT 11 07/26/2022 0818    BILITOT 0.5 07/26/2022 0818    ESTGFRAFRICA >60.0 07/26/2022 0818    EGFRNONAA >60.0 07/26/2022 0818        No results found for: LABA1C, HGBA1C    Lab Results   Component Value Date    TSH 0.911 06/27/2019     Lab Results   Component Value Date    INR 0.9 03/31/2020    INR 0.9 11/07/2019    INR 1.0 10/14/2019     Lab Results   Component Value Date    WBC 4.78 07/26/2022    HGB 14.1 07/26/2022    HCT 44.8 07/26/2022    MCV 84 07/26/2022      07/26/2022     BMP  Sodium   Date Value Ref Range Status   07/26/2022 144 136 - 145 mmol/L Final     Potassium   Date Value Ref Range Status   07/26/2022 5.3 (H) 3.5 - 5.1 mmol/L Final     Comment:     *No Visible Hemolysis     Chloride   Date Value Ref Range Status   07/26/2022 107 95 - 110 mmol/L Final     CO2   Date Value Ref Range Status   07/26/2022 28 23 - 29 mmol/L Final     BUN   Date Value Ref Range Status   07/26/2022 10 8 - 23 mg/dL Final     Creatinine   Date Value Ref Range Status   07/26/2022 1.0 0.5 - 1.4 mg/dL Final     Calcium   Date Value Ref Range Status   07/26/2022 9.2 8.7 - 10.5 mg/dL Final     Anion Gap   Date Value Ref Range Status   07/26/2022 9 8 - 16 mmol/L Final     eGFR if    Date Value Ref Range Status   07/26/2022 >60.0 >60 mL/min/1.73 m^2 Final     eGFR if non    Date Value Ref Range Status   07/26/2022 >60.0 >60 mL/min/1.73 m^2 Final     Comment:     Calculation used to obtain the estimated glomerular filtration  rate (eGFR) is the CKD-EPI equation.        CrCl cannot be calculated (Patient's most recent lab result is older than the maximum 7 days allowed.).  FREDY The right ankle [DT] artery has biphasic flow.   The right ankle [DP] artery has biphasic flow.   The left ankle [PT] artery has biphasic flow.  The left ankle [DP] artery has monophasic flow.  Exercise Study: treadmill test.    The exercise study was stopped due to leg fatigue, claudication and patient request.   US Measurements - FREDY    Left   Left arm  mmHg         Left posterior tibial 112 mmHg         Left dorsalis pedis 78 mmHg         Left FREDY 0.74          Left toe pressure 37 mmHg         Left TBI 0.25           Right   Right arm  mmHg         Right posterior tibial 138 mmHg         Right dorsalis pedis 150 mmHg         Right FREDY 0.99          Right toe pressure 56 mmHg         Right TBI 0.37               US Measurements - Stress FREDY    Treadmill speed 2 mph          Treadmill grade 10 %         Treadmill time 1 min               US Measurements - Post Stress FREDY    Immediate arm  mmHg         FREDY claudication time 1 min         2nd post exercise arm  mmHg         2nd post exercise reading 2 min         3rd post exercise arm  mmHg         3rd post exercise reading 5 min          Left   Immediate left tibial 76 mmHg         Immediate left FREDY 0.54          2nd post exercise left tibial 115 mmHg         2nd post exercise left FREDY 0.69          3rd post exercise left tibial 120 mmHg         3rd post excercise left FREDY 0.79           Right   Immediate right tibial 72 mmHg         Immediate right FREDY 0.51          2nd post exercise right tibial 151 mmHg         2nd post exercise right FREDY 0.91          3rd post exercise right tibial 158 mmHg         3rd post exercise right FREDY 1.04            Assessment:     1. Atherosclerotic PVD with intermittent claudication    2. Hypertension, unspecified type    3. Alcohol dependence, daily use    4. Hyperlipidemia, unspecified hyperlipidemia type    5. Multiple pulmonary nodules    6. Aortic calcification    7. Stenosis of carotid artery, unspecified laterality    8. Atherosclerosis of coronary artery, unspecified vessel or lesion type, unspecified whether angina present, unspecified whether native or transplanted heart    9. Pulmonary heart disease    10. Bronchiectasis, uncomplicated      Has stable claudication non limiting with still reasonable lifestyle. He was counseled about optimal medical therapy walking exercise rf modification diet compliance . All questions answered. His lipids being addressed with appropriate therapy.   Htn controlled.  Plan:   Continue current therapy  Cardiac low salt diet.  Risk factor modification and excercise program.  F/u yearly,.

## 2022-09-13 ENCOUNTER — OUTPATIENT CASE MANAGEMENT (OUTPATIENT)
Dept: ADMINISTRATIVE | Facility: OTHER | Age: 77
End: 2022-09-13
Payer: MEDICARE

## 2022-09-13 NOTE — PROGRESS NOTES
Outpatient Care Management   - Care Plan Follow Up    Patient: Alex Milner  MRN:  [id number]  Date of Service:  9/13/2022  Completed by:  Gypsy Moraes LCSW  Referral Date: 08/10/2022    Reason for Visit   Patient presents with    OPCM Chart Review    OPCM SW FOLLOW-UP    Case Closure     9/13/2022  Brief Summary: SW telephoned pt for a follow up.Pt has not yet completed ACP documents SW and pt discussed case closure and pt is in agreement with case closure.     Complex Care Plan     Care plan was discussed and completed today with input from patient and/or caregiver.    Patient Instructions     No follow-ups on file.    Todays OPCM Self-Management Care Plan was developed with the patients/caregivers input and was based on identified barriers from todays assessment.  Goals were written today with the patient/caregiver and the patient has agreed to work towards these goals to improve his/her overall well-being. Patient verbalized understanding of the care plan, goals, and all of today's instructions. Encouraged patient/caregiver to communicate with his/her physician and health care team about health conditions and the treatment plan.  Provided my contact information today and encouraged patient/caregiver to call me with any questions as needed.

## 2022-09-19 ENCOUNTER — SPECIALTY PHARMACY (OUTPATIENT)
Dept: PHARMACY | Facility: CLINIC | Age: 77
End: 2022-09-19
Payer: MEDICARE

## 2022-09-19 NOTE — TELEPHONE ENCOUNTER
Specialty Pharmacy - Refill Coordination    Specialty Medication Orders Linked to Encounter      Flowsheet Row Most Recent Value   Medication #1 evolocumab (REPATHA SURECLICK) 140 mg/mL PnIj (Order#[id number], Rx#[id number])            Refill Questions - Documented Responses      Flowsheet Row Most Recent Value   Patient Availability and HIPAA Verification    Does patient want to proceed with activity? Yes   HIPAA/medical authority confirmed? Yes   Relationship to patient of person spoken to? Self   Refill Screening Questions    Would patient like to speak to a pharmacist? No   When does the patient need to receive the medication? 09/25/22   Refill Delivery Questions    How will the patient receive the medication? Delivery Erin   When does the patient need to receive the medication? 09/25/22   Shipping Address Home   Address in [facility] confirmed and updated if neccessary? Yes   Expected Copay ($) 5   Is the patient able to afford the medication copay? Yes   Payment Method CC on file   Days supply of Refill 28   Refill activity completed? Yes   Refill activity plan Refill scheduled   Shipment/Pickup Date: 09/22/22            Current Outpatient Medications   Medication Sig    albuterol (PROVENTIL/VENTOLIN HFA) 90 mcg/actuation inhaler INHALE 2 PUFFS INTO THE LUNGS EVERY 6 (SIX) HOURS AS NEEDED FOR WHEEZING OR SHORTNESS OF BREATH    amLODIPine (NORVASC) 10 MG tablet Take 1 tablet (10 mg total) by mouth once daily.    aspirin (ECOTRIN) 81 MG EC tablet TAKE 1 TABLET BY MOUTH EVERY DAY    atorvastatin (LIPITOR) 80 MG tablet Take 1 tablet (80 mg total) by mouth once daily.    betaxolol 0.5% (BETOPTIC-S) 0.5 % Drop Place 1 drop into both eyes 2 (two) times daily.    cilostazoL (PLETAL) 100 MG Tab Take 1 tablet (100 mg total) by mouth 2 (two) times daily.    evolocumab (REPATHA SURECLICK) 140 mg/mL PnIj Inject 1 mL (140 mg total) into the skin every 14 (fourteen) days.    ezetimibe (ZETIA) 10 mg tablet Take 1  tablet (10 mg total) by mouth once daily. (Patient not taking: No sig reported)    finasteride (PROSCAR) 5 mg tablet Take 1 tablet (5 mg total) by mouth once daily.    fluticasone furoate-vilanteroL (BREO ELLIPTA) 200-25 mcg/dose DsDv diskus inhaler Inhale 1 puff into the lungs once daily. Controller    latanoprost 0.005 % ophthalmic solution Place 1 drop into both eyes every evening.    lisinopriL (PRINIVIL,ZESTRIL) 20 MG tablet Take 1 tablet (20 mg total) by mouth once daily.    tamsulosin (FLOMAX) 0.4 mg Cap Take 1 capsule (0.4 mg total) by mouth once daily.   Last reviewed on 9/7/2022 11:24 AM by Brian Herrera, FELISA    Review of patient's allergies indicates:  No Known Allergies Last reviewed on  9/7/2022 11:24 AM by Brian Herrera      Tasks added this encounter   10/16/2022 - Refill Call (Auto Added)   Tasks due within next 3 months   No tasks due.     Huyen Levin, PharmD  [facility]  [address] [place] [id number]  Phone: [phone]  Fax: [phone]

## 2022-09-26 ENCOUNTER — CLINICAL SUPPORT (OUTPATIENT)
Dept: PULMONOLOGY | Facility: CLINIC | Age: 77
End: 2022-09-26
Payer: MEDICARE

## 2022-09-26 DIAGNOSIS — J45.20 ASTHMA, MILD INTERMITTENT, WELL-CONTROLLED: ICD-10-CM

## 2022-09-26 PROCEDURE — 95012 NITRIC OXIDE EXP GAS DETER: CPT | Mod: S$GLB,,, | Performed by: INTERNAL MEDICINE

## 2022-09-26 PROCEDURE — 95012 PR NITRIC OXIDE EXPIRED GAS DETERMINATION: ICD-10-PCS | Mod: S$GLB,,, | Performed by: INTERNAL MEDICINE

## 2022-09-26 NOTE — PROCEDURES
"Clinical Guide to Interpretation or FeNO Levels :    FeNO  (ppb) LOW INTERMEDIATE HIGH   ADULT VALUES < 25 25-50          > 50   Th2-driven Inflammation Unlikely Likely Significant     Patients FeNO level at this visit : __39__ (ppb)     Interpretation of FeNO measurement in adults:   [ ] FENO is less than 25 ppb implies non eosinophilic airway inflammation or the absence of airway inflammation.   Comment: Low FENO (<25 ppb) in adult asthmatics with persistent symptoms suggests other etiologies for these symptoms and a lower likelihood of benefit from adding or increasing inhaled glucocorticoids.     [X ] FENO between 25 and 50 ppb in adults should be interpreted cautiously with reference to the clinical situation (eg, symptomatic, on or off therapy, current smoking).     [ ] FENO greater than 50 ppb in adults  suggests eosinophilic airway inflammation   Comment: High FENO (>50 ppb) in adult asthmatics even with atypical symptoms suggests glucocorticoid responsiveness. High FENO (>50 ppb) can help identify poor adherence or uncontrolled inflammation in asthma patients with otherwise seemingly "controlled" asthma.     Discussion:   ·A FENO less than 25 ppb in adults and less than 20 ppb in children younger than 12 years of age implies noneosinophilic airway inflammation or the absence of airway inflammation.   ·A FENO greater than 50 ppb in adults or greater than 35 ppb in children suggests eosinophilic airway inflammation.   ·Values of FENO between 25 and 50 ppb in adults (20 to 35 ppb in children) should be interpreted cautiously with reference to the clinical situation (eg, symptomatic, on or off therapy, current smoking).   ·A rising FENO with a greater than 20 percent change and more than 25 ppb (20 ppb in children) from a previously stable level suggests increasing eosinophilic airway inflammation, but there are wide inter-individual differences.   ·A decrease in FENO greater than 20 percent for values over 50 " ppb or more than 10 ppb for values less than 50 ppb may be clinically important.   FENO in other respiratory diseases - Several other diseases are associated with altered levels of exhaled NO: low levels of FENO have been noted in cystic fibrosis, current smoking, pulmonary hypertension, hypothermia, primary ciliary dyskinesia, and bronchopulmonary dysplasia. Elevated FENO has been noted in atopy, nonasthmatic eosinophilic bronchitis, COPD exacerbations, noncystic fibrosis bronchiectasis, and viral upper respiratory infections.     REFERENCE:   ATS Board of Directors, December 2004, and by the ERS Executive Committee, June 2004. ATS/ERS Recommendations for Standardized Procedures for the Online and Offline Measurement of Exhaled Lower Respiratory Nitric Oxide and Nasal Nitric Oxide. Guideline 2005

## 2022-09-29 ENCOUNTER — HOSPITAL ENCOUNTER (OUTPATIENT)
Dept: RADIOLOGY | Facility: HOSPITAL | Age: 77
Discharge: HOME | End: 2022-09-29
Attending: INTERNAL MEDICINE
Payer: MEDICARE

## 2022-09-29 DIAGNOSIS — R91.8 MULTIPLE PULMONARY NODULES: ICD-10-CM

## 2022-09-29 PROCEDURE — 71250 CT CHEST WITHOUT CONTRAST: ICD-10-PCS | Mod: 26,,, | Performed by: RADIOLOGY

## 2022-09-29 PROCEDURE — 71250 CT THORAX DX C-: CPT | Mod: TC

## 2022-09-29 PROCEDURE — 71250 CT THORAX DX C-: CPT | Mod: 26,,, | Performed by: RADIOLOGY

## 2022-10-05 NOTE — Clinical Note
Pt. Frequently monitored and assessed. A&Ox4, up x1 and walker. Refuses night bed alarm despite education on importance. All other fall precaution interventions maintained. Denies dizziness/light headiness upon ambulation. VSS. RA- denies SOB. Denies N/V/D. C/o of lower back pain. Heating pad applied to the area with good relief. Urinating adequate amounts of clear urine via external cath. No BM during shift. R PP C/D/I saline flushed, good blood return, saline locked. AM labs drawn and sent. All meds given per MAR. Will CTM & notify MD of any changes.     AM labs:   K 3.3. GO Dr. Braga made aware. 40 mEq p.o KCL given per orders.   Your patient was seen today for a HRA visit. Abnormalities (BOLDED) have been identified during this visit that may require additional testing and  follow up. I have included a copy of my visit note, please review the note and feel free to contact me with any questions.  Thank you for allowing me to participate in the care of your patients.  Chacha Osborn NP

## 2022-10-06 ENCOUNTER — OFFICE VISIT (OUTPATIENT)
Dept: SLEEP MEDICINE | Facility: CLINIC | Age: 77
End: 2022-10-06
Payer: MEDICARE

## 2022-10-06 VITALS
OXYGEN SATURATION: 100 % | HEIGHT: 65 IN | RESPIRATION RATE: 17 BRPM | DIASTOLIC BLOOD PRESSURE: 78 MMHG | SYSTOLIC BLOOD PRESSURE: 112 MMHG | WEIGHT: 170.19 LBS | BODY MASS INDEX: 28.36 KG/M2 | HEART RATE: 57 BPM

## 2022-10-06 DIAGNOSIS — J45.20 ASTHMA, MILD INTERMITTENT, WELL-CONTROLLED: Primary | ICD-10-CM

## 2022-10-06 DIAGNOSIS — R91.8 MULTIPLE PULMONARY NODULES: ICD-10-CM

## 2022-10-06 DIAGNOSIS — I10 PRIMARY HYPERTENSION: Chronic | ICD-10-CM

## 2022-10-06 DIAGNOSIS — E78.2 MIXED HYPERLIPIDEMIA: ICD-10-CM

## 2022-10-06 DIAGNOSIS — J47.9 BRONCHIECTASIS, UNCOMPLICATED: ICD-10-CM

## 2022-10-06 DIAGNOSIS — I27.9 PULMONARY HEART DISEASE: ICD-10-CM

## 2022-10-06 PROCEDURE — 3078F DIAST BP <80 MM HG: CPT | Mod: CPTII,S$GLB,, | Performed by: INTERNAL MEDICINE

## 2022-10-06 PROCEDURE — 3074F PR MOST RECENT SYSTOLIC BLOOD PRESSURE < 130 MM HG: ICD-10-PCS | Mod: CPTII,S$GLB,, | Performed by: INTERNAL MEDICINE

## 2022-10-06 PROCEDURE — 1159F PR MEDICATION LIST DOCUMENTED IN MEDICAL RECORD: ICD-10-PCS | Mod: CPTII,S$GLB,, | Performed by: INTERNAL MEDICINE

## 2022-10-06 PROCEDURE — 3078F PR MOST RECENT DIASTOLIC BLOOD PRESSURE < 80 MM HG: ICD-10-PCS | Mod: CPTII,S$GLB,, | Performed by: INTERNAL MEDICINE

## 2022-10-06 PROCEDURE — 1101F PR PT FALLS ASSESS DOC 0-1 FALLS W/OUT INJ PAST YR: ICD-10-PCS | Mod: CPTII,S$GLB,, | Performed by: INTERNAL MEDICINE

## 2022-10-06 PROCEDURE — 1160F RVW MEDS BY RX/DR IN RCRD: CPT | Mod: CPTII,S$GLB,, | Performed by: INTERNAL MEDICINE

## 2022-10-06 PROCEDURE — 1160F PR REVIEW ALL MEDS BY PRESCRIBER/CLIN PHARMACIST DOCUMENTED: ICD-10-PCS | Mod: CPTII,S$GLB,, | Performed by: INTERNAL MEDICINE

## 2022-10-06 PROCEDURE — 99999 PR PBB SHADOW E&M-EST. PATIENT-LVL IV: ICD-10-PCS | Mod: PBBFAC,,, | Performed by: INTERNAL MEDICINE

## 2022-10-06 PROCEDURE — 3074F SYST BP LT 130 MM HG: CPT | Mod: CPTII,S$GLB,, | Performed by: INTERNAL MEDICINE

## 2022-10-06 PROCEDURE — 99214 PR OFFICE/OUTPT VISIT, EST, LEVL IV, 30-39 MIN: ICD-10-PCS | Mod: S$GLB,,, | Performed by: INTERNAL MEDICINE

## 2022-10-06 PROCEDURE — 99499 RISK ADDL DX/OHS AUDIT: ICD-10-PCS | Mod: S$GLB,,, | Performed by: INTERNAL MEDICINE

## 2022-10-06 PROCEDURE — 99999 PR PBB SHADOW E&M-EST. PATIENT-LVL IV: CPT | Mod: PBBFAC,,, | Performed by: INTERNAL MEDICINE

## 2022-10-06 PROCEDURE — 1159F MED LIST DOCD IN RCRD: CPT | Mod: CPTII,S$GLB,, | Performed by: INTERNAL MEDICINE

## 2022-10-06 PROCEDURE — 99214 OFFICE O/P EST MOD 30 MIN: CPT | Mod: S$GLB,,, | Performed by: INTERNAL MEDICINE

## 2022-10-06 PROCEDURE — 1101F PT FALLS ASSESS-DOCD LE1/YR: CPT | Mod: CPTII,S$GLB,, | Performed by: INTERNAL MEDICINE

## 2022-10-06 PROCEDURE — 3288F PR FALLS RISK ASSESSMENT DOCUMENTED: ICD-10-PCS | Mod: CPTII,S$GLB,, | Performed by: INTERNAL MEDICINE

## 2022-10-06 PROCEDURE — 99499 UNLISTED E&M SERVICE: CPT | Mod: S$GLB,,, | Performed by: INTERNAL MEDICINE

## 2022-10-06 PROCEDURE — 3288F FALL RISK ASSESSMENT DOCD: CPT | Mod: CPTII,S$GLB,, | Performed by: INTERNAL MEDICINE

## 2022-10-06 RX ORDER — ALBUTEROL SULFATE 90 UG/1
AEROSOL, METERED RESPIRATORY (INHALATION)
Qty: 18 G | Refills: 11 | Status: SHIPPED | OUTPATIENT
Start: 2022-10-06 | End: 2023-10-05 | Stop reason: SDUPTHER

## 2022-10-06 RX ORDER — FLUTICASONE FUROATE AND VILANTEROL 200; 25 UG/1; UG/1
1 POWDER RESPIRATORY (INHALATION) DAILY
Qty: 180 EACH | Refills: 3 | Status: SHIPPED | OUTPATIENT
Start: 2022-10-06 | End: 2023-10-05 | Stop reason: SDUPTHER

## 2022-10-06 NOTE — ASSESSMENT & PLAN NOTE
Controlled, ACT score 24  FeNo was 39  Stable on Breo and albuterol if needed   Normal spirometry    Current immunisations

## 2022-10-06 NOTE — PROGRESS NOTES
Pulmonary Outpatient Follow Up Visit     Subjective:       Patient ID: Alex Milner is a 77 y.o. male.  The following portions of the patient's history were reviewed and updated as appropriate:   He  has a past medical history of Atherosclerosis of coronary artery (8/10/2022), Atherosclerotic PVD with intermittent claudication (8/16/2019), Atrial fibrillation, Back pain, Bilateral inguinal hernia, Calcified granuloma of lung (10/3/2017), Diverticulosis, Diverticulosis (10/28/13), ED (erectile dysfunction), Elevated PSA, Granuloma of liver, Hyperlipidemia, Hypertension, Moderate persistent asthma without complication (9/26/2017), Obesity, Personal history of colonic polyps (2013), Pulmonary heart disease (8/10/2022), Tobacco dependence, Uncomplicated alcohol dependence (11/27/2019), and Urinary tract infection.  He does not have any pertinent problems on file.  He  has a past surgical history that includes Hernia repair; Prostate biopsy; Aortography with serialography (N/A, 10/21/2019); and Aortography with serialography (N/A, 11/14/2019).  His family history includes Cancer in his brother, father, and mother; Cancer (age of onset: 64) in his sister; Prostate cancer in his brother.  He  reports that he quit smoking about 37 years ago. His smoking use included cigarettes. He started smoking about 62 years ago. He has a 30.00 pack-year smoking history. He has never used smokeless tobacco. He reports current alcohol use of about 12.0 standard drinks per week. He reports that he does not use drugs.  He has a current medication list which includes the following prescription(s): aspirin, atorvastatin, betaxolol 0.5%, cilostazol, repatha sureclick, finasteride, latanoprost, lisinopril, tamsulosin, albuterol, amlodipine, ezetimibe, and fluticasone furoate-vilanterol.  Current Outpatient Medications on File Prior to Visit   Medication Sig Dispense Refill    aspirin (ECOTRIN) 81  MG EC tablet TAKE 1 TABLET BY MOUTH EVERY DAY 90 tablet 2    atorvastatin (LIPITOR) 80 MG tablet Take 1 tablet (80 mg total) by mouth once daily. 90 tablet 3    betaxolol 0.5% (BETOPTIC-S) 0.5 % Drop Place 1 drop into both eyes 2 (two) times daily. 10 mL 4    cilostazoL (PLETAL) 100 MG Tab Take 1 tablet (100 mg total) by mouth 2 (two) times daily. 60 tablet 11    evolocumab (REPATHA SURECLICK) 140 mg/mL PnIj Inject 1 mL (140 mg total) into the skin every 14 (fourteen) days. 2 each 5    finasteride (PROSCAR) 5 mg tablet Take 1 tablet (5 mg total) by mouth once daily. 90 tablet 3    latanoprost 0.005 % ophthalmic solution Place 1 drop into both eyes every evening. 2 mL 11    lisinopriL (PRINIVIL,ZESTRIL) 20 MG tablet Take 1 tablet (20 mg total) by mouth once daily. 90 tablet 1    tamsulosin (FLOMAX) 0.4 mg Cap Take 1 capsule (0.4 mg total) by mouth once daily. 90 capsule 3    [DISCONTINUED] albuterol (PROVENTIL/VENTOLIN HFA) 90 mcg/actuation inhaler INHALE 2 PUFFS INTO THE LUNGS EVERY 6 (SIX) HOURS AS NEEDED FOR WHEEZING OR SHORTNESS OF BREATH 18 g 11    [DISCONTINUED] fluticasone furoate-vilanteroL (BREO ELLIPTA) 200-25 mcg/dose DsDv diskus inhaler Inhale 1 puff into the lungs once daily. Controller 180 each 3    amLODIPine (NORVASC) 10 MG tablet Take 1 tablet (10 mg total) by mouth once daily. 30 tablet 11    ezetimibe (ZETIA) 10 mg tablet Take 1 tablet (10 mg total) by mouth once daily. (Patient not taking: No sig reported) 90 tablet 3     No current facility-administered medications on file prior to visit.     He has No Known Allergies..     Asthma Control Test  In the past 4  weeks, how much of the time did your asthma keep you from getting as much done at work, school or at home?: None of the time  During the past 4 weeks, how often have you had shortness of breath?: Not at all  During the past 4 weeks, how often did your asthma symptoms (wheezing, couging, shortness of breath, chest tightness or pain) wake you  up at night or earlier than usual in the morning?: Not at all  During the past 4 weeks, how often have you used your rescue inhaler or nebulizer medication (such as albuterol)?: Not at all  How would you rate your asthma control during the past 4 weeks?: Well controlled  If your score is 19 or less, your asthma may not be under control: 24      Chief Complaint: Asthma      Alex Milner is 75 y.o.  Last visit 03/10/2020  Asthma and pulmonary nodules  Out of Inhalers  Act score 21  Subtle declined on FEV1 and FVC today  Triggers Allergies  Former smoker   : STATION 16    05/19/2022  Last visit 06/03/2021  Here to review Gunner  Normal  Improved FEV1 and FVC  No recent exacebation  FEV1 improved from 1.43L to 1.83L  FVC  Improved from 2.03L to 2.56L    10/06/2022  Last seen 05/19/2022  Here to review FeNO and Chest CT  Chest CT Normal:  Annual surveillance CXR  FeNo was 39  ACT was 24  Stable on BREO and CB  No Cough, No SOB  Due for flushot      Additional complaints:  Respiratory ROS: no cough, shortness of breath, or wheezing          Review of Systems   Respiratory:  Negative for apnea, cough, hemoptysis, choking, shortness of breath, orthopnea, previous hospitialization due to pulmonary problems, asthma nighttime symptoms, pleurisy, dyspnea on extertion, use of rescue inhaler and Paroxysmal Nocturnal Dyspnea.    All other systems reviewed and are negative.    Outpatient Encounter Medications as of 10/6/2022   Medication Sig Dispense Refill    aspirin (ECOTRIN) 81 MG EC tablet TAKE 1 TABLET BY MOUTH EVERY DAY 90 tablet 2    atorvastatin (LIPITOR) 80 MG tablet Take 1 tablet (80 mg total) by mouth once daily. 90 tablet 3    betaxolol 0.5% (BETOPTIC-S) 0.5 % Drop Place 1 drop into both eyes 2 (two) times daily. 10 mL 4    cilostazoL (PLETAL) 100 MG Tab Take 1 tablet (100 mg total) by mouth 2 (two) times daily. 60 tablet 11    evolocumab (REPATHA SURECLICK) 140 mg/mL PnIj Inject 1 mL (140 mg total)  "into the skin every 14 (fourteen) days. 2 each 5    finasteride (PROSCAR) 5 mg tablet Take 1 tablet (5 mg total) by mouth once daily. 90 tablet 3    latanoprost 0.005 % ophthalmic solution Place 1 drop into both eyes every evening. 2 mL 11    lisinopriL (PRINIVIL,ZESTRIL) 20 MG tablet Take 1 tablet (20 mg total) by mouth once daily. 90 tablet 1    tamsulosin (FLOMAX) 0.4 mg Cap Take 1 capsule (0.4 mg total) by mouth once daily. 90 capsule 3    [DISCONTINUED] albuterol (PROVENTIL/VENTOLIN HFA) 90 mcg/actuation inhaler INHALE 2 PUFFS INTO THE LUNGS EVERY 6 (SIX) HOURS AS NEEDED FOR WHEEZING OR SHORTNESS OF BREATH 18 g 11    [DISCONTINUED] fluticasone furoate-vilanteroL (BREO ELLIPTA) 200-25 mcg/dose DsDv diskus inhaler Inhale 1 puff into the lungs once daily. Controller 180 each 3    albuterol (PROVENTIL/VENTOLIN HFA) 90 mcg/actuation inhaler INHALE 2 PUFFS INTO THE LUNGS EVERY 6 (SIX) HOURS AS NEEDED FOR WHEEZING OR SHORTNESS OF BREATH 18 g 11    amLODIPine (NORVASC) 10 MG tablet Take 1 tablet (10 mg total) by mouth once daily. 30 tablet 11    ezetimibe (ZETIA) 10 mg tablet Take 1 tablet (10 mg total) by mouth once daily. (Patient not taking: No sig reported) 90 tablet 3    fluticasone furoate-vilanteroL (BREO ELLIPTA) 200-25 mcg/dose DsDv diskus inhaler Inhale 1 puff into the lungs once daily. Controller 180 each 3     No facility-administered encounter medications on file as of 10/6/2022.       Objective:     Vital Signs (Most Recent)  Vital Signs  Pulse: (!) 57  Resp: 17  SpO2: 100 %  BP: 112/78  Height and Weight  Height: 5' 5" (165.1 cm)  Weight: 77.2 kg (170 lb 3.1 oz)  BSA (Calculated - sq m): 1.88 sq meters  BMI (Calculated): 28.3  Weight in (lb) to have BMI = 25: 149.9]  Wt Readings from Last 2 Encounters:   10/06/22 77.2 kg (170 lb 3.1 oz)   09/07/22 76 kg (167 lb 8.8 oz)       Physical Exam   Constitutional: He is oriented to person, place, and time. He appears well-developed and well-nourished.   HENT: "   Head: Normocephalic.   Mouth/Throat: Mallampati Score: II.   Neck: No JVD present.   Cardiovascular: Normal rate and intact distal pulses.   No murmur heard.  Pulmonary/Chest: Normal expansion, effort normal and breath sounds normal.   Abdominal: Soft. Bowel sounds are normal.   Musculoskeletal:         General: No edema. Normal range of motion.      Cervical back: Normal range of motion and neck supple.   Lymphadenopathy:     He has no axillary adenopathy.   Neurological: He is alert and oriented to person, place, and time.   Skin: Skin is warm and dry.   Psychiatric: He has a normal mood and affect.   Nursing note and vitals reviewed.    Laboratory  Lab Results   Component Value Date    WBC 4.78 07/26/2022    RBC 5.34 07/26/2022    HGB 14.1 07/26/2022    HCT 44.8 07/26/2022    MCV 84 07/26/2022    MCH 26.4 (L) 07/26/2022    MCHC 31.5 (L) 07/26/2022    RDW 14.7 (H) 07/26/2022     07/26/2022    MPV 10.1 07/26/2022    GRAN 2.1 07/26/2022    GRAN 44.0 07/26/2022    LYMPH 1.8 07/26/2022    LYMPH 36.8 07/26/2022    MONO 0.6 07/26/2022    MONO 11.9 07/26/2022    EOS 0.3 07/26/2022    BASO 0.05 07/26/2022    EOSINOPHIL 6.1 07/26/2022    BASOPHIL 1.0 07/26/2022       BMP  Lab Results   Component Value Date     07/26/2022    K 5.3 (H) 07/26/2022     07/26/2022    CO2 28 07/26/2022    BUN 10 07/26/2022    CREATININE 1.0 07/26/2022    CALCIUM 9.2 07/26/2022    ANIONGAP 9 07/26/2022    ESTGFRAFRICA >60.0 07/26/2022    EGFRNONAA >60.0 07/26/2022    AST 16 07/26/2022    ALT 11 07/26/2022    PROT 6.6 07/26/2022       No results found for: BNP    Lab Results   Component Value Date    TSH 0.911 06/27/2019       No results found for: SEDRATE    No results found for: CRP  Lab Results   Component Value Date    IGE <35 03/19/2021        No results found for: ASPERGILLUS  No results found for: AFUMIGATUSCL     No results found for: ACE     Diagnostic Results:  I have personally reviewed today the following  studies:   CT Chest Without Contrast  Narrative: EXAMINATION:  CT CHEST WITHOUT CONTRAST    CLINICAL HISTORY:  Follow-up multiple pulmonary nodules; Other nonspecific abnormal finding of lung field    TECHNIQUE:  Low dose axial images, sagittal and coronal reformations were obtained from the thoracic inlet to the lung bases. Contrast was not administered.    COMPARISON:  06/03/2021 and 03/12/2020    FINDINGS:  Thoracic aorta, great vessels and heart are unchanged.  No pericardial or pleural effusion.  No pathologic axillary, mediastinal or hilar lymph nodes.    Lungs demonstrate no acute opacity or detrimental change.  Stable both calcified and noncalcified pulmonary nodules.    Visualized upper abdominal structures demonstrate no acute abnormality.  No acute osseous abnormality.  Impression: No acute abnormality or detrimental change.    Electronically signed by: Eze Jamison MD  Date:    09/29/2022  Time:    10:12    Clinical Guide to Interpretation or FeNO Levels :     FeNO  (ppb) LOW INTERMEDIATE HIGH   ADULT VALUES < 25 25-50          > 50   Th2-driven Inflammation Unlikely Likely Significant      Patients FeNO level at this visit : __39__ (ppb)      Interpretation of FeNO measurement in adults:   [ ] FENO is less than 25 ppb implies non eosinophilic airway inflammation or the absence of airway inflammation.   Comment: Low FENO (<25 ppb) in adult asthmatics with persistent symptoms suggests other etiologies for these symptoms and a lower likelihood of benefit from adding or increasing inhaled glucocorticoids.     [X ] FENO between 25 and 50 ppb in adults should be interpreted cautiously with reference to the clinical situation (eg, symptomatic, on or off therapy, current smoking).     [ ] FENO greater than 50 ppb in adults  suggests eosinophilic airway inflammation   Comment: High FENO (>50 ppb) in adult asthmatics even with atypical symptoms suggests glucocorticoid responsiveness. High FENO (>50 ppb) can  "help identify poor adherence or uncontrolled inflammation in asthma patients with otherwise seemingly "controlled" asthma.     Discussion:   ·A FENO less than 25 ppb in adults and less than 20 ppb in children younger than 12 years of age implies noneosinophilic airway inflammation or the absence of airway inflammation.   ·A FENO greater than 50 ppb in adults or greater than 35 ppb in children suggests eosinophilic airway inflammation.   ·Values of FENO between 25 and 50 ppb in adults (20 to 35 ppb in children) should be interpreted cautiously with reference to the clinical situation (eg, symptomatic, on or off therapy, current smoking).   ·A rising FENO with a greater than 20 percent change and more than 25 ppb (20 ppb in children) from a previously stable level suggests increasing eosinophilic airway inflammation, but there are wide inter-individual differences.   ·A decrease in FENO greater than 20 percent for values over 50 ppb or more than 10 ppb for values less than 50 ppb may be clinically important.   FENO in other respiratory diseases - Several other diseases are associated with altered levels of exhaled NO: low levels of FENO have been noted in cystic fibrosis, current smoking, pulmonary hypertension, hypothermia, primary ciliary dyskinesia, and bronchopulmonary dysplasia. Elevated FENO has been noted in atopy, nonasthmatic eosinophilic bronchitis, COPD exacerbations, noncystic fibrosis bronchiectasis, and viral upper respiratory infections.     REFERENCE:   ATS Board of Directors, December 2004, and by the ERS Executive Committee, June 2004. ATS/ERS Recommendations for Standardized Procedures for the Online and Offline Measurement of Exhaled Lower Respiratory Nitric Oxide and Nasal Nitric Oxide. Guideline 2005        ROBSON  FEV1: 1.83L( 86.9%), FVC 2.56L( 92.1%)  FEV1/FVC 72    Assessment/Plan:     Problem List Items Addressed This Visit       Hypertension (Chronic)     STABLE on LISINOPRIL NORVASC         " Bronchiectasis, uncomplicated    Pulmonary heart disease    Hyperlipidemia     STABLE ON ZETIA         Multiple pulmonary nodules     Stable 7 mm nodule  Quit smoking 1985  Imaging since 2019    Annual CXR surveillance  Low risk         Asthma, well controlled - Primary     Controlled, ACT score 24  FeNo was 39  Stable on Breo and albuterol if needed   Normal spirometry    Current immunisations           Relevant Medications    albuterol (PROVENTIL/VENTOLIN HFA) 90 mcg/actuation inhaler    fluticasone furoate-vilanteroL (BREO ELLIPTA) 200-25 mcg/dose DsDv diskus inhaler    Other Relevant Orders    X-Ray Chest PA And Lateral    Spirometry with/without bronchodilator    Fraction of  Nitric Oxide     Requested Prescriptions     Signed Prescriptions Disp Refills    albuterol (PROVENTIL/VENTOLIN HFA) 90 mcg/actuation inhaler 18 g 11     Sig: INHALE 2 PUFFS INTO THE LUNGS EVERY 6 (SIX) HOURS AS NEEDED FOR WHEEZING OR SHORTNESS OF BREATH    fluticasone furoate-vilanteroL (BREO ELLIPTA) 200-25 mcg/dose DsDv diskus inhaler 180 each 3     Sig: Inhale 1 puff into the lungs once daily. Controller      Stable asthma  FEV1 and FVC improved    Follow up in about 1 year (around 10/6/2023), or CXR, FeNo, Gunner.    This note was prepared using voice recognition system and is likely to have sound alike errors that may have been overlooked even after proof reading.  Please call me with any questions    Discussed diagnosis, its evaluation, treatment and usual course. All questions answered.      Dominik Hernandez MD

## 2022-10-11 ENCOUNTER — OFFICE VISIT (OUTPATIENT)
Dept: OPHTHALMOLOGY | Facility: CLINIC | Age: 77
End: 2022-10-11
Payer: MEDICARE

## 2022-10-11 DIAGNOSIS — H40.1121 PRIMARY OPEN ANGLE GLAUCOMA (POAG) OF LEFT EYE, MILD STAGE: ICD-10-CM

## 2022-10-11 DIAGNOSIS — H40.1113 PRIMARY OPEN ANGLE GLAUCOMA (POAG) OF RIGHT EYE, SEVERE STAGE: Primary | ICD-10-CM

## 2022-10-11 PROCEDURE — 92012 INTRM OPH EXAM EST PATIENT: CPT | Mod: S$GLB,,, | Performed by: OPTOMETRIST

## 2022-10-11 PROCEDURE — 1159F PR MEDICATION LIST DOCUMENTED IN MEDICAL RECORD: ICD-10-PCS | Mod: CPTII,S$GLB,, | Performed by: OPTOMETRIST

## 2022-10-11 PROCEDURE — 99999 PR PBB SHADOW E&M-EST. PATIENT-LVL II: ICD-10-PCS | Mod: PBBFAC,,, | Performed by: OPTOMETRIST

## 2022-10-11 PROCEDURE — 1159F MED LIST DOCD IN RCRD: CPT | Mod: CPTII,S$GLB,, | Performed by: OPTOMETRIST

## 2022-10-11 PROCEDURE — 1160F PR REVIEW ALL MEDS BY PRESCRIBER/CLIN PHARMACIST DOCUMENTED: ICD-10-PCS | Mod: CPTII,S$GLB,, | Performed by: OPTOMETRIST

## 2022-10-11 PROCEDURE — 92083 HUMPHREY VISUAL FIELD - OU - BOTH EYES: ICD-10-PCS | Mod: S$GLB,,, | Performed by: OPTOMETRIST

## 2022-10-11 PROCEDURE — 92083 EXTENDED VISUAL FIELD XM: CPT | Mod: S$GLB,,, | Performed by: OPTOMETRIST

## 2022-10-11 PROCEDURE — 1160F RVW MEDS BY RX/DR IN RCRD: CPT | Mod: CPTII,S$GLB,, | Performed by: OPTOMETRIST

## 2022-10-11 PROCEDURE — 99999 PR PBB SHADOW E&M-EST. PATIENT-LVL II: CPT | Mod: PBBFAC,,, | Performed by: OPTOMETRIST

## 2022-10-11 PROCEDURE — 92012 PR EYE EXAM, EST PATIENT,INTERMED: ICD-10-PCS | Mod: S$GLB,,, | Performed by: OPTOMETRIST

## 2022-10-11 RX ORDER — DORZOLAMIDE HCL 20 MG/ML
1 SOLUTION/ DROPS OPHTHALMIC 3 TIMES DAILY
Qty: 10 ML | Refills: 3 | Status: SHIPPED | OUTPATIENT
Start: 2022-10-11 | End: 2023-01-09

## 2022-10-11 RX ORDER — BRIMONIDINE TARTRATE 1.5 MG/ML
1 SOLUTION/ DROPS OPHTHALMIC 3 TIMES DAILY
Qty: 15 ML | Refills: 4 | Status: SHIPPED | OUTPATIENT
Start: 2022-10-11 | End: 2023-08-03 | Stop reason: SDUPTHER

## 2022-10-11 NOTE — PROGRESS NOTES
HPI     Glaucoma            Comments: Medication eye drops: Latanoprost qhs OU  Last HVF: 10/11/22  Last gOCT: 9/07/22  Last SDP: none      [facility] could not afford to get            Last edited by Beena Levy MA on 10/11/2022  2:23 PM.            Assessment /Plan     For exam results, see Encounter Report.    Primary open angle glaucoma (POAG) of right eye, severe stage  -     Coe Visual Field - OU - Extended - Both Eyes  Primary open angle glaucoma (POAG) of left eye, mild stage  -     Coe Visual Field - OU - Extended - Both Eyes  -     dorzolamide (TRUSOPT) 2 % ophthalmic solution; Place 1 drop into both eyes 3 (three) times daily.  Dispense: 10 mL; Refill: 3  -     brimonidine 0.15 % OPTH DROP (ALPHAGAN) 0.15 % ophthalmic solution; Place 1 drop into both eyes 3 (three) times daily.  Dispense: 15 mL; Refill: 4  IOP very elevated today above target OS>OD  VF worsened today OD, stable OS  Start Trusopt tid OU and Brimonidine tid OU  Continue Latanoprost qhs OU  Monitor 1 month      RTC 1 month for IOP check or PRN  Discussed above and all questions were answered.

## 2022-10-13 ENCOUNTER — OFFICE VISIT (OUTPATIENT)
Dept: INTERNAL MEDICINE | Facility: CLINIC | Age: 77
End: 2022-10-13
Payer: MEDICARE

## 2022-10-13 VITALS
DIASTOLIC BLOOD PRESSURE: 74 MMHG | BODY MASS INDEX: 28.21 KG/M2 | HEART RATE: 60 BPM | OXYGEN SATURATION: 95 % | WEIGHT: 169.31 LBS | TEMPERATURE: 98 F | HEIGHT: 65 IN | SYSTOLIC BLOOD PRESSURE: 120 MMHG

## 2022-10-13 DIAGNOSIS — Z00.00 ROUTINE GENERAL MEDICAL EXAMINATION AT HEALTH CARE FACILITY: ICD-10-CM

## 2022-10-13 DIAGNOSIS — R91.8 MULTIPLE PULMONARY NODULES: ICD-10-CM

## 2022-10-13 DIAGNOSIS — E78.2 MIXED HYPERLIPIDEMIA: ICD-10-CM

## 2022-10-13 DIAGNOSIS — I10 PRIMARY HYPERTENSION: Chronic | ICD-10-CM

## 2022-10-13 DIAGNOSIS — R16.0 LIVER MASS: ICD-10-CM

## 2022-10-13 DIAGNOSIS — I70.219 ATHEROSCLEROTIC PVD WITH INTERMITTENT CLAUDICATION: ICD-10-CM

## 2022-10-13 DIAGNOSIS — Z12.11 COLON CANCER SCREENING: ICD-10-CM

## 2022-10-13 PROCEDURE — 99397 PR PREVENTIVE VISIT,EST,65 & OVER: ICD-10-PCS | Mod: 25,S$GLB,, | Performed by: INTERNAL MEDICINE

## 2022-10-13 PROCEDURE — 1126F AMNT PAIN NOTED NONE PRSNT: CPT | Mod: CPTII,S$GLB,, | Performed by: INTERNAL MEDICINE

## 2022-10-13 PROCEDURE — 1101F PR PT FALLS ASSESS DOC 0-1 FALLS W/OUT INJ PAST YR: ICD-10-PCS | Mod: CPTII,S$GLB,, | Performed by: INTERNAL MEDICINE

## 2022-10-13 PROCEDURE — 1160F PR REVIEW ALL MEDS BY PRESCRIBER/CLIN PHARMACIST DOCUMENTED: ICD-10-PCS | Mod: CPTII,S$GLB,, | Performed by: INTERNAL MEDICINE

## 2022-10-13 PROCEDURE — 3288F PR FALLS RISK ASSESSMENT DOCUMENTED: ICD-10-PCS | Mod: CPTII,S$GLB,, | Performed by: INTERNAL MEDICINE

## 2022-10-13 PROCEDURE — 3288F FALL RISK ASSESSMENT DOCD: CPT | Mod: CPTII,S$GLB,, | Performed by: INTERNAL MEDICINE

## 2022-10-13 PROCEDURE — 1126F PR PAIN SEVERITY QUANTIFIED, NO PAIN PRESENT: ICD-10-PCS | Mod: CPTII,S$GLB,, | Performed by: INTERNAL MEDICINE

## 2022-10-13 PROCEDURE — 1159F MED LIST DOCD IN RCRD: CPT | Mod: CPTII,S$GLB,, | Performed by: INTERNAL MEDICINE

## 2022-10-13 PROCEDURE — 90694 VACC AIIV4 NO PRSRV 0.5ML IM: CPT | Mod: S$GLB,,, | Performed by: INTERNAL MEDICINE

## 2022-10-13 PROCEDURE — 1159F PR MEDICATION LIST DOCUMENTED IN MEDICAL RECORD: ICD-10-PCS | Mod: CPTII,S$GLB,, | Performed by: INTERNAL MEDICINE

## 2022-10-13 PROCEDURE — 99397 PER PM REEVAL EST PAT 65+ YR: CPT | Mod: 25,S$GLB,, | Performed by: INTERNAL MEDICINE

## 2022-10-13 PROCEDURE — G0008 FLU VACCINE - QUADRIVALENT - ADJUVANTED: ICD-10-PCS | Mod: S$GLB,,, | Performed by: INTERNAL MEDICINE

## 2022-10-13 PROCEDURE — 3078F PR MOST RECENT DIASTOLIC BLOOD PRESSURE < 80 MM HG: ICD-10-PCS | Mod: CPTII,S$GLB,, | Performed by: INTERNAL MEDICINE

## 2022-10-13 PROCEDURE — 1101F PT FALLS ASSESS-DOCD LE1/YR: CPT | Mod: CPTII,S$GLB,, | Performed by: INTERNAL MEDICINE

## 2022-10-13 PROCEDURE — 3074F PR MOST RECENT SYSTOLIC BLOOD PRESSURE < 130 MM HG: ICD-10-PCS | Mod: CPTII,S$GLB,, | Performed by: INTERNAL MEDICINE

## 2022-10-13 PROCEDURE — 90694 FLU VACCINE - QUADRIVALENT - ADJUVANTED: ICD-10-PCS | Mod: S$GLB,,, | Performed by: INTERNAL MEDICINE

## 2022-10-13 PROCEDURE — 99999 PR PBB SHADOW E&M-EST. PATIENT-LVL V: ICD-10-PCS | Mod: PBBFAC,,, | Performed by: INTERNAL MEDICINE

## 2022-10-13 PROCEDURE — 3078F DIAST BP <80 MM HG: CPT | Mod: CPTII,S$GLB,, | Performed by: INTERNAL MEDICINE

## 2022-10-13 PROCEDURE — 1160F RVW MEDS BY RX/DR IN RCRD: CPT | Mod: CPTII,S$GLB,, | Performed by: INTERNAL MEDICINE

## 2022-10-13 PROCEDURE — 3074F SYST BP LT 130 MM HG: CPT | Mod: CPTII,S$GLB,, | Performed by: INTERNAL MEDICINE

## 2022-10-13 PROCEDURE — 99999 PR PBB SHADOW E&M-EST. PATIENT-LVL V: CPT | Mod: PBBFAC,,, | Performed by: INTERNAL MEDICINE

## 2022-10-13 PROCEDURE — G0008 ADMIN INFLUENZA VIRUS VAC: HCPCS | Mod: S$GLB,,, | Performed by: INTERNAL MEDICINE

## 2022-10-13 NOTE — PROGRESS NOTES
Subjective:       Patient ID: Alex Milner is a 77 y.o. male.    Chief Complaint: Follow-up    Follow-up  Pertinent negatives include no arthralgias, chest pain, coughing, fever, headaches, myalgias, nausea, rash or vomiting.  Patient is a 77-year-old male presenting today for updated physical exam review of chronic health issues.  Patient has history of hypertension, hyperlipidemia, peripheral vascular disease, multiple pulmonary nodules and history of a liver mass which resolved.    Patient indicates he has been doing well.  He has not had any significant issues at this time.  He continues take his medications as prescribed.  He is feeling good without any issues.  He continues to follow with Urology in regards to elevated PSA and he is having no issues there.    We discussed colon cancer screening.  He is overdue for his screening.  He would like to get that done so were going to go ahead and get that set up for him.    He continues to follow with Pulmonary for his pulmonary nodules.  There has been no changes there.  The liver lesion that was evaluated previously had resolved on the last set of imaging.    Review of Systems   Constitutional:  Negative for fever and unexpected weight change.   HENT:  Negative for hearing loss, postnasal drip and rhinorrhea.    Eyes:  Negative for pain and visual disturbance.   Respiratory:  Negative for cough, shortness of breath and wheezing.    Cardiovascular:  Negative for chest pain and palpitations.   Gastrointestinal:  Negative for constipation, diarrhea, nausea and vomiting.   Genitourinary:  Negative for dysuria and hematuria.   Musculoskeletal:  Negative for arthralgias, back pain, myalgias and neck stiffness.   Skin:  Negative for pallor and rash.   Neurological:  Negative for seizures, syncope and headaches.   Hematological:  Negative for adenopathy.   Psychiatric/Behavioral:  Negative for dysphoric mood. The patient is not nervous/anxious.      Objective:   BP  "120/74   Pulse 60   Temp 97.6 °F (36.4 °C) (Temporal)   Ht 5' 5" (1.651 m)   Wt 76.8 kg (169 lb 5 oz)   SpO2 95%   BMI 28.18 kg/m²      Physical Exam  Vitals reviewed.   Constitutional:       General: He is not in acute distress.     Appearance: He is well-developed.   HENT:      Head: Normocephalic and atraumatic.      Right Ear: Tympanic membrane and ear canal normal.      Left Ear: Tympanic membrane and ear canal normal.   Eyes:      Pupils: Pupils are equal, round, and reactive to light.   Neck:      Thyroid: No thyromegaly.      Vascular: No JVD.   Cardiovascular:      Rate and Rhythm: Normal rate and regular rhythm.      Heart sounds: Normal heart sounds. No murmur heard.    No friction rub. No gallop.   Pulmonary:      Effort: Pulmonary effort is normal.      Breath sounds: Normal breath sounds. No wheezing or rales.   Abdominal:      General: Bowel sounds are normal. There is no distension.      Palpations: Abdomen is soft.      Tenderness: There is no abdominal tenderness. There is no guarding or rebound.   Musculoskeletal:         General: Normal range of motion.      Cervical back: Normal range of motion and neck supple.   Lymphadenopathy:      Cervical: No cervical adenopathy.   Skin:     General: Skin is warm and dry.      Findings: No rash.   Neurological:      General: No focal deficit present.      Mental Status: He is alert and oriented to person, place, and time.      Cranial Nerves: No cranial nerve deficit.      Deep Tendon Reflexes: Reflexes are normal and symmetric.   Psychiatric:         Mood and Affect: Mood normal.         Judgment: Judgment normal.           Assessment:       1. Routine general medical examination at health care facility    2. Primary hypertension    3. Mixed hyperlipidemia    4. Atherosclerotic PVD with intermittent claudication    5. Liver mass    6. Multiple pulmonary nodules    7. Colon cancer screening          Plan:   Hypertension  Blood pressure is under good " control.  We will continue the current regimen.  Will work on regular aerobic exercise and a low salt diet.        Hyperlipidemia  Cholesterol numbers look good.  We will continue the current regimen at this time.  Remain focused on low fat diet and high dietary fiber intake.      Multiple pulmonary nodules  Following with DR. Hernandez, recent scan normal    Atherosclerotic PVD with intermittent claudication  Continue pletal and asa and repatha  Routine general medical examination at health care facility    Primary hypertension    Mixed hyperlipidemia    Atherosclerotic PVD with intermittent claudication    Liver mass    Multiple pulmonary nodules    Colon cancer screening  -     Ambulatory referral/consult to Endo Procedure ; Future; Expected date: 10/14/2022    Other orders  -     Influenza - Quadrivalent (Adjuvanted)        Follow up in about 6 months (around 4/13/2023).

## 2022-10-17 ENCOUNTER — SPECIALTY PHARMACY (OUTPATIENT)
Dept: PHARMACY | Facility: CLINIC | Age: 77
End: 2022-10-17
Payer: MEDICARE

## 2022-10-25 ENCOUNTER — TELEPHONE (OUTPATIENT)
Dept: PREADMISSION TESTING | Facility: HOSPITAL | Age: 77
End: 2022-10-25

## 2022-10-25 ENCOUNTER — HOSPITAL ENCOUNTER (OUTPATIENT)
Dept: PREADMISSION TESTING | Facility: HOSPITAL | Age: 77
Discharge: HOME | End: 2022-10-25
Payer: MEDICARE

## 2022-10-25 DIAGNOSIS — Z12.11 COLON CANCER SCREENING: Primary | ICD-10-CM

## 2022-10-25 RX ORDER — SOD SULF/POT CHLORIDE/MAG SULF 1.479 G
12 TABLET ORAL DAILY
Qty: 24 TABLET | Refills: 0 | Status: ON HOLD | OUTPATIENT
Start: 2022-10-25 | End: 2022-12-07 | Stop reason: HOSPADM

## 2022-10-25 NOTE — TELEPHONE ENCOUNTER
This patient is being scheduled for one of the following procedures: Colonoscopy    Please indicate if the patient is cleared for surgery from a cardiac standpoint.    Patient gave verbal consent for e-consult Yes    Cardiac PMHx: Hypertension  Hyperlipidemia  Aortic atherosclerosis  Atherosclerotic PVD with intermittent claudication  Atherosclerosis of coronary artery  Stenosis of carotid artery  Pulmonary heart disease  Atrial fibrillation   Last Echo: No results found for this or any previous visit.      Anticoagulants: ls anticoag list: Pletal (CILOSTAZOL)    Patient states he is not on pletal but it is listed on his chart and in his last cardiac note.    Please indicate if and when the patient can safely stop their medication prior to the procedure.    Please take into consideration that therapeutic maneuvers may be performed during the procedure that requires delay in restarting anticoagulation therapy.       Patient gave verbal consent Yes    If you have questions or concerns, please call us at [phone].

## 2022-10-26 ENCOUNTER — TELEPHONE (OUTPATIENT)
Dept: PREADMISSION TESTING | Facility: HOSPITAL | Age: 77
End: 2022-10-26
Payer: MEDICARE

## 2022-10-26 NOTE — TELEPHONE ENCOUNTER
This patient is being scheduled for one of the following procedures: Colonoscopy     Please indicate if the patient is cleared for surgery from a cardiac standpoint.     Patient gave verbal consent for e-consult Yes     Cardiac PMHx: Hypertension  Hyperlipidemia  Aortic atherosclerosis  Atherosclerotic PVD with intermittent claudication  Atherosclerosis of coronary artery  Stenosis of carotid artery  Pulmonary heart disease  Atrial fibrillation   Last Echo: No results found for this or any previous visit.        Anticoagulants: ls anticoag list: Pletal (CILOSTAZOL)     Patient states he is not on pletal but it is listed on his chart and in his last cardiac note.     Please indicate if and when the patient can safely stop their medication prior to the procedure.     Please take into consideration that therapeutic maneuvers may be performed during the procedure that requires delay in restarting anticoagulation therapy.         Patient gave verbal consent Yes     If you have questions or concerns, please call us at [phone].

## 2022-10-28 NOTE — TELEPHONE ENCOUNTER
Specialty Pharmacy - Refill Coordination    Specialty Medication Orders Linked to Encounter      Flowsheet Row Most Recent Value   Medication #1 evolocumab (REPATHA SURECLICK) 140 mg/mL PnIj (Order#[id number], Rx#[id number])            Refill Questions - Documented Responses      Flowsheet Row Most Recent Value   Patient Availability and HIPAA Verification    Does patient want to proceed with activity? Yes   HIPAA/medical authority confirmed? Yes   Relationship to patient of person spoken to? Self   Refill Screening Questions    Would patient like to speak to a pharmacist? No   When does the patient need to receive the medication? 11/01/22   Refill Delivery Questions    How will the patient receive the medication? MEDRx   When does the patient need to receive the medication? 11/01/22   Shipping Address Home   Address in [facility] confirmed and updated if neccessary? Yes   Expected Copay ($) 5   Is the patient able to afford the medication copay? Yes   Payment Method CC on file   Days supply of Refill 28   Supplies needed? No supplies needed   Refill activity completed? Yes   Refill activity plan Refill scheduled   Shipment/Pickup Date: 10/31/22            Current Outpatient Medications   Medication Sig    albuterol (PROVENTIL/VENTOLIN HFA) 90 mcg/actuation inhaler INHALE 2 PUFFS INTO THE LUNGS EVERY 6 (SIX) HOURS AS NEEDED FOR WHEEZING OR SHORTNESS OF BREATH    amLODIPine (NORVASC) 10 MG tablet Take 1 tablet (10 mg total) by mouth once daily.    aspirin (ECOTRIN) 81 MG EC tablet TAKE 1 TABLET BY MOUTH EVERY DAY    atorvastatin (LIPITOR) 80 MG tablet Take 1 tablet (80 mg total) by mouth once daily.    brimonidine 0.15 % OPTH DROP (ALPHAGAN) 0.15 % ophthalmic solution Place 1 drop into both eyes 3 (three) times daily.    cilostazoL (PLETAL) 100 MG Tab Take 1 tablet (100 mg total) by mouth 2 (two) times daily.    dorzolamide (TRUSOPT) 2 % ophthalmic solution Place 1 drop into both eyes 3 (three) times daily.     evolocumab (REPATHA SURECLICK) 140 mg/mL PnIj Inject 1 mL (140 mg total) into the skin every 14 (fourteen) days.    finasteride (PROSCAR) 5 mg tablet TAKE 1 TABLET BY MOUTH EVERY DAY    fluticasone furoate-vilanteroL (BREO ELLIPTA) 200-25 mcg/dose DsDv diskus inhaler Inhale 1 puff into the lungs once daily. Controller    latanoprost 0.005 % ophthalmic solution Place 1 drop into both eyes every evening.    lisinopriL (PRINIVIL,ZESTRIL) 20 MG tablet Take 1 tablet (20 mg total) by mouth once daily.    sod sulf-pot chloride-mag sulf (SUTAB) 1.479-0.188- 0.225 gram tablet Take 12 tablets by mouth once daily. Take according to package instructions with indicated amount of water.    tamsulosin (FLOMAX) 0.4 mg Cap TAKE 1 CAPSULE BY MOUTH EVERY DAY   Last reviewed on 10/13/2022 11:51 AM by Loi Patel MD    Review of patient's allergies indicates:  No Known Allergies Last reviewed on  10/25/2022 2:05 PM by Luna Richardson      Tasks added this encounter   11/22/2022 - Refill Call (Auto Added)   Tasks due within next 3 months   No tasks due.     Kaley Valerio, Patient Care Assistant  [facility]  [address] [place], [place] [address]  [facility] [address]  Phone: [phone]  Fax: [phone]

## 2022-11-11 ENCOUNTER — OFFICE VISIT (OUTPATIENT)
Dept: OPHTHALMOLOGY | Facility: CLINIC | Age: 77
End: 2022-11-11
Payer: MEDICARE

## 2022-11-11 DIAGNOSIS — H40.1113 PRIMARY OPEN ANGLE GLAUCOMA (POAG) OF RIGHT EYE, SEVERE STAGE: Primary | ICD-10-CM

## 2022-11-11 DIAGNOSIS — H40.1121 PRIMARY OPEN ANGLE GLAUCOMA (POAG) OF LEFT EYE, MILD STAGE: ICD-10-CM

## 2022-11-11 PROCEDURE — 99999 PR PBB SHADOW E&M-EST. PATIENT-LVL III: ICD-10-PCS | Mod: PBBFAC,,, | Performed by: OPTOMETRIST

## 2022-11-11 PROCEDURE — 1159F MED LIST DOCD IN RCRD: CPT | Mod: CPTII,S$GLB,, | Performed by: OPTOMETRIST

## 2022-11-11 PROCEDURE — 99999 PR PBB SHADOW E&M-EST. PATIENT-LVL III: CPT | Mod: PBBFAC,,, | Performed by: OPTOMETRIST

## 2022-11-11 PROCEDURE — 99213 PR OFFICE/OUTPT VISIT, EST, LEVL III, 20-29 MIN: ICD-10-PCS | Mod: S$GLB,,, | Performed by: OPTOMETRIST

## 2022-11-11 PROCEDURE — 99213 OFFICE O/P EST LOW 20 MIN: CPT | Mod: S$GLB,,, | Performed by: OPTOMETRIST

## 2022-11-11 PROCEDURE — 1160F RVW MEDS BY RX/DR IN RCRD: CPT | Mod: CPTII,S$GLB,, | Performed by: OPTOMETRIST

## 2022-11-11 PROCEDURE — 1160F PR REVIEW ALL MEDS BY PRESCRIBER/CLIN PHARMACIST DOCUMENTED: ICD-10-PCS | Mod: CPTII,S$GLB,, | Performed by: OPTOMETRIST

## 2022-11-11 PROCEDURE — 1159F PR MEDICATION LIST DOCUMENTED IN MEDICAL RECORD: ICD-10-PCS | Mod: CPTII,S$GLB,, | Performed by: OPTOMETRIST

## 2022-11-11 NOTE — PROGRESS NOTES
HPI     Glaucoma            Comments: PT was last seen on 10/11/22 with DNL for HVF and gOCT. PT was   told to RTC  1 month for IOP.  Medication eye drops if any: Latanoprost qhs OU, Brimonidine tid OU and   Trusopt tid OU   Last HVF: 10/11/22  Last gOCT: 10/11/22  Last SDPs:none            Last edited by Beena Levy MA on 11/11/2022  9:19 AM.            Assessment /Plan     For exam results, see Encounter Report.    Primary open angle glaucoma (POAG) of right eye, severe stage    Primary open angle glaucoma (POAG) of left eye, mild stage    IOP lower than last visit but still elevated above target OU  Pt did not  Brimonidine from pharmacy after last visit  Pt is using Latanoprost qhs OU and Trusopt bid OU instead of tid  Written instructions given to pt on correct drop usage  Stressed compliance or risk permanent vision loss  Discussed surgery referral if IOP not better at next visit  Pt expressed understanding    Continue Latanoprost qhs OU  Brimonidine tid OU and Trusopt tid OU      RTC 1 month for IOP check or PRN  Discussed above and all questions were answered.

## 2022-11-22 ENCOUNTER — SPECIALTY PHARMACY (OUTPATIENT)
Dept: PHARMACY | Facility: CLINIC | Age: 77
End: 2022-11-22
Payer: MEDICARE

## 2022-11-22 NOTE — TELEPHONE ENCOUNTER
Specialty Pharmacy - Refill Coordination    Specialty Medication Orders Linked to Encounter      Flowsheet Row Most Recent Value   Medication #1 evolocumab (REPATHA SURECLICK) 140 mg/mL PnIj (Order#[id number], Rx#[id number])            Refill Questions - Documented Responses      Flowsheet Row Most Recent Value   Patient Availability and HIPAA Verification    Does patient want to proceed with activity? Yes   HIPAA/medical authority confirmed? Yes   Relationship to patient of person spoken to? Self   Refill Screening Questions    Would patient like to speak to a pharmacist? No   When does the patient need to receive the medication? 11/30/22   Refill Delivery Questions    How will the patient receive the medication? MEDRx   When does the patient need to receive the medication? 11/30/22   Shipping Address Home   Address in [facility] confirmed and updated if neccessary? Yes   Expected Copay ($) 5   Is the patient able to afford the medication copay? Yes   Payment Method CC on file   Days supply of Refill 28   Supplies needed? No supplies needed   Refill activity completed? Yes   Refill activity plan Refill scheduled   Shipment/Pickup Date: 11/29/22            Current Outpatient Medications   Medication Sig    albuterol (PROVENTIL/VENTOLIN HFA) 90 mcg/actuation inhaler INHALE 2 PUFFS INTO THE LUNGS EVERY 6 (SIX) HOURS AS NEEDED FOR WHEEZING OR SHORTNESS OF BREATH    amLODIPine (NORVASC) 10 MG tablet Take 1 tablet (10 mg total) by mouth once daily.    aspirin (ECOTRIN) 81 MG EC tablet TAKE 1 TABLET BY MOUTH EVERY DAY    atorvastatin (LIPITOR) 80 MG tablet Take 1 tablet (80 mg total) by mouth once daily.    brimonidine 0.15 % OPTH DROP (ALPHAGAN) 0.15 % ophthalmic solution Place 1 drop into both eyes 3 (three) times daily.    cilostazoL (PLETAL) 100 MG Tab Take 1 tablet (100 mg total) by mouth 2 (two) times daily.    dorzolamide (TRUSOPT) 2 % ophthalmic solution Place 1 drop into both eyes 3 (three) times daily.     evolocumab (REPATHA SURECLICK) 140 mg/mL PnIj Inject 1 mL (140 mg total) into the skin every 14 (fourteen) days.    finasteride (PROSCAR) 5 mg tablet TAKE 1 TABLET BY MOUTH EVERY DAY    fluticasone furoate-vilanteroL (BREO ELLIPTA) 200-25 mcg/dose DsDv diskus inhaler Inhale 1 puff into the lungs once daily. Controller    latanoprost 0.005 % ophthalmic solution Place 1 drop into both eyes every evening.    lisinopriL (PRINIVIL,ZESTRIL) 20 MG tablet Take 1 tablet (20 mg total) by mouth once daily.    sod sulf-pot chloride-mag sulf (SUTAB) 1.479-0.188- 0.225 gram tablet Take 12 tablets by mouth once daily. Take according to package instructions with indicated amount of water.    tamsulosin (FLOMAX) 0.4 mg Cap TAKE 1 CAPSULE BY MOUTH EVERY DAY   Last reviewed on 11/11/2022  9:49 AM by Brian Herrera, FELISA    Review of patient's allergies indicates:  No Known Allergies Last reviewed on  11/11/2022 9:49 AM by Brian Herrera      Tasks added this encounter   No tasks added.   Tasks due within next 3 months   11/22/2022 - Refill Call (Auto Added)     [facility]  [address] [place] [id number]  Phone: [phone]  Fax: [phone]

## 2022-11-29 ENCOUNTER — OFFICE VISIT (OUTPATIENT)
Dept: CARDIOLOGY | Facility: CLINIC | Age: 77
End: 2022-11-29
Payer: MEDICARE

## 2022-11-29 VITALS
DIASTOLIC BLOOD PRESSURE: 58 MMHG | WEIGHT: 169.56 LBS | SYSTOLIC BLOOD PRESSURE: 116 MMHG | OXYGEN SATURATION: 98 % | HEART RATE: 60 BPM | BODY MASS INDEX: 28.21 KG/M2

## 2022-11-29 DIAGNOSIS — I10 PRIMARY HYPERTENSION: Chronic | ICD-10-CM

## 2022-11-29 DIAGNOSIS — E78.2 MIXED HYPERLIPIDEMIA: ICD-10-CM

## 2022-11-29 DIAGNOSIS — I70.219 ATHEROSCLEROTIC PVD WITH INTERMITTENT CLAUDICATION: Primary | ICD-10-CM

## 2022-11-29 PROCEDURE — 99999 PR PBB SHADOW E&M-EST. PATIENT-LVL III: CPT | Mod: PBBFAC,,, | Performed by: INTERNAL MEDICINE

## 2022-11-29 PROCEDURE — 1160F RVW MEDS BY RX/DR IN RCRD: CPT | Mod: CPTII,S$GLB,, | Performed by: INTERNAL MEDICINE

## 2022-11-29 PROCEDURE — 3078F PR MOST RECENT DIASTOLIC BLOOD PRESSURE < 80 MM HG: ICD-10-PCS | Mod: CPTII,S$GLB,, | Performed by: INTERNAL MEDICINE

## 2022-11-29 PROCEDURE — 1159F PR MEDICATION LIST DOCUMENTED IN MEDICAL RECORD: ICD-10-PCS | Mod: CPTII,S$GLB,, | Performed by: INTERNAL MEDICINE

## 2022-11-29 PROCEDURE — 99999 PR PBB SHADOW E&M-EST. PATIENT-LVL III: ICD-10-PCS | Mod: PBBFAC,,, | Performed by: INTERNAL MEDICINE

## 2022-11-29 PROCEDURE — 1160F PR REVIEW ALL MEDS BY PRESCRIBER/CLIN PHARMACIST DOCUMENTED: ICD-10-PCS | Mod: CPTII,S$GLB,, | Performed by: INTERNAL MEDICINE

## 2022-11-29 PROCEDURE — 3074F SYST BP LT 130 MM HG: CPT | Mod: CPTII,S$GLB,, | Performed by: INTERNAL MEDICINE

## 2022-11-29 PROCEDURE — 3074F PR MOST RECENT SYSTOLIC BLOOD PRESSURE < 130 MM HG: ICD-10-PCS | Mod: CPTII,S$GLB,, | Performed by: INTERNAL MEDICINE

## 2022-11-29 PROCEDURE — 3078F DIAST BP <80 MM HG: CPT | Mod: CPTII,S$GLB,, | Performed by: INTERNAL MEDICINE

## 2022-11-29 PROCEDURE — 99214 OFFICE O/P EST MOD 30 MIN: CPT | Mod: S$GLB,,, | Performed by: INTERNAL MEDICINE

## 2022-11-29 PROCEDURE — 1159F MED LIST DOCD IN RCRD: CPT | Mod: CPTII,S$GLB,, | Performed by: INTERNAL MEDICINE

## 2022-11-29 PROCEDURE — 99214 PR OFFICE/OUTPT VISIT, EST, LEVL IV, 30-39 MIN: ICD-10-PCS | Mod: S$GLB,,, | Performed by: INTERNAL MEDICINE

## 2022-11-29 NOTE — PROGRESS NOTES
Subjective:   Patient ID:  Alex Milner is a 77 y.o. male who presents for follow up of No chief complaint on file.      76 yo male, 6 months f/u  PMH PAD, h/o AO showed left infrapopliteal . Distal left SFA PTCA and  AO right SFA PTCA by Dr. Ross,  HTN, HLP, PVD, obesity, ETOH use PAULA on CPAP. No smoking  C/o bilateral thigh and buttock pain with walking and some time long time sitting, and back pain at night,. No calf pain   left FREDY 0.7 and right 1.1. LE arterial US showed left infrapopliteal stenosis and occluded AT     Chest CT wo contrast showed mild aorta calcification  No chest pain sob, dizziness, palpitation,  Resolved claudication after angiogram. Walks 2 miles a day. Only knee pain, No muscle weakness, pain and numbness  No smoking/drinking  Med compliance    05/2021 visit  C/o both thigh muscle tightness at rest and night. Some sore on both thighs at walking.  Some lower back pain at night  No chest pain dyspnea palpitation, faint and dizziness  Today EKG NSR HR 54 bpm, no acute ctt change     visit  Started taking Lipitor and zetia daily about few months ago. Occasionally muscle pain at walking. No resting pain.   Some lower back pain . No chest pain dyspnea dizziness and faint     visit  No leg pain with a lot of walking. No chest pain dyspnea dizziness dan leg swelling  Med compliance and no active bleeding  ekg NSR and BP controlled. , Copay of repatha was high    Interval history  Saw Dr. Ross interventional cardiologist in  for + exercise ABIs and leg claudication. Stable. Rec. Med Rx  No chest pain dyspnea dizziness palpitation and leg swelling.  Med compliance. Walks a lot.   On Repatha Rx                 Past Medical History:   Diagnosis Date    Atherosclerosis of coronary artery 8/10/2022    Atherosclerotic PVD with intermittent claudication 8/16/2019    Atrial fibrillation     pt unaware of this    Back pain     Bilateral  inguinal hernia     CT ABdomen/pelvis 3/9/2015 (care everywhere)---Bilateral inguinal hernias containing fat.      Calcified granuloma of lung 10/3/2017    CT CHest 3/26/2018---There are calcified lymph nodes in the mediastinum and left hilum.    CT chest 9/11/ 2017 Calcified left hilar and subcarinal granulomas are noted  ;Calcified granuloma noted within the posterior segment of the right hepatic lobe.    Diverticulosis     Diverticulosis 10/28/13    Colonoscopy     ED (erectile dysfunction)     Elevated PSA     Granuloma of liver     ct chest 3/26/2018---There is a small calcified granuloma posteriorly in the right lobe of the liver    Hyperlipidemia     Hypertension     Liver mass 3/13/2020    New lesion compared to CT chest dating back to 2018. New liver lesion, dome of liver seen on CT 3/12/2020 and MRI abdomen 3/12/2020.  3/13/2020 referral to Oncology  PET CT   Resolved in 2020    Moderate persistent asthma without complication 9/26/2017    Obesity     Personal history of colonic polyps 2013    Pulmonary heart disease 8/10/2022    Tobacco dependence     resolved    Uncomplicated alcohol dependence 11/27/2019    Urinary tract infection        Past Surgical History:   Procedure Laterality Date    AORTOGRAPHY WITH SERIALOGRAPHY N/A 10/21/2019    Procedure: AORTOGRAM, WITH RUNOFF;  Surgeon: Chencho Ross MD;  Location: [facility] CATH LAB;  Service: Cardiology;  Laterality: N/A;  pt requesting 9am arrival    AORTOGRAPHY WITH SERIALOGRAPHY N/A 11/14/2019    Procedure: AORTOGRAM, WITH RUNOFF;  Surgeon: Chencho Ross MD;  Location: [facility] CATH LAB;  Service: Cardiology;  Laterality: N/A;    HERNIA REPAIR      x2    PROSTATE BIOPSY      reported per patient around 2014 or 2015 which was reportedly negative       Social History     Tobacco Use    Smoking status: Former     Packs/day: 2.00     Years: 15.00     Pack years: 30.00     Types: Cigarettes     Start date: 1960     Quit date: 8/12/1985     Years since quitting:  37.3    Smokeless tobacco: Never   Substance Use Topics    Alcohol use: Yes     Alcohol/week: 12.0 standard drinks     Types: 12 Cans of beer per week    Drug use: No       Family History   Problem Relation Age of Onset    Cancer Mother         Unknown primary    Cancer Father     Cancer Brother         prostate     Prostate cancer Brother     Cancer Sister 64        pancreatic     Diabetes Neg Hx     Heart disease Neg Hx     Kidney disease Neg Hx     Stroke Neg Hx     Hyperlipidemia Neg Hx     Hypertension Neg Hx          Review of Systems   Constitutional: Negative for decreased appetite, diaphoresis, fever, malaise/fatigue and night sweats.   HENT:  Negative for nosebleeds.    Eyes:  Negative for blurred vision and double vision.   Cardiovascular:  Negative for chest pain, claudication, dyspnea on exertion, irregular heartbeat, leg swelling, near-syncope, orthopnea, palpitations, paroxysmal nocturnal dyspnea and syncope.   Respiratory:  Negative for cough, shortness of breath, sleep disturbances due to breathing, snoring, sputum production and wheezing.    Endocrine: Negative for cold intolerance and polyuria.   Hematologic/Lymphatic: Does not bruise/bleed easily.   Skin:  Negative for rash.   Musculoskeletal:  Positive for arthritis, back pain and joint pain. Negative for falls, joint swelling and neck pain.   Gastrointestinal:  Negative for abdominal pain, heartburn, nausea and vomiting.   Genitourinary:  Negative for dysuria, frequency and hematuria.   Neurological:  Negative for difficulty with concentration, dizziness, focal weakness, headaches, light-headedness, numbness, seizures and weakness.   Psychiatric/Behavioral:  Negative for depression, memory loss and substance abuse. The patient does not have insomnia.    Allergic/Immunologic: Negative for HIV exposure and hives.     Objective:   Physical Exam  HENT:      Head: Normocephalic.   Eyes:      Pupils: Pupils are equal, round, and reactive to light.    Neck:      Thyroid: No thyromegaly.      Vascular: Normal carotid pulses. No carotid bruit or JVD.   Cardiovascular:      Rate and Rhythm: Normal rate and regular rhythm. No extrasystoles are present.     Chest Wall: PMI is not displaced.      Pulses: Normal pulses.      Heart sounds: Normal heart sounds. No murmur heard.    No gallop. No S3 sounds.   Pulmonary:      Effort: No respiratory distress.      Breath sounds: Normal breath sounds. No stridor.   Abdominal:      General: Bowel sounds are normal.      Palpations: Abdomen is soft.      Tenderness: There is no abdominal tenderness. There is no rebound.   Musculoskeletal:         General: Normal range of motion.   Skin:     Findings: No rash.   Neurological:      Mental Status: He is alert and oriented to person, place, and time.   Psychiatric:         Behavior: Behavior normal.       Lab Results   Component Value Date    CHOL 201 (H) 01/04/2022    CHOL 300 (H) 06/25/2021    CHOL 299 (H) 05/24/2021     Lab Results   Component Value Date    HDL 45 01/04/2022    HDL 91 (H) 06/25/2021    HDL 82 (H) 05/24/2021     Lab Results   Component Value Date    LDLCALC 134.2 01/04/2022    LDLCALC 188.2 (H) 06/25/2021    LDLCALC 197.8 (H) 05/24/2021     Lab Results   Component Value Date    TRIG 109 01/04/2022    TRIG 104 06/25/2021    TRIG 96 05/24/2021     Lab Results   Component Value Date    CHOLHDL 22.4 01/04/2022    CHOLHDL 30.3 06/25/2021    CHOLHDL 27.4 05/24/2021       Chemistry        Component Value Date/Time     07/26/2022 0818    K 5.3 (H) 07/26/2022 0818     07/26/2022 0818    CO2 28 07/26/2022 0818    BUN 10 07/26/2022 0818    CREATININE 1.0 07/26/2022 0818    GLU 93 07/26/2022 0818        Component Value Date/Time    CALCIUM 9.2 07/26/2022 0818    ALKPHOS 82 07/26/2022 0818    AST 16 07/26/2022 0818    ALT 11 07/26/2022 0818    BILITOT 0.5 07/26/2022 0818    ESTGFRAFRICA >60.0 07/26/2022 0818    EGFRNONAA >60.0 07/26/2022 0818          No results  found for: LABA1C, HGBA1C  Lab Results   Component Value Date    TSH 0.911 06/27/2019     Lab Results   Component Value Date    INR 0.9 03/31/2020    INR 0.9 11/07/2019    INR 1.0 10/14/2019     Lab Results   Component Value Date    WBC 4.78 07/26/2022    HGB 14.1 07/26/2022    HCT 44.8 07/26/2022    MCV 84 07/26/2022     07/26/2022     BMP  Sodium   Date Value Ref Range Status   07/26/2022 144 136 - 145 mmol/L Final     Potassium   Date Value Ref Range Status   07/26/2022 5.3 (H) 3.5 - 5.1 mmol/L Final     Comment:     *No Visible Hemolysis     Chloride   Date Value Ref Range Status   07/26/2022 107 95 - 110 mmol/L Final     CO2   Date Value Ref Range Status   07/26/2022 28 23 - 29 mmol/L Final     BUN   Date Value Ref Range Status   07/26/2022 10 8 - 23 mg/dL Final     Creatinine   Date Value Ref Range Status   07/26/2022 1.0 0.5 - 1.4 mg/dL Final     Calcium   Date Value Ref Range Status   07/26/2022 9.2 8.7 - 10.5 mg/dL Final     Anion Gap   Date Value Ref Range Status   07/26/2022 9 8 - 16 mmol/L Final     eGFR if    Date Value Ref Range Status   07/26/2022 >60.0 >60 mL/min/1.73 m^2 Final     eGFR if non    Date Value Ref Range Status   07/26/2022 >60.0 >60 mL/min/1.73 m^2 Final     Comment:     Calculation used to obtain the estimated glomerular filtration  rate (eGFR) is the CKD-EPI equation.        BNP  @LABRCNTIP(BNP,BNPTRIAGEBLO)@  @LABRCNTIP(troponini)@  CrCl cannot be calculated (Patient's most recent lab result is older than the maximum 7 days allowed.).  No results found in the last 24 hours.  No results found in the last 24 hours.  No results found in the last 24 hours.    Assessment:      1. Atherosclerotic PVD with intermittent claudication    2. Primary hypertension    3. Mixed hyperlipidemia        Plan:   Continue ASA Pletal statin repatha amlodipine and Lisinopril  Keep exercise   Counseled DASH  Check Lipid profile in 6 months  Recommend heart-healthy  diet, weight control and regular exercise.  Tasneem. Risk modification.   I have reviewed all pertinent labs and cardiac studies independently. Plans and recommendations have been formulated under my direct supervision. All questions answered and patient voiced understanding.   If symptoms persist go to the ED  RTC in 6 months

## 2022-12-06 ENCOUNTER — ANESTHESIA EVENT (OUTPATIENT)
Dept: ENDOSCOPY | Facility: HOSPITAL | Age: 77
End: 2022-12-06
Payer: MEDICARE

## 2022-12-07 ENCOUNTER — HOSPITAL ENCOUNTER (OUTPATIENT)
Facility: HOSPITAL | Age: 77
Discharge: HOME | End: 2022-12-07
Attending: SURGERY | Admitting: SURGERY
Payer: MEDICARE

## 2022-12-07 ENCOUNTER — ANESTHESIA (OUTPATIENT)
Dept: ENDOSCOPY | Facility: HOSPITAL | Age: 77
End: 2022-12-07
Payer: MEDICARE

## 2022-12-07 VITALS
OXYGEN SATURATION: 99 % | RESPIRATION RATE: 18 BRPM | WEIGHT: 170 LBS | BODY MASS INDEX: 28.32 KG/M2 | SYSTOLIC BLOOD PRESSURE: 147 MMHG | DIASTOLIC BLOOD PRESSURE: 70 MMHG | HEART RATE: 60 BPM | HEIGHT: 65 IN | TEMPERATURE: 98 F

## 2022-12-07 DIAGNOSIS — Z86.010 HISTORY OF COLON POLYPS: Primary | ICD-10-CM

## 2022-12-07 PROCEDURE — 88305 TISSUE EXAM BY PATHOLOGIST: CPT | Mod: 26,,, | Performed by: PATHOLOGY

## 2022-12-07 PROCEDURE — 27201012 HC FORCEPS, HOT/COLD, DISP: Performed by: SURGERY

## 2022-12-07 PROCEDURE — 45380 PR COLONOSCOPY,BIOPSY: ICD-10-PCS | Mod: PT,59,, | Performed by: SURGERY

## 2022-12-07 PROCEDURE — 27201089 HC SNARE, DISP (ANY): Performed by: SURGERY

## 2022-12-07 PROCEDURE — 45380 COLONOSCOPY AND BIOPSY: CPT | Mod: PT,59,, | Performed by: SURGERY

## 2022-12-07 PROCEDURE — 37000008 HC ANESTHESIA 1ST 15 MINUTES: Performed by: SURGERY

## 2022-12-07 PROCEDURE — 37000009 HC ANESTHESIA EA ADD 15 MINS: Performed by: SURGERY

## 2022-12-07 PROCEDURE — 45385 COLONOSCOPY W/LESION REMOVAL: CPT | Mod: PT,,, | Performed by: SURGERY

## 2022-12-07 PROCEDURE — 45385 COLONOSCOPY W/LESION REMOVAL: CPT | Mod: PT | Performed by: SURGERY

## 2022-12-07 PROCEDURE — 63600175 PHARM REV CODE 636 W HCPCS: Performed by: NURSE ANESTHETIST, CERTIFIED REGISTERED

## 2022-12-07 PROCEDURE — 45380 COLONOSCOPY AND BIOPSY: CPT | Mod: PT,59 | Performed by: SURGERY

## 2022-12-07 PROCEDURE — 88305 TISSUE EXAM BY PATHOLOGIST: CPT | Performed by: PATHOLOGY

## 2022-12-07 PROCEDURE — 45385 PR COLONOSCOPY,REMV LESN,SNARE: ICD-10-PCS | Mod: PT,,, | Performed by: SURGERY

## 2022-12-07 PROCEDURE — 25000003 PHARM REV CODE 250: Performed by: NURSE ANESTHETIST, CERTIFIED REGISTERED

## 2022-12-07 PROCEDURE — 88305 TISSUE EXAM BY PATHOLOGIST: ICD-10-PCS | Mod: 26,,, | Performed by: PATHOLOGY

## 2022-12-07 PROCEDURE — 63600175 PHARM REV CODE 636 W HCPCS: Performed by: SURGERY

## 2022-12-07 RX ORDER — LIDOCAINE HYDROCHLORIDE 10 MG/ML
INJECTION, SOLUTION EPIDURAL; INFILTRATION; INTRACAUDAL; PERINEURAL
Status: DISCONTINUED | OUTPATIENT
Start: 2022-12-07 | End: 2022-12-07

## 2022-12-07 RX ORDER — SODIUM CHLORIDE, SODIUM LACTATE, POTASSIUM CHLORIDE, CALCIUM CHLORIDE 600; 310; 30; 20 MG/100ML; MG/100ML; MG/100ML; MG/100ML
INJECTION, SOLUTION INTRAVENOUS CONTINUOUS
Status: DISCONTINUED | OUTPATIENT
Start: 2022-12-07 | End: 2022-12-07 | Stop reason: HOSPADM

## 2022-12-07 RX ORDER — PROPOFOL 10 MG/ML
VIAL (ML) INTRAVENOUS
Status: DISCONTINUED | OUTPATIENT
Start: 2022-12-07 | End: 2022-12-07

## 2022-12-07 RX ADMIN — PROPOFOL 100 MG: 10 INJECTION, EMULSION INTRAVENOUS at 06:12

## 2022-12-07 RX ADMIN — PROPOFOL 50 MG: 10 INJECTION, EMULSION INTRAVENOUS at 06:12

## 2022-12-07 RX ADMIN — PROPOFOL 50 MG: 10 INJECTION, EMULSION INTRAVENOUS at 07:12

## 2022-12-07 RX ADMIN — LIDOCAINE HYDROCHLORIDE 30 MG: 10 INJECTION, SOLUTION EPIDURAL; INFILTRATION; INTRACAUDAL; PERINEURAL at 06:12

## 2022-12-07 RX ADMIN — SODIUM CHLORIDE, SODIUM LACTATE, POTASSIUM CHLORIDE, AND CALCIUM CHLORIDE: 600; 310; 30; 20 INJECTION, SOLUTION INTRAVENOUS at 06:12

## 2022-12-07 NOTE — H&P
PRE PROCEDURE H&P    Patient Name: Alex Milner  MRN: [id number]  : 1945  Date of Procedure:  2022  Referring Physician: Loi Patel MD  Primary Physician: Loi Patel MD  Procedure Physician: Florinda Lindsey DO      Planned Procedure: Colonoscopy  Diagnosis: previous adenomatous polyp  Chief Complaint: Same as above    HPI: Patient is an 77 y.o. male is here for the above.     Last colonoscopy:   Family history: Denies colorectal cancers  Anticoagulation: Aspirin, pletal    Past Medical History:   Past Medical History:   Diagnosis Date    Atherosclerosis of coronary artery 8/10/2022    Atherosclerotic PVD with intermittent claudication 2019    Atrial fibrillation     pt unaware of this    Back pain     Bilateral inguinal hernia     CT ABdomen/pelvis 3/9/2015 (care everywhere)---Bilateral inguinal hernias containing fat.      Calcified granuloma of lung 10/3/2017    CT CHest 3/26/2018---There are calcified lymph nodes in the mediastinum and left hilum.    CT chest 2017 Calcified left hilar and subcarinal granulomas are noted  ;Calcified granuloma noted within the posterior segment of the right hepatic lobe.    Diverticulosis     Diverticulosis 10/28/13    Colonoscopy     ED (erectile dysfunction)     Elevated PSA     Granuloma of liver     ct chest 3/26/2018---There is a small calcified granuloma posteriorly in the right lobe of the liver    Hyperlipidemia     Hypertension     Liver mass 3/13/2020    New lesion compared to CT chest dating back to 2018. New liver lesion, dome of liver seen on CT 3/12/2020 and MRI abdomen 3/12/2020.  3/13/2020 referral to Oncology  PET CT   Resolved in     Moderate persistent asthma without complication 2017    Obesity     Personal history of colonic polyps 2013    Pulmonary heart disease 8/10/2022    Tobacco dependence     resolved    Uncomplicated alcohol dependence 2019    Urinary tract infection         Past Surgical  History:  Past Surgical History:   Procedure Laterality Date    AORTOGRAPHY WITH SERIALOGRAPHY N/A 10/21/2019    Procedure: AORTOGRAM, WITH RUNOFF;  Surgeon: Chencho Ross MD;  Location: [facility] CATH LAB;  Service: Cardiology;  Laterality: N/A;  pt requesting 9am arrival    AORTOGRAPHY WITH SERIALOGRAPHY N/A 11/14/2019    Procedure: AORTOGRAM, WITH RUNOFF;  Surgeon: Chencho Ross MD;  Location: [facility] CATH LAB;  Service: Cardiology;  Laterality: N/A;    HERNIA REPAIR      x2    PROSTATE BIOPSY      reported per patient around 2014 or 2015 which was reportedly negative        Home Medications:  Prior to Admission medications    Medication Sig Start Date End Date Taking? Authorizing Provider   albuterol (PROVENTIL/VENTOLIN HFA) 90 mcg/actuation inhaler INHALE 2 PUFFS INTO THE LUNGS EVERY 6 (SIX) HOURS AS NEEDED FOR WHEEZING OR SHORTNESS OF BREATH 10/6/22  Yes Dominik Hernandez MD   amLODIPine (NORVASC) 10 MG tablet Take 1 tablet (10 mg total) by mouth once daily. 11/12/20 12/7/22 Yes Zackary Upton MD   aspirin (ECOTRIN) 81 MG EC tablet TAKE 1 TABLET BY MOUTH EVERY DAY 9/21/21  Yes Zackary Upton MD   atorvastatin (LIPITOR) 80 MG tablet Take 1 tablet (80 mg total) by mouth once daily. 11/23/21  Yes Zackary Upton MD   brimonidine 0.15 % OPTH DROP (ALPHAGAN) 0.15 % ophthalmic solution Place 1 drop into both eyes 3 (three) times daily. 10/11/22  Yes Brian Cardosoleur, OD   dorzolamide (TRUSOPT) 2 % ophthalmic solution Place 1 drop into both eyes 3 (three) times daily. 10/11/22  Yes Brian JEEVAN CardosoSharon, OD   evolocumab (REPATHA SURECLICK) 140 mg/mL PnIj Inject 1 mL (140 mg total) into the skin every 14 (fourteen) days. 7/8/22  Yes Zackary Upton MD   finasteride (PROSCAR) 5 mg tablet TAKE 1 TABLET BY MOUTH EVERY DAY 10/7/22  Yes Davion Copeland MD   fluticasone furoate-vilanteroL (BREO ELLIPTA) 200-25 mcg/dose DsDv diskus inhaler Inhale 1 puff into the lungs once daily. Controller 10/6/22  Yes Dominik Hernandez MD   latanoprost  0.005 % ophthalmic solution Place 1 drop into both eyes every evening. 22 Yes Brian Herrera, OD   lisinopriL (PRINIVIL,ZESTRIL) 20 MG tablet Take 1 tablet (20 mg total) by mouth once daily. 22  Yes Hamida Laureano NP   tamsulosin (FLOMAX) 0.4 mg Cap TAKE 1 CAPSULE BY MOUTH EVERY DAY 10/7/22  Yes Davion Copeland MD   cilostazoL (PLETAL) 100 MG Tab Take 1 tablet (100 mg total) by mouth 2 (two) times daily. 22  Zackary Upton MD   sod sulf-pot chloride-mag sulf (SUTAB) 1.479-0.188- 0.225 gram tablet Take 12 tablets by mouth once daily. Take according to package instructions with indicated amount of water. 10/25/22   Duncan Grossman PA-C        Allergies:  Review of patient's allergies indicates:  No Known Allergies     Social History:   Social History     Socioeconomic History    Marital status:    Occupational History     Employer: [employer]   Tobacco Use    Smoking status: Former     Packs/day: 2.00     Years: 15.00     Pack years: 30.00     Types: Cigarettes     Start date:      Quit date: 1985     Years since quittin.3    Smokeless tobacco: Never   Substance and Sexual Activity    Alcohol use: Yes     Alcohol/week: 12.0 standard drinks     Types: 12 Cans of beer per week    Drug use: No    Sexual activity: Yes     Partners: Female     Social Determinants of Health     Financial Resource Strain: Medium Risk    Difficulty of Paying Living Expenses: Somewhat hard   Food Insecurity: No Food Insecurity    Worried About Running Out of Food in the Last Year: Never true    Ran Out of Food in the Last Year: Never true   Transportation Needs: No Transportation Needs    Lack of Transportation (Medical): No    Lack of Transportation (Non-Medical): No   Physical Activity: Sufficiently Active    Days of Exercise per Week: 7 days    Minutes of Exercise per Session: 30 min   Stress: No Stress Concern Present    Feeling of Stress : Not at all   Social Connections:  "Moderately Integrated    Frequency of Communication with Friends and Family: Once a week    Frequency of Social Gatherings with Friends and Family: Twice a week    Attends Jehovah[employer] witness Services: More than 4 times per year    Active Member of Clubs or Organizations: No    Attends Club or Organization Meetings: Never    Marital Status:    Housing Stability: Low Risk     Unable to Pay for Housing in the Last Year: No    Number of Places Lived in the Last Year: 1    Unstable Housing in the Last Year: No       Family History:  Family History   Problem Relation Age of Onset    Cancer Mother         Unknown primary    Cancer Father     Cancer Brother         prostate     Prostate cancer Brother     Cancer Sister 64        pancreatic     Diabetes Neg Hx     Heart disease Neg Hx     Kidney disease Neg Hx     Stroke Neg Hx     Hyperlipidemia Neg Hx     Hypertension Neg Hx        ROS: No acute cardiac events, no acute respiratory complaints.     Physical Exam (all patients):    BP (!) 178/77   Pulse (!) 55   Temp 97.7 °F (36.5 °C)   Resp 18   Ht 5' 5" (1.651 m)   Wt 77.1 kg (170 lb)   SpO2 99%   BMI 28.29 kg/m²   Lungs: Clear to auscultation bilaterally, respirations unlabored  Heart: Regular rate and rhythm, S1 and S2 normal, no obvious murmurs  Abdomen:         Soft, non-tender, bowel sounds normal, no masses, no organomegaly    Lab Results   Component Value Date    WBC 4.78 07/26/2022    MCV 84 07/26/2022    RDW 14.7 (H) 07/26/2022     07/26/2022    INR 0.9 03/31/2020    GLU 93 07/26/2022    BUN 10 07/26/2022     07/26/2022    K 5.3 (H) 07/26/2022     07/26/2022        SEDATION PLAN: per anesthesia      History reviewed, vital signs satisfactory, cardiopulmonary status satisfactory, sedation options, risks and plans have been discussed with the patient  All their questions were answered and the patient agrees to the sedation procedures as planned and the patient is deemed an appropriate " candidate for the sedation as planned.    Procedure explained to patient, informed consent obtained and placed in chart.    Florinda Lindsey,   General Surgery  [facility]  12/7/2022

## 2022-12-07 NOTE — PROVATION PATIENT INSTRUCTIONS
Discharge Summary/Instructions after an Endoscopic Procedure  Patient Name: Alex Milner  Patient MRN: [id number]  Patient YOB: 1945 Wednesday, December 7, 2022 Florinda Lindsey DO  Dear patient,  As a result of recent federal legislation (The Federal Cures Act), you may   receive lab or pathology results from your procedure in your [facility]   account before your physician is able to contact you. Your physician or   their representative will relay the results to you with their   recommendations at their soonest availability.  Thank you,  RESTRICTIONS:  During your procedure today, you received medications for sedation.  These   medications may affect your judgment, balance and coordination.  Therefore,   for 24 hours, you have the following restrictions:   - DO NOT drive a car, operate machinery, make legal/financial decisions,   sign important papers or drink alcohol.    ACTIVITY:  Today: no heavy lifting, straining or running due to procedural   sedation/anesthesia.  The following day: return to full activity including work.  DIET:  Eat and drink normally unless instructed otherwise.     TREATMENT FOR COMMON SIDE EFFECTS:  - Mild abdominal pain, nausea, belching, bloating or excessive gas:  rest,   eat lightly and use a heating pad.  - Sore Throat: treat with throat lozenges and/or gargle with warm salt   water.  - Because air was used during the procedure, expelling large amounts of air   from your rectum or belching is normal.  - If a bowel prep was taken, you may not have a bowel movement for 1-3 days.    This is normal.  SYMPTOMS TO WATCH FOR AND REPORT TO YOUR PHYSICIAN:  1. Abdominal pain or bloating, other than gas cramps.  2. Chest pain.  3. Back pain.  4. Signs of infection such as: chills or fever occurring within 24 hours   after the procedure.  5. Rectal bleeding, which would show as bright red, maroon, or black stools.   (A tablespoon of blood from the rectum is not serious,  especially if   hemorrhoids are present.)  6. Vomiting.  7. Weakness or dizziness.  GO DIRECTLY TO THE NEAREST EMERGENCY ROOM IF YOU HAVE ANY OF THE FOLLOWING:      Difficulty breathing              Chills and/or fever over 101 F   Persistent vomiting and/or vomiting blood   Severe abdominal pain   Severe chest pain   Black, tarry stools   Bleeding- more than one tablespoon   Any other symptom or condition that you feel may need urgent attention  Your doctor recommends these additional instructions:  If any biopsies were taken, your doctors clinic will contact you in 1 to 2   weeks with any results.  - Patient has a contact number available for emergencies.  The signs and   symptoms of potential delayed complications were discussed with the   patient.  Return to normal activities tomorrow.  Written discharge   instructions were provided to the patient.   - Resume previous diet.   - Discharge patient to home (ambulatory).   - Continue present medications.   - Repeat colonoscopy in 5 years for surveillance.   - Return to GI clinic PRN.  For questions, problems or results please call your physician Florinda Lindsey, DO at Work:  [phone]  If you have any questions about the above instructions, call the GI   department at [phone] or call the endoscopy unit at [phone]   from 7am until 3 pm.  [facility], EMERGENCY ROOM PHONE NUMBER:   [phone]  IF A COMPLICATION OR EMERGENCY SITUATION ARISES AND YOU ARE UNABLE TO REACH   YOUR PHYSICIAN - GO DIRECTLY TO THE EMERGENCY ROOM.  I have read or have had read to me these discharge instructions for my   procedure and have received a written copy.  I understand these   instructions and will follow-up with my physician if I have any questions.     __________________________________       _____________________________________  Nurse Signature                                          Patient/Designated   Responsible Party  Signature  Florinda Lindsey, DO  12/7/2022 7:10:41 AM  This report has been verified and signed electronically.  Dear patient,  As a result of recent federal legislation (The Federal Cures Act), you may   receive lab or pathology results from your procedure in your [facility]   account before your physician is able to contact you. Your physician or   their representative will relay the results to you with their   recommendations at their soonest availability.  Thank you,  PROVATION

## 2022-12-07 NOTE — ANESTHESIA POSTPROCEDURE EVALUATION
Anesthesia Post Evaluation    Patient: Alex Milner    Procedure(s) Performed: Procedure(s) (LRB):  COLONOSCOPY (N/A)    Final Anesthesia Type: MAC      Patient location during evaluation: GI PACU  Patient participation: Yes- Able to Participate  Level of consciousness: awake and alert  Post-procedure vital signs: reviewed and stable  Pain management: adequate  Airway patency: patent    PONV status at discharge: No PONV  Anesthetic complications: no      Cardiovascular status: blood pressure returned to baseline and hemodynamically stable  Respiratory status: unassisted, spontaneous ventilation and room air  Hydration status: euvolemic  Follow-up not needed.          Vitals Value Taken Time   /70 12/07/22 0725   Temp 36.7 °C (98.1 °F) 12/07/22 0710   Pulse 60 12/07/22 0725   Resp 18 12/07/22 0725   SpO2 99 % 12/07/22 0725         Event Time   Out of Recovery 07:43:29         Pain/Meaghan Score: No data recorded

## 2022-12-07 NOTE — TRANSFER OF CARE
"Anesthesia Transfer of Care Note    Patient: Alex Milner    Procedure(s) Performed: Procedure(s) (LRB):  COLONOSCOPY (N/A)    Patient location: GI    Anesthesia Type: MAC    Transport from OR: Transported from OR on room air with adequate spontaneous ventilation    Post pain: adequate analgesia    Post assessment: no apparent anesthetic complications    Post vital signs: stable    Level of consciousness: sedated    Nausea/Vomiting: no nausea/vomiting    Complications: none    Transfer of care protocol was followed      Last vitals:   Visit Vitals  BP (!) 178/77   Pulse (!) 55   Temp 36.5 °C (97.7 °F)   Resp 18   Ht 5' 5" (1.651 m)   Wt 77.1 kg (170 lb)   SpO2 99%   BMI 28.29 kg/m²     "

## 2022-12-07 NOTE — ANESTHESIA PREPROCEDURE EVALUATION
12/07/2022  Alex Milner is a 77 y.o., male.      Pre-op Assessment    I have reviewed the Patient Summary Reports.     I have reviewed the Nursing Notes. I have reviewed the NPO Status.   I have reviewed the Medications.     Review of Systems  Anesthesia Hx:  No problems with previous Anesthesia  History of prior surgery of interest to airway management or planning: Previous anesthesia: General Denies Family Hx of Anesthesia complications.   Denies Personal Hx of Anesthesia complications.   Social:  Alcohol Use, Former Smoker Daily ETOH use   Hematology/Oncology:  Hematology Normal   Oncology Normal     EENT/Dental:EENT/Dental Normal   Cardiovascular:   Hypertension, well controlled CAD  Dysrhythmias atrial fibrillation PVD ECG has been reviewed. CONCLUSIONS     1 - Concentric remodeling.     2 - No wall motion abnormalities.     3 - Normal left ventricular systolic function (EF 60-65%).     4 - Normal left ventricular diastolic function.     5 - Normal right ventricular systolic function .     6 - Pulmonary hypertension. The estimated PA systolic pressure is greater than 45 mmHg.     7 - Mild tricuspid regurgitation.     Pt denies Cp/SOB Carotoid Artery Disease    Pulmonary:   Asthma Pulmonary nodules   Renal/:  Renal/ Normal     Hepatic/GI:  Hepatic/GI Normal    Musculoskeletal:  Musculoskeletal Normal    Neurological:  Neurology Normal    Endocrine:  Endocrine Normal    Dermatological:  Skin Normal    Psych:  Psychiatric Normal           Physical Exam  General: Well nourished, Cooperative, Alert and Oriented    Airway:  Mallampati: II   Mouth Opening: Normal  TM Distance: Normal  Tongue: Normal    Dental:  Dentures        Anesthesia Plan  Type of Anesthesia, risks & benefits discussed:    Anesthesia Type: MAC  Intra-op Monitoring Plan: Standard ASA Monitors  Post Op Pain Control Plan: IV/PO  Opioids PRN  Induction:  IV  Informed Consent: Informed consent signed with the Patient and all parties understand the risks and agree with anesthesia plan.  All questions answered.   ASA Score: 3  Day of Surgery Review of History & Physical: H&P Update referred to the surgeon/provider.I have interviewed and examined the patient. I have reviewed the patient's H&P dated:     Ready For Surgery From Anesthesia Perspective.     .

## 2022-12-07 NOTE — DISCHARGE SUMMARY
[facility] - Endoscopy (Hospital)  Discharge Note  Short Stay    Procedure(s) (LRB):  COLONOSCOPY (N/A)      OUTCOME: Patient tolerated treatment/procedure well without complication and is now ready for discharge.    DISPOSITION: Home or Self Care    FINAL DIAGNOSIS:  <principal problem not specified>    FOLLOWUP: With primary care provider    DISCHARGE INSTRUCTIONS:  No discharge procedures on file.      Clinical Reference Documents Added to Patient Instructions         Document    COLON POLYPECTOMY DISCHARGE INSTRUCTIONS (ENGLISH)    COLONOSCOPY DISCHARGE INSTRUCTIONS (ENGLISH)    DIVERTICULOSIS DISCHARGE INSTRUCTIONS (ENGLISH)    HEMORRHOIDS DISCHARGE INSTRUCTIONS (ENGLISH)            TIME SPENT ON DISCHARGE: 5 minutes

## 2022-12-08 ENCOUNTER — TELEPHONE (OUTPATIENT)
Dept: SURGERY | Facility: CLINIC | Age: 77
End: 2022-12-08
Payer: MEDICARE

## 2022-12-08 NOTE — TELEPHONE ENCOUNTER
----- Message from Miguel Hutchinson sent at 12/8/2022  2:26 PM CST -----  Contact: Patient  Type:  Patient Returning Call    Who Called:Alex Milner   Who Left Message for Patient: nurse   Does the patient know what this is regarding?: check up   Would the patient rather a call back or a response via [facility]?  Call back   Best Call Back Number: [phone]   Additional Information:

## 2022-12-19 LAB
FINAL PATHOLOGIC DIAGNOSIS: NORMAL
GROSS: NORMAL
Lab: NORMAL

## 2022-12-21 ENCOUNTER — SPECIALTY PHARMACY (OUTPATIENT)
Dept: PHARMACY | Facility: CLINIC | Age: 77
End: 2022-12-21
Payer: MEDICARE

## 2022-12-21 NOTE — TELEPHONE ENCOUNTER
Specialty Pharmacy - Refill Coordination    Specialty Medication Orders Linked to Encounter      Flowsheet Row Most Recent Value   Medication #1 evolocumab (REPATHA SURECLICK) 140 mg/mL PnIj (Order#[id number], Rx#[id number])            Refill Questions - Documented Responses      Flowsheet Row Most Recent Value   Patient Availability and HIPAA Verification    Does patient want to proceed with activity? Yes   HIPAA/medical authority confirmed? Yes   Relationship to patient of person spoken to? Self   Refill Screening Questions    Would patient like to speak to a pharmacist? No   When does the patient need to receive the medication? 12/28/22   Refill Delivery Questions    How will the patient receive the medication? MEDRx   When does the patient need to receive the medication? 12/28/22   Shipping Address Home   Address in [facility] confirmed and updated if neccessary? Yes   Expected Copay ($) 5   Is the patient able to afford the medication copay? Yes   Payment Method CC on file   Days supply of Refill 28   Supplies needed? No supplies needed   Refill activity completed? Yes   Refill activity plan Refill scheduled   Shipment/Pickup Date: 12/23/22            Current Outpatient Medications   Medication Sig    albuterol (PROVENTIL/VENTOLIN HFA) 90 mcg/actuation inhaler INHALE 2 PUFFS INTO THE LUNGS EVERY 6 (SIX) HOURS AS NEEDED FOR WHEEZING OR SHORTNESS OF BREATH    amLODIPine (NORVASC) 10 MG tablet Take 1 tablet (10 mg total) by mouth once daily.    aspirin (ECOTRIN) 81 MG EC tablet TAKE 1 TABLET BY MOUTH EVERY DAY    atorvastatin (LIPITOR) 80 MG tablet Take 1 tablet (80 mg total) by mouth once daily.    brimonidine 0.15 % OPTH DROP (ALPHAGAN) 0.15 % ophthalmic solution Place 1 drop into both eyes 3 (three) times daily.    cilostazoL (PLETAL) 100 MG Tab Take 1 tablet (100 mg total) by mouth 2 (two) times daily.    dorzolamide (TRUSOPT) 2 % ophthalmic solution Place 1 drop into both eyes 3 (three) times daily.     evolocumab (REPATHA SURECLICK) 140 mg/mL PnIj Inject 1 mL (140 mg total) into the skin every 14 (fourteen) days.    finasteride (PROSCAR) 5 mg tablet TAKE 1 TABLET BY MOUTH EVERY DAY    fluticasone furoate-vilanteroL (BREO ELLIPTA) 200-25 mcg/dose DsDv diskus inhaler Inhale 1 puff into the lungs once daily. Controller    latanoprost 0.005 % ophthalmic solution Place 1 drop into both eyes every evening.    lisinopriL (PRINIVIL,ZESTRIL) 20 MG tablet Take 1 tablet (20 mg total) by mouth once daily.    tamsulosin (FLOMAX) 0.4 mg Cap TAKE 1 CAPSULE BY MOUTH EVERY DAY   Last reviewed on 12/7/2022  6:40 AM by Davion Bautista RN    Review of patient's allergies indicates:  No Known Allergies Last reviewed on  12/7/2022 6:40 AM by Davion Bautista      Tasks added this encounter   1/18/2023 - Refill Call (Auto Added)   Tasks due within next 3 months   No tasks due.     [facility] - Specialty Pharmacy  [address] [facility] [address]  Phone: [phone]  Fax: [phone]

## 2023-01-18 ENCOUNTER — SPECIALTY PHARMACY (OUTPATIENT)
Dept: PHARMACY | Facility: CLINIC | Age: 78
End: 2023-01-18
Payer: MEDICARE

## 2023-01-18 NOTE — TELEPHONE ENCOUNTER
Outgoing call regarding repatha refill; per pt, he's due to inject on 1/28 or 1/29; informed him that a refill request was sent to md, and once approved [facility] will follow up to schedule delivery

## 2023-01-20 ENCOUNTER — LAB VISIT (OUTPATIENT)
Dept: LAB | Facility: HOSPITAL | Age: 78
End: 2023-01-20
Attending: UROLOGY
Payer: MEDICARE

## 2023-01-20 DIAGNOSIS — R97.20 ELEVATED PSA: ICD-10-CM

## 2023-01-20 LAB — COMPLEXED PSA SERPL-MCNC: 36 NG/ML (ref 0–4)

## 2023-01-20 PROCEDURE — 36415 COLL VENOUS BLD VENIPUNCTURE: CPT | Mod: HCNC,PN | Performed by: UROLOGY

## 2023-01-20 PROCEDURE — 84153 ASSAY OF PSA TOTAL: CPT | Mod: HCNC | Performed by: UROLOGY

## 2023-01-20 NOTE — TELEPHONE ENCOUNTER
Specialty Pharmacy - Refill Coordination    Specialty Medication Orders Linked to Encounter      Flowsheet Row Most Recent Value   Medication #1 evolocumab (REPATHA SURECLICK) 140 mg/mL PnIj (Order#[id number], Rx#[id number])            Refill Questions - Documented Responses      Flowsheet Row Most Recent Value   Patient Availability and HIPAA Verification    Does patient want to proceed with activity? Yes   HIPAA/medical authority confirmed? Yes   Relationship to patient of person spoken to? Self   Refill Screening Questions    Would patient like to speak to a pharmacist? No   When does the patient need to receive the medication? 01/29/23   Refill Delivery Questions    How will the patient receive the medication? MEDRx   When does the patient need to receive the medication? 01/29/23   Shipping Address Home   Address in [facility] confirmed and updated if neccessary? Yes   Expected Copay ($) 5   Is the patient able to afford the medication copay? Yes   Payment Method CC on file   Days supply of Refill 28   Supplies needed? No supplies needed   Refill activity completed? Yes   Refill activity plan Refill scheduled   Shipment/Pickup Date: 01/26/23            Current Outpatient Medications   Medication Sig    albuterol (PROVENTIL/VENTOLIN HFA) 90 mcg/actuation inhaler INHALE 2 PUFFS INTO THE LUNGS EVERY 6 (SIX) HOURS AS NEEDED FOR WHEEZING OR SHORTNESS OF BREATH    amLODIPine (NORVASC) 10 MG tablet Take 1 tablet (10 mg total) by mouth once daily.    aspirin (ECOTRIN) 81 MG EC tablet TAKE 1 TABLET BY MOUTH EVERY DAY    atorvastatin (LIPITOR) 80 MG tablet Take 1 tablet (80 mg total) by mouth once daily.    brimonidine 0.15 % OPTH DROP (ALPHAGAN) 0.15 % ophthalmic solution Place 1 drop into both eyes 3 (three) times daily.    cilostazoL (PLETAL) 100 MG Tab Take 1 tablet (100 mg total) by mouth 2 (two) times daily.    dorzolamide (TRUSOPT) 2 % ophthalmic solution INSTILL 1 DROP INTO BOTH EYES 3 TIMES A DAY     evolocumab (REPATHA SURECLICK) 140 mg/mL PnIj Inject 1 mL (140 mg total) into the skin every 14 (fourteen) days.    finasteride (PROSCAR) 5 mg tablet TAKE 1 TABLET BY MOUTH EVERY DAY    fluticasone furoate-vilanteroL (BREO ELLIPTA) 200-25 mcg/dose DsDv diskus inhaler Inhale 1 puff into the lungs once daily. Controller    latanoprost 0.005 % ophthalmic solution Place 1 drop into both eyes every evening.    lisinopriL (PRINIVIL,ZESTRIL) 20 MG tablet Take 1 tablet (20 mg total) by mouth once daily.    tamsulosin (FLOMAX) 0.4 mg Cap TAKE 1 CAPSULE BY MOUTH EVERY DAY   Last reviewed on 12/7/2022  6:40 AM by Davion Bautista RN    Review of patient's allergies indicates:  No Known Allergies Last reviewed on  12/7/2022 6:40 AM by Davion Bautista      Tasks added this encounter   2/19/2023 - Refill Call (Auto Added)   Tasks due within next 3 months   No tasks due.     [facility]  [address] [facility] [address]  Phone: [phone]  Fax: [phone]

## 2023-01-26 ENCOUNTER — OFFICE VISIT (OUTPATIENT)
Dept: UROLOGY | Facility: CLINIC | Age: 78
End: 2023-01-26
Payer: MEDICARE

## 2023-01-26 VITALS
BODY MASS INDEX: 28.32 KG/M2 | HEIGHT: 65 IN | SYSTOLIC BLOOD PRESSURE: 130 MMHG | HEART RATE: 61 BPM | DIASTOLIC BLOOD PRESSURE: 50 MMHG | WEIGHT: 170 LBS | TEMPERATURE: 97 F

## 2023-01-26 DIAGNOSIS — R97.20 ELEVATED PSA: Primary | ICD-10-CM

## 2023-01-26 DIAGNOSIS — N40.0 BENIGN PROSTATIC HYPERPLASIA, UNSPECIFIED WHETHER LOWER URINARY TRACT SYMPTOMS PRESENT: ICD-10-CM

## 2023-01-26 DIAGNOSIS — N52.9 ERECTILE DYSFUNCTION, UNSPECIFIED ERECTILE DYSFUNCTION TYPE: ICD-10-CM

## 2023-01-26 PROCEDURE — 1101F PT FALLS ASSESS-DOCD LE1/YR: CPT | Mod: HCNC,CPTII,S$GLB, | Performed by: UROLOGY

## 2023-01-26 PROCEDURE — 1160F RVW MEDS BY RX/DR IN RCRD: CPT | Mod: HCNC,CPTII,S$GLB, | Performed by: UROLOGY

## 2023-01-26 PROCEDURE — 99214 OFFICE O/P EST MOD 30 MIN: CPT | Mod: HCNC,S$GLB,, | Performed by: UROLOGY

## 2023-01-26 PROCEDURE — 3075F SYST BP GE 130 - 139MM HG: CPT | Mod: HCNC,CPTII,S$GLB, | Performed by: UROLOGY

## 2023-01-26 PROCEDURE — 1101F PR PT FALLS ASSESS DOC 0-1 FALLS W/OUT INJ PAST YR: ICD-10-PCS | Mod: HCNC,CPTII,S$GLB, | Performed by: UROLOGY

## 2023-01-26 PROCEDURE — 1159F PR MEDICATION LIST DOCUMENTED IN MEDICAL RECORD: ICD-10-PCS | Mod: HCNC,CPTII,S$GLB, | Performed by: UROLOGY

## 2023-01-26 PROCEDURE — 3075F PR MOST RECENT SYSTOLIC BLOOD PRESS GE 130-139MM HG: ICD-10-PCS | Mod: HCNC,CPTII,S$GLB, | Performed by: UROLOGY

## 2023-01-26 PROCEDURE — 3288F FALL RISK ASSESSMENT DOCD: CPT | Mod: HCNC,CPTII,S$GLB, | Performed by: UROLOGY

## 2023-01-26 PROCEDURE — 99999 PR PBB SHADOW E&M-EST. PATIENT-LVL III: ICD-10-PCS | Mod: PBBFAC,HCNC,, | Performed by: UROLOGY

## 2023-01-26 PROCEDURE — 1126F PR PAIN SEVERITY QUANTIFIED, NO PAIN PRESENT: ICD-10-PCS | Mod: HCNC,CPTII,S$GLB, | Performed by: UROLOGY

## 2023-01-26 PROCEDURE — 99214 PR OFFICE/OUTPT VISIT, EST, LEVL IV, 30-39 MIN: ICD-10-PCS | Mod: HCNC,S$GLB,, | Performed by: UROLOGY

## 2023-01-26 PROCEDURE — 3078F DIAST BP <80 MM HG: CPT | Mod: HCNC,CPTII,S$GLB, | Performed by: UROLOGY

## 2023-01-26 PROCEDURE — 1159F MED LIST DOCD IN RCRD: CPT | Mod: HCNC,CPTII,S$GLB, | Performed by: UROLOGY

## 2023-01-26 PROCEDURE — 1160F PR REVIEW ALL MEDS BY PRESCRIBER/CLIN PHARMACIST DOCUMENTED: ICD-10-PCS | Mod: HCNC,CPTII,S$GLB, | Performed by: UROLOGY

## 2023-01-26 PROCEDURE — 3288F PR FALLS RISK ASSESSMENT DOCUMENTED: ICD-10-PCS | Mod: HCNC,CPTII,S$GLB, | Performed by: UROLOGY

## 2023-01-26 PROCEDURE — 3078F PR MOST RECENT DIASTOLIC BLOOD PRESSURE < 80 MM HG: ICD-10-PCS | Mod: HCNC,CPTII,S$GLB, | Performed by: UROLOGY

## 2023-01-26 PROCEDURE — 99999 PR PBB SHADOW E&M-EST. PATIENT-LVL III: CPT | Mod: PBBFAC,HCNC,, | Performed by: UROLOGY

## 2023-01-26 PROCEDURE — 1126F AMNT PAIN NOTED NONE PRSNT: CPT | Mod: HCNC,CPTII,S$GLB, | Performed by: UROLOGY

## 2023-01-26 NOTE — PROGRESS NOTES
Chief Complaint:   Encounter Diagnoses   Name Primary?    Elevated PSA Yes    Benign prostatic hyperplasia, unspecified whether lower urinary tract symptoms present     Erectile dysfunction, unspecified erectile dysfunction type        HPI:   1/26/23- PSA continues to fluctuate, voiding okay though on medical management.  Gets up 2-3 times per evening.  Although patient is okay with this.  Erections are currently not an issue.  1/8/18: Returns after long absence.  PSA >30.  Says he had a biopsy in 2016 with another urologist in town off of [facility].  Second one he had.  MRI report from 5/17 reassuring with 98gm prostate.  Reduced stream not on flomax/finasteride.  No abd/pelvic pain and no exac/rel factors.  No hematuria.  No urolithiasis.  3-4 cups coffee in AM.  Nocturia x3-4.    Allergies:  Patient has no known allergies.    Medications:  has a current medication list which includes the following prescription(s): albuterol, amlodipine, aspirin, atorvastatin, brimonidine 0.15 % opth drop, cilostazol, dorzolamide, repatha sureclick, finasteride, fluad quad 2022-23(65y up)(pf), fluticasone furoate-vilanterol, latanoprost, lisinopril, and tamsulosin.    Review of Systems:  General: No fever, chills, fatigability, or weight loss.  Skin: No rashes, itching, or changes in color or texture of skin.  Chest: Denies THOMPSON, cyanosis, wheezing, cough, and sputum production.  Abdomen: Appetite fine. No weight loss. Denies diarrhea, abdominal pain, hematemesis, or blood in stool.  Musculoskeletal: No joint stiffness or swelling. Some back pain.  : As above.  All other review of systems negative.    PMH:   has a past medical history of Atherosclerosis of coronary artery (8/10/2022), Atherosclerotic PVD with intermittent claudication (8/16/2019), Atrial fibrillation, Back pain, Bilateral inguinal hernia, Calcified granuloma of lung (10/3/2017), Diverticulosis, Diverticulosis (10/28/13), ED (erectile dysfunction), Elevated PSA,  Granuloma of liver, Hyperlipidemia, Hypertension, Liver mass (3/13/2020), Moderate persistent asthma without complication (9/26/2017), Obesity, Personal history of colonic polyps (2013), Pulmonary heart disease (8/10/2022), Tobacco dependence, Uncomplicated alcohol dependence (11/27/2019), and Urinary tract infection.    PSH:   has a past surgical history that includes Hernia repair; Prostate biopsy; Aortography with serialography (N/A, 10/21/2019); Aortography with serialography (N/A, 11/14/2019); and Colonoscopy (N/A, 12/7/2022).    FamHx: family history includes Cancer in his brother, father, and mother; Cancer (age of onset: 64) in his sister; Prostate cancer in his brother.    SocHx:  reports that he quit smoking about 37 years ago. His smoking use included cigarettes. He started smoking about 63 years ago. He has a 30.00 pack-year smoking history. He has never used smokeless tobacco. He reports current alcohol use of about 12.0 standard drinks per week. He reports that he does not use drugs.      Physical Exam:  Vitals:    01/26/23 0919   BP: (!) 130/50   Pulse: 61   Temp: 97.3 °F (36.3 °C)     General: A&Ox3, no apparent distress, no deformities  Neck: No masses, normal thyroid  Lungs: normal inspiration, no use of accessory muscles  Heart: normal pulse, no arrhythmias  Abdomen: Soft, NT, ND  Skin: The skin is warm and dry. No jaundice.  Ext: No c/c/e.    Labs/Studies:   pnbx negative 65g  3/8/21  Reported negative biopsy 2016 outside facility  PSA 36 1/23  PSA 27 7/22  PSA 30.1 4/22  PSA 32.6 1/22  PSA 41.2 9/21  PSA 21.9 1/21  PSA 19.3 7/20  MRI PI-RADS 2 6/22  MRI PI-RADS 4 2/21    Impression/Plan:       1. Elevated PSA- PSA continues to fluctuate, past MRI was negative though.  Previous abnormal MRI may have been falsely positive per reports.  Either way patient wants to continue to monitor and will repeat the PSA in 6 months, call with any complaints in the meantime.    2. BPH- tamsulosin and finasteride  appear to be working well, call when he needs a refill.    3. Erectile dysfunction- sildenafil 100 mg works well for the patient, currently not an issue.

## 2023-02-20 ENCOUNTER — SPECIALTY PHARMACY (OUTPATIENT)
Dept: PHARMACY | Facility: CLINIC | Age: 78
End: 2023-02-20
Payer: MEDICARE

## 2023-02-20 NOTE — TELEPHONE ENCOUNTER
Specialty Pharmacy - Refill Coordination    Specialty Medication Orders Linked to Encounter      Flowsheet Row Most Recent Value   Medication #1 evolocumab (REPATHA SURECLICK) 140 mg/mL PnIj (Order#[id number], Rx#[id number])            Refill Questions - Documented Responses      Flowsheet Row Most Recent Value   Patient Availability and HIPAA Verification    Does patient want to proceed with activity? Yes   HIPAA/medical authority confirmed? Yes   Relationship to patient of person spoken to? Self   Refill Screening Questions    Would patient like to speak to a pharmacist? No   When does the patient need to receive the medication? 02/28/23   Refill Delivery Questions    How will the patient receive the medication? MEDRx   When does the patient need to receive the medication? 02/28/23   Shipping Address Home   Address in [facility] confirmed and updated if neccessary? Yes   Expected Copay ($) 5   Is the patient able to afford the medication copay? Yes   Payment Method CC on file   Days supply of Refill 28   Supplies needed? No supplies needed   Refill activity completed? Yes   Refill activity plan Refill scheduled   Shipment/Pickup Date: 02/23/23            Current Outpatient Medications   Medication Sig    albuterol (PROVENTIL/VENTOLIN HFA) 90 mcg/actuation inhaler INHALE 2 PUFFS INTO THE LUNGS EVERY 6 (SIX) HOURS AS NEEDED FOR WHEEZING OR SHORTNESS OF BREATH    amLODIPine (NORVASC) 10 MG tablet Take 1 tablet (10 mg total) by mouth once daily.    aspirin (ECOTRIN) 81 MG EC tablet TAKE 1 TABLET BY MOUTH EVERY DAY    atorvastatin (LIPITOR) 80 MG tablet Take 1 tablet (80 mg total) by mouth once daily.    brimonidine 0.15 % OPTH DROP (ALPHAGAN) 0.15 % ophthalmic solution Place 1 drop into both eyes 3 (three) times daily.    cilostazoL (PLETAL) 100 MG Tab Take 1 tablet (100 mg total) by mouth 2 (two) times daily.    dorzolamide (TRUSOPT) 2 % ophthalmic solution INSTILL 1 DROP INTO BOTH EYES 3 TIMES A DAY     evolocumab (REPATHA SURECLICK) 140 mg/mL PnIj Inject 1 mL (140 mg total) into the skin every 14 (fourteen) days.    finasteride (PROSCAR) 5 mg tablet TAKE 1 TABLET BY MOUTH EVERY DAY    FLUAD QUAD 2022-23,65Y UP,,PF, 60 mcg (15 mcg x 4)/0.5 mL Syrg     fluticasone furoate-vilanteroL (BREO ELLIPTA) 200-25 mcg/dose DsDv diskus inhaler Inhale 1 puff into the lungs once daily. Controller    latanoprost 0.005 % ophthalmic solution Place 1 drop into both eyes every evening.    lisinopriL (PRINIVIL,ZESTRIL) 20 MG tablet Take 1 tablet (20 mg total) by mouth once daily.    tamsulosin (FLOMAX) 0.4 mg Cap TAKE 1 CAPSULE BY MOUTH EVERY DAY   Last reviewed on 1/26/2023  9:22 AM by Davion Copeland MD    Review of patient's allergies indicates:  No Known Allergies Last reviewed on  1/26/2023 9:22 AM by Davion Copeland      Tasks added this encounter   3/21/2023 - Refill Call (Auto Added)   Tasks due within next 3 months   No tasks due.     Rebecca Holloway - Specialty Pharmacy  [facility] [facility] [address]  Phone: [phone]  Fax: [phone]

## 2023-03-21 ENCOUNTER — SPECIALTY PHARMACY (OUTPATIENT)
Dept: PHARMACY | Facility: CLINIC | Age: 78
End: 2023-03-21
Payer: MEDICARE

## 2023-03-21 NOTE — TELEPHONE ENCOUNTER
Specialty Pharmacy - Refill Coordination    Specialty Medication Orders Linked to Encounter      Flowsheet Row Most Recent Value   Medication #1 evolocumab (REPATHA SURECLICK) 140 mg/mL PnIj (Order#[id number], Rx#[id number])            Refill Questions - Documented Responses      Flowsheet Row Most Recent Value   Patient Availability and HIPAA Verification    Does patient want to proceed with activity? Yes   HIPAA/medical authority confirmed? Yes   Relationship to patient of person spoken to? Self   Refill Screening Questions    Would patient like to speak to a pharmacist? No   When does the patient need to receive the medication? 03/28/23   Refill Delivery Questions    How will the patient receive the medication? MEDRx   When does the patient need to receive the medication? 03/28/23   Shipping Address Home   Address in [facility] confirmed and updated if neccessary? Yes   Expected Copay ($) 5   Is the patient able to afford the medication copay? Yes   Payment Method CC on file   Days supply of Refill 28   Supplies needed? No supplies needed   Refill activity completed? Yes   Refill activity plan Refill scheduled   Shipment/Pickup Date: 03/23/23            Current Outpatient Medications   Medication Sig    albuterol (PROVENTIL/VENTOLIN HFA) 90 mcg/actuation inhaler INHALE 2 PUFFS INTO THE LUNGS EVERY 6 (SIX) HOURS AS NEEDED FOR WHEEZING OR SHORTNESS OF BREATH    amLODIPine (NORVASC) 10 MG tablet Take 1 tablet (10 mg total) by mouth once daily.    aspirin (ECOTRIN) 81 MG EC tablet TAKE 1 TABLET BY MOUTH EVERY DAY    atorvastatin (LIPITOR) 80 MG tablet Take 1 tablet (80 mg total) by mouth once daily.    brimonidine 0.15 % OPTH DROP (ALPHAGAN) 0.15 % ophthalmic solution Place 1 drop into both eyes 3 (three) times daily.    cilostazoL (PLETAL) 100 MG Tab Take 1 tablet (100 mg total) by mouth 2 (two) times daily.    dorzolamide (TRUSOPT) 2 % ophthalmic solution INSTILL 1 DROP INTO BOTH EYES 3 TIMES A DAY     evolocumab (REPATHA SURECLICK) 140 mg/mL PnIj Inject 1 mL (140 mg total) into the skin every 14 (fourteen) days.    finasteride (PROSCAR) 5 mg tablet TAKE 1 TABLET BY MOUTH EVERY DAY    FLUAD QUAD 2022-23,65Y UP,,PF, 60 mcg (15 mcg x 4)/0.5 mL Syrg     fluticasone furoate-vilanteroL (BREO ELLIPTA) 200-25 mcg/dose DsDv diskus inhaler Inhale 1 puff into the lungs once daily. Controller    latanoprost 0.005 % ophthalmic solution Place 1 drop into both eyes every evening.    lisinopriL (PRINIVIL,ZESTRIL) 20 MG tablet Take 1 tablet (20 mg total) by mouth once daily.    tamsulosin (FLOMAX) 0.4 mg Cap TAKE 1 CAPSULE BY MOUTH EVERY DAY   Last reviewed on 1/26/2023  9:22 AM by Davion Copeland MD    Review of patient's allergies indicates:  No Known Allergies Last reviewed on  1/26/2023 9:22 AM by Davion Copeland      Tasks added this encounter   4/18/2023 - Refill Call (Auto Added)   Tasks due within next 3 months   No tasks due.     Fiordaliza Santo [facility] - Specialty Pharmacy  [address] [place] [address]  Phone: [phone]  Fax: [phone]

## 2023-04-18 ENCOUNTER — PATIENT MESSAGE (OUTPATIENT)
Dept: PHARMACY | Facility: CLINIC | Age: 78
End: 2023-04-18
Payer: MEDICARE

## 2023-04-21 ENCOUNTER — SPECIALTY PHARMACY (OUTPATIENT)
Dept: PHARMACY | Facility: CLINIC | Age: 78
End: 2023-04-21
Payer: MEDICARE

## 2023-04-21 NOTE — TELEPHONE ENCOUNTER
Specialty Pharmacy - Refill Coordination    Specialty Medication Orders Linked to Encounter      Flowsheet Row Most Recent Value   Medication #1 evolocumab (REPATHA SURECLICK) 140 mg/mL PnIj (Order#[id number], Rx#[id number])            Refill Questions - Documented Responses      Flowsheet Row Most Recent Value   Patient Availability and HIPAA Verification    Does patient want to proceed with activity? Yes   HIPAA/medical authority confirmed? Yes   Relationship to patient of person spoken to? Self   Refill Screening Questions    Would patient like to speak to a pharmacist? No   When does the patient need to receive the medication? 04/27/23   Refill Delivery Questions    How will the patient receive the medication? MEDRx   When does the patient need to receive the medication? 04/27/23   Shipping Address Home   Address in [facility] confirmed and updated if neccessary? Yes   Expected Copay ($) 5   Is the patient able to afford the medication copay? Yes   Payment Method CC on file   Days supply of Refill 28   Supplies needed? No supplies needed   Refill activity completed? Yes   Refill activity plan Refill scheduled   Shipment/Pickup Date: 04/24/23            Current Outpatient Medications   Medication Sig    albuterol (PROVENTIL/VENTOLIN HFA) 90 mcg/actuation inhaler INHALE 2 PUFFS INTO THE LUNGS EVERY 6 (SIX) HOURS AS NEEDED FOR WHEEZING OR SHORTNESS OF BREATH    amLODIPine (NORVASC) 10 MG tablet Take 1 tablet (10 mg total) by mouth once daily.    aspirin (ECOTRIN) 81 MG EC tablet TAKE 1 TABLET BY MOUTH EVERY DAY    atorvastatin (LIPITOR) 80 MG tablet Take 1 tablet (80 mg total) by mouth once daily.    brimonidine 0.15 % OPTH DROP (ALPHAGAN) 0.15 % ophthalmic solution Place 1 drop into both eyes 3 (three) times daily.    cilostazoL (PLETAL) 100 MG Tab Take 1 tablet (100 mg total) by mouth 2 (two) times daily.    dorzolamide (TRUSOPT) 2 % ophthalmic solution INSTILL 1 DROP INTO BOTH EYES 3 TIMES A DAY     evolocumab (REPATHA SURECLICK) 140 mg/mL PnIj Inject 1 mL (140 mg total) into the skin every 14 (fourteen) days.    finasteride (PROSCAR) 5 mg tablet TAKE 1 TABLET BY MOUTH EVERY DAY    FLUAD QUAD 2022-23,65Y UP,,PF, 60 mcg (15 mcg x 4)/0.5 mL Syrg     fluticasone furoate-vilanteroL (BREO ELLIPTA) 200-25 mcg/dose DsDv diskus inhaler Inhale 1 puff into the lungs once daily. Controller    latanoprost 0.005 % ophthalmic solution Place 1 drop into both eyes every evening.    lisinopriL (PRINIVIL,ZESTRIL) 20 MG tablet Take 1 tablet (20 mg total) by mouth once daily.    tamsulosin (FLOMAX) 0.4 mg Cap TAKE 1 CAPSULE BY MOUTH EVERY DAY   Last reviewed on 1/26/2023  9:22 AM by Davion Copeland MD    Review of patient's allergies indicates:  No Known Allergies Last reviewed on  1/26/2023 9:22 AM by Davion Copeland      Tasks added this encounter   5/18/2023 - Refill Call (Auto Added)   Tasks due within next 3 months   No tasks due.     Heather Santo [facility] - Specialty Pharmacy  [address] [place] [address]  Phone: [phone]  Fax: [phone]

## 2023-04-26 ENCOUNTER — PATIENT MESSAGE (OUTPATIENT)
Dept: INTERNAL MEDICINE | Facility: CLINIC | Age: 78
End: 2023-04-26
Payer: MEDICARE

## 2023-05-18 ENCOUNTER — SPECIALTY PHARMACY (OUTPATIENT)
Dept: PHARMACY | Facility: CLINIC | Age: 78
End: 2023-05-18
Payer: MEDICARE

## 2023-05-18 NOTE — TELEPHONE ENCOUNTER
Specialty Pharmacy - Refill Coordination    Specialty Medication Orders Linked to Encounter      Flowsheet Row Most Recent Value   Medication #1 evolocumab (REPATHA SURECLICK) 140 mg/mL PnIj (Order#[id number], Rx#[id number])            Refill Questions - Documented Responses      Flowsheet Row Most Recent Value   Patient Availability and HIPAA Verification    Does patient want to proceed with activity? Yes   HIPAA/medical authority confirmed? Yes   Relationship to patient of person spoken to? Self   Refill Screening Questions    Would patient like to speak to a pharmacist? No   When does the patient need to receive the medication? 05/26/23   Refill Delivery Questions    How will the patient receive the medication? MEDRx   When does the patient need to receive the medication? 05/26/23   Shipping Address Home   Address in [facility] confirmed and updated if neccessary? Yes   Expected Copay ($) 5   Is the patient able to afford the medication copay? Yes   Payment Method CC on file   Days supply of Refill 28   Supplies needed? No supplies needed   Refill activity completed? Yes   Refill activity plan Refill scheduled   Shipment/Pickup Date: 05/24/23            Current Outpatient Medications   Medication Sig    albuterol (PROVENTIL/VENTOLIN HFA) 90 mcg/actuation inhaler INHALE 2 PUFFS INTO THE LUNGS EVERY 6 (SIX) HOURS AS NEEDED FOR WHEEZING OR SHORTNESS OF BREATH    amLODIPine (NORVASC) 10 MG tablet Take 1 tablet (10 mg total) by mouth once daily.    aspirin (ECOTRIN) 81 MG EC tablet TAKE 1 TABLET BY MOUTH EVERY DAY    atorvastatin (LIPITOR) 80 MG tablet Take 1 tablet (80 mg total) by mouth once daily.    brimonidine 0.15 % OPTH DROP (ALPHAGAN) 0.15 % ophthalmic solution Place 1 drop into both eyes 3 (three) times daily.    cilostazoL (PLETAL) 100 MG Tab Take 1 tablet (100 mg total) by mouth 2 (two) times daily.    dorzolamide (TRUSOPT) 2 % ophthalmic solution INSTILL 1 DROP INTO BOTH EYES 3 TIMES A DAY     evolocumab (REPATHA SURECLICK) 140 mg/mL PnIj Inject 1 mL (140 mg total) into the skin every 14 (fourteen) days.    finasteride (PROSCAR) 5 mg tablet TAKE 1 TABLET BY MOUTH EVERY DAY    FLUAD QUAD 2022-23,65Y UP,,PF, 60 mcg (15 mcg x 4)/0.5 mL Syrg     fluticasone furoate-vilanteroL (BREO ELLIPTA) 200-25 mcg/dose DsDv diskus inhaler Inhale 1 puff into the lungs once daily. Controller    latanoprost 0.005 % ophthalmic solution Place 1 drop into both eyes every evening.    lisinopriL (PRINIVIL,ZESTRIL) 20 MG tablet Take 1 tablet (20 mg total) by mouth once daily.    tamsulosin (FLOMAX) 0.4 mg Cap TAKE 1 CAPSULE BY MOUTH EVERY DAY   Last reviewed on 1/26/2023  9:22 AM by Davion Copeland MD    Review of patient's allergies indicates:  No Known Allergies Last reviewed on  1/26/2023 9:22 AM by Davion Copeland      Tasks added this encounter   No tasks added.   Tasks due within next 3 months   5/18/2023 - Refill Coordination Outreach (1 time occurrence)  5/15/2023 - Refill Coordination Outreach (1 time occurrence)     Kiera Holloway - Specialty Pharmacy  [address] [place], [place] [address]  [facility] [address]  Phone: [phone]  Fax: [phone]

## 2023-05-22 ENCOUNTER — TELEPHONE (OUTPATIENT)
Dept: ADMINISTRATIVE | Facility: HOSPITAL | Age: 78
End: 2023-05-22
Payer: MEDICARE

## 2023-05-22 ENCOUNTER — PATIENT MESSAGE (OUTPATIENT)
Dept: ADMINISTRATIVE | Facility: HOSPITAL | Age: 78
End: 2023-05-22
Payer: MEDICARE

## 2023-05-24 DIAGNOSIS — I70.0 AORTIC ATHEROSCLEROSIS: ICD-10-CM

## 2023-05-24 DIAGNOSIS — I10 PRIMARY HYPERTENSION: Primary | Chronic | ICD-10-CM

## 2023-06-13 ENCOUNTER — OFFICE VISIT (OUTPATIENT)
Dept: CARDIOLOGY | Facility: CLINIC | Age: 78
End: 2023-06-13
Payer: MEDICARE

## 2023-06-13 ENCOUNTER — HOSPITAL ENCOUNTER (OUTPATIENT)
Dept: CARDIOLOGY | Facility: HOSPITAL | Age: 78
Discharge: HOME | End: 2023-06-13
Attending: INTERNAL MEDICINE
Payer: MEDICARE

## 2023-06-13 VITALS
OXYGEN SATURATION: 97 % | SYSTOLIC BLOOD PRESSURE: 144 MMHG | WEIGHT: 176.13 LBS | BODY MASS INDEX: 29.34 KG/M2 | DIASTOLIC BLOOD PRESSURE: 66 MMHG | HEIGHT: 65 IN | HEART RATE: 59 BPM

## 2023-06-13 DIAGNOSIS — I27.9 PULMONARY HEART DISEASE: ICD-10-CM

## 2023-06-13 DIAGNOSIS — I70.0 AORTIC ATHEROSCLEROSIS: ICD-10-CM

## 2023-06-13 DIAGNOSIS — I10 PRIMARY HYPERTENSION: Chronic | ICD-10-CM

## 2023-06-13 DIAGNOSIS — I25.10 ATHEROSCLEROSIS OF CORONARY ARTERY, UNSPECIFIED VESSEL OR LESION TYPE, UNSPECIFIED WHETHER ANGINA PRESENT, UNSPECIFIED WHETHER NATIVE OR TRANSPLANTED HEART: ICD-10-CM

## 2023-06-13 DIAGNOSIS — E78.2 MIXED HYPERLIPIDEMIA: ICD-10-CM

## 2023-06-13 DIAGNOSIS — I70.219 ATHEROSCLEROTIC PVD WITH INTERMITTENT CLAUDICATION: Primary | ICD-10-CM

## 2023-06-13 PROCEDURE — 1160F RVW MEDS BY RX/DR IN RCRD: CPT | Mod: CPTII,S$GLB,, | Performed by: INTERNAL MEDICINE

## 2023-06-13 PROCEDURE — 1101F PR PT FALLS ASSESS DOC 0-1 FALLS W/OUT INJ PAST YR: ICD-10-PCS | Mod: CPTII,S$GLB,, | Performed by: INTERNAL MEDICINE

## 2023-06-13 PROCEDURE — 99215 OFFICE O/P EST HI 40 MIN: CPT | Mod: S$GLB,,, | Performed by: INTERNAL MEDICINE

## 2023-06-13 PROCEDURE — 3078F DIAST BP <80 MM HG: CPT | Mod: CPTII,S$GLB,, | Performed by: INTERNAL MEDICINE

## 2023-06-13 PROCEDURE — 99999 PR PBB SHADOW E&M-EST. PATIENT-LVL IV: ICD-10-PCS | Mod: PBBFAC,,, | Performed by: INTERNAL MEDICINE

## 2023-06-13 PROCEDURE — 1101F PT FALLS ASSESS-DOCD LE1/YR: CPT | Mod: CPTII,S$GLB,, | Performed by: INTERNAL MEDICINE

## 2023-06-13 PROCEDURE — 99999 PR PBB SHADOW E&M-EST. PATIENT-LVL IV: CPT | Mod: PBBFAC,,, | Performed by: INTERNAL MEDICINE

## 2023-06-13 PROCEDURE — 3078F PR MOST RECENT DIASTOLIC BLOOD PRESSURE < 80 MM HG: ICD-10-PCS | Mod: CPTII,S$GLB,, | Performed by: INTERNAL MEDICINE

## 2023-06-13 PROCEDURE — 3288F FALL RISK ASSESSMENT DOCD: CPT | Mod: CPTII,S$GLB,, | Performed by: INTERNAL MEDICINE

## 2023-06-13 PROCEDURE — 93010 EKG 12-LEAD: ICD-10-PCS | Mod: ,,, | Performed by: INTERNAL MEDICINE

## 2023-06-13 PROCEDURE — 1126F AMNT PAIN NOTED NONE PRSNT: CPT | Mod: CPTII,S$GLB,, | Performed by: INTERNAL MEDICINE

## 2023-06-13 PROCEDURE — 3077F SYST BP >= 140 MM HG: CPT | Mod: CPTII,S$GLB,, | Performed by: INTERNAL MEDICINE

## 2023-06-13 PROCEDURE — 1160F PR REVIEW ALL MEDS BY PRESCRIBER/CLIN PHARMACIST DOCUMENTED: ICD-10-PCS | Mod: CPTII,S$GLB,, | Performed by: INTERNAL MEDICINE

## 2023-06-13 PROCEDURE — 93010 ELECTROCARDIOGRAM REPORT: CPT | Mod: ,,, | Performed by: INTERNAL MEDICINE

## 2023-06-13 PROCEDURE — 1126F PR PAIN SEVERITY QUANTIFIED, NO PAIN PRESENT: ICD-10-PCS | Mod: CPTII,S$GLB,, | Performed by: INTERNAL MEDICINE

## 2023-06-13 PROCEDURE — 3077F PR MOST RECENT SYSTOLIC BLOOD PRESSURE >= 140 MM HG: ICD-10-PCS | Mod: CPTII,S$GLB,, | Performed by: INTERNAL MEDICINE

## 2023-06-13 PROCEDURE — 93005 ELECTROCARDIOGRAM TRACING: CPT

## 2023-06-13 PROCEDURE — 99215 PR OFFICE/OUTPT VISIT, EST, LEVL V, 40-54 MIN: ICD-10-PCS | Mod: S$GLB,,, | Performed by: INTERNAL MEDICINE

## 2023-06-13 PROCEDURE — 1159F MED LIST DOCD IN RCRD: CPT | Mod: CPTII,S$GLB,, | Performed by: INTERNAL MEDICINE

## 2023-06-13 PROCEDURE — 3288F PR FALLS RISK ASSESSMENT DOCUMENTED: ICD-10-PCS | Mod: CPTII,S$GLB,, | Performed by: INTERNAL MEDICINE

## 2023-06-13 PROCEDURE — 1159F PR MEDICATION LIST DOCUMENTED IN MEDICAL RECORD: ICD-10-PCS | Mod: CPTII,S$GLB,, | Performed by: INTERNAL MEDICINE

## 2023-06-13 RX ORDER — RIVAROXABAN 2.5 MG/1
2.5 TABLET, FILM COATED ORAL 2 TIMES DAILY WITH MEALS
Qty: 60 TABLET | Refills: 5 | Status: SHIPPED | OUTPATIENT
Start: 2023-06-13

## 2023-06-13 NOTE — PROGRESS NOTES
Subjective:   Patient ID:  Alex Milner is a 77 y.o. male who presents for follow up of No chief complaint on file.      78 yo male, 6 months f/u  PMH PAD, h/o AO showed left infrapopliteal . Distal left SFA PTCA and  AO right SFA PTCA by Dr. Ross,  HTN, HLP, PVD, obesity, ETOH use PAULA on CPAP. No smoking  C/o bilateral thigh and buttock pain with walking and some time long time sitting, and back pain at night,. No calf pain   left FREDY 0.7 and right 1.1. LE arterial US showed left infrapopliteal stenosis and occluded AT     Chest CT wo contrast showed mild aorta calcification  No chest pain sob, dizziness, palpitation,  Resolved claudication after angiogram. Walks 2 miles a day. Only knee pain, No muscle weakness, pain and numbness  No smoking/drinking  Med compliance    05/2021 visit  C/o both thigh muscle tightness at rest and night. Some sore on both thighs at walking.  Some lower back pain at night  No chest pain dyspnea palpitation, faint and dizziness  Today EKG NSR HR 54 bpm, no acute ctt change     visit  Started taking Lipitor and zetia daily about few months ago. Occasionally muscle pain at walking. No resting pain.   Some lower back pain . No chest pain dyspnea dizziness and faint     visit  No leg pain with a lot of walking. No chest pain dyspnea dizziness dan leg swelling  Med compliance and no active bleeding  ekg NSR and BP controlled. , Copay of repatha was high     visit  Saw Dr. Ross interventional cardiologist in  for + exercise ABIs and leg claudication. Stable. Rec. Med Rx  No chest pain dyspnea dizziness palpitation and leg swelling.  Med compliance. Walks a lot.   On Repatha Rx     Interval history  Ekg NSr. On PCSK9i Rx  Does house keeping work. Lives with wife.   BP high at 140 to 150 mmHG. No chest pain dyspnea dizziness. Some claudication. Some lower back pain                 Past Medical History:   Diagnosis Date     Atherosclerosis of coronary artery 8/10/2022    Atherosclerotic PVD with intermittent claudication 8/16/2019    Atrial fibrillation     pt unaware of this    Back pain     Bilateral inguinal hernia     CT ABdomen/pelvis 3/9/2015 (care everywhere)---Bilateral inguinal hernias containing fat.      Calcified granuloma of lung 10/3/2017    CT CHest 3/26/2018---There are calcified lymph nodes in the mediastinum and left hilum.    CT chest 9/11/ 2017 Calcified left hilar and subcarinal granulomas are noted  ;Calcified granuloma noted within the posterior segment of the right hepatic lobe.    Diverticulosis     Diverticulosis 10/28/13    Colonoscopy     ED (erectile dysfunction)     Elevated PSA     Granuloma of liver     ct chest 3/26/2018---There is a small calcified granuloma posteriorly in the right lobe of the liver    Hyperlipidemia     Hypertension     Liver mass 3/13/2020    New lesion compared to CT chest dating back to 2018. New liver lesion, dome of liver seen on CT 3/12/2020 and MRI abdomen 3/12/2020.  3/13/2020 referral to Oncology  PET CT   Resolved in 2020    Moderate persistent asthma without complication 9/26/2017    Obesity     Personal history of colonic polyps 2013    Pulmonary heart disease 8/10/2022    Tobacco dependence     resolved    Uncomplicated alcohol dependence 11/27/2019    Urinary tract infection        Past Surgical History:   Procedure Laterality Date    AORTOGRAPHY WITH SERIALOGRAPHY N/A 10/21/2019    Procedure: AORTOGRAM, WITH RUNOFF;  Surgeon: Chencho Ross MD;  Location: [facility] CATH LAB;  Service: Cardiology;  Laterality: N/A;  pt requesting 9am arrival    AORTOGRAPHY WITH SERIALOGRAPHY N/A 11/14/2019    Procedure: AORTOGRAM, WITH RUNOFF;  Surgeon: Chencho Ross MD;  Location: [facility] CATH LAB;  Service: Cardiology;  Laterality: N/A;    COLONOSCOPY N/A 12/7/2022    Procedure: COLONOSCOPY;  Surgeon: Florinda Lindsey DO;  Location: [facility] ENDO;  Service: General;  Laterality: N/A;     HERNIA REPAIR      x2    PROSTATE BIOPSY      reported per patient around  or  which was reportedly negative       Social History     Tobacco Use    Smoking status: Former     Packs/day: 2.00     Years: 15.00     Pack years: 30.00     Types: Cigarettes     Start date:      Quit date: 1985     Years since quittin.8    Smokeless tobacco: Never   Substance Use Topics    Alcohol use: Yes     Alcohol/week: 12.0 standard drinks     Types: 12 Cans of beer per week    Drug use: No       Family History   Problem Relation Age of Onset    Cancer Mother         Unknown primary    Cancer Father     Cancer Brother         prostate     Prostate cancer Brother     Cancer Sister 64        pancreatic     Diabetes Neg Hx     Heart disease Neg Hx     Kidney disease Neg Hx     Stroke Neg Hx     Hyperlipidemia Neg Hx     Hypertension Neg Hx          Review of Systems   Cardiovascular:  Positive for claudication.     Objective:   Physical Exam  HENT:      Head: Normocephalic.   Eyes:      Pupils: Pupils are equal, round, and reactive to light.   Neck:      Thyroid: No thyromegaly.      Vascular: Normal carotid pulses. No carotid bruit or JVD.   Cardiovascular:      Rate and Rhythm: Normal rate and regular rhythm. No extrasystoles are present.     Chest Wall: PMI is not displaced.      Pulses: Normal pulses.      Heart sounds: Normal heart sounds. No murmur heard.    No gallop. No S3 sounds.   Pulmonary:      Effort: No respiratory distress.      Breath sounds: Normal breath sounds. No stridor.   Abdominal:      General: Bowel sounds are normal.      Palpations: Abdomen is soft.      Tenderness: There is no abdominal tenderness. There is no rebound.   Musculoskeletal:         General: Normal range of motion.   Skin:     Findings: No rash.   Neurological:      Mental Status: He is alert and oriented to person, place, and time.   Psychiatric:         Behavior: Behavior normal.       Lab Results   Component Value Date     CHOL 201 (H) 01/04/2022    CHOL 300 (H) 06/25/2021    CHOL 299 (H) 05/24/2021     Lab Results   Component Value Date    HDL 45 01/04/2022    HDL 91 (H) 06/25/2021    HDL 82 (H) 05/24/2021     Lab Results   Component Value Date    LDLCALC 134.2 01/04/2022    LDLCALC 188.2 (H) 06/25/2021    LDLCALC 197.8 (H) 05/24/2021     Lab Results   Component Value Date    TRIG 109 01/04/2022    TRIG 104 06/25/2021    TRIG 96 05/24/2021     Lab Results   Component Value Date    CHOLHDL 22.4 01/04/2022    CHOLHDL 30.3 06/25/2021    CHOLHDL 27.4 05/24/2021       Chemistry        Component Value Date/Time     07/26/2022 0818    K 5.3 (H) 07/26/2022 0818     07/26/2022 0818    CO2 28 07/26/2022 0818    BUN 10 07/26/2022 0818    CREATININE 1.0 07/26/2022 0818    GLU 93 07/26/2022 0818        Component Value Date/Time    CALCIUM 9.2 07/26/2022 0818    ALKPHOS 82 07/26/2022 0818    AST 16 07/26/2022 0818    ALT 11 07/26/2022 0818    BILITOT 0.5 07/26/2022 0818    ESTGFRAFRICA >60.0 07/26/2022 0818    EGFRNONAA >60.0 07/26/2022 0818          No results found for: LABA1C, HGBA1C  Lab Results   Component Value Date    TSH 0.911 06/27/2019     Lab Results   Component Value Date    INR 0.9 03/31/2020    INR 0.9 11/07/2019    INR 1.0 10/14/2019     Lab Results   Component Value Date    WBC 4.78 07/26/2022    HGB 14.1 07/26/2022    HCT 44.8 07/26/2022    MCV 84 07/26/2022     07/26/2022     BMP  Sodium   Date Value Ref Range Status   07/26/2022 144 136 - 145 mmol/L Final     Potassium   Date Value Ref Range Status   07/26/2022 5.3 (H) 3.5 - 5.1 mmol/L Final     Comment:     *No Visible Hemolysis     Chloride   Date Value Ref Range Status   07/26/2022 107 95 - 110 mmol/L Final     CO2   Date Value Ref Range Status   07/26/2022 28 23 - 29 mmol/L Final     BUN   Date Value Ref Range Status   07/26/2022 10 8 - 23 mg/dL Final     Creatinine   Date Value Ref Range Status   07/26/2022 1.0 0.5 - 1.4 mg/dL Final     Calcium   Date  Value Ref Range Status   07/26/2022 9.2 8.7 - 10.5 mg/dL Final     Anion Gap   Date Value Ref Range Status   07/26/2022 9 8 - 16 mmol/L Final     eGFR if    Date Value Ref Range Status   07/26/2022 >60.0 >60 mL/min/1.73 m^2 Final     eGFR if non    Date Value Ref Range Status   07/26/2022 >60.0 >60 mL/min/1.73 m^2 Final     Comment:     Calculation used to obtain the estimated glomerular filtration  rate (eGFR) is the CKD-EPI equation.        BNP  @LABRCNTIP(BNP,BNPTRIAGEBLO)@  @LABRCNTIP(troponini)@  CrCl cannot be calculated (Patient's most recent lab result is older than the maximum 7 days allowed.).  No results found in the last 24 hours.  No results found in the last 24 hours.  No results found in the last 24 hours.    Assessment:      1. Atherosclerotic PVD with intermittent claudication    2. Primary hypertension    3. Mixed hyperlipidemia    4. Aortic atherosclerosis    5. Atherosclerosis of coronary artery, unspecified vessel or lesion type, unspecified whether angina present, unspecified whether native or transplanted heart    6. Pulmonary heart disease        Plan:   Add xarelto 2.5 mg bid for severe PAD and claudication  Discussed the benefits and risks of Xarelto  Continue ASA repatha statin lisinopirl and amlodipine    Counseled DASH  Check Lipid profile with PCP in 6 months  Recommend heart-healthy diet, weight control and regular exercise.  Tasneem. Risk modification.   I have reviewed all pertinent labs and cardiac studies independently. Plans and recommendations have been formulated under my direct supervision. All questions answered and patient voiced understanding.   If symptoms persist go to the ED  RTC in 6 months

## 2023-06-14 ENCOUNTER — SPECIALTY PHARMACY (OUTPATIENT)
Dept: PHARMACY | Facility: CLINIC | Age: 78
End: 2023-06-14
Payer: MEDICARE

## 2023-06-14 NOTE — TELEPHONE ENCOUNTER
Outgoing call to pt regarding repatha refill. Pt has a dose on hand for 6/20. Next dose due 7/4. Will follow up 6/27 for refill.  Co-pay card needs to be renewed as well. Routing to [facility].

## 2023-06-27 ENCOUNTER — TELEPHONE (OUTPATIENT)
Dept: ADMINISTRATIVE | Facility: HOSPITAL | Age: 78
End: 2023-06-27
Payer: MEDICARE

## 2023-06-27 ENCOUNTER — PATIENT MESSAGE (OUTPATIENT)
Dept: ADMINISTRATIVE | Facility: HOSPITAL | Age: 78
End: 2023-06-27
Payer: MEDICARE

## 2023-07-03 NOTE — TELEPHONE ENCOUNTER
Specialty Pharmacy - Refill Coordination    Specialty Medication Orders Linked to Encounter      Flowsheet Row Most Recent Value   Medication #1 evolocumab (REPATHA SURECLICK) 140 mg/mL PnIj (Order#[id number], Rx#[id number])            Refill Questions - Documented Responses      Flowsheet Row Most Recent Value   Patient Availability and HIPAA Verification    Does patient want to proceed with activity? Yes   HIPAA/medical authority confirmed? Yes   Relationship to patient of person spoken to? Self   Refill Screening Questions    Would patient like to speak to a pharmacist? No   When does the patient need to receive the medication? 07/05/23   Refill Delivery Questions    How will the patient receive the medication? MEDRx   When does the patient need to receive the medication? 07/05/23   Shipping Address Home   Address in [facility] confirmed and updated if neccessary? Yes   Expected Copay ($) 5   Is the patient able to afford the medication copay? Yes   Payment Method CC on file   Days supply of Refill 28   Supplies needed? No supplies needed   Refill activity completed? Yes   Refill activity plan Refill scheduled   Shipment/Pickup Date: 07/03/23            Current Outpatient Medications   Medication Sig    albuterol (PROVENTIL/VENTOLIN HFA) 90 mcg/actuation inhaler INHALE 2 PUFFS INTO THE LUNGS EVERY 6 (SIX) HOURS AS NEEDED FOR WHEEZING OR SHORTNESS OF BREATH    amLODIPine (NORVASC) 10 MG tablet Take 1 tablet (10 mg total) by mouth once daily.    aspirin (ECOTRIN) 81 MG EC tablet TAKE 1 TABLET BY MOUTH EVERY DAY    atorvastatin (LIPITOR) 80 MG tablet Take 1 tablet (80 mg total) by mouth once daily.    brimonidine 0.15 % OPTH DROP (ALPHAGAN) 0.15 % ophthalmic solution Place 1 drop into both eyes 3 (three) times daily.    dorzolamide (TRUSOPT) 2 % ophthalmic solution INSTILL 1 DROP INTO BOTH EYES 3 TIMES A DAY    evolocumab (REPATHA SURECLICK) 140 mg/mL PnIj Inject 1 mL (140 mg total) into the skin every 14  (fourteen) days.    finasteride (PROSCAR) 5 mg tablet TAKE 1 TABLET BY MOUTH EVERY DAY    FLUAD QUAD 2022-23,65Y UP,,PF, 60 mcg (15 mcg x 4)/0.5 mL Syrg     fluticasone furoate-vilanteroL (BREO ELLIPTA) 200-25 mcg/dose DsDv diskus inhaler Inhale 1 puff into the lungs once daily. Controller    latanoprost 0.005 % ophthalmic solution Place 1 drop into both eyes every evening.    lisinopriL (PRINIVIL,ZESTRIL) 20 MG tablet Take 1 tablet (20 mg total) by mouth once daily.    rivaroxaban (XARELTO) 2.5 mg Tab Take 1 tablet (2.5 mg total) by mouth 2 (two) times daily with meals.    tamsulosin (FLOMAX) 0.4 mg Cap TAKE 1 CAPSULE BY MOUTH EVERY DAY   Last reviewed on 6/13/2023  3:04 PM by Zackary Upton MD    Review of patient's allergies indicates:  No Known Allergies Last reviewed on  6/13/2023 3:04 PM by Zackary Upton      Tasks added this encounter   No tasks added.   Tasks due within next 3 months   No tasks due.     Zamzam Arroyo, PharmD  [facility]  [address] [facility] [address]  Phone: [phone]  Fax: [phone]

## 2023-07-24 ENCOUNTER — SPECIALTY PHARMACY (OUTPATIENT)
Dept: PHARMACY | Facility: CLINIC | Age: 78
End: 2023-07-24
Payer: MEDICARE

## 2023-07-24 DIAGNOSIS — E78.2 MIXED HYPERLIPIDEMIA: Primary | ICD-10-CM

## 2023-07-24 RX ORDER — DORZOLAMIDE HCL 20 MG/ML
SOLUTION/ DROPS OPHTHALMIC
Qty: 30 ML | Refills: 1 | OUTPATIENT
Start: 2023-07-24

## 2023-07-24 NOTE — TELEPHONE ENCOUNTER
Outgoing call to patient for repatha refill -- next dose due 7/31. Rx out of refills, refill request sent to MD. Will follow up once received.

## 2023-07-26 RX ORDER — EVOLOCUMAB 140 MG/ML
140 INJECTION, SOLUTION SUBCUTANEOUS
Qty: 2 EACH | Refills: 5 | Status: ACTIVE | OUTPATIENT
Start: 2023-07-26 | End: 2024-03-13 | Stop reason: SDUPTHER

## 2023-07-26 NOTE — TELEPHONE ENCOUNTER
Outgoing call regarding Repatha refill, informed pt PA  and will have assigned submit a new PA. Pt states his next injection is due

## 2023-07-28 NOTE — TELEPHONE ENCOUNTER
Outgoing call regarding repatha refill; per pt, he's due to inject on 8/5; informed him that a PA is required, and once approved [facility] will follow up to schedule delivery; routed to [facility] Zamzam VAZQUEZ

## 2023-07-31 NOTE — TELEPHONE ENCOUNTER
Outgoing call regarding the repatha. Pt stated that his next injection is 8/12. Pt stated that he has one on hand and wont need one until 8/26. Will adela dodson to set up refill.

## 2023-08-02 ENCOUNTER — TELEPHONE (OUTPATIENT)
Dept: ADMINISTRATIVE | Facility: HOSPITAL | Age: 78
End: 2023-08-02
Payer: MEDICARE

## 2023-08-02 ENCOUNTER — SPECIALTY PHARMACY (OUTPATIENT)
Dept: PHARMACY | Facility: CLINIC | Age: 78
End: 2023-08-02
Payer: MEDICARE

## 2023-08-02 ENCOUNTER — PATIENT MESSAGE (OUTPATIENT)
Dept: ADMINISTRATIVE | Facility: HOSPITAL | Age: 78
End: 2023-08-02
Payer: MEDICARE

## 2023-08-03 ENCOUNTER — OFFICE VISIT (OUTPATIENT)
Dept: OPHTHALMOLOGY | Facility: CLINIC | Age: 78
End: 2023-08-03
Payer: MEDICARE

## 2023-08-03 DIAGNOSIS — H40.1113 PRIMARY OPEN ANGLE GLAUCOMA (POAG) OF RIGHT EYE, SEVERE STAGE: Primary | ICD-10-CM

## 2023-08-03 DIAGNOSIS — H40.1121 PRIMARY OPEN ANGLE GLAUCOMA (POAG) OF LEFT EYE, MILD STAGE: ICD-10-CM

## 2023-08-03 PROCEDURE — 92133 POSTERIOR SEGMENT OCT OPTIC NERVE(OCULAR COHERENCE TOMOGRAPHY) - OU - BOTH EYES: ICD-10-PCS | Mod: HCNC,S$GLB,, | Performed by: OPTOMETRIST

## 2023-08-03 PROCEDURE — 99213 OFFICE O/P EST LOW 20 MIN: CPT | Mod: HCNC,S$GLB,, | Performed by: OPTOMETRIST

## 2023-08-03 PROCEDURE — 92133 CPTRZD OPH DX IMG PST SGM ON: CPT | Mod: HCNC,S$GLB,, | Performed by: OPTOMETRIST

## 2023-08-03 PROCEDURE — 99999 PR PBB SHADOW E&M-EST. PATIENT-LVL II: CPT | Mod: PBBFAC,HCNC,, | Performed by: OPTOMETRIST

## 2023-08-03 PROCEDURE — 99213 PR OFFICE/OUTPT VISIT, EST, LEVL III, 20-29 MIN: ICD-10-PCS | Mod: HCNC,S$GLB,, | Performed by: OPTOMETRIST

## 2023-08-03 PROCEDURE — 1159F MED LIST DOCD IN RCRD: CPT | Mod: HCNC,CPTII,S$GLB, | Performed by: OPTOMETRIST

## 2023-08-03 PROCEDURE — 99999 PR PBB SHADOW E&M-EST. PATIENT-LVL II: ICD-10-PCS | Mod: PBBFAC,HCNC,, | Performed by: OPTOMETRIST

## 2023-08-03 PROCEDURE — 1160F RVW MEDS BY RX/DR IN RCRD: CPT | Mod: HCNC,CPTII,S$GLB, | Performed by: OPTOMETRIST

## 2023-08-03 PROCEDURE — 1160F PR REVIEW ALL MEDS BY PRESCRIBER/CLIN PHARMACIST DOCUMENTED: ICD-10-PCS | Mod: HCNC,CPTII,S$GLB, | Performed by: OPTOMETRIST

## 2023-08-03 PROCEDURE — 1159F PR MEDICATION LIST DOCUMENTED IN MEDICAL RECORD: ICD-10-PCS | Mod: HCNC,CPTII,S$GLB, | Performed by: OPTOMETRIST

## 2023-08-03 RX ORDER — LATANOPROST 50 UG/ML
1 SOLUTION/ DROPS OPHTHALMIC NIGHTLY
Qty: 2 ML | Refills: 11 | Status: SHIPPED | OUTPATIENT
Start: 2023-08-03 | End: 2024-08-02

## 2023-08-03 RX ORDER — BRIMONIDINE TARTRATE 1.5 MG/ML
1 SOLUTION/ DROPS OPHTHALMIC 2 TIMES DAILY
Qty: 15 ML | Refills: 4 | Status: SHIPPED | OUTPATIENT
Start: 2023-08-03

## 2023-08-03 RX ORDER — DORZOLAMIDE HCL 20 MG/ML
SOLUTION/ DROPS OPHTHALMIC
Qty: 30 ML | Refills: 4 | Status: SHIPPED | OUTPATIENT
Start: 2023-08-03

## 2023-08-03 NOTE — PROGRESS NOTES
HPI    Continue Latanoprost qhs OU  Brimonidine tid OU and Trusopt tid OU        RTC 1 month for IOP check    Pt stated that he was only given Trusopt and pharmacy did not refill the   other two  Pt stated the Trusopt burns  and pain in OS every now and then  Last edited by Olena Christina on 8/3/2023  1:09 PM.        Pt has only been using trusopt qd-bid OU      Assessment /Plan     For exam results, see Encounter Report.    Primary open angle glaucoma (POAG) of right eye, severe stage  -     Posterior Segment OCT Optic Nerve- Both eyes    Primary open angle glaucoma (POAG) of left eye, mild stage  -     Posterior Segment OCT Optic Nerve- Both eyes  -     latanoprost 0.005 % ophthalmic solution; Place 1 drop into both eyes every evening.  Dispense: 2 mL; Refill: 11  -     brimonidine 0.15 % OPTH DROP (ALPHAGAN) 0.15 % ophthalmic solution; Place 1 drop into both eyes 2 (two) times a day.  Dispense: 15 mL; Refill: 4  -     dorzolamide (TRUSOPT) 2 % ophthalmic solution; INSTILL 1 DROP INTO BOTH EYES 2 TIMES A DAY  Dispense: 30 mL; Refill: 4    IOP elevated today OD>OS  Slight progression on GCL scan today OD, OS  Overall stable NFL scans OU  Resume Latanoprost qhs OU, Brimonidine bid OU and Trusopt bid OU  Stressed the importance of compliance with medications and follow up visits or risk blindness.  Sent medications to [facility] mail order pharmacy instead of local pharmacy to help make sure pt gets refills  Monitor 2 months      RTC 2 months for 24-2VF and dilated exam or PRN  Discussed above and all questions were answered.

## 2023-08-16 NOTE — TELEPHONE ENCOUNTER
Specialty Pharmacy - Refill Coordination    Specialty Medication Orders Linked to Encounter      Flowsheet Row Most Recent Value   Medication #1 evolocumab (REPATHA SURECLICK) 140 mg/mL PnIj (Order#[id number], Rx#[id number])            Refill Questions - Documented Responses      Flowsheet Row Most Recent Value   Patient Availability and HIPAA Verification    Does patient want to proceed with activity? Yes   HIPAA/medical authority confirmed? Yes   Relationship to patient of person spoken to? Self   Refill Screening Questions    Would patient like to speak to a pharmacist? No   When does the patient need to receive the medication? 08/26/23   Refill Delivery Questions    How will the patient receive the medication? MEDRx   When does the patient need to receive the medication? 08/26/23   Shipping Address Home   Address in [facility] confirmed and updated if neccessary? Yes   Expected Copay ($) 5   Is the patient able to afford the medication copay? Yes   Payment Method CC on file   Days supply of Refill 28   Supplies needed? No supplies needed   Refill activity completed? Yes   Refill activity plan Refill scheduled   Shipment/Pickup Date: 08/23/23            Current Outpatient Medications   Medication Sig    albuterol (PROVENTIL/VENTOLIN HFA) 90 mcg/actuation inhaler INHALE 2 PUFFS INTO THE LUNGS EVERY 6 (SIX) HOURS AS NEEDED FOR WHEEZING OR SHORTNESS OF BREATH    amLODIPine (NORVASC) 10 MG tablet Take 1 tablet (10 mg total) by mouth once daily.    aspirin (ECOTRIN) 81 MG EC tablet TAKE 1 TABLET BY MOUTH EVERY DAY    atorvastatin (LIPITOR) 80 MG tablet Take 1 tablet (80 mg total) by mouth once daily.    brimonidine 0.15 % OPTH DROP (ALPHAGAN) 0.15 % ophthalmic solution Place 1 drop into both eyes 2 (two) times a day.    dorzolamide (TRUSOPT) 2 % ophthalmic solution INSTILL 1 DROP INTO BOTH EYES 2 TIMES A DAY    evolocumab (REPATHA SURECLICK) 140 mg/mL PnIj Inject 1 mL (140 mg total) into the skin every 14  (fourteen) days.    finasteride (PROSCAR) 5 mg tablet TAKE 1 TABLET BY MOUTH EVERY DAY    FLUAD QUAD 2022-23,65Y UP,,PF, 60 mcg (15 mcg x 4)/0.5 mL Syrg     fluticasone furoate-vilanteroL (BREO ELLIPTA) 200-25 mcg/dose DsDv diskus inhaler Inhale 1 puff into the lungs once daily. Controller    latanoprost 0.005 % ophthalmic solution Place 1 drop into both eyes every evening.    lisinopriL (PRINIVIL,ZESTRIL) 20 MG tablet Take 1 tablet (20 mg total) by mouth once daily.    rivaroxaban (XARELTO) 2.5 mg Tab Take 1 tablet (2.5 mg total) by mouth 2 (two) times daily with meals.    tamsulosin (FLOMAX) 0.4 mg Cap TAKE 1 CAPSULE BY MOUTH EVERY DAY   Last reviewed on 8/3/2023  1:28 PM by Brian Herrera, OD    Review of patient's allergies indicates:  No Known Allergies Last reviewed on  8/3/2023 1:28 PM by Brian Herrera      Tasks added this encounter   No tasks added.   Tasks due within next 3 months   No tasks due.     Zamzam Arroyo, PharmD  [facility]  [address] [facility] [address]  Phone: [phone]  Fax: [phone]

## 2023-08-18 ENCOUNTER — TELEPHONE (OUTPATIENT)
Dept: ADMINISTRATIVE | Facility: HOSPITAL | Age: 78
End: 2023-08-18
Payer: MEDICARE

## 2023-09-27 ENCOUNTER — OFFICE VISIT (OUTPATIENT)
Dept: CARDIOLOGY | Facility: CLINIC | Age: 78
End: 2023-09-27
Payer: MEDICARE

## 2023-09-27 VITALS
OXYGEN SATURATION: 98 % | BODY MASS INDEX: 29.49 KG/M2 | SYSTOLIC BLOOD PRESSURE: 138 MMHG | WEIGHT: 177 LBS | HEIGHT: 65 IN | HEART RATE: 55 BPM | DIASTOLIC BLOOD PRESSURE: 70 MMHG

## 2023-09-27 DIAGNOSIS — I27.9 PULMONARY HEART DISEASE: ICD-10-CM

## 2023-09-27 DIAGNOSIS — J45.20 ASTHMA, MILD INTERMITTENT, WELL-CONTROLLED: ICD-10-CM

## 2023-09-27 DIAGNOSIS — I25.10 ATHEROSCLEROSIS OF CORONARY ARTERY, UNSPECIFIED VESSEL OR LESION TYPE, UNSPECIFIED WHETHER ANGINA PRESENT, UNSPECIFIED WHETHER NATIVE OR TRANSPLANTED HEART: ICD-10-CM

## 2023-09-27 DIAGNOSIS — I70.0 AORTIC ATHEROSCLEROSIS: ICD-10-CM

## 2023-09-27 DIAGNOSIS — J47.9 BRONCHIECTASIS, UNCOMPLICATED: ICD-10-CM

## 2023-09-27 DIAGNOSIS — I10 PRIMARY HYPERTENSION: Chronic | ICD-10-CM

## 2023-09-27 DIAGNOSIS — R91.8 MULTIPLE PULMONARY NODULES: ICD-10-CM

## 2023-09-27 DIAGNOSIS — I65.29 STENOSIS OF CAROTID ARTERY, UNSPECIFIED LATERALITY: ICD-10-CM

## 2023-09-27 DIAGNOSIS — I70.219 ATHEROSCLEROTIC PVD WITH INTERMITTENT CLAUDICATION: Primary | ICD-10-CM

## 2023-09-27 DIAGNOSIS — E78.2 MIXED HYPERLIPIDEMIA: ICD-10-CM

## 2023-09-27 PROCEDURE — 3078F PR MOST RECENT DIASTOLIC BLOOD PRESSURE < 80 MM HG: ICD-10-PCS | Mod: HCNC,CPTII,S$GLB, | Performed by: INTERNAL MEDICINE

## 2023-09-27 PROCEDURE — 99214 PR OFFICE/OUTPT VISIT, EST, LEVL IV, 30-39 MIN: ICD-10-PCS | Mod: HCNC,S$GLB,, | Performed by: INTERNAL MEDICINE

## 2023-09-27 PROCEDURE — 1101F PR PT FALLS ASSESS DOC 0-1 FALLS W/OUT INJ PAST YR: ICD-10-PCS | Mod: HCNC,CPTII,S$GLB, | Performed by: INTERNAL MEDICINE

## 2023-09-27 PROCEDURE — 1159F MED LIST DOCD IN RCRD: CPT | Mod: HCNC,CPTII,S$GLB, | Performed by: INTERNAL MEDICINE

## 2023-09-27 PROCEDURE — 1159F PR MEDICATION LIST DOCUMENTED IN MEDICAL RECORD: ICD-10-PCS | Mod: HCNC,CPTII,S$GLB, | Performed by: INTERNAL MEDICINE

## 2023-09-27 PROCEDURE — 1160F RVW MEDS BY RX/DR IN RCRD: CPT | Mod: HCNC,CPTII,S$GLB, | Performed by: INTERNAL MEDICINE

## 2023-09-27 PROCEDURE — 3288F PR FALLS RISK ASSESSMENT DOCUMENTED: ICD-10-PCS | Mod: HCNC,CPTII,S$GLB, | Performed by: INTERNAL MEDICINE

## 2023-09-27 PROCEDURE — 1101F PT FALLS ASSESS-DOCD LE1/YR: CPT | Mod: HCNC,CPTII,S$GLB, | Performed by: INTERNAL MEDICINE

## 2023-09-27 PROCEDURE — 3075F PR MOST RECENT SYSTOLIC BLOOD PRESS GE 130-139MM HG: ICD-10-PCS | Mod: HCNC,CPTII,S$GLB, | Performed by: INTERNAL MEDICINE

## 2023-09-27 PROCEDURE — 99214 OFFICE O/P EST MOD 30 MIN: CPT | Mod: HCNC,S$GLB,, | Performed by: INTERNAL MEDICINE

## 2023-09-27 PROCEDURE — 1160F PR REVIEW ALL MEDS BY PRESCRIBER/CLIN PHARMACIST DOCUMENTED: ICD-10-PCS | Mod: HCNC,CPTII,S$GLB, | Performed by: INTERNAL MEDICINE

## 2023-09-27 PROCEDURE — 99999 PR PBB SHADOW E&M-EST. PATIENT-LVL III: ICD-10-PCS | Mod: PBBFAC,HCNC,, | Performed by: INTERNAL MEDICINE

## 2023-09-27 PROCEDURE — 99999 PR PBB SHADOW E&M-EST. PATIENT-LVL III: CPT | Mod: PBBFAC,HCNC,, | Performed by: INTERNAL MEDICINE

## 2023-09-27 PROCEDURE — 3078F DIAST BP <80 MM HG: CPT | Mod: HCNC,CPTII,S$GLB, | Performed by: INTERNAL MEDICINE

## 2023-09-27 PROCEDURE — 3075F SYST BP GE 130 - 139MM HG: CPT | Mod: HCNC,CPTII,S$GLB, | Performed by: INTERNAL MEDICINE

## 2023-09-27 PROCEDURE — 3288F FALL RISK ASSESSMENT DOCD: CPT | Mod: HCNC,CPTII,S$GLB, | Performed by: INTERNAL MEDICINE

## 2023-09-27 NOTE — PROGRESS NOTES
Subjective:   Patient ID:  Alex Milner is a 78 y.o. male who presents for follow up of Annual Exam      HPI  75 yo male, 6 months f/u  PMH PAD, h/o AO showed left infrapopliteal . Distal left SFA PTCA and  AO right SFA PTCA by Dr. Ross,  HTN, HLP, PVD, obesity, ETOH use PAULA on CPAP. No smoking  C/o bilateral thigh and buttock pain with walking and some time long time sitting, and back pain at night,. No calf pain   left FREDY 0.7 and right 1.1. LE arterial US showed left infrapopliteal stenosis and occluded AT      Chest CT wo contrast showed mild aorta calcification  No chest pain sob, dizziness, palpitation,  Resolved claudication after angiogram. Walks 2 miles a day. Only knee pain, No muscle weakness, pain and numbness  No smoking/drinking  Med compliance     05/2021 visit  C/o both thigh muscle tightness at rest and night. Some sore on both thighs at walking.  Some lower back pain at night  No chest pain dyspnea palpitation, faint and dizziness  Today EKG NSR HR 54 bpm, no acute ctt change      visit  Started taking Lipitor and zetia daily about few months ago. Occasionally muscle pain at walking. No resting pain.   Some lower back pain . No chest pain dyspnea dizziness and faint     Interval history  No leg pain with a lot of walking. No chest pain dyspnea dizziness dan leg swelling  Med compliance and no active bleeding  ekg NSR and BP controlled. , Copay of repatha was high     9/7/2022  Has non limiting claudication worse on left than right . He has no rest pain he has no discoloration. He is taking repatha. Has no chf symptoms he is compliant with meds and diet had fredy with exercise symptomatic recovered fast to baseline.  He is on asa and cilostazol.no bleeding or bruising .    He is on statins and [id number] for his hlp.       9/27/2023   Status quo with pvd and symptoms unchanged from last year visit he is able to get his lifestyle addressed not  smoking drinks beer denies any cardiac symptoms tolerated asa and pletal .tolerated [id number]     Past Medical History:   Diagnosis Date    Atherosclerosis of coronary artery 8/10/2022    Atherosclerotic PVD with intermittent claudication 8/16/2019    Atrial fibrillation     pt unaware of this    Back pain     Bilateral inguinal hernia     CT ABdomen/pelvis 3/9/2015 (care everywhere)---Bilateral inguinal hernias containing fat.      Calcified granuloma of lung 10/3/2017    CT CHest 3/26/2018---There are calcified lymph nodes in the mediastinum and left hilum.    CT chest 9/11/ 2017 Calcified left hilar and subcarinal granulomas are noted  ;Calcified granuloma noted within the posterior segment of the right hepatic lobe.    Diverticulosis     Diverticulosis 10/28/13    Colonoscopy     ED (erectile dysfunction)     Elevated PSA     Granuloma of liver     ct chest 3/26/2018---There is a small calcified granuloma posteriorly in the right lobe of the liver    Hyperlipidemia     Hypertension     Liver mass 3/13/2020    New lesion compared to CT chest dating back to 2018. New liver lesion, dome of liver seen on CT 3/12/2020 and MRI abdomen 3/12/2020.  3/13/2020 referral to Oncology  PET CT   Resolved in 2020    Moderate persistent asthma without complication 9/26/2017    Obesity     Personal history of colonic polyps 2013    Pulmonary heart disease 8/10/2022    Tobacco dependence     resolved    Uncomplicated alcohol dependence 11/27/2019    Urinary tract infection        Past Surgical History:   Procedure Laterality Date    AORTOGRAPHY WITH SERIALOGRAPHY N/A 10/21/2019    Procedure: AORTOGRAM, WITH RUNOFF;  Surgeon: Chencho Ross MD;  Location: [facility] CATH LAB;  Service: Cardiology;  Laterality: N/A;  pt requesting 9am arrival    AORTOGRAPHY WITH SERIALOGRAPHY N/A 11/14/2019    Procedure: AORTOGRAM, WITH RUNOFF;  Surgeon: Chencho Ross MD;  Location: [facility] CATH LAB;  Service: Cardiology;  Laterality: N/A;     COLONOSCOPY N/A 2022    Procedure: COLONOSCOPY;  Surgeon: Florinda Lindsey DO;  Location: [facility] ENDO;  Service: General;  Laterality: N/A;    HERNIA REPAIR      x2    PROSTATE BIOPSY      reported per patient around  or  which was reportedly negative       Social History     Tobacco Use    Smoking status: Former     Current packs/day: 0.00     Average packs/day: 2.0 packs/day for 25.6 years (51.2 ttl pk-yrs)     Types: Cigarettes     Start date:      Quit date: 1985     Years since quittin.1    Smokeless tobacco: Never   Substance Use Topics    Alcohol use: Yes     Alcohol/week: 12.0 standard drinks of alcohol     Types: 12 Cans of beer per week    Drug use: No       Family History   Problem Relation Age of Onset    Cancer Mother         Unknown primary    Cancer Father     Cancer Brother         prostate     Prostate cancer Brother     Cancer Sister 64        pancreatic     Diabetes Neg Hx     Heart disease Neg Hx     Kidney disease Neg Hx     Stroke Neg Hx     Hyperlipidemia Neg Hx     Hypertension Neg Hx        Current Outpatient Medications   Medication Sig    albuterol (PROVENTIL/VENTOLIN HFA) 90 mcg/actuation inhaler INHALE 2 PUFFS INTO THE LUNGS EVERY 6 (SIX) HOURS AS NEEDED FOR WHEEZING OR SHORTNESS OF BREATH    atorvastatin (LIPITOR) 80 MG tablet Take 1 tablet (80 mg total) by mouth once daily.    brimonidine 0.15 % OPTH DROP (ALPHAGAN) 0.15 % ophthalmic solution Place 1 drop into both eyes 2 (two) times a day.    dorzolamide (TRUSOPT) 2 % ophthalmic solution INSTILL 1 DROP INTO BOTH EYES 2 TIMES A DAY    evolocumab (REPATHA SURECLICK) 140 mg/mL PnIj Inject 1 mL (140 mg total) into the skin every 14 (fourteen) days.    finasteride (PROSCAR) 5 mg tablet TAKE 1 TABLET BY MOUTH EVERY DAY    FLUAD QUAD -,65Y UP,,PF, 60 mcg (15 mcg x 4)/0.5 mL Syrg     fluticasone furoate-vilanteroL (BREO ELLIPTA) 200-25 mcg/dose DsDv diskus inhaler Inhale 1 puff into the lungs once daily.  Controller    latanoprost 0.005 % ophthalmic solution Place 1 drop into both eyes every evening.    lisinopriL (PRINIVIL,ZESTRIL) 20 MG tablet Take 1 tablet (20 mg total) by mouth once daily.    rivaroxaban (XARELTO) 2.5 mg Tab Take 1 tablet (2.5 mg total) by mouth 2 (two) times daily with meals.    tamsulosin (FLOMAX) 0.4 mg Cap TAKE 1 CAPSULE BY MOUTH EVERY DAY    amLODIPine (NORVASC) 10 MG tablet Take 1 tablet (10 mg total) by mouth once daily.    aspirin (ECOTRIN) 81 MG EC tablet TAKE 1 TABLET BY MOUTH EVERY DAY (Patient not taking: Reported on 9/27/2023)     No current facility-administered medications for this visit.     Current Outpatient Medications on File Prior to Visit   Medication Sig    albuterol (PROVENTIL/VENTOLIN HFA) 90 mcg/actuation inhaler INHALE 2 PUFFS INTO THE LUNGS EVERY 6 (SIX) HOURS AS NEEDED FOR WHEEZING OR SHORTNESS OF BREATH    atorvastatin (LIPITOR) 80 MG tablet Take 1 tablet (80 mg total) by mouth once daily.    brimonidine 0.15 % OPTH DROP (ALPHAGAN) 0.15 % ophthalmic solution Place 1 drop into both eyes 2 (two) times a day.    dorzolamide (TRUSOPT) 2 % ophthalmic solution INSTILL 1 DROP INTO BOTH EYES 2 TIMES A DAY    evolocumab (REPATHA SURECLICK) 140 mg/mL PnIj Inject 1 mL (140 mg total) into the skin every 14 (fourteen) days.    finasteride (PROSCAR) 5 mg tablet TAKE 1 TABLET BY MOUTH EVERY DAY    FLUAD QUAD 2022-23,65Y UP,,PF, 60 mcg (15 mcg x 4)/0.5 mL Syrg     fluticasone furoate-vilanteroL (BREO ELLIPTA) 200-25 mcg/dose DsDv diskus inhaler Inhale 1 puff into the lungs once daily. Controller    latanoprost 0.005 % ophthalmic solution Place 1 drop into both eyes every evening.    lisinopriL (PRINIVIL,ZESTRIL) 20 MG tablet Take 1 tablet (20 mg total) by mouth once daily.    rivaroxaban (XARELTO) 2.5 mg Tab Take 1 tablet (2.5 mg total) by mouth 2 (two) times daily with meals.    tamsulosin (FLOMAX) 0.4 mg Cap TAKE 1 CAPSULE BY MOUTH EVERY DAY    amLODIPine (NORVASC) 10 MG tablet  Take 1 tablet (10 mg total) by mouth once daily.    aspirin (ECOTRIN) 81 MG EC tablet TAKE 1 TABLET BY MOUTH EVERY DAY (Patient not taking: Reported on 9/27/2023)     No current facility-administered medications on file prior to visit.     Review of patient's allergies indicates:  No Known Allergies   Review of Systems   Constitutional: Negative for diaphoresis, malaise/fatigue and weight gain.   HENT:  Negative for hoarse voice.    Eyes:  Negative for double vision and visual disturbance.   Cardiovascular:  Positive for claudication. Negative for chest pain, cyanosis, dyspnea on exertion, irregular heartbeat, leg swelling, near-syncope, orthopnea, palpitations, paroxysmal nocturnal dyspnea and syncope.   Respiratory:  Negative for cough, hemoptysis, shortness of breath and snoring.    Hematologic/Lymphatic: Negative for bleeding problem. Does not bruise/bleed easily.   Skin:  Negative for color change and poor wound healing.   Musculoskeletal:  Negative for muscle cramps, muscle weakness and myalgias.   Gastrointestinal:  Negative for bloating, abdominal pain, change in bowel habit, diarrhea, heartburn, hematemesis, hematochezia, melena and nausea.   Neurological:  Negative for excessive daytime sleepiness, dizziness, headaches, light-headedness, loss of balance, numbness and weakness.   Psychiatric/Behavioral:  Negative for memory loss. The patient does not have insomnia.    Allergic/Immunologic: Negative for hives.       Objective:   Physical Exam  Vitals and nursing note reviewed.   Constitutional:       General: He is not in acute distress.     Appearance: He is not diaphoretic.   HENT:      Head: Normocephalic and atraumatic.      Nose: Nose normal.   Eyes:      Conjunctiva/sclera: Conjunctivae normal.      Pupils: Pupils are equal, round, and reactive to light.   Neck:      Vascular: No JVD.      Trachea: No tracheal deviation.   Cardiovascular:      Pulses:           Carotid pulses are 2+ on the right side  "and 2+ on the left side.       Radial pulses are 2+ on the right side and 2+ on the left side.        Femoral pulses are 2+ on the right side and 2+ on the left side.       Popliteal pulses are 2+ on the right side and 2+ on the left side.        Dorsalis pedis pulses are 1+ on the right side and 1+ on the left side.        Posterior tibial pulses are 0 on the right side and 0 on the left side.      Heart sounds: Normal heart sounds, S1 normal and S2 normal. No midsystolic click. No murmur heard.     No friction rub. No gallop.   Pulmonary:      Effort: Pulmonary effort is normal.      Breath sounds: Normal breath sounds.   Chest:      Chest wall: No tenderness.   Abdominal:      General: Bowel sounds are normal.      Palpations: Abdomen is soft. There is no mass.   Musculoskeletal:         General: Normal range of motion.      Cervical back: Neck supple.   Lymphadenopathy:      Cervical: No cervical adenopathy.   Skin:     General: Skin is warm and dry.      Findings: No erythema, lesion or rash.   Neurological:      Mental Status: He is alert and oriented to person, place, and time.   Psychiatric:         Behavior: Behavior is cooperative.       Vitals:    09/27/23 0810 09/27/23 0811   BP: 132/76 138/70   BP Location: Left arm Right arm   Patient Position: Sitting Sitting   BP Method: Large (Manual) Large (Manual)   Pulse: (!) 55    SpO2: 98%    Weight: 80.3 kg (177 lb 0.5 oz)    Height: 5' 5" (1.651 m)      Lab Results   Component Value Date    CHOL 201 (H) 01/04/2022    CHOL 300 (H) 06/25/2021    CHOL 299 (H) 05/24/2021      Body mass index is 29.46 kg/m².   No results found for: "LABA1C", "HGBA1C"   BMP  Lab Results   Component Value Date     07/26/2022    K 5.3 (H) 07/26/2022     07/26/2022    CO2 28 07/26/2022    BUN 10 07/26/2022    CREATININE 1.0 07/26/2022    CALCIUM 9.2 07/26/2022    ANIONGAP 9 07/26/2022      Lab Results   Component Value Date    HDL 45 01/04/2022    HDL 91 (H) 06/25/2021    " HDL 82 (H) 05/24/2021     Lab Results   Component Value Date    LDLCALC 134.2 01/04/2022    LDLCALC 188.2 (H) 06/25/2021    LDLCALC 197.8 (H) 05/24/2021     Lab Results   Component Value Date    TRIG 109 01/04/2022    TRIG 104 06/25/2021    TRIG 96 05/24/2021     Lab Results   Component Value Date    CHOLHDL 22.4 01/04/2022    CHOLHDL 30.3 06/25/2021    CHOLHDL 27.4 05/24/2021       Chemistry        Component Value Date/Time     07/26/2022 0818    K 5.3 (H) 07/26/2022 0818     07/26/2022 0818    CO2 28 07/26/2022 0818    BUN 10 07/26/2022 0818    CREATININE 1.0 07/26/2022 0818    GLU 93 07/26/2022 0818        Component Value Date/Time    CALCIUM 9.2 07/26/2022 0818    ALKPHOS 82 07/26/2022 0818    AST 16 07/26/2022 0818    ALT 11 07/26/2022 0818    BILITOT 0.5 07/26/2022 0818    ESTGFRAFRICA >60.0 07/26/2022 0818    EGFRNONAA >60.0 07/26/2022 0818          Lab Results   Component Value Date    TSH 0.911 06/27/2019     Lab Results   Component Value Date    INR 0.9 03/31/2020    INR 0.9 11/07/2019    INR 1.0 10/14/2019     Lab Results   Component Value Date    WBC 4.78 07/26/2022    HGB 14.1 07/26/2022    HCT 44.8 07/26/2022    MCV 84 07/26/2022     07/26/2022     BMP  Sodium   Date Value Ref Range Status   07/26/2022 144 136 - 145 mmol/L Final     Potassium   Date Value Ref Range Status   07/26/2022 5.3 (H) 3.5 - 5.1 mmol/L Final     Comment:     *No Visible Hemolysis     Chloride   Date Value Ref Range Status   07/26/2022 107 95 - 110 mmol/L Final     CO2   Date Value Ref Range Status   07/26/2022 28 23 - 29 mmol/L Final     BUN   Date Value Ref Range Status   07/26/2022 10 8 - 23 mg/dL Final     Creatinine   Date Value Ref Range Status   07/26/2022 1.0 0.5 - 1.4 mg/dL Final     Calcium   Date Value Ref Range Status   07/26/2022 9.2 8.7 - 10.5 mg/dL Final     Anion Gap   Date Value Ref Range Status   07/26/2022 9 8 - 16 mmol/L Final     eGFR if    Date Value Ref Range Status    07/26/2022 >60.0 >60 mL/min/1.73 m^2 Final     eGFR if non    Date Value Ref Range Status   07/26/2022 >60.0 >60 mL/min/1.73 m^2 Final     Comment:     Calculation used to obtain the estimated glomerular filtration  rate (eGFR) is the CKD-EPI equation.        CrCl cannot be calculated (Patient's most recent lab result is older than the maximum 7 days allowed.).    Assessment:     1. Atherosclerotic PVD with intermittent claudication    2. Asthma, mild intermittent, well-controlled    3. Bronchiectasis, uncomplicated    4. Multiple pulmonary nodules    5. Aortic atherosclerosis    6. Atherosclerosis of coronary artery, unspecified vessel or lesion type, unspecified whether angina present, unspecified whether native or transplanted heart    7. Mixed hyperlipidemia    8. Primary hypertension    9. Pulmonary heart disease    10. Stenosis of carotid artery, unspecified laterality      Pvd wise status unchanged has non limiting claudication on appropriate therapy stable continue walking exercise rf modification  His htn is controlled stable    His lipids adequately treated needs repeat labs.   Carotid disease non obs on antiplatelets asymptomatic continue the same.   Plan:   Continue current therapy  Cardiac low salt diet.  Risk factor modification and excercise program.  F/u yearly    Needs labs checked

## 2023-09-28 ENCOUNTER — LAB VISIT (OUTPATIENT)
Dept: LAB | Facility: HOSPITAL | Age: 78
End: 2023-09-28
Attending: INTERNAL MEDICINE
Payer: MEDICARE

## 2023-09-28 DIAGNOSIS — E78.2 MIXED HYPERLIPIDEMIA: ICD-10-CM

## 2023-09-28 DIAGNOSIS — I70.219 ATHEROSCLEROTIC PVD WITH INTERMITTENT CLAUDICATION: ICD-10-CM

## 2023-09-28 LAB
ALBUMIN SERPL BCP-MCNC: 3.7 G/DL (ref 3.5–5.2)
ALP SERPL-CCNC: 80 U/L (ref 55–135)
ALT SERPL W/O P-5'-P-CCNC: 11 U/L (ref 10–44)
ANION GAP SERPL CALC-SCNC: 4 MMOL/L (ref 8–16)
AST SERPL-CCNC: 17 U/L (ref 10–40)
BILIRUB SERPL-MCNC: 0.3 MG/DL (ref 0.1–1)
BUN SERPL-MCNC: 10 MG/DL (ref 8–23)
CALCIUM SERPL-MCNC: 9.3 MG/DL (ref 8.7–10.5)
CHLORIDE SERPL-SCNC: 109 MMOL/L (ref 95–110)
CHOLEST SERPL-MCNC: 197 MG/DL (ref 120–199)
CHOLEST/HDLC SERPL: 2.7 {RATIO} (ref 2–5)
CO2 SERPL-SCNC: 29 MMOL/L (ref 23–29)
CREAT SERPL-MCNC: 1.1 MG/DL (ref 0.5–1.4)
EST. GFR  (NO RACE VARIABLE): >60 ML/MIN/1.73 M^2
GLUCOSE SERPL-MCNC: 96 MG/DL (ref 70–110)
HDLC SERPL-MCNC: 74 MG/DL (ref 40–75)
HDLC SERPL: 37.6 % (ref 20–50)
LDLC SERPL CALC-MCNC: 100 MG/DL (ref 63–159)
NONHDLC SERPL-MCNC: 123 MG/DL
POTASSIUM SERPL-SCNC: 4.9 MMOL/L (ref 3.5–5.1)
PROT SERPL-MCNC: 7 G/DL (ref 6–8.4)
SODIUM SERPL-SCNC: 142 MMOL/L (ref 136–145)
TRIGL SERPL-MCNC: 115 MG/DL (ref 30–150)

## 2023-09-28 PROCEDURE — 80061 LIPID PANEL: CPT | Mod: HCNC | Performed by: INTERNAL MEDICINE

## 2023-09-28 PROCEDURE — 80053 COMPREHEN METABOLIC PANEL: CPT | Mod: HCNC | Performed by: INTERNAL MEDICINE

## 2023-09-28 PROCEDURE — 36415 COLL VENOUS BLD VENIPUNCTURE: CPT | Mod: HCNC | Performed by: INTERNAL MEDICINE

## 2023-10-05 ENCOUNTER — OFFICE VISIT (OUTPATIENT)
Dept: SLEEP MEDICINE | Facility: CLINIC | Age: 78
End: 2023-10-05
Payer: MEDICARE

## 2023-10-05 ENCOUNTER — HOSPITAL ENCOUNTER (OUTPATIENT)
Dept: RADIOLOGY | Facility: HOSPITAL | Age: 78
Discharge: HOME | End: 2023-10-05
Attending: INTERNAL MEDICINE
Payer: MEDICARE

## 2023-10-05 ENCOUNTER — CLINICAL SUPPORT (OUTPATIENT)
Dept: PULMONOLOGY | Facility: CLINIC | Age: 78
End: 2023-10-05
Payer: MEDICARE

## 2023-10-05 ENCOUNTER — PATIENT OUTREACH (OUTPATIENT)
Dept: ADMINISTRATIVE | Facility: HOSPITAL | Age: 78
End: 2023-10-05
Payer: MEDICARE

## 2023-10-05 VITALS
BODY MASS INDEX: 29.53 KG/M2 | HEART RATE: 54 BPM | OXYGEN SATURATION: 97 % | RESPIRATION RATE: 17 BRPM | DIASTOLIC BLOOD PRESSURE: 86 MMHG | HEIGHT: 65 IN | WEIGHT: 177.25 LBS | SYSTOLIC BLOOD PRESSURE: 140 MMHG

## 2023-10-05 DIAGNOSIS — E78.2 MIXED HYPERLIPIDEMIA: ICD-10-CM

## 2023-10-05 DIAGNOSIS — I10 PRIMARY HYPERTENSION: Chronic | ICD-10-CM

## 2023-10-05 DIAGNOSIS — J45.20 ASTHMA, MILD INTERMITTENT, WELL-CONTROLLED: ICD-10-CM

## 2023-10-05 DIAGNOSIS — I27.9 PULMONARY HEART DISEASE: ICD-10-CM

## 2023-10-05 DIAGNOSIS — J47.9 BRONCHIECTASIS, UNCOMPLICATED: ICD-10-CM

## 2023-10-05 DIAGNOSIS — R91.8 MULTIPLE PULMONARY NODULES: ICD-10-CM

## 2023-10-05 DIAGNOSIS — J45.20 ASTHMA, MILD INTERMITTENT, WELL-CONTROLLED: Primary | ICD-10-CM

## 2023-10-05 LAB
BRPFT: NORMAL
FEF 25 75 CHG: 15.7 %
FEF 25 75 LLN: 0.47
FEF 25 75 POST REF: 60.4 %
FEF 25 75 PRE REF: 52.2 %
FEF 25 75 REF: 1.55
FET100 CHG: -2.2 %
FEV1 CHG: 14.7 %
FEV1 FVC CHG: 0.3 %
FEV1 FVC LLN: 62
FEV1 FVC POST REF: 89.6 %
FEV1 FVC PRE REF: 89.4 %
FEV1 FVC REF: 76
FEV1 LLN: 1.36
FEV1 POST REF: 82.4 %
FEV1 PRE REF: 71.8 %
FEV1 REF: 2.08
FVC CHG: 14.3 %
FVC LLN: 1.92
FVC POST REF: 91.4 %
FVC PRE REF: 79.9 %
FVC REF: 2.74
PEF CHG: 1 %
PEF LLN: 4.12
PEF POST REF: 88.6 %
PEF PRE REF: 87.7 %
PEF REF: 6.4
POST FEF 25 75: 0.94 L/S (ref 0.47–2.64)
POST FET 100: 11.17 SEC
POST FEV1 FVC: 68.25 % (ref 61.65–90.61)
POST FEV1: 1.71 L (ref 1.36–2.79)
POST FVC: 2.51 L (ref 1.92–3.57)
POST PEF: 5.67 L/S (ref 4.12–8.69)
PRE FEF 25 75: 0.81 L/S (ref 0.47–2.64)
PRE FET 100: 11.42 SEC
PRE FEV1 FVC: 68.03 % (ref 61.65–90.61)
PRE FEV1: 1.49 L (ref 1.36–2.79)
PRE FVC: 2.19 L (ref 1.92–3.57)
PRE PEF: 5.62 L/S (ref 4.12–8.69)

## 2023-10-05 PROCEDURE — 99999 PR PBB SHADOW E&M-EST. PATIENT-LVL IV: ICD-10-PCS | Mod: PBBFAC,HCNC,, | Performed by: INTERNAL MEDICINE

## 2023-10-05 PROCEDURE — 99214 OFFICE O/P EST MOD 30 MIN: CPT | Mod: 25,HCNC,S$GLB, | Performed by: INTERNAL MEDICINE

## 2023-10-05 PROCEDURE — 3079F DIAST BP 80-89 MM HG: CPT | Mod: HCNC,CPTII,S$GLB, | Performed by: INTERNAL MEDICINE

## 2023-10-05 PROCEDURE — 3079F PR MOST RECENT DIASTOLIC BLOOD PRESSURE 80-89 MM HG: ICD-10-PCS | Mod: HCNC,CPTII,S$GLB, | Performed by: INTERNAL MEDICINE

## 2023-10-05 PROCEDURE — 3077F PR MOST RECENT SYSTOLIC BLOOD PRESSURE >= 140 MM HG: ICD-10-PCS | Mod: HCNC,CPTII,S$GLB, | Performed by: INTERNAL MEDICINE

## 2023-10-05 PROCEDURE — 3288F PR FALLS RISK ASSESSMENT DOCUMENTED: ICD-10-PCS | Mod: HCNC,CPTII,S$GLB, | Performed by: INTERNAL MEDICINE

## 2023-10-05 PROCEDURE — 95012 NITRIC OXIDE EXP GAS DETER: CPT | Mod: HCNC,S$GLB,, | Performed by: INTERNAL MEDICINE

## 2023-10-05 PROCEDURE — 3077F SYST BP >= 140 MM HG: CPT | Mod: HCNC,CPTII,S$GLB, | Performed by: INTERNAL MEDICINE

## 2023-10-05 PROCEDURE — 71046 X-RAY EXAM CHEST 2 VIEWS: CPT | Mod: 26,HCNC,, | Performed by: RADIOLOGY

## 2023-10-05 PROCEDURE — 1160F PR REVIEW ALL MEDS BY PRESCRIBER/CLIN PHARMACIST DOCUMENTED: ICD-10-PCS | Mod: HCNC,CPTII,S$GLB, | Performed by: INTERNAL MEDICINE

## 2023-10-05 PROCEDURE — 1160F RVW MEDS BY RX/DR IN RCRD: CPT | Mod: HCNC,CPTII,S$GLB, | Performed by: INTERNAL MEDICINE

## 2023-10-05 PROCEDURE — 71046 X-RAY EXAM CHEST 2 VIEWS: CPT | Mod: TC,HCNC

## 2023-10-05 PROCEDURE — 95012 PR NITRIC OXIDE EXPIRED GAS DETERMINATION: ICD-10-PCS | Mod: HCNC,S$GLB,, | Performed by: INTERNAL MEDICINE

## 2023-10-05 PROCEDURE — 99999 PR PBB SHADOW E&M-EST. PATIENT-LVL IV: CPT | Mod: PBBFAC,HCNC,, | Performed by: INTERNAL MEDICINE

## 2023-10-05 PROCEDURE — 71046 XR CHEST PA AND LATERAL: ICD-10-PCS | Mod: 26,HCNC,, | Performed by: RADIOLOGY

## 2023-10-05 PROCEDURE — 1101F PR PT FALLS ASSESS DOC 0-1 FALLS W/OUT INJ PAST YR: ICD-10-PCS | Mod: HCNC,CPTII,S$GLB, | Performed by: INTERNAL MEDICINE

## 2023-10-05 PROCEDURE — 94060 PR EVAL OF BRONCHOSPASM: ICD-10-PCS | Mod: HCNC,S$GLB,, | Performed by: INTERNAL MEDICINE

## 2023-10-05 PROCEDURE — 3288F FALL RISK ASSESSMENT DOCD: CPT | Mod: HCNC,CPTII,S$GLB, | Performed by: INTERNAL MEDICINE

## 2023-10-05 PROCEDURE — 99214 PR OFFICE/OUTPT VISIT, EST, LEVL IV, 30-39 MIN: ICD-10-PCS | Mod: 25,HCNC,S$GLB, | Performed by: INTERNAL MEDICINE

## 2023-10-05 PROCEDURE — 1159F MED LIST DOCD IN RCRD: CPT | Mod: HCNC,CPTII,S$GLB, | Performed by: INTERNAL MEDICINE

## 2023-10-05 PROCEDURE — 94060 EVALUATION OF WHEEZING: CPT | Mod: HCNC,S$GLB,, | Performed by: INTERNAL MEDICINE

## 2023-10-05 PROCEDURE — 1159F PR MEDICATION LIST DOCUMENTED IN MEDICAL RECORD: ICD-10-PCS | Mod: HCNC,CPTII,S$GLB, | Performed by: INTERNAL MEDICINE

## 2023-10-05 PROCEDURE — 1101F PT FALLS ASSESS-DOCD LE1/YR: CPT | Mod: HCNC,CPTII,S$GLB, | Performed by: INTERNAL MEDICINE

## 2023-10-05 RX ORDER — FLUTICASONE FUROATE AND VILANTEROL 200; 25 UG/1; UG/1
1 POWDER RESPIRATORY (INHALATION) DAILY
Qty: 180 EACH | Refills: 3 | Status: SHIPPED | OUTPATIENT
Start: 2023-10-05 | End: 2023-10-12

## 2023-10-05 RX ORDER — ALBUTEROL SULFATE 90 UG/1
AEROSOL, METERED RESPIRATORY (INHALATION)
Qty: 18 G | Refills: 11 | Status: SHIPPED | OUTPATIENT
Start: 2023-10-05

## 2023-10-05 NOTE — PROGRESS NOTES
Pulmonary Outpatient Follow Up Visit     Subjective:       Patient ID: Alex Milner is a 78 y.o. male.  The following portions of the patient's history were reviewed and updated as appropriate:   He  has a past medical history of Atherosclerosis of coronary artery (8/10/2022), Atherosclerotic PVD with intermittent claudication (8/16/2019), Atrial fibrillation, Back pain, Bilateral inguinal hernia, Calcified granuloma of lung (10/3/2017), Diverticulosis, Diverticulosis (10/28/13), ED (erectile dysfunction), Elevated PSA, Granuloma of liver, Hyperlipidemia, Hypertension, Liver mass (3/13/2020), Moderate persistent asthma without complication (9/26/2017), Obesity, Personal history of colonic polyps (2013), Pulmonary heart disease (8/10/2022), Tobacco dependence, Uncomplicated alcohol dependence (11/27/2019), and Urinary tract infection.  He does not have any pertinent problems on file.  He  has a past surgical history that includes Hernia repair; Prostate biopsy; Aortography with serialography (N/A, 10/21/2019); Aortography with serialography (N/A, 11/14/2019); and Colonoscopy (N/A, 12/7/2022).  His family history includes Cancer in his brother, father, and mother; Cancer (age of onset: 64) in his sister; Prostate cancer in his brother.  He  reports that he quit smoking about 38 years ago. His smoking use included cigarettes. He started smoking about 63 years ago. He has a 51.2 pack-year smoking history. He has never used smokeless tobacco. He reports current alcohol use of about 12.0 standard drinks of alcohol per week. He reports that he does not use drugs.  He has a current medication list which includes the following prescription(s): albuterol, amlodipine, aspirin, atorvastatin, brimonidine 0.15 % opth drop, dorzolamide, repatha sureclick, finasteride, fluad quad 2022-23(65y up)(pf), fluticasone furoate-vilanterol, latanoprost, lisinopril, xarelto, and  tamsulosin.  Current Outpatient Medications on File Prior to Visit   Medication Sig Dispense Refill    amLODIPine (NORVASC) 10 MG tablet Take 1 tablet (10 mg total) by mouth once daily. 30 tablet 11    aspirin (ECOTRIN) 81 MG EC tablet TAKE 1 TABLET BY MOUTH EVERY DAY (Patient not taking: Reported on 9/27/2023) 90 tablet 2    atorvastatin (LIPITOR) 80 MG tablet Take 1 tablet (80 mg total) by mouth once daily. 90 tablet 3    brimonidine 0.15 % OPTH DROP (ALPHAGAN) 0.15 % ophthalmic solution Place 1 drop into both eyes 2 (two) times a day. 15 mL 4    dorzolamide (TRUSOPT) 2 % ophthalmic solution INSTILL 1 DROP INTO BOTH EYES 2 TIMES A DAY 30 mL 4    evolocumab (REPATHA SURECLICK) 140 mg/mL PnIj Inject 1 mL (140 mg total) into the skin every 14 (fourteen) days. 2 each 5    finasteride (PROSCAR) 5 mg tablet TAKE 1 TABLET BY MOUTH EVERY DAY 90 tablet 3    FLUAD QUAD 2022-23,65Y UP,,PF, 60 mcg (15 mcg x 4)/0.5 mL Syrg       latanoprost 0.005 % ophthalmic solution Place 1 drop into both eyes every evening. 2 mL 11    lisinopriL (PRINIVIL,ZESTRIL) 20 MG tablet Take 1 tablet (20 mg total) by mouth once daily. 90 tablet 1    rivaroxaban (XARELTO) 2.5 mg Tab Take 1 tablet (2.5 mg total) by mouth 2 (two) times daily with meals. 60 tablet 5    tamsulosin (FLOMAX) 0.4 mg Cap TAKE 1 CAPSULE BY MOUTH EVERY DAY 90 capsule 3    [DISCONTINUED] albuterol (PROVENTIL/VENTOLIN HFA) 90 mcg/actuation inhaler INHALE 2 PUFFS INTO THE LUNGS EVERY 6 (SIX) HOURS AS NEEDED FOR WHEEZING OR SHORTNESS OF BREATH 18 g 11    [DISCONTINUED] fluticasone furoate-vilanteroL (BREO ELLIPTA) 200-25 mcg/dose DsDv diskus inhaler Inhale 1 puff into the lungs once daily. Controller 180 each 3     No current facility-administered medications on file prior to visit.     He has No Known Allergies..     Asthma Control Test  In the past 4  weeks, how much of the time did your asthma keep you from getting as much done at work, school or at home?: None of  the time  During the past 4 weeks, how often have you had shortness of breath?: Not at all  During the past 4 weeks, how often did your asthma symptoms (wheezing, couging, shortness of breath, chest tightness or pain) wake you up at night or earlier than usual in the morning?: Not at all  During the past 4 weeks, how often have you used your rescue inhaler or nebulizer medication (such as albuterol)?: 1 or 2 times per day  How would you rate your asthma control during the past 4 weeks?: Well controlled  If your score is 19 or less, your asthma may not be under control: 21      Chief Complaint: Asthma        Alex Milner is 75 y.o.  Last visit 03/10/2020  Asthma and pulmonary nodules  Out of Inhalers  Act score 21  Subtle declined on FEV1 and FVC today  Triggers Allergies  Former smoker   : STATION 16    05/19/2022  Last visit 06/03/2021  Here to review Gunner  Normal  Improved FEV1 and FVC  No recent exacebation  FEV1 improved from 1.43L to 1.83L  FVC  Improved from 2.03L to 2.56L    10/06/2022  Last seen 05/19/2022  Here to review FeNO and Chest CT  Chest CT Normal:  Annual surveillance CXR  FeNo was 39  ACT was 24  Stable on BREO and CB  No Cough, No SOB  Due for flushot      10/05/2023  Followup  Last seen 10/06/2022  Here to review tests  Not using any inhaler except periodically  ACT score 21  FeNo trended up and FEV1 and FVC declined  Risks discussed    Additional complaints:  Respiratory ROS: no cough, shortness of breath, or wheezing      Asthma Control Test  In the past 4  weeks, how much of the time did your asthma keep you from getting as much done at work, school or at home?: None of the time  During the past 4 weeks, how often have you had shortness of breath?: Not at all  During the past 4 weeks, how often did your asthma symptoms (wheezing, couging, shortness of breath, chest tightness or pain) wake you up at night or earlier than usual in the morning?: Not at all  During the past 4  weeks, how often have you used your rescue inhaler or nebulizer medication (such as albuterol)?: 1 or 2 times per day  How would you rate your asthma control during the past 4 weeks?: Well controlled  If your score is 19 or less, your asthma may not be under control: 21      Review of Systems   Respiratory:  Negative for apnea, cough, hemoptysis, choking, shortness of breath, orthopnea, previous hospitialization due to pulmonary problems, asthma nighttime symptoms, pleurisy, dyspnea on extertion, use of rescue inhaler and Paroxysmal Nocturnal Dyspnea.    All other systems reviewed and are negative.      Outpatient Encounter Medications as of 10/5/2023   Medication Sig Dispense Refill    albuterol (PROVENTIL/VENTOLIN HFA) 90 mcg/actuation inhaler INHALE 2 PUFFS INTO THE LUNGS EVERY 6 (SIX) HOURS AS NEEDED FOR WHEEZING OR SHORTNESS OF BREATH 18 g 11    amLODIPine (NORVASC) 10 MG tablet Take 1 tablet (10 mg total) by mouth once daily. 30 tablet 11    aspirin (ECOTRIN) 81 MG EC tablet TAKE 1 TABLET BY MOUTH EVERY DAY (Patient not taking: Reported on 9/27/2023) 90 tablet 2    atorvastatin (LIPITOR) 80 MG tablet Take 1 tablet (80 mg total) by mouth once daily. 90 tablet 3    brimonidine 0.15 % OPTH DROP (ALPHAGAN) 0.15 % ophthalmic solution Place 1 drop into both eyes 2 (two) times a day. 15 mL 4    dorzolamide (TRUSOPT) 2 % ophthalmic solution INSTILL 1 DROP INTO BOTH EYES 2 TIMES A DAY 30 mL 4    evolocumab (REPATHA SURECLICK) 140 mg/mL PnIj Inject 1 mL (140 mg total) into the skin every 14 (fourteen) days. 2 each 5    finasteride (PROSCAR) 5 mg tablet TAKE 1 TABLET BY MOUTH EVERY DAY 90 tablet 3    FLUAD QUAD 2022-23,65Y UP,,PF, 60 mcg (15 mcg x 4)/0.5 mL Syrg       fluticasone furoate-vilanteroL (BREO ELLIPTA) 200-25 mcg/dose DsDv diskus inhaler Inhale 1 puff into the lungs once daily. Controller 180 each 3    latanoprost 0.005 % ophthalmic solution Place 1 drop into both eyes every evening. 2 mL 11     "lisinopriL (PRINIVIL,ZESTRIL) 20 MG tablet Take 1 tablet (20 mg total) by mouth once daily. 90 tablet 1    rivaroxaban (XARELTO) 2.5 mg Tab Take 1 tablet (2.5 mg total) by mouth 2 (two) times daily with meals. 60 tablet 5    tamsulosin (FLOMAX) 0.4 mg Cap TAKE 1 CAPSULE BY MOUTH EVERY DAY 90 capsule 3    [DISCONTINUED] albuterol (PROVENTIL/VENTOLIN HFA) 90 mcg/actuation inhaler INHALE 2 PUFFS INTO THE LUNGS EVERY 6 (SIX) HOURS AS NEEDED FOR WHEEZING OR SHORTNESS OF BREATH 18 g 11    [DISCONTINUED] fluticasone furoate-vilanteroL (BREO ELLIPTA) 200-25 mcg/dose DsDv diskus inhaler Inhale 1 puff into the lungs once daily. Controller 180 each 3     No facility-administered encounter medications on file as of 10/5/2023.       Objective:     Vital Signs (Most Recent)  Vital Signs  Pulse: (!) 54  Resp: 17  SpO2: 97 %  BP: (!) 140/86  Height and Weight  Height: 5' 5" (165.1 cm)  Weight: 80.4 kg (177 lb 4 oz)  BSA (Calculated - sq m): 1.92 sq meters  BMI (Calculated): 29.5  Weight in (lb) to have BMI = 25: 149.9]  Wt Readings from Last 2 Encounters:   10/05/23 80.4 kg (177 lb 4 oz)   09/27/23 80.3 kg (177 lb 0.5 oz)       Physical Exam   Constitutional: He is oriented to person, place, and time. He appears well-developed and well-nourished.   HENT:   Head: Normocephalic.   Mouth/Throat: Mallampati Score: II.   Neck: No JVD present.   Cardiovascular: Normal rate and intact distal pulses.   No murmur heard.  Pulmonary/Chest: Normal expansion, effort normal and breath sounds normal.   Abdominal: Soft. Bowel sounds are normal.   Musculoskeletal:         General: No edema. Normal range of motion.      Cervical back: Normal range of motion and neck supple.   Lymphadenopathy:     He has no axillary adenopathy.   Neurological: He is alert and oriented to person, place, and time.   Skin: Skin is warm and dry.   Psychiatric: He has a normal mood and affect.   Nursing note and vitals reviewed.      Laboratory  Lab Results " "  Component Value Date    WBC 4.78 07/26/2022    RBC 5.34 07/26/2022    HGB 14.1 07/26/2022    HCT 44.8 07/26/2022    MCV 84 07/26/2022    MCH 26.4 (L) 07/26/2022    MCHC 31.5 (L) 07/26/2022    RDW 14.7 (H) 07/26/2022     07/26/2022    MPV 10.1 07/26/2022    GRAN 2.1 07/26/2022    GRAN 44.0 07/26/2022    LYMPH 1.8 07/26/2022    LYMPH 36.8 07/26/2022    MONO 0.6 07/26/2022    MONO 11.9 07/26/2022    EOS 0.3 07/26/2022    BASO 0.05 07/26/2022    EOSINOPHIL 6.1 07/26/2022    BASOPHIL 1.0 07/26/2022       BMP  Lab Results   Component Value Date     09/28/2023    K 4.9 09/28/2023     09/28/2023    CO2 29 09/28/2023    BUN 10 09/28/2023    CREATININE 1.1 09/28/2023    CALCIUM 9.3 09/28/2023    ANIONGAP 4 (L) 09/28/2023    ESTGFRAFRICA >60.0 07/26/2022    EGFRNONAA >60.0 07/26/2022    AST 17 09/28/2023    ALT 11 09/28/2023    PROT 7.0 09/28/2023       No results found for: "BNP"    Lab Results   Component Value Date    TSH 0.911 06/27/2019       No results found for: "SEDRATE"    No results found for: "CRP"  Lab Results   Component Value Date    IGE <35 03/19/2021        No results found for: "ASPERGILLUS"  No results found for: "AFUMIGATUSCL"     No results found for: "ACE"     Diagnostic Results:  I have personally reviewed today the following studies:   X-Ray Chest PA And Lateral  Narrative: EXAM:  XR CHEST PA AND LATERAL    CLINICAL HISTORY:  Asthma    FINDINGS:    Comparison chest 05/19/2022.    The heart is normal in size.    The lungs are clear.    Pericardium fat pads are present.  Impression: No infiltrates.    Finalized on: 10/5/2023 1:14 PM By:  Kenneth Calderon MD  [facility]# [id number]      2023-10-05 13:17:05.021    [facility]      Clinical Guide to Interpretation or FeNO Levels :     FeNO  (ppb) LOW INTERMEDIATE HIGH   ADULT VALUES < 25 25-50          > 50   Th2-driven Inflammation Unlikely Likely Significant      Patients FeNO level at this visit : __57__ (ppb)      Interpretation of FeNO measurement in " "adults:   [ ] FENO is less than 25 ppb implies non eosinophilic airway inflammation or the absence of airway inflammation.   Comment: Low FENO (<25 ppb) in adult asthmatics with persistent symptoms suggests other etiologies for these symptoms and a lower likelihood of benefit from adding or increasing inhaled glucocorticoids.     [] FENO between 25 and 50 ppb in adults should be interpreted cautiously with reference to the clinical situation (eg, symptomatic, on or off therapy, current smoking).     [X ] FENO greater than 50 ppb in adults  suggests eosinophilic airway inflammation   Comment: High FENO (>50 ppb) in adult asthmatics even with atypical symptoms suggests glucocorticoid responsiveness. High FENO (>50 ppb) can help identify poor adherence or uncontrolled inflammation in asthma patients with otherwise seemingly "controlled" asthma.     Discussion:   ·A FENO less than 25 ppb in adults and less than 20 ppb in children younger than 12 years of age implies noneosinophilic airway inflammation or the absence of airway inflammation.   ·A FENO greater than 50 ppb in adults or greater than 35 ppb in children suggests eosinophilic airway inflammation.   ·Values of FENO between 25 and 50 ppb in adults (20 to 35 ppb in children) should be interpreted cautiously with reference to the clinical situation (eg, symptomatic, on or off therapy, current smoking).   ·A rising FENO with a greater than 20 percent change and more than 25 ppb (20 ppb in children) from a previously stable level suggests increasing eosinophilic airway inflammation, but there are wide inter-individual differences.   ·A decrease in FENO greater than 20 percent for values over 50 ppb or more than 10 ppb for values less than 50 ppb may be clinically important.   FENO in other respiratory diseases - Several other diseases are associated with altered levels of exhaled NO: low levels of FENO have been noted in cystic fibrosis, current smoking, pulmonary " hypertension, hypothermia, primary ciliary dyskinesia, and bronchopulmonary dysplasia. Elevated FENO has been noted in atopy, nonasthmatic eosinophilic bronchitis, COPD exacerbations, noncystic fibrosis bronchiectasis, and viral upper respiratory infections.     REFERENCE:   ATS Board of Directors, 2004, and by the ERS Executive Committee, 2004. ATS/ERS Recommendations for Standardized Procedures for the Online and Offline Measurement of Exhaled Lower Respiratory Nitric Oxide and Nasal Nitric Oxide. Guideline 2005        ROBSON  FEV1: 1.49 L( 71.8%), FVC 2.19L( 79.9%)  FEV1/FVC 68    Assessment/Plan:     Problem List Items Addressed This Visit       Hypertension (Chronic)    Bronchiectasis, uncomplicated    Pulmonary heart disease    Asthma, well controlled - Primary    Relevant Medications    albuterol (PROVENTIL/VENTOLIN HFA) 90 mcg/actuation inhaler    fluticasone furoate-vilanteroL (BREO ELLIPTA) 200-25 mcg/dose DsDv diskus inhaler    Other Relevant Orders    Fraction of  Nitric Oxide    Hyperlipidemia    Multiple pulmonary nodules     Requested Prescriptions     Signed Prescriptions Disp Refills    albuterol (PROVENTIL/VENTOLIN HFA) 90 mcg/actuation inhaler 18 g 11     Sig: INHALE 2 PUFFS INTO THE LUNGS EVERY 6 (SIX) HOURS AS NEEDED FOR WHEEZING OR SHORTNESS OF BREATH    fluticasone furoate-vilanteroL (BREO ELLIPTA) 200-25 mcg/dose DsDv diskus inhaler 180 each 3     Sig: Inhale 1 puff into the lungs once daily. Controller      Poorly adherent with LABA ICS  FeNo trended up  Poor insight of symptoms  FEV1 and FVC declined    Follow up in about 3 months (around 2024), or Adherence with meds, see Cassandra with bud FeNo.    This note was prepared using voice recognition system and is likely to have sound alike errors that may have been overlooked even after proof reading.  Please call me with any questions    Discussed diagnosis, its evaluation, treatment and usual course. All questions  answered.      Dominik Hernandez MD

## 2023-10-05 NOTE — PROCEDURES
Clinical Guide to Interpretation or FeNO Levels :    FeNO  (ppb) LOW INTERMEDIATE HIGH   ADULT VALUES < 25 25-50          > 50   Th2-driven Inflammation Unlikely Likely Significant     Patients FeNO level at this visit : ___57_ (ppb)    Interpretation of FeNO measurement in adults:  [] FENO is less than 25 ppb implies non eosinophilic airway inflammation or the absence of airway inflammation.    Comment: Low FENO (<25 ppb) in adult asthmatics with persistent symptoms suggests other etiologies for these symptoms and a lower likelihood of benefit from adding or increasing inhaled glucocorticoids.    [] FENO between 25 and 50 ppb in adults should be interpreted cautiously with reference to the clinical situation (eg, symptomatic, on or off therapy, current smoking).    [x] FENO greater than 50 ppb in adults  suggests eosinophilic airway inflammation   Comment: High FENO (>50 ppb) in adult asthmatics even with atypical symptoms suggests glucocorticoid responsiveness. High FENO (>50 ppb) can help identify poor adherence or uncontrolled inflammation in asthma patients with otherwise seemingly controlled asthma.    Discussion:  A FENO less than 25 ppb in adults and less than 20 ppb in children younger than 12 years of age implies noneosinophilic airway inflammation or the absence of airway inflammation.  A FENO greater than 50 ppb in adults or greater than 35 ppb in children suggests eosinophilic airway inflammation.   Values of FENO between 25 and 50 ppb in adults (20 to 35 ppb in children) should be interpreted cautiously with reference to the clinical situation (eg, symptomatic, on or off therapy, current smoking).  A rising FENO with a greater than 20 percent change and more than 25 ppb (20 ppb in children) from a previously stable level suggests increasing eosinophilic airway inflammation, but there are wide inter-individual differences.  A decrease in FENO greater than 20 percent for values over 50 ppb or more than  10 ppb for values less than 50 ppb may be clinically important.  ?FENO in other respiratory diseases - Several other diseases are associated with altered levels of exhaled NO: low levels of FENO have been noted in cystic fibrosis, current smoking, pulmonary hypertension, hypothermia, primary ciliary dyskinesia, and bronchopulmonary dysplasia. Elevated FENO has been noted in atopy, nonasthmatic eosinophilic bronchitis, COPD exacerbations, noncystic fibrosis bronchiectasis, and viral upper respiratory infections.    REFERENCE:  ATS Board of Directors, December 2004, and by the ERS Executive Committee, June 2004. ATS/ERS Recommendations for Standardized Procedures for the Online and Offline Measurement of Exhaled Lower Respiratory Nitric Oxide and Nasal Nitric Oxide. Guideline 2005

## 2023-10-06 ENCOUNTER — OFFICE VISIT (OUTPATIENT)
Dept: OPHTHALMOLOGY | Facility: CLINIC | Age: 78
End: 2023-10-06
Payer: MEDICARE

## 2023-10-06 DIAGNOSIS — H25.13 NUCLEAR SCLEROSIS, BILATERAL: ICD-10-CM

## 2023-10-06 DIAGNOSIS — H25.013 CORTICAL AGE-RELATED CATARACT OF BOTH EYES: ICD-10-CM

## 2023-10-06 DIAGNOSIS — H40.1113 PRIMARY OPEN ANGLE GLAUCOMA (POAG) OF RIGHT EYE, SEVERE STAGE: Primary | ICD-10-CM

## 2023-10-06 DIAGNOSIS — H52.221 REGULAR ASTIGMATISM OF RIGHT EYE: ICD-10-CM

## 2023-10-06 DIAGNOSIS — H52.4 PRESBYOPIA: ICD-10-CM

## 2023-10-06 DIAGNOSIS — H40.1121 PRIMARY OPEN ANGLE GLAUCOMA (POAG) OF LEFT EYE, MILD STAGE: ICD-10-CM

## 2023-10-06 PROCEDURE — 92083 EXTENDED VISUAL FIELD XM: CPT | Mod: HCNC,S$GLB,, | Performed by: OPTOMETRIST

## 2023-10-06 PROCEDURE — 99999 PR PBB SHADOW E&M-EST. PATIENT-LVL III: CPT | Mod: PBBFAC,HCNC,, | Performed by: OPTOMETRIST

## 2023-10-06 PROCEDURE — 99214 OFFICE O/P EST MOD 30 MIN: CPT | Mod: HCNC,S$GLB,, | Performed by: OPTOMETRIST

## 2023-10-06 PROCEDURE — 99214 PR OFFICE/OUTPT VISIT, EST, LEVL IV, 30-39 MIN: ICD-10-PCS | Mod: HCNC,S$GLB,, | Performed by: OPTOMETRIST

## 2023-10-06 PROCEDURE — 99999 PR PBB SHADOW E&M-EST. PATIENT-LVL III: ICD-10-PCS | Mod: PBBFAC,HCNC,, | Performed by: OPTOMETRIST

## 2023-10-06 PROCEDURE — 1159F MED LIST DOCD IN RCRD: CPT | Mod: HCNC,CPTII,S$GLB, | Performed by: OPTOMETRIST

## 2023-10-06 PROCEDURE — 1160F PR REVIEW ALL MEDS BY PRESCRIBER/CLIN PHARMACIST DOCUMENTED: ICD-10-PCS | Mod: HCNC,CPTII,S$GLB, | Performed by: OPTOMETRIST

## 2023-10-06 PROCEDURE — 1159F PR MEDICATION LIST DOCUMENTED IN MEDICAL RECORD: ICD-10-PCS | Mod: HCNC,CPTII,S$GLB, | Performed by: OPTOMETRIST

## 2023-10-06 PROCEDURE — 1160F RVW MEDS BY RX/DR IN RCRD: CPT | Mod: HCNC,CPTII,S$GLB, | Performed by: OPTOMETRIST

## 2023-10-06 PROCEDURE — 92083 HUMPHREY VISUAL FIELD - OU - BOTH EYES: ICD-10-PCS | Mod: HCNC,S$GLB,, | Performed by: OPTOMETRIST

## 2023-10-06 PROCEDURE — 92015 DETERMINE REFRACTIVE STATE: CPT | Mod: HCNC,S$GLB,, | Performed by: OPTOMETRIST

## 2023-10-06 PROCEDURE — 92015 PR REFRACTION: ICD-10-PCS | Mod: HCNC,S$GLB,, | Performed by: OPTOMETRIST

## 2023-10-06 NOTE — PROGRESS NOTES
HPI     Follow-up            Comments: RTC 2 months for 24-2VF and dilated exam  Pt is not compliant with using Latanoprost qhs OU, Brimonidine bid OU and   Trusopt bid OU  Pt states no pain, VA is good           Last edited by Olena Christina on 10/6/2023 10:15 AM.            Assessment /Plan     For exam results, see Encounter Report.    Primary open angle glaucoma (POAG) of right eye, severe stage  -     Coe Visual Field - OU - Extended - Both Eyes    Primary open angle glaucoma (POAG) of left eye, mild stage  -     Coe Visual Field - OU - Extended - Both Eyes    Non compliant with gtts  Increased IOP OU today, high after dilation, suspect possible chronic angle closure, recommend cat eval  Stressed compliance or risk blindness  Resume latanoprost qhs OU, brimonidine bid OU and trusopt bid OU      Nuclear sclerosis, bilateral  Cortical age-related cataract of both eyes  Cataract accounts for vision change. New Rx for glasses/contacts will not improve vision.   Refer to ophthalmologist for cataract evaluation.     Regular astigmatism of right eye  Presbyopia  Hold spec Rx      RTC next available with [facility] for cataract/glaucoma evaluation or PRN  If unable to schedule eval within a month, will have pt rtc 1 month with [facility] for IOP check  Discussed above and all questions were answered. '

## 2023-10-09 ENCOUNTER — TELEPHONE (OUTPATIENT)
Dept: PULMONOLOGY | Facility: CLINIC | Age: 78
End: 2023-10-09
Payer: MEDICARE

## 2023-10-11 ENCOUNTER — TELEPHONE (OUTPATIENT)
Dept: PULMONOLOGY | Facility: CLINIC | Age: 78
End: 2023-10-11
Payer: MEDICARE

## 2023-10-12 ENCOUNTER — CLINICAL SUPPORT (OUTPATIENT)
Dept: PULMONOLOGY | Facility: CLINIC | Age: 78
End: 2023-10-12
Payer: MEDICARE

## 2023-10-12 VITALS — HEART RATE: 57 BPM | OXYGEN SATURATION: 98 %

## 2023-10-12 DIAGNOSIS — J45.909 ASTHMA, WELL CONTROLLED: Primary | ICD-10-CM

## 2023-10-12 DIAGNOSIS — J47.9 BRONCHIECTASIS, UNCOMPLICATED: ICD-10-CM

## 2023-10-12 DIAGNOSIS — J45.20 ASTHMA, MILD INTERMITTENT, WELL-CONTROLLED: ICD-10-CM

## 2023-10-12 PROCEDURE — 99999 PR PBB SHADOW E&M-EST. PATIENT-LVL I: ICD-10-PCS | Mod: PBBFAC,HCNC,,

## 2023-10-12 PROCEDURE — 99999 PR PBB SHADOW E&M-EST. PATIENT-LVL I: CPT | Mod: PBBFAC,HCNC,,

## 2023-10-12 RX ORDER — FLUTICASONE PROPIONATE AND SALMETEROL 250; 50 UG/1; UG/1
1 POWDER RESPIRATORY (INHALATION) 2 TIMES DAILY
Qty: 60 EACH | Refills: 11 | Status: SHIPPED | OUTPATIENT
Start: 2023-10-12 | End: 2024-10-11

## 2023-10-12 NOTE — PROGRESS NOTES
Pulmonary Disease Management  [facility]  Chronic Care Mangement  Initial Visit    Referring Provider: Mary  Diagnosis: COPD  Last Hospital Admission: NA  Last provider visit: 10/5/23      Current Outpatient Medications:     albuterol (PROVENTIL/VENTOLIN HFA) 90 mcg/actuation inhaler, INHALE 2 PUFFS INTO THE LUNGS EVERY 6 (SIX) HOURS AS NEEDED FOR WHEEZING OR SHORTNESS OF BREATH, Disp: 18 g, Rfl: 11    amLODIPine (NORVASC) 10 MG tablet, Take 1 tablet (10 mg total) by mouth once daily., Disp: 30 tablet, Rfl: 11    aspirin (ECOTRIN) 81 MG EC tablet, TAKE 1 TABLET BY MOUTH EVERY DAY, Disp: 90 tablet, Rfl: 2    atorvastatin (LIPITOR) 80 MG tablet, Take 1 tablet (80 mg total) by mouth once daily., Disp: 90 tablet, Rfl: 3    brimonidine 0.15 % OPTH DROP (ALPHAGAN) 0.15 % ophthalmic solution, Place 1 drop into both eyes 2 (two) times a day., Disp: 15 mL, Rfl: 4    dorzolamide (TRUSOPT) 2 % ophthalmic solution, INSTILL 1 DROP INTO BOTH EYES 2 TIMES A DAY, Disp: 30 mL, Rfl: 4    evolocumab (REPATHA SURECLICK) 140 mg/mL PnIj, Inject 1 mL (140 mg total) into the skin every 14 (fourteen) days., Disp: 2 each, Rfl: 5    finasteride (PROSCAR) 5 mg tablet, TAKE 1 TABLET BY MOUTH EVERY DAY, Disp: 90 tablet, Rfl: 3    FLUAD QUAD 2022-23,65Y UP,,PF, 60 mcg (15 mcg x 4)/0.5 mL Syrg, , Disp: , Rfl:     fluticasone-salmeterol diskus inhaler 250-50 mcg, Inhale 1 puff into the lungs 2 (two) times daily. Controller, Disp: 60 each, Rfl: 11    latanoprost 0.005 % ophthalmic solution, Place 1 drop into both eyes every evening., Disp: 2 mL, Rfl: 11    lisinopriL (PRINIVIL,ZESTRIL) 20 MG tablet, Take 1 tablet (20 mg total) by mouth once daily., Disp: 90 tablet, Rfl: 1    rivaroxaban (XARELTO) 2.5 mg Tab, Take 1 tablet (2.5 mg total) by mouth 2 (two) times daily with meals., Disp: 60 tablet, Rfl: 5    tamsulosin (FLOMAX) 0.4 mg Cap, TAKE 1 CAPSULE BY MOUTH EVERY DAY, Disp: 90 capsule, Rfl: 3    Review of patient's allergies  indicates:  No Known Allergies    Smoking history:   Social History     Tobacco Use   Smoking Status Former    Current packs/day: 0.00    Average packs/day: 2.0 packs/day for 25.6 years (51.2 ttl pk-yrs)    Types: Cigarettes    Start date:     Quit date: 1985    Years since quittin.1   Smokeless Tobacco Never       Pulse: (!) 57  SpO2: 98 %        Resting Aleksandra Dyspnea Rating : 1      Current Oxygen Use: No     ACT Questionnaire:  Asthma Control Test  In the past 4  weeks, how much of the time did your asthma keep you from getting as much done at work, school or at home?: None of the time  During the past 4 weeks, how often have you had shortness of breath?: Not at all  During the past 4 weeks, how often did your asthma symptoms (wheezing, couging, shortness of breath, chest tightness or pain) wake you up at night or earlier than usual in the morning?: Not at all  During the past 4 weeks, how often have you used your rescue inhaler or nebulizer medication (such as albuterol)?: Not at all  How would you rate your asthma control during the past 4 weeks?: Well controlled  If your score is 19 or less, your asthma may not be under control: 24       PFT Results:  Pre FVC   Date/Time Value Ref Range Status   10/05/2023 01:50 PM 2.19 1.92 - 3.57 L Final     Pre FEV1   Date/Time Value Ref Range Status   10/05/2023 01:50 PM 1.49 1.36 - 2.79 L Final     Pre FEV1 FVC   Date/Time Value Ref Range Status   10/05/2023 01:50 PM 68.03 61.65 - 90.61 % Final     Pre TLC   Date/Time Value Ref Range Status   2017 04:08 PM 4.38 (L) 4.76 - 5.75 L Final     Pre DLCO   Date/Time Value Ref Range Status   2017 04:08 PM 17.14 14.54 - 22.83 ml/mmHg/min Final          Patient Concerns or Expectations:   BREO not covered by insurance. Changed to  [place] or equivalent. Will cost patient $4.      Therapist Comments:   Patient was seen today to review respiratory medication purpose and proper technique for use of inhalers.  Patient practiced proper technique using MDI with valved holding chamber (spacer) and DPI inhalers. Patient demonstrated understanding. Literature was given to patient.     Asthma trigger checklist was verbally reviewed and literature given to patient.     Infection prevention was discussed. Patient was reminded to get influenza vaccine. Hand hygiene and cleaning of respiratory equipment was also discussed. Patient verbalized understanding.      COPD action plan was reviewed and literature was given to patient. Patient verbalized understanding.          Plan:  Monthly Pulmonary Disease Management Questionnaire  Follow-up PDM appointment scheduled for 4/10/24   Reinforce education  Meds: ICS/LABA  DME Needs: na  Action Plan  Immunization: Pneumococcal- current, Flu-due  Next Provider Visit: 1/4/24  Next Spirometry/CPFT: not scheduled  Approximate time spent with patient: 35 minutes

## 2023-10-26 NOTE — PROGRESS NOTES
Assessment /Plan     For exam results, see Encounter Report.    Nuclear sclerosis of right eye- Visually Significant Cataract OD > OS  Patient reports decreased vision consistent with the clinical amount of lenticular opacity, which reaches the level of visual significance and affects activities of daily living including reading and glare. Risks, benefits, and alternatives to cataract surgery were discussed.  Discussion of risks included possibility of infection as well as permanent vision loss.The pt expressed a desire to proceed with surgery with the potential for some reasonable degree of visual improvement. Recommended regular use of artificial tears and good lid hygiene to optimize surgical outcome.     Discussed IOL options and refractive outcomes for this patient.    [place] right eye with DESHAUN goniotomy,   Topical  Will aim for Monofocal - Distance IOL    Intraop Kenalog 40:  Dense lens settings    Post op gtts:   PF      Discussed that vision may be limited by:  Glaucoma and VMT    Dilation: Poor  Alpha Blockers: Flomax    SS record completed, IOL master reviewed, external referral completed.   Referral to [facility] for Ophthalmic [facility]  Prescriptions sent for preoperative medications  Explained that patient may need glasses after surgery.    RTC for POD#1      Nuclear sclerosis of left eye - Follow    Primary open angle glaucoma (POAG) of right eye, severe stage    Primary open angle glaucoma (POAG) of left eye, mild stage

## 2023-11-02 ENCOUNTER — DOCUMENTATION ONLY (OUTPATIENT)
Dept: OPHTHALMOLOGY | Facility: CLINIC | Age: 78
End: 2023-11-02

## 2023-11-02 ENCOUNTER — OFFICE VISIT (OUTPATIENT)
Dept: OPHTHALMOLOGY | Facility: CLINIC | Age: 78
End: 2023-11-02
Payer: MEDICARE

## 2023-11-02 DIAGNOSIS — H25.11 NUCLEAR SCLEROSIS OF RIGHT EYE: Primary | ICD-10-CM

## 2023-11-02 DIAGNOSIS — H02.834 DERMATOCHALASIS OF BOTH UPPER EYELIDS: ICD-10-CM

## 2023-11-02 DIAGNOSIS — H25.12 NUCLEAR SCLEROSIS OF LEFT EYE: ICD-10-CM

## 2023-11-02 DIAGNOSIS — H40.1113 PRIMARY OPEN ANGLE GLAUCOMA (POAG) OF RIGHT EYE, SEVERE STAGE: ICD-10-CM

## 2023-11-02 DIAGNOSIS — H02.831 DERMATOCHALASIS OF BOTH UPPER EYELIDS: ICD-10-CM

## 2023-11-02 DIAGNOSIS — H43.821 VITREOMACULAR ADHESION OF RIGHT EYE: ICD-10-CM

## 2023-11-02 DIAGNOSIS — H40.1121 PRIMARY OPEN ANGLE GLAUCOMA (POAG) OF LEFT EYE, MILD STAGE: ICD-10-CM

## 2023-11-02 DIAGNOSIS — H35.372 EPIRETINAL MEMBRANE (ERM) OF LEFT EYE: ICD-10-CM

## 2023-11-02 PROCEDURE — 1160F RVW MEDS BY RX/DR IN RCRD: CPT | Mod: HCNC,CPTII,S$GLB, | Performed by: STUDENT IN AN ORGANIZED HEALTH CARE EDUCATION/TRAINING PROGRAM

## 2023-11-02 PROCEDURE — 99999 PR PBB SHADOW E&M-EST. PATIENT-LVL III: ICD-10-PCS | Mod: PBBFAC,HCNC,, | Performed by: STUDENT IN AN ORGANIZED HEALTH CARE EDUCATION/TRAINING PROGRAM

## 2023-11-02 PROCEDURE — 1160F PR REVIEW ALL MEDS BY PRESCRIBER/CLIN PHARMACIST DOCUMENTED: ICD-10-PCS | Mod: HCNC,CPTII,S$GLB, | Performed by: STUDENT IN AN ORGANIZED HEALTH CARE EDUCATION/TRAINING PROGRAM

## 2023-11-02 PROCEDURE — 1159F MED LIST DOCD IN RCRD: CPT | Mod: HCNC,CPTII,S$GLB, | Performed by: STUDENT IN AN ORGANIZED HEALTH CARE EDUCATION/TRAINING PROGRAM

## 2023-11-02 PROCEDURE — 99204 PR OFFICE/OUTPT VISIT, NEW, LEVL IV, 45-59 MIN: ICD-10-PCS | Mod: HCNC,S$GLB,, | Performed by: STUDENT IN AN ORGANIZED HEALTH CARE EDUCATION/TRAINING PROGRAM

## 2023-11-02 PROCEDURE — 99999 PR PBB SHADOW E&M-EST. PATIENT-LVL III: CPT | Mod: PBBFAC,HCNC,, | Performed by: STUDENT IN AN ORGANIZED HEALTH CARE EDUCATION/TRAINING PROGRAM

## 2023-11-02 PROCEDURE — 92136 IOL MASTER - OD - RIGHT EYE: ICD-10-PCS | Mod: HCNC,RT,S$GLB, | Performed by: STUDENT IN AN ORGANIZED HEALTH CARE EDUCATION/TRAINING PROGRAM

## 2023-11-02 PROCEDURE — 92136 OPHTHALMIC BIOMETRY: CPT | Mod: HCNC,RT,S$GLB, | Performed by: STUDENT IN AN ORGANIZED HEALTH CARE EDUCATION/TRAINING PROGRAM

## 2023-11-02 PROCEDURE — 1159F PR MEDICATION LIST DOCUMENTED IN MEDICAL RECORD: ICD-10-PCS | Mod: HCNC,CPTII,S$GLB, | Performed by: STUDENT IN AN ORGANIZED HEALTH CARE EDUCATION/TRAINING PROGRAM

## 2023-11-02 PROCEDURE — 99204 OFFICE O/P NEW MOD 45 MIN: CPT | Mod: HCNC,S$GLB,, | Performed by: STUDENT IN AN ORGANIZED HEALTH CARE EDUCATION/TRAINING PROGRAM

## 2023-11-02 RX ORDER — PREDNISOLONE ACETATE 10 MG/ML
1 SUSPENSION/ DROPS OPHTHALMIC 4 TIMES DAILY
Qty: 5 ML | Refills: 1 | Status: SHIPPED | OUTPATIENT
Start: 2023-11-02

## 2023-11-02 NOTE — PROGRESS NOTES
Short Stay Record    Diagnosis: Nuclear Sclerotic Cataract right and Primary Open Angle Glaucoma, severe right    CC: Blurry Vision and uncontrolled intraocular pressure of the right eye    HPI:  Alex Milenr is a 78 y.o. male who presents for evaluation prior to ophthalmic surgery. No current complaints.     Past Medical History:   Diagnosis Date    Atherosclerosis of coronary artery 8/10/2022    Atherosclerotic PVD with intermittent claudication 8/16/2019    Atrial fibrillation     pt unaware of this    Back pain     Bilateral inguinal hernia     CT ABdomen/pelvis 3/9/2015 (care everywhere)---Bilateral inguinal hernias containing fat.      Calcified granuloma of lung 10/3/2017    CT CHest 3/26/2018---There are calcified lymph nodes in the mediastinum and left hilum.    CT chest 9/11/ 2017 Calcified left hilar and subcarinal granulomas are noted  ;Calcified granuloma noted within the posterior segment of the right hepatic lobe.    Diverticulosis     Diverticulosis 10/28/13    Colonoscopy     ED (erectile dysfunction)     Elevated PSA     Granuloma of liver     ct chest 3/26/2018---There is a small calcified granuloma posteriorly in the right lobe of the liver    Hyperlipidemia     Hypertension     Liver mass 3/13/2020    New lesion compared to CT chest dating back to 2018. New liver lesion, dome of liver seen on CT 3/12/2020 and MRI abdomen 3/12/2020.  3/13/2020 referral to Oncology  PET CT   Resolved in 2020    Moderate persistent asthma without complication 9/26/2017    Obesity     Personal history of colonic polyps 2013    Pulmonary heart disease 8/10/2022    Tobacco dependence     resolved    Uncomplicated alcohol dependence 11/27/2019    Urinary tract infection      Past Surgical History:   Procedure Laterality Date    AORTOGRAPHY WITH SERIALOGRAPHY N/A 10/21/2019    Procedure: AORTOGRAM, WITH RUNOFF;  Surgeon: Chencho Ross MD;  Location: [facility] CATH LAB;  Service: Cardiology;  Laterality: N/A;  pt  requesting 9am arrival    AORTOGRAPHY WITH SERIALOGRAPHY N/A 2019    Procedure: AORTOGRAM, WITH RUNOFF;  Surgeon: Chencho Ross MD;  Location: [facility] CATH LAB;  Service: Cardiology;  Laterality: N/A;    COLONOSCOPY N/A 2022    Procedure: COLONOSCOPY;  Surgeon: Florinda Lindsey DO;  Location: [facility] ENDO;  Service: General;  Laterality: N/A;    HERNIA REPAIR      x2    PROSTATE BIOPSY      reported per patient around  or  which was reportedly negative     Social History     Tobacco Use    Smoking status: Former     Current packs/day: 0.00     Average packs/day: 2.0 packs/day for 25.6 years (51.2 ttl pk-yrs)     Types: Cigarettes     Start date:      Quit date: 1985     Years since quittin.2    Smokeless tobacco: Never   Substance Use Topics    Alcohol use: Yes     Alcohol/week: 12.0 standard drinks of alcohol     Types: 12 Cans of beer per week     Family History   Problem Relation Age of Onset    Cancer Mother         Unknown primary    Cancer Father     Cancer Brother         prostate     Prostate cancer Brother     Cancer Sister 64        pancreatic     Diabetes Neg Hx     Heart disease Neg Hx     Kidney disease Neg Hx     Stroke Neg Hx     Hyperlipidemia Neg Hx     Hypertension Neg Hx      Review of patient's allergies indicates:  No Known Allergies      Current Outpatient Medications:     albuterol (PROVENTIL/VENTOLIN HFA) 90 mcg/actuation inhaler, INHALE 2 PUFFS INTO THE LUNGS EVERY 6 (SIX) HOURS AS NEEDED FOR WHEEZING OR SHORTNESS OF BREATH, Disp: 18 g, Rfl: 11    amLODIPine (NORVASC) 10 MG tablet, Take 1 tablet (10 mg total) by mouth once daily., Disp: 30 tablet, Rfl: 11    aspirin (ECOTRIN) 81 MG EC tablet, TAKE 1 TABLET BY MOUTH EVERY DAY, Disp: 90 tablet, Rfl: 2    atorvastatin (LIPITOR) 80 MG tablet, Take 1 tablet (80 mg total) by mouth once daily., Disp: 90 tablet, Rfl: 3    brimonidine 0.15 % OPTH DROP (ALPHAGAN) 0.15 % ophthalmic solution, Place 1 drop into both eyes 2  (two) times a day., Disp: 15 mL, Rfl: 4    dorzolamide (TRUSOPT) 2 % ophthalmic solution, INSTILL 1 DROP INTO BOTH EYES 2 TIMES A DAY, Disp: 30 mL, Rfl: 4    evolocumab (REPATHA SURECLICK) 140 mg/mL PnIj, Inject 1 mL (140 mg total) into the skin every 14 (fourteen) days., Disp: 2 each, Rfl: 5    finasteride (PROSCAR) 5 mg tablet, TAKE 1 TABLET BY MOUTH EVERY DAY, Disp: 90 tablet, Rfl: 3    FLUAD QUAD 2022-23,65Y UP,,PF, 60 mcg (15 mcg x 4)/0.5 mL Syrg, , Disp: , Rfl:     fluticasone-salmeterol diskus inhaler 250-50 mcg, Inhale 1 puff into the lungs 2 (two) times daily. Controller, Disp: 60 each, Rfl: 11    latanoprost 0.005 % ophthalmic solution, Place 1 drop into both eyes every evening., Disp: 2 mL, Rfl: 11    lisinopriL (PRINIVIL,ZESTRIL) 20 MG tablet, Take 1 tablet (20 mg total) by mouth once daily., Disp: 90 tablet, Rfl: 1    prednisoLONE acetate (PRED FORTE) 1 % DrpS, Place 1 drop into the right eye 4 (four) times daily., Disp: 5 mL, Rfl: 1    rivaroxaban (XARELTO) 2.5 mg Tab, Take 1 tablet (2.5 mg total) by mouth 2 (two) times daily with meals., Disp: 60 tablet, Rfl: 5    tamsulosin (FLOMAX) 0.4 mg Cap, TAKE 1 CAPSULE BY MOUTH EVERY DAY, Disp: 90 capsule, Rfl: 3    Review of Systems:  10 Pt ROS negative except as stated in HPI    Physical Exam:  General Appearance:    A&Ox3, no distress, appears stated age   Head:    Normocephalic, without obvious abnormality, atraumatic   Eyes:    PERRL, EOM's intact   Back:     Symmetric, no curvature   Lungs:     respirations unlabored   Chest Wall:    No tenderness or deformity    Heart:  Abdomen:  Extremities:  Skin:    S1 and S2 present    Soft, non-tender    Extremities normal, atraumatic    Skin color, texture, turgor normal     Patient is stable for ophthalmic surgery under local and MAC.       _

## 2023-11-08 ENCOUNTER — OFFICE VISIT (OUTPATIENT)
Dept: INTERNAL MEDICINE | Facility: CLINIC | Age: 78
End: 2023-11-08
Payer: MEDICARE

## 2023-11-08 VITALS
HEART RATE: 57 BPM | WEIGHT: 174.19 LBS | HEIGHT: 65 IN | OXYGEN SATURATION: 99 % | BODY MASS INDEX: 29.02 KG/M2 | DIASTOLIC BLOOD PRESSURE: 78 MMHG | RESPIRATION RATE: 16 BRPM | SYSTOLIC BLOOD PRESSURE: 144 MMHG

## 2023-11-08 DIAGNOSIS — R91.8 MULTIPLE PULMONARY NODULES: ICD-10-CM

## 2023-11-08 DIAGNOSIS — Z00.00 ENCOUNTER FOR MEDICARE ANNUAL WELLNESS EXAM: Primary | ICD-10-CM

## 2023-11-08 DIAGNOSIS — I65.29 STENOSIS OF CAROTID ARTERY, UNSPECIFIED LATERALITY: ICD-10-CM

## 2023-11-08 DIAGNOSIS — I27.9 PULMONARY HEART DISEASE: ICD-10-CM

## 2023-11-08 DIAGNOSIS — I70.0 AORTIC ATHEROSCLEROSIS: ICD-10-CM

## 2023-11-08 DIAGNOSIS — J45.20 ASTHMA, MILD INTERMITTENT, WELL-CONTROLLED: ICD-10-CM

## 2023-11-08 DIAGNOSIS — Z86.010 HISTORY OF COLON POLYPS: ICD-10-CM

## 2023-11-08 DIAGNOSIS — I25.10 ATHEROSCLEROSIS OF CORONARY ARTERY, UNSPECIFIED VESSEL OR LESION TYPE, UNSPECIFIED WHETHER ANGINA PRESENT, UNSPECIFIED WHETHER NATIVE OR TRANSPLANTED HEART: ICD-10-CM

## 2023-11-08 DIAGNOSIS — I70.219 ATHEROSCLEROTIC PVD WITH INTERMITTENT CLAUDICATION: ICD-10-CM

## 2023-11-08 DIAGNOSIS — E78.2 MIXED HYPERLIPIDEMIA: ICD-10-CM

## 2023-11-08 DIAGNOSIS — J47.9 BRONCHIECTASIS, UNCOMPLICATED: ICD-10-CM

## 2023-11-08 DIAGNOSIS — R97.20 ELEVATED PSA: ICD-10-CM

## 2023-11-08 DIAGNOSIS — N52.9 ERECTILE DYSFUNCTION, UNSPECIFIED ERECTILE DYSFUNCTION TYPE: ICD-10-CM

## 2023-11-08 DIAGNOSIS — N40.0 BENIGN PROSTATIC HYPERPLASIA, UNSPECIFIED WHETHER LOWER URINARY TRACT SYMPTOMS PRESENT: ICD-10-CM

## 2023-11-08 DIAGNOSIS — I10 PRIMARY HYPERTENSION: Chronic | ICD-10-CM

## 2023-11-08 PROBLEM — F10.20 ALCOHOL DEPENDENCE, DAILY USE: Chronic | Status: RESOLVED | Noted: 2019-11-27 | Resolved: 2023-11-08

## 2023-11-08 PROCEDURE — 3077F PR MOST RECENT SYSTOLIC BLOOD PRESSURE >= 140 MM HG: ICD-10-PCS | Mod: HCNC,CPTII,S$GLB, | Performed by: NURSE PRACTITIONER

## 2023-11-08 PROCEDURE — 99999 PR PBB SHADOW E&M-EST. PATIENT-LVL V: CPT | Mod: PBBFAC,HCNC,, | Performed by: NURSE PRACTITIONER

## 2023-11-08 PROCEDURE — 3077F SYST BP >= 140 MM HG: CPT | Mod: HCNC,CPTII,S$GLB, | Performed by: NURSE PRACTITIONER

## 2023-11-08 PROCEDURE — 1159F MED LIST DOCD IN RCRD: CPT | Mod: HCNC,CPTII,S$GLB, | Performed by: NURSE PRACTITIONER

## 2023-11-08 PROCEDURE — 1101F PT FALLS ASSESS-DOCD LE1/YR: CPT | Mod: HCNC,CPTII,S$GLB, | Performed by: NURSE PRACTITIONER

## 2023-11-08 PROCEDURE — 1170F PR FUNCTIONAL STATUS ASSESSED: ICD-10-PCS | Mod: HCNC,CPTII,S$GLB, | Performed by: NURSE PRACTITIONER

## 2023-11-08 PROCEDURE — 3078F DIAST BP <80 MM HG: CPT | Mod: HCNC,CPTII,S$GLB, | Performed by: NURSE PRACTITIONER

## 2023-11-08 PROCEDURE — 3288F FALL RISK ASSESSMENT DOCD: CPT | Mod: HCNC,CPTII,S$GLB, | Performed by: NURSE PRACTITIONER

## 2023-11-08 PROCEDURE — 1101F PR PT FALLS ASSESS DOC 0-1 FALLS W/OUT INJ PAST YR: ICD-10-PCS | Mod: HCNC,CPTII,S$GLB, | Performed by: NURSE PRACTITIONER

## 2023-11-08 PROCEDURE — 1160F RVW MEDS BY RX/DR IN RCRD: CPT | Mod: HCNC,CPTII,S$GLB, | Performed by: NURSE PRACTITIONER

## 2023-11-08 PROCEDURE — 99999 PR PBB SHADOW E&M-EST. PATIENT-LVL V: ICD-10-PCS | Mod: PBBFAC,HCNC,, | Performed by: NURSE PRACTITIONER

## 2023-11-08 PROCEDURE — 1126F PR PAIN SEVERITY QUANTIFIED, NO PAIN PRESENT: ICD-10-PCS | Mod: HCNC,CPTII,S$GLB, | Performed by: NURSE PRACTITIONER

## 2023-11-08 PROCEDURE — G0439 PR MEDICARE ANNUAL WELLNESS SUBSEQUENT VISIT: ICD-10-PCS | Mod: HCNC,S$GLB,, | Performed by: NURSE PRACTITIONER

## 2023-11-08 PROCEDURE — 3288F PR FALLS RISK ASSESSMENT DOCUMENTED: ICD-10-PCS | Mod: HCNC,CPTII,S$GLB, | Performed by: NURSE PRACTITIONER

## 2023-11-08 PROCEDURE — 3078F PR MOST RECENT DIASTOLIC BLOOD PRESSURE < 80 MM HG: ICD-10-PCS | Mod: HCNC,CPTII,S$GLB, | Performed by: NURSE PRACTITIONER

## 2023-11-08 PROCEDURE — 1159F PR MEDICATION LIST DOCUMENTED IN MEDICAL RECORD: ICD-10-PCS | Mod: HCNC,CPTII,S$GLB, | Performed by: NURSE PRACTITIONER

## 2023-11-08 PROCEDURE — 1170F FXNL STATUS ASSESSED: CPT | Mod: HCNC,CPTII,S$GLB, | Performed by: NURSE PRACTITIONER

## 2023-11-08 PROCEDURE — G0439 PPPS, SUBSEQ VISIT: HCPCS | Mod: HCNC,S$GLB,, | Performed by: NURSE PRACTITIONER

## 2023-11-08 PROCEDURE — 1160F PR REVIEW ALL MEDS BY PRESCRIBER/CLIN PHARMACIST DOCUMENTED: ICD-10-PCS | Mod: HCNC,CPTII,S$GLB, | Performed by: NURSE PRACTITIONER

## 2023-11-08 PROCEDURE — 1126F AMNT PAIN NOTED NONE PRSNT: CPT | Mod: HCNC,CPTII,S$GLB, | Performed by: NURSE PRACTITIONER

## 2023-11-08 NOTE — Clinical Note
Good morning,   Mr. Milner is overdue for a follow up appt with Dr. Copeland. Can your team please assist with scheduling him?  Thank you,   MONA Rader

## 2023-11-08 NOTE — PROGRESS NOTES
"  Alex Milner presented for a Medicare AWV today. The following components were reviewed and updated:    Medical history  Family History  Social history  Allergies and Current Medications  Health Risk Assessment  Health Maintenance  Care Team    **See Completed Assessments for Annual Wellness visit with in the encounter summary    The following assessments were completed:  Depression Screening  Cognitive function Screening  Timed Get Up Test  Whisper Test          Vitals:    11/08/23 1117   BP: (!) 144/78   Pulse: (!) 57   Resp: 16   SpO2: 99%   Weight: 79 kg (174 lb 2.6 oz)   Height: 5' 5" (1.651 m)     Body mass index is 28.98 kg/m².   ]    Physical Exam  Vitals reviewed.   Constitutional:       Appearance: Normal appearance.   HENT:      Head: Normocephalic and atraumatic.   Cardiovascular:      Rate and Rhythm: Normal rate and regular rhythm.      Pulses: Normal pulses.      Heart sounds: Normal heart sounds.   Pulmonary:      Effort: Pulmonary effort is normal.      Breath sounds: Normal breath sounds.   Abdominal:      General: Bowel sounds are normal.      Palpations: Abdomen is soft.   Musculoskeletal:         General: Normal range of motion.   Skin:     General: Skin is warm and dry.      Capillary Refill: Capillary refill takes less than 2 seconds.   Neurological:      General: No focal deficit present.      Mental Status: He is alert and oriented to person, place, and time.   Psychiatric:         Mood and Affect: Mood normal.         Behavior: Behavior normal.         Thought Content: Thought content normal.         Judgment: Judgment normal.         Diagnoses and health risks identified today and associated recommendations/orders:  1. Encounter for Medicare annual wellness exam  Reviewed and discussed preventive health screenings and vaccinations with the patient.   Offered flu vaccine today - patient declined but states he will consider in near future.   ** Due for annual with PCP - appointment " scheduled 11/30/2023   ** Due for f/u with Urologist - will message staff to assist with scheduling     2. Stenosis of carotid artery, unspecified laterality  Carotid ultrasound 11/2020 with 20-39% stenosis of left carotid. On ASA, Xarelto, statin / Stable. Continue current treatment plan as previously prescribed with your Cardiologist.     3. Atherosclerosis of coronary artery, unspecified vessel or lesion type, unspecified whether angina present, unspecified whether native or transplanted heart  On ASA, Statin, and Repatha. Stable. Continue current treatment plan as previously prescribed with your Cardiologist.     4. Atherosclerotic PVD with intermittent claudication  ABIs 8/2022 with severe PAD of BLE.   He is on ASA, statin and chronic anticoagulation with low-dose Xarelto.  Stable. Continue current treatment plan as previously prescribed with your Cardiologist.      5. Aortic atherosclerosis  Stable. Continue current treatment plan as previously prescribed with your Cardiologist.     6. Pulmonary heart disease  Stable/ on Albuterol PRN and Advair. Continue current treatment plan as previously prescribed with your Pulmonologist and Cardiologist.     7. Bronchiectasis, uncomplicated  Stable/ on Albuterol PRN and Advair. Continue current treatment plan as previously prescribed with your Pulmonologist    8. Asthma, mild intermittent, well-controlled  Stable/ on Albuterol PRN and Advair. Continue current treatment plan as previously prescribed with your Pulmonologist    9. Multiple pulmonary nodules  Stable nodules noted on CT Chest 9/2022. Continue current treatment plan as previously prescribed with your Pulmonologist.     10. Primary hypertension  On Norvasc, Lisinopril. Did not take medications yet this morning - currently 144/78. Advised patient to monitor his blood pressure daily ~2 hours after medications and contact PCP and/or Cardiologist if consistently >140/90.     11. Mixed hyperlipidemia  On Lipitor  "and Repatha but not taking Lipitor consistently. With vascular disease, patient would benefit from lower LDL (<70) - encouraged him to start consistently taking Lipitor. Continue current treatment plan as previously prescribed with your Cardiologist and PCP     Lab Results   Component Value Date    CHOL 197 09/28/2023    CHOL 201 (H) 01/04/2022    CHOL 300 (H) 06/25/2021     Lab Results   Component Value Date    HDL 74 09/28/2023    HDL 45 01/04/2022    HDL 91 (H) 06/25/2021     Lab Results   Component Value Date    LDLCALC 100.0 09/28/2023    LDLCALC 134.2 01/04/2022    LDLCALC 188.2 (H) 06/25/2021     No results found for: "DLDL"  Lab Results   Component Value Date    TRIG 115 09/28/2023    TRIG 109 01/04/2022    TRIG 104 06/25/2021     Lab Results   Component Value Date    CHOLHDL 37.6 09/28/2023    CHOLHDL 22.4 01/04/2022    CHOLHDL 30.3 06/25/2021       12. Elevated PSA  MRI Prostate 6/2022: Impression:  1. BPH with no focal suspicious abnormality demonstrated.  2. Other observations as above  Overall Assessment:     PIRADS 2 (clinically significant cancer is unlikely to be present)    He is monitored by Urologist, Dr. Copeland. Last appt 1/2023 - he needs a follow up appointment - will message staff to assist with scheduling.     13. Benign prostatic hyperplasia, unspecified whether lower urinary tract symptoms present  Stable. Continue current treatment plan as previously prescribed with your Urologist - schedule f/u appointment with Dr. Copeland     14. Erectile dysfunction, unspecified erectile dysfunction type  Stable. Continue current treatment plan as previously prescribed with your Urologist - schedule f/u appt. With Dr. Copeland     15. History of colon polyps  Colonoscopy is UTD as of 12/2022. Recommended repeat in 5 yrs / Stable. Continue current treatment plan as previously prescribed with your PCP     Opioid screen: No Rx for Opioids     Substance abuse screen: No substance abuse per pt / chart. " Mr. Milner reports drinking 1-2 beers daily. No tobacco use. No recreational drug use.     Provided Alex with a 5-10 year written screening schedule and personal prevention plan. Recommendations were developed using the USPSTF age appropriate recommendations. Education, counseling, and referrals were provided as needed.  After Visit Summary printed and given to patient which includes a list of additional screenings\tests needed.    No follow-ups on file.      Kaley Ferrer NP    I offered to discuss advanced care planning, including how to pick a person who would make decisions for you if you were unable to make them for yourself, called a health care power of , and what kind of decisions you might make such as use of life sustaining treatments such as ventilators and tube feeding when faced with a life limiting illness recorded on a living will that they will need to know. (How you want to be cared for as you near the end of your natural life)     X  Patient has advanced directives written and agrees to provide copies to the institution.

## 2023-11-08 NOTE — PATIENT INSTRUCTIONS
Counseling and Referral of Other Preventative  (Italic type indicates deductible and co-insurance are waived)    Patient Name: Alex Milner  Today's Date: 11/8/2023    Health Maintenance       Date Due Completion Date    TETANUS VACCINE Never done ---    Shingles Vaccine (1 of 2) Never done ---    RSV Vaccine (Age 60+) (1 - 1-dose 60+ series) Never done ---    COVID-19 Vaccine (3 - 2023-24 season) 09/01/2023 2/23/2021    Influenza Vaccine (1) 06/30/2024 (Originally 9/1/2023) 10/13/2022    PROSTATE-SPECIFIC ANTIGEN 01/20/2024 1/20/2023    Override on 7/16/2013: Done    Override on 4/16/2013: Done    Lipid Panel 09/28/2024 9/28/2023    Aspirin/Antiplatelet Therapy 10/06/2024 10/6/2023    Colonoscopy 12/07/2027 12/7/2022        No orders of the defined types were placed in this encounter.      The following information is provided to all patients.  This information is to help you find resources for any of the problems found today that may be affecting your health:                Living healthy guide: www.[facility].[place].[facility]      Understanding Diabetes: www.diabetes.org      Eating healthy: www.cdc.gov/healthyweight      CDC home safety checklist: www.cdc.gov/steadi/patient.html      Agency on Aging: www.goea.[place].[facility]      Alcoholics anonymous (AA): www.aa.org      Physical Activity: www.raya.nih.gov/qg5nehg      Tobacco use: www.quitwithusla.org     Counseling and Referral of Other Preventative  (Italic type indicates deductible and co-insurance are waived)    Patient Name: Alex Milner  Today's Date: 11/8/2023    Health Maintenance       Date Due Completion Date    TETANUS VACCINE Never done ---    Shingles Vaccine (1 of 2) Never done ---    RSV Vaccine (Age 60+) (1 - 1-dose 60+ series) Never done ---    COVID-19 Vaccine (3 - 2023-24 season) 09/01/2023 2/23/2021    Influenza Vaccine (1) 06/30/2024 (Originally 9/1/2023) 10/13/2022    PROSTATE-SPECIFIC ANTIGEN 01/20/2024 1/20/2023    Override on 7/16/2013:  Done    Override on 4/16/2013: Done    Lipid Panel 09/28/2024 9/28/2023    Aspirin/Antiplatelet Therapy 10/06/2024 10/6/2023    Colonoscopy 12/07/2027 12/7/2022        No orders of the defined types were placed in this encounter.      The following information is provided to all patients.  This information is to help you find resources for any of the problems found today that may be affecting your health:                Living healthy guide: www.[facility].[place].[facility]      Understanding Diabetes: www.diabetes.org      Eating healthy: www.cdc.gov/healthyweight      [facility] home safety checklist: www.cdc.gov/steadi/patient.html      Agency on Aging: www.goea.[place]      Alcoholics anonymous (AA): www.aa.org      Physical Activity: www.raya.nih.gov/go7atqb      Tobacco use: www.quitwithusla.org

## 2023-11-16 ENCOUNTER — OUTSIDE PLACE OF SERVICE (OUTPATIENT)
Dept: OPHTHALMOLOGY | Facility: CLINIC | Age: 78
End: 2023-11-16
Payer: MEDICARE

## 2023-11-16 PROCEDURE — 66984 XCAPSL CTRC RMVL W/O ECP: CPT | Mod: 51,RT,, | Performed by: STUDENT IN AN ORGANIZED HEALTH CARE EDUCATION/TRAINING PROGRAM

## 2023-11-16 PROCEDURE — 65820 GONIOTOMY: CPT | Mod: RT,,, | Performed by: STUDENT IN AN ORGANIZED HEALTH CARE EDUCATION/TRAINING PROGRAM

## 2023-11-17 ENCOUNTER — OFFICE VISIT (OUTPATIENT)
Dept: OPHTHALMOLOGY | Facility: CLINIC | Age: 78
End: 2023-11-17
Payer: MEDICARE

## 2023-11-17 DIAGNOSIS — Z98.41 CATARACT EXTRACTION STATUS, RIGHT: ICD-10-CM

## 2023-11-17 DIAGNOSIS — Z98.890 POST-OPERATIVE STATE: Primary | ICD-10-CM

## 2023-11-17 PROCEDURE — 1159F MED LIST DOCD IN RCRD: CPT | Mod: HCNC,CPTII,S$GLB, | Performed by: STUDENT IN AN ORGANIZED HEALTH CARE EDUCATION/TRAINING PROGRAM

## 2023-11-17 PROCEDURE — 1159F PR MEDICATION LIST DOCUMENTED IN MEDICAL RECORD: ICD-10-PCS | Mod: HCNC,CPTII,S$GLB, | Performed by: STUDENT IN AN ORGANIZED HEALTH CARE EDUCATION/TRAINING PROGRAM

## 2023-11-17 PROCEDURE — 99999 PR PBB SHADOW E&M-EST. PATIENT-LVL III: CPT | Mod: PBBFAC,HCNC,, | Performed by: STUDENT IN AN ORGANIZED HEALTH CARE EDUCATION/TRAINING PROGRAM

## 2023-11-17 PROCEDURE — 99999 PR PBB SHADOW E&M-EST. PATIENT-LVL III: ICD-10-PCS | Mod: PBBFAC,HCNC,, | Performed by: STUDENT IN AN ORGANIZED HEALTH CARE EDUCATION/TRAINING PROGRAM

## 2023-11-17 PROCEDURE — 99024 POSTOP FOLLOW-UP VISIT: CPT | Mod: HCNC,S$GLB,, | Performed by: STUDENT IN AN ORGANIZED HEALTH CARE EDUCATION/TRAINING PROGRAM

## 2023-11-17 PROCEDURE — 1160F PR REVIEW ALL MEDS BY PRESCRIBER/CLIN PHARMACIST DOCUMENTED: ICD-10-PCS | Mod: HCNC,CPTII,S$GLB, | Performed by: STUDENT IN AN ORGANIZED HEALTH CARE EDUCATION/TRAINING PROGRAM

## 2023-11-17 PROCEDURE — 1160F RVW MEDS BY RX/DR IN RCRD: CPT | Mod: HCNC,CPTII,S$GLB, | Performed by: STUDENT IN AN ORGANIZED HEALTH CARE EDUCATION/TRAINING PROGRAM

## 2023-11-17 PROCEDURE — 99024 PR POST-OP FOLLOW-UP VISIT: ICD-10-PCS | Mod: HCNC,S$GLB,, | Performed by: STUDENT IN AN ORGANIZED HEALTH CARE EDUCATION/TRAINING PROGRAM

## 2023-11-17 NOTE — PROGRESS NOTES
HPI     Post-op Evaluation     Additional comments: S/p 1 day PCIOL OD  0/10 pain scale            Comments      1. PCIOL OD 11/16/23      Pred QID OD           Last edited by Girma Bedoya on 11/17/2023  8:57 AM.            Assessment /Plan     For exam results, see Encounter Report.    Post-operative state  Cataract extraction status, right- POD#1 S/P CEIOL OD Doing well. Mild edema    Continue gtts to operative eye:  PF QID      Reinstructed in importance of absolute compliance with Post-OP instructions including medications, shield at bedtime, protective glasses during the day, and limitation of activities. Follow up appointments in approximately one and six weeks or call immediately for increased pain, redness or vision loss.     RTC 1 week. MOCT if PH worse than 20/25

## 2023-11-20 RX ORDER — FINASTERIDE 5 MG/1
TABLET, FILM COATED ORAL
Qty: 90 TABLET | Refills: 3 | Status: SHIPPED | OUTPATIENT
Start: 2023-11-20 | End: 2023-12-28 | Stop reason: SDUPTHER

## 2023-11-21 ENCOUNTER — OFFICE VISIT (OUTPATIENT)
Dept: OPHTHALMOLOGY | Facility: CLINIC | Age: 78
End: 2023-11-21
Payer: MEDICARE

## 2023-11-21 DIAGNOSIS — H40.1113 PRIMARY OPEN ANGLE GLAUCOMA (POAG) OF RIGHT EYE, SEVERE STAGE: ICD-10-CM

## 2023-11-21 DIAGNOSIS — H25.12 NUCLEAR SCLEROSIS OF LEFT EYE: ICD-10-CM

## 2023-11-21 DIAGNOSIS — Z98.41 CATARACT EXTRACTION STATUS, RIGHT: ICD-10-CM

## 2023-11-21 DIAGNOSIS — H40.1121 PRIMARY OPEN ANGLE GLAUCOMA (POAG) OF LEFT EYE, MILD STAGE: ICD-10-CM

## 2023-11-21 DIAGNOSIS — Z98.890 POST-OPERATIVE STATE: Primary | ICD-10-CM

## 2023-11-21 PROCEDURE — 1160F RVW MEDS BY RX/DR IN RCRD: CPT | Mod: HCNC,CPTII,S$GLB, | Performed by: STUDENT IN AN ORGANIZED HEALTH CARE EDUCATION/TRAINING PROGRAM

## 2023-11-21 PROCEDURE — 99024 PR POST-OP FOLLOW-UP VISIT: ICD-10-PCS | Mod: HCNC,S$GLB,, | Performed by: STUDENT IN AN ORGANIZED HEALTH CARE EDUCATION/TRAINING PROGRAM

## 2023-11-21 PROCEDURE — 99024 POSTOP FOLLOW-UP VISIT: CPT | Mod: HCNC,S$GLB,, | Performed by: STUDENT IN AN ORGANIZED HEALTH CARE EDUCATION/TRAINING PROGRAM

## 2023-11-21 PROCEDURE — 99999 PR PBB SHADOW E&M-EST. PATIENT-LVL III: CPT | Mod: PBBFAC,HCNC,, | Performed by: STUDENT IN AN ORGANIZED HEALTH CARE EDUCATION/TRAINING PROGRAM

## 2023-11-21 PROCEDURE — 1159F MED LIST DOCD IN RCRD: CPT | Mod: HCNC,CPTII,S$GLB, | Performed by: STUDENT IN AN ORGANIZED HEALTH CARE EDUCATION/TRAINING PROGRAM

## 2023-11-21 PROCEDURE — 1160F PR REVIEW ALL MEDS BY PRESCRIBER/CLIN PHARMACIST DOCUMENTED: ICD-10-PCS | Mod: HCNC,CPTII,S$GLB, | Performed by: STUDENT IN AN ORGANIZED HEALTH CARE EDUCATION/TRAINING PROGRAM

## 2023-11-21 PROCEDURE — 1159F PR MEDICATION LIST DOCUMENTED IN MEDICAL RECORD: ICD-10-PCS | Mod: HCNC,CPTII,S$GLB, | Performed by: STUDENT IN AN ORGANIZED HEALTH CARE EDUCATION/TRAINING PROGRAM

## 2023-11-21 PROCEDURE — 99999 PR PBB SHADOW E&M-EST. PATIENT-LVL III: ICD-10-PCS | Mod: PBBFAC,HCNC,, | Performed by: STUDENT IN AN ORGANIZED HEALTH CARE EDUCATION/TRAINING PROGRAM

## 2023-11-21 NOTE — PROGRESS NOTES
HPI     Post-op Evaluation     Additional comments: Patient here today for POW#1 visit s/p CEIOL of the   right eye. Patient states vision is doing well and is using drops. Denies   any pain or discomfort.  No other ocular complaints           Last edited by Maritza Pritchett on 11/21/2023 11:20 AM.            Assessment /Plan     For exam results, see Encounter Report.    Post-operative state  Cataract extraction status, right- Impression/Plan  POW#1 S/P CEIOL OD : Doing well with no evidence of infection.     Trace Cell. PF Taper 4-3-2-1 then stop    Pt given and instructed in one week postop instructions. Can resume normal activitites and d/c eye shield. OTC reading glasses can be used until evaluated for final MR. Follow up in 3 months or PRN pain, redness, vision loss, or other concerns.      Nuclear sclerosis of left eye- Patient wishes to follow    Primary open angle glaucoma (POAG) of right eye, severe stage  Primary open angle glaucoma (POAG) of left eye, mild stage- IOP doing well OS after surgery and off drops   Lon continue monitoring IOP       Return to clinic in 3M DFE W/ DNL to resume care or PRN

## 2023-11-30 ENCOUNTER — LAB VISIT (OUTPATIENT)
Dept: LAB | Facility: HOSPITAL | Age: 78
End: 2023-11-30
Attending: INTERNAL MEDICINE
Payer: MEDICARE

## 2023-11-30 ENCOUNTER — OFFICE VISIT (OUTPATIENT)
Dept: INTERNAL MEDICINE | Facility: CLINIC | Age: 78
End: 2023-11-30
Payer: MEDICARE

## 2023-11-30 VITALS
TEMPERATURE: 97 F | BODY MASS INDEX: 29.42 KG/M2 | HEART RATE: 60 BPM | DIASTOLIC BLOOD PRESSURE: 70 MMHG | WEIGHT: 176.81 LBS | OXYGEN SATURATION: 97 % | SYSTOLIC BLOOD PRESSURE: 130 MMHG

## 2023-11-30 DIAGNOSIS — Z00.00 ROUTINE GENERAL MEDICAL EXAMINATION AT HEALTH CARE FACILITY: Primary | ICD-10-CM

## 2023-11-30 DIAGNOSIS — I10 PRIMARY HYPERTENSION: Chronic | ICD-10-CM

## 2023-11-30 DIAGNOSIS — I70.219 ATHEROSCLEROTIC PVD WITH INTERMITTENT CLAUDICATION: ICD-10-CM

## 2023-11-30 DIAGNOSIS — R97.20 ELEVATED PSA: ICD-10-CM

## 2023-11-30 DIAGNOSIS — N40.0 BENIGN PROSTATIC HYPERPLASIA, UNSPECIFIED WHETHER LOWER URINARY TRACT SYMPTOMS PRESENT: ICD-10-CM

## 2023-11-30 DIAGNOSIS — E78.2 MIXED HYPERLIPIDEMIA: ICD-10-CM

## 2023-11-30 DIAGNOSIS — Z23 NEED FOR VACCINATION: ICD-10-CM

## 2023-11-30 DIAGNOSIS — R91.8 MULTIPLE PULMONARY NODULES: ICD-10-CM

## 2023-11-30 PROCEDURE — 1101F PR PT FALLS ASSESS DOC 0-1 FALLS W/OUT INJ PAST YR: ICD-10-PCS | Mod: HCNC,CPTII,S$GLB, | Performed by: INTERNAL MEDICINE

## 2023-11-30 PROCEDURE — 90694 FLU VACCINE - QUADRIVALENT - ADJUVANTED: ICD-10-PCS | Mod: HCNC,S$GLB,, | Performed by: INTERNAL MEDICINE

## 2023-11-30 PROCEDURE — 84153 ASSAY OF PSA TOTAL: CPT | Mod: HCNC | Performed by: INTERNAL MEDICINE

## 2023-11-30 PROCEDURE — 1126F AMNT PAIN NOTED NONE PRSNT: CPT | Mod: HCNC,CPTII,S$GLB, | Performed by: INTERNAL MEDICINE

## 2023-11-30 PROCEDURE — 99397 PR PREVENTIVE VISIT,EST,65 & OVER: ICD-10-PCS | Mod: 25,HCNC,S$GLB, | Performed by: INTERNAL MEDICINE

## 2023-11-30 PROCEDURE — 3288F PR FALLS RISK ASSESSMENT DOCUMENTED: ICD-10-PCS | Mod: HCNC,CPTII,S$GLB, | Performed by: INTERNAL MEDICINE

## 2023-11-30 PROCEDURE — 3078F DIAST BP <80 MM HG: CPT | Mod: HCNC,CPTII,S$GLB, | Performed by: INTERNAL MEDICINE

## 2023-11-30 PROCEDURE — 1159F PR MEDICATION LIST DOCUMENTED IN MEDICAL RECORD: ICD-10-PCS | Mod: HCNC,CPTII,S$GLB, | Performed by: INTERNAL MEDICINE

## 2023-11-30 PROCEDURE — 3078F PR MOST RECENT DIASTOLIC BLOOD PRESSURE < 80 MM HG: ICD-10-PCS | Mod: HCNC,CPTII,S$GLB, | Performed by: INTERNAL MEDICINE

## 2023-11-30 PROCEDURE — 99999 PR PBB SHADOW E&M-EST. PATIENT-LVL IV: ICD-10-PCS | Mod: PBBFAC,HCNC,, | Performed by: INTERNAL MEDICINE

## 2023-11-30 PROCEDURE — 36415 COLL VENOUS BLD VENIPUNCTURE: CPT | Mod: HCNC,PO | Performed by: INTERNAL MEDICINE

## 2023-11-30 PROCEDURE — 90694 VACC AIIV4 NO PRSRV 0.5ML IM: CPT | Mod: HCNC,S$GLB,, | Performed by: INTERNAL MEDICINE

## 2023-11-30 PROCEDURE — 1160F PR REVIEW ALL MEDS BY PRESCRIBER/CLIN PHARMACIST DOCUMENTED: ICD-10-PCS | Mod: HCNC,CPTII,S$GLB, | Performed by: INTERNAL MEDICINE

## 2023-11-30 PROCEDURE — 99999 PR PBB SHADOW E&M-EST. PATIENT-LVL IV: CPT | Mod: PBBFAC,HCNC,, | Performed by: INTERNAL MEDICINE

## 2023-11-30 PROCEDURE — G0008 FLU VACCINE - QUADRIVALENT - ADJUVANTED: ICD-10-PCS | Mod: HCNC,S$GLB,, | Performed by: INTERNAL MEDICINE

## 2023-11-30 PROCEDURE — 99397 PER PM REEVAL EST PAT 65+ YR: CPT | Mod: 25,HCNC,S$GLB, | Performed by: INTERNAL MEDICINE

## 2023-11-30 PROCEDURE — 1126F PR PAIN SEVERITY QUANTIFIED, NO PAIN PRESENT: ICD-10-PCS | Mod: HCNC,CPTII,S$GLB, | Performed by: INTERNAL MEDICINE

## 2023-11-30 PROCEDURE — 1159F MED LIST DOCD IN RCRD: CPT | Mod: HCNC,CPTII,S$GLB, | Performed by: INTERNAL MEDICINE

## 2023-11-30 PROCEDURE — 3075F PR MOST RECENT SYSTOLIC BLOOD PRESS GE 130-139MM HG: ICD-10-PCS | Mod: HCNC,CPTII,S$GLB, | Performed by: INTERNAL MEDICINE

## 2023-11-30 PROCEDURE — G0008 ADMIN INFLUENZA VIRUS VAC: HCPCS | Mod: HCNC,S$GLB,, | Performed by: INTERNAL MEDICINE

## 2023-11-30 PROCEDURE — 1101F PT FALLS ASSESS-DOCD LE1/YR: CPT | Mod: HCNC,CPTII,S$GLB, | Performed by: INTERNAL MEDICINE

## 2023-11-30 PROCEDURE — 3288F FALL RISK ASSESSMENT DOCD: CPT | Mod: HCNC,CPTII,S$GLB, | Performed by: INTERNAL MEDICINE

## 2023-11-30 PROCEDURE — 3075F SYST BP GE 130 - 139MM HG: CPT | Mod: HCNC,CPTII,S$GLB, | Performed by: INTERNAL MEDICINE

## 2023-11-30 PROCEDURE — 1160F RVW MEDS BY RX/DR IN RCRD: CPT | Mod: HCNC,CPTII,S$GLB, | Performed by: INTERNAL MEDICINE

## 2023-11-30 NOTE — PROGRESS NOTES
Subjective:       Patient ID: Alex Milner is a 78 y.o. male.    Chief Complaint: Annual Exam      HPI:  Patient is an 78-year-old male presenting today for updated physical exam review of chronic health issues.  Patient has history of hypertension, hyperlipidemia, peripheral vascular disease, pulmonary nodules, BPH with elevated PSA.  Overall patient indicates he is doing well.  He notes no recent hospitalizations or ER visits.  He is noted no problems with his medications at this time.  He continues to follow with urology for his elevated PSA.  He is an appointment in December to follow-up with them.  He notes no changes in his symptoms.  He notes no chest pain or palpitations.  No shortness of breath.    Review of Systems   Constitutional:  Negative for fever and unexpected weight change.   HENT:  Negative for hearing loss, postnasal drip and rhinorrhea.    Eyes:  Negative for pain and visual disturbance.   Respiratory:  Negative for cough, shortness of breath and wheezing.    Cardiovascular:  Negative for chest pain and palpitations.   Gastrointestinal:  Negative for constipation, diarrhea, nausea and vomiting.   Genitourinary:  Negative for dysuria and hematuria.   Musculoskeletal:  Negative for arthralgias, back pain, myalgias and neck stiffness.   Skin:  Negative for pallor and rash.   Neurological:  Negative for seizures, syncope and headaches.   Hematological:  Negative for adenopathy.   Psychiatric/Behavioral:  Negative for dysphoric mood. The patient is not nervous/anxious.        Objective:   /70   Pulse 60   Temp 96.5 °F (35.8 °C)   Wt 80.2 kg (176 lb 12.9 oz)   SpO2 97%   BMI 29.42 kg/m²      Physical Exam  Vitals reviewed.   Constitutional:       General: He is not in acute distress.     Appearance: He is well-developed.   HENT:      Head: Normocephalic and atraumatic.      Right Ear: Tympanic membrane and ear canal normal.      Left Ear: Tympanic membrane and ear canal normal.    Eyes:      Pupils: Pupils are equal, round, and reactive to light.   Neck:      Thyroid: No thyromegaly.      Vascular: No JVD.   Cardiovascular:      Rate and Rhythm: Normal rate and regular rhythm.      Heart sounds: Normal heart sounds. No murmur heard.     No friction rub. No gallop.   Pulmonary:      Effort: Pulmonary effort is normal.      Breath sounds: Normal breath sounds. No wheezing or rales.   Abdominal:      General: Bowel sounds are normal. There is no distension.      Palpations: Abdomen is soft.      Tenderness: There is no abdominal tenderness. There is no guarding or rebound.   Musculoskeletal:         General: Normal range of motion.      Cervical back: Normal range of motion and neck supple.   Lymphadenopathy:      Cervical: No cervical adenopathy.   Skin:     General: Skin is warm and dry.      Findings: No rash.   Neurological:      General: No focal deficit present.      Mental Status: He is alert and oriented to person, place, and time.      Cranial Nerves: No cranial nerve deficit.      Deep Tendon Reflexes: Reflexes are normal and symmetric.   Psychiatric:         Mood and Affect: Mood normal.         Judgment: Judgment normal.             Assessment:       1. Routine general medical examination at health care facility    2. Primary hypertension    3. Mixed hyperlipidemia    4. Atherosclerotic PVD with intermittent claudication    5. Multiple pulmonary nodules    6. Benign prostatic hyperplasia, unspecified whether lower urinary tract symptoms present    7. Elevated PSA        Plan:   Multiple pulmonary nodules  CXr in 2023 shows no issues.  CT in 2022 no significant issues.    Hypertension  Blood pressure is under good control.  We will continue the current regimen.  Will work on regular aerobic exercise and a low salt diet.        Hyperlipidemia  Cholesterol numbers look good.  We will continue the current regimen at this time.  Remain focused on low fat diet and high dietary fiber  intake.    Continue working on low fat high fiber diet  Routine general medical examination at health care facility  Comments:  Focus on good health habits, low carbohydrate diet, regular exercise, seatbelt use, sunscreen use    Primary hypertension    Mixed hyperlipidemia    Atherosclerotic PVD with intermittent claudication    Multiple pulmonary nodules    Benign prostatic hyperplasia, unspecified whether lower urinary tract symptoms present  Comments:  Following with Dr. Hyde.  Has appt 12/28/23.  Orders:  -     PROSTATE SPECIFIC ANTIGEN, DIAGNOSTIC; Future; Expected date: 11/30/2023    Elevated PSA  Comments:  Will do PSA prior to visit with  in order to discuss lab at the visit.  Orders:  -     PROSTATE SPECIFIC ANTIGEN, DIAGNOSTIC; Future; Expected date: 11/30/2023          Follow up in about 6 months (around 5/30/2024) for HTN, HLP, with Hamida Laureano.

## 2023-11-30 NOTE — ASSESSMENT & PLAN NOTE
Cholesterol numbers look good.  We will continue the current regimen at this time.  Remain focused on low fat diet and high dietary fiber intake.    Continue working on low fat high fiber diet

## 2023-11-30 NOTE — PROGRESS NOTES
Vis given. Pt instructed to wait 15 min before leaving facility. Pt states understanding. Administered in right deltoid. Pt tolerated well. No complaints or concerns noted at this time

## 2023-12-01 LAB — COMPLEXED PSA SERPL-MCNC: 47.5 NG/ML (ref 0–4)

## 2023-12-08 ENCOUNTER — TELEPHONE (OUTPATIENT)
Dept: INTERNAL MEDICINE | Facility: CLINIC | Age: 78
End: 2023-12-08
Payer: MEDICARE

## 2023-12-13 ENCOUNTER — PATIENT OUTREACH (OUTPATIENT)
Dept: PULMONOLOGY | Facility: CLINIC | Age: 78
End: 2023-12-13
Payer: MEDICARE

## 2023-12-18 RX ORDER — TAMSULOSIN HYDROCHLORIDE 0.4 MG/1
CAPSULE ORAL
Qty: 90 CAPSULE | Refills: 3 | Status: SHIPPED | OUTPATIENT
Start: 2023-12-18 | End: 2023-12-28 | Stop reason: SDUPTHER

## 2023-12-28 ENCOUNTER — OFFICE VISIT (OUTPATIENT)
Dept: UROLOGY | Facility: CLINIC | Age: 78
End: 2023-12-28
Payer: MEDICARE

## 2023-12-28 VITALS — HEIGHT: 65 IN | BODY MASS INDEX: 29.32 KG/M2 | WEIGHT: 176 LBS

## 2023-12-28 DIAGNOSIS — N40.0 BENIGN PROSTATIC HYPERPLASIA, UNSPECIFIED WHETHER LOWER URINARY TRACT SYMPTOMS PRESENT: ICD-10-CM

## 2023-12-28 DIAGNOSIS — N52.9 ERECTILE DYSFUNCTION, UNSPECIFIED ERECTILE DYSFUNCTION TYPE: ICD-10-CM

## 2023-12-28 DIAGNOSIS — R97.20 ELEVATED PSA: Primary | ICD-10-CM

## 2023-12-28 PROCEDURE — 1101F PT FALLS ASSESS-DOCD LE1/YR: CPT | Mod: HCNC,CPTII,S$GLB, | Performed by: UROLOGY

## 2023-12-28 PROCEDURE — 3288F PR FALLS RISK ASSESSMENT DOCUMENTED: ICD-10-PCS | Mod: HCNC,CPTII,S$GLB, | Performed by: UROLOGY

## 2023-12-28 PROCEDURE — 99214 OFFICE O/P EST MOD 30 MIN: CPT | Mod: HCNC,S$GLB,, | Performed by: UROLOGY

## 2023-12-28 PROCEDURE — 1159F MED LIST DOCD IN RCRD: CPT | Mod: HCNC,CPTII,S$GLB, | Performed by: UROLOGY

## 2023-12-28 PROCEDURE — 99214 PR OFFICE/OUTPT VISIT, EST, LEVL IV, 30-39 MIN: ICD-10-PCS | Mod: HCNC,S$GLB,, | Performed by: UROLOGY

## 2023-12-28 PROCEDURE — 99999 PR PBB SHADOW E&M-EST. PATIENT-LVL III: CPT | Mod: PBBFAC,HCNC,, | Performed by: UROLOGY

## 2023-12-28 PROCEDURE — 99999 PR PBB SHADOW E&M-EST. PATIENT-LVL III: ICD-10-PCS | Mod: PBBFAC,HCNC,, | Performed by: UROLOGY

## 2023-12-28 PROCEDURE — 1159F PR MEDICATION LIST DOCUMENTED IN MEDICAL RECORD: ICD-10-PCS | Mod: HCNC,CPTII,S$GLB, | Performed by: UROLOGY

## 2023-12-28 PROCEDURE — 1101F PR PT FALLS ASSESS DOC 0-1 FALLS W/OUT INJ PAST YR: ICD-10-PCS | Mod: HCNC,CPTII,S$GLB, | Performed by: UROLOGY

## 2023-12-28 PROCEDURE — 3288F FALL RISK ASSESSMENT DOCD: CPT | Mod: HCNC,CPTII,S$GLB, | Performed by: UROLOGY

## 2023-12-28 RX ORDER — TAMSULOSIN HYDROCHLORIDE 0.4 MG/1
1 CAPSULE ORAL DAILY
Qty: 90 CAPSULE | Refills: 3 | Status: SHIPPED | OUTPATIENT
Start: 2023-12-28

## 2023-12-28 RX ORDER — SILDENAFIL 100 MG/1
100 TABLET, FILM COATED ORAL DAILY PRN
Qty: 45 TABLET | Refills: 5 | Status: SHIPPED | OUTPATIENT
Start: 2023-12-28

## 2023-12-28 RX ORDER — FINASTERIDE 5 MG/1
5 TABLET, FILM COATED ORAL DAILY
Qty: 90 TABLET | Refills: 3 | Status: SHIPPED | OUTPATIENT
Start: 2023-12-28

## 2023-12-28 NOTE — PROGRESS NOTES
Chief Complaint:   Encounter Diagnoses   Name Primary?    Elevated PSA Yes    Benign prostatic hyperplasia, unspecified whether lower urinary tract symptoms present     Erectile dysfunction, unspecified erectile dysfunction type        HPI:   12/28/23- voiding well on medical management, psa is back up again.  ED better with meds.    1/8/18: Returns after long absence.  PSA >30.  Says he had a biopsy in 2016 with another urologist in town off of [facility].  Second one he had.  MRI report from 5/17 reassuring with 98gm prostate.  Reduced stream not on flomax/finasteride.  No abd/pelvic pain and no exac/rel factors.  No hematuria.  No urolithiasis.  3-4 cups coffee in AM.  Nocturia x3-4.    Allergies:  Patient has no known allergies.    Medications:  has a current medication list which includes the following prescription(s): albuterol, amlodipine, aspirin, atorvastatin, brimonidine 0.15 % opth drop, dorzolamide, repatha sureclick, finasteride, fluad quad 2022-23(65y up)(pf), fluticasone-salmeterol 250-50 mcg/dose, latanoprost, lisinopril, prednisolone acetate, xarelto, and tamsulosin.    Review of Systems:  General: No fever, chills, fatigability, or weight loss.  Skin: No rashes, itching, or changes in color or texture of skin.  Chest: Denies THOMPSON, cyanosis, wheezing, cough, and sputum production.  Abdomen: Appetite fine. No weight loss. Denies diarrhea, abdominal pain, hematemesis, or blood in stool.  Musculoskeletal: No joint stiffness or swelling. Some back pain.  : As above.  All other review of systems negative.    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    PSH:   has a past surgical history that includes Hernia repair; Prostate biopsy; Aortography with serialography (N/A, 10/21/2019); Aortography with serialography (N/A, 11/14/2019); and Colonoscopy (N/A, 12/7/2022).    FamHx: family history includes Cancer in his brother, father, and mother; Cancer (age of onset: 64) in his sister; Prostate cancer in his brother.    SocHx:  reports that he quit smoking about 38 years ago. His smoking use included cigarettes. He started smoking about 64 years ago. He has a 51.2 pack-year smoking history. He has never used smokeless tobacco. He reports current alcohol use of about 12.0 standard drinks of alcohol per week. He reports that he does not use drugs.      Physical Exam:  There were no vitals filed for this visit.    General: A&Ox3, no apparent distress, no deformities  Neck: No masses, normal thyroid  Lungs: normal inspiration, no use of accessory muscles  Heart: normal pulse, no arrhythmias  Abdomen: Soft, NT, ND  Skin: The skin is warm and dry. No jaundice.  Ext: No c/c/e.    Labs/Studies:   pnbx negative 65g  3/8/21  Reported negative biopsy 2016 outside facility  PSA 47.5 11/23  PSA 36 1/23  PSA 27 7/22  PSA 30.1 4/22  PSA 32.6 1/22  PSA 41.2 9/21  PSA 21.9 1/21  PSA 19.3 7/20  MRI PI-RADS 2 6/22  MRI PI-RADS 4 2/21    Impression/Plan:      1. Elevated PSA- PSA continues to rise, previous negative MRI and biopsy, did have one abnormal MRI years ago.  Will repeat the MRI and if abnormal then schedule him for an MRI guided biopsy.  If stable have already scheduled him for a repeat PSA in 3 months.    2. BPH- tamsulosin and finasteride appear to be working well, a refill has been sent to his pharmacy.    3. Erectile dysfunction- sildenafil 100 mg works well for the patient, a refill has  been sent to his pharmacy.

## 2024-01-09 ENCOUNTER — LAB VISIT (OUTPATIENT)
Dept: LAB | Facility: HOSPITAL | Age: 79
End: 2024-01-09
Attending: UROLOGY
Payer: MEDICARE

## 2024-01-09 ENCOUNTER — CLINICAL SUPPORT (OUTPATIENT)
Dept: PULMONOLOGY | Facility: CLINIC | Age: 79
End: 2024-01-09
Payer: MEDICARE

## 2024-01-09 ENCOUNTER — OFFICE VISIT (OUTPATIENT)
Dept: PULMONOLOGY | Facility: CLINIC | Age: 79
End: 2024-01-09
Payer: MEDICARE

## 2024-01-09 VITALS
HEART RATE: 57 BPM | BODY MASS INDEX: 28.45 KG/M2 | DIASTOLIC BLOOD PRESSURE: 62 MMHG | WEIGHT: 177 LBS | RESPIRATION RATE: 18 BRPM | HEIGHT: 66 IN | OXYGEN SATURATION: 93 % | SYSTOLIC BLOOD PRESSURE: 128 MMHG

## 2024-01-09 DIAGNOSIS — R91.8 MULTIPLE PULMONARY NODULES: Primary | ICD-10-CM

## 2024-01-09 DIAGNOSIS — J45.20 ASTHMA, MILD INTERMITTENT, WELL-CONTROLLED: ICD-10-CM

## 2024-01-09 DIAGNOSIS — R97.20 ELEVATED PSA: ICD-10-CM

## 2024-01-09 LAB
CREAT SERPL-MCNC: 1.1 MG/DL (ref 0.5–1.4)
EST. GFR  (NO RACE VARIABLE): >60 ML/MIN/1.73 M^2

## 2024-01-09 PROCEDURE — 3078F DIAST BP <80 MM HG: CPT | Mod: HCNC,CPTII,S$GLB, | Performed by: HOSPITALIST

## 2024-01-09 PROCEDURE — 1160F RVW MEDS BY RX/DR IN RCRD: CPT | Mod: HCNC,CPTII,S$GLB, | Performed by: HOSPITALIST

## 2024-01-09 PROCEDURE — 99999 PR PBB SHADOW E&M-EST. PATIENT-LVL IV: CPT | Mod: PBBFAC,HCNC,, | Performed by: HOSPITALIST

## 2024-01-09 PROCEDURE — 99213 OFFICE O/P EST LOW 20 MIN: CPT | Mod: HCNC,25,S$GLB, | Performed by: HOSPITALIST

## 2024-01-09 PROCEDURE — 3074F SYST BP LT 130 MM HG: CPT | Mod: HCNC,CPTII,S$GLB, | Performed by: HOSPITALIST

## 2024-01-09 PROCEDURE — 1159F MED LIST DOCD IN RCRD: CPT | Mod: HCNC,CPTII,S$GLB, | Performed by: HOSPITALIST

## 2024-01-09 PROCEDURE — 95012 NITRIC OXIDE EXP GAS DETER: CPT | Mod: HCNC,S$GLB,, | Performed by: INTERNAL MEDICINE

## 2024-01-09 PROCEDURE — 3288F FALL RISK ASSESSMENT DOCD: CPT | Mod: HCNC,CPTII,S$GLB, | Performed by: HOSPITALIST

## 2024-01-09 PROCEDURE — 36415 COLL VENOUS BLD VENIPUNCTURE: CPT | Mod: HCNC | Performed by: UROLOGY

## 2024-01-09 PROCEDURE — 82565 ASSAY OF CREATININE: CPT | Mod: HCNC | Performed by: UROLOGY

## 2024-01-09 PROCEDURE — 1101F PT FALLS ASSESS-DOCD LE1/YR: CPT | Mod: HCNC,CPTII,S$GLB, | Performed by: HOSPITALIST

## 2024-01-09 NOTE — ASSESSMENT & PLAN NOTE
- continue ics-laba  - discussed optimal usage being BID, reviewed swishing mouth with water afterwards  - symptoms controlled, rarely/never using albuterol

## 2024-01-09 NOTE — PROCEDURES
Clinical Guide to Interpretation or FeNO Levels :    FeNO  (ppb) LOW INTERMEDIATE HIGH   ADULT VALUES < 25 25-50          > 50   Th2-driven Inflammation Unlikely Likely Significant     Patients FeNO level at this visit : 35____ (ppb)    Interpretation of FeNO measurement in adults:  [] FENO is less than 25 ppb implies non eosinophilic airway inflammation or the absence of airway inflammation.    Comment: Low FENO (<25 ppb) in adult asthmatics with persistent symptoms suggests other etiologies for these symptoms and a lower likelihood of benefit from adding or increasing inhaled glucocorticoids.    [x] FENO between 25 and 50 ppb in adults should be interpreted cautiously with reference to the clinical situation (eg, symptomatic, on or off therapy, current smoking).    [] FENO greater than 50 ppb in adults  suggests eosinophilic airway inflammation   Comment: High FENO (>50 ppb) in adult asthmatics even with atypical symptoms suggests glucocorticoid responsiveness. High FENO (>50 ppb) can help identify poor adherence or uncontrolled inflammation in asthma patients with otherwise seemingly controlled asthma.    Discussion:  A FENO less than 25 ppb in adults and less than 20 ppb in children younger than 12 years of age implies noneosinophilic airway inflammation or the absence of airway inflammation.  A FENO greater than 50 ppb in adults or greater than 35 ppb in children suggests eosinophilic airway inflammation.   Values of FENO between 25 and 50 ppb in adults (20 to 35 ppb in children) should be interpreted cautiously with reference to the clinical situation (eg, symptomatic, on or off therapy, current smoking).  A rising FENO with a greater than 20 percent change and more than 25 ppb (20 ppb in children) from a previously stable level suggests increasing eosinophilic airway inflammation, but there are wide inter-individual differences.  A decrease in FENO greater than 20 percent for values over 50 ppb or more than  10 ppb for values less than 50 ppb may be clinically important.  ?FENO in other respiratory diseases - Several other diseases are associated with altered levels of exhaled NO: low levels of FENO have been noted in cystic fibrosis, current smoking, pulmonary hypertension, hypothermia, primary ciliary dyskinesia, and bronchopulmonary dysplasia. Elevated FENO has been noted in atopy, nonasthmatic eosinophilic bronchitis, COPD exacerbations, noncystic fibrosis bronchiectasis, and viral upper respiratory infections.    REFERENCE:  ATS Board of Directors, December 2004, and by the ERS Executive Committee, June 2004. ATS/ERS Recommendations for Standardized Procedures for the Online and Offline Measurement of Exhaled Lower Respiratory Nitric Oxide and Nasal Nitric Oxide. Guideline 2005

## 2024-01-09 NOTE — PROGRESS NOTES
"  Subjective:      Patient ID: Alex Milner is a 78 y.o. male.    Chief Complaint: Asthma, Pulm nodules    HPI 1/9/2024:    78 year old male with history of HTN, HLD, PVD, asthma and pulmonary nodules (followed by Dr. Hernandez) who presents to clinic today for asthma follow up. He was last seen 10/2023 by Dr. Hernandez- at that time was poorly compliant to LABA-ICS, FeNO trending up with decreased FEV1 and FVC- plan was made for close follow up with FeNO.     Mr. Milner states that he is doing well, no complaints of feeling out of breath or wheezing, coughed a little yesterday with the weather change. No ED/UC visits since last seen. He has been trying to be more compliant with ics-laba.    Pertinent Work Up:  FeNO 1/9/24- 35ppb  FeNO 10/2023- 57ppb  [place] 10/2023- Mild obstruction with FEV1 82.4%, significant bronchodilator response  Pulmonary Nodules- stable 9/2022 since 3/2020    Pulmonary Interventions:  Albuterol HFA- never using  Advair 250- Using once per day, every now and then twice per day    Smoking hx: Quit 1985, 51 pack years    Review of Systems   Respiratory:  Positive for cough. Negative for sputum production, wheezing and dyspnea on extertion.      Objective:     Physical Exam   Constitutional: He is oriented to person, place, and time. He appears well-developed and well-nourished. He is obese.   Cardiovascular: Normal rate and regular rhythm.   Pulmonary/Chest: Normal expansion, effort normal and breath sounds normal.   Musculoskeletal:         General: No edema.   Neurological: He is alert and oriented to person, place, and time.   Skin: Skin is warm and dry.     Personal Diagnostic Review  As Above      12/28/2023    12:41 PM 11/30/2023    11:25 AM 11/8/2023    11:17 AM 10/12/2023     1:42 PM 10/5/2023     1:54 PM 9/27/2023     8:10 AM 6/13/2023     2:42 PM   Pulmonary Function Tests   SpO2  97 % 99 % 98 % 97 % 98 % 97 %   Height 5' 5" (1.651 m)  5' 5" (1.651 m)  5' 5" (1.651 m) 5' 5" " "(1.651 m) 5' 5" (1.651 m)   Weight 79.8 kg (176 lb) 80.2 kg (176 lb 12.9 oz) 79 kg (174 lb 2.6 oz)  80.4 kg (177 lb 4 oz) 80.3 kg (177 lb 0.5 oz) 79.9 kg (176 lb 2.4 oz)   BMI (Calculated) 29.3  29  29.5 29.5 29.3        Assessment:     No diagnosis found.     Outpatient Encounter Medications as of 1/9/2024   Medication Sig Dispense Refill    albuterol (PROVENTIL/VENTOLIN HFA) 90 mcg/actuation inhaler INHALE 2 PUFFS INTO THE LUNGS EVERY 6 (SIX) HOURS AS NEEDED FOR WHEEZING OR SHORTNESS OF BREATH 18 g 11    amLODIPine (NORVASC) 10 MG tablet Take 1 tablet (10 mg total) by mouth once daily. 30 tablet 11    aspirin (ECOTRIN) 81 MG EC tablet TAKE 1 TABLET BY MOUTH EVERY DAY 90 tablet 2    atorvastatin (LIPITOR) 80 MG tablet Take 1 tablet (80 mg total) by mouth once daily. 90 tablet 3    brimonidine 0.15 % OPTH DROP (ALPHAGAN) 0.15 % ophthalmic solution Place 1 drop into both eyes 2 (two) times a day. 15 mL 4    dorzolamide (TRUSOPT) 2 % ophthalmic solution INSTILL 1 DROP INTO BOTH EYES 2 TIMES A DAY 30 mL 4    evolocumab (REPATHA SURECLICK) 140 mg/mL PnIj Inject 1 mL (140 mg total) into the skin every 14 (fourteen) days. 2 each 5    finasteride (PROSCAR) 5 mg tablet Take 1 tablet (5 mg total) by mouth once daily. 90 tablet 3    FLUAD QUAD 2022-23,65Y UP,,PF, 60 mcg (15 mcg x 4)/0.5 mL Syrg       fluticasone-salmeterol diskus inhaler 250-50 mcg Inhale 1 puff into the lungs 2 (two) times daily. Controller 60 each 11    latanoprost 0.005 % ophthalmic solution Place 1 drop into both eyes every evening. 2 mL 11    lisinopriL (PRINIVIL,ZESTRIL) 20 MG tablet Take 1 tablet (20 mg total) by mouth once daily. 90 tablet 1    prednisoLONE acetate (PRED FORTE) 1 % DrpS Place 1 drop into the right eye 4 (four) times daily. 5 mL 1    rivaroxaban (XARELTO) 2.5 mg Tab Take 1 tablet (2.5 mg total) by mouth 2 (two) times daily with meals. 60 tablet 5    sildenafiL (VIAGRA) 100 MG tablet Take 1 tablet (100 mg total) by mouth daily as needed " for Erectile Dysfunction. 45 tablet 5    tamsulosin (FLOMAX) 0.4 mg Cap Take 1 capsule (0.4 mg total) by mouth once daily. 90 capsule 3    [DISCONTINUED] finasteride (PROSCAR) 5 mg tablet TAKE 1 TABLET BY MOUTH EVERY DAY 90 tablet 3    [DISCONTINUED] tamsulosin (FLOMAX) 0.4 mg Cap TAKE 1 CAPSULE BY MOUTH EVERY DAY 90 capsule 3    [DISCONTINUED] tamsulosin (FLOMAX) 0.4 mg Cap TAKE 1 CAPSULE BY MOUTH EVERY DAY 90 capsule 3     No facility-administered encounter medications on file as of 1/9/2024.     No orders of the defined types were placed in this encounter.    Plan:     Problem List Items Addressed This Visit          Pulmonary    Multiple pulmonary nodules - Primary     - multiple, stable since 2019  - annual CXR surveillance   - quit smoking 1985         Asthma, well controlled     - continue ics-laba  - discussed optimal usage being BID, reviewed swishing mouth with water afterwards  - symptoms controlled, rarely/never using albuterol          Plan discussed with pt and he expressed understanding, all questions answered. RTC 6 months.

## 2024-01-11 ENCOUNTER — HOSPITAL ENCOUNTER (OUTPATIENT)
Dept: RADIOLOGY | Facility: HOSPITAL | Age: 79
Discharge: HOME | End: 2024-01-11
Attending: UROLOGY
Payer: MEDICARE

## 2024-01-11 DIAGNOSIS — R97.20 ELEVATED PSA: ICD-10-CM

## 2024-01-11 PROCEDURE — A9585 GADOBUTROL INJECTION: HCPCS | Mod: HCNC,PN | Performed by: UROLOGY

## 2024-01-11 PROCEDURE — 72197 MRI PELVIS W/O & W/DYE: CPT | Mod: TC,HCNC,PN

## 2024-01-11 PROCEDURE — 72197 MRI PELVIS W/O & W/DYE: CPT | Mod: 26,HCNC,, | Performed by: RADIOLOGY

## 2024-01-11 PROCEDURE — 25500020 PHARM REV CODE 255: Mod: HCNC,PN | Performed by: UROLOGY

## 2024-01-11 RX ORDER — OXYCODONE AND ACETAMINOPHEN 5; 325 MG/1; MG/1
1 TABLET ORAL EVERY 4 HOURS PRN
Qty: 10 TABLET | Refills: 0 | Status: SHIPPED | OUTPATIENT
Start: 2024-01-11

## 2024-01-11 RX ORDER — GADOBUTROL 604.72 MG/ML
8 INJECTION INTRAVENOUS
Status: COMPLETED | OUTPATIENT
Start: 2024-01-11 | End: 2024-01-11

## 2024-01-11 RX ORDER — SULFAMETHOXAZOLE AND TRIMETHOPRIM 800; 160 MG/1; MG/1
1 TABLET ORAL 2 TIMES DAILY
Qty: 10 TABLET | Refills: 0 | Status: SHIPPED | OUTPATIENT
Start: 2024-01-11

## 2024-01-11 RX ORDER — DIAZEPAM 5 MG/1
5 TABLET ORAL ONCE
Qty: 1 TABLET | Refills: 0 | Status: SHIPPED | OUTPATIENT
Start: 2024-01-11 | End: 2024-01-11

## 2024-01-11 RX ADMIN — GADOBUTROL 8 ML: 604.72 INJECTION INTRAVENOUS at 11:01

## 2024-03-13 ENCOUNTER — PATIENT OUTREACH (OUTPATIENT)
Dept: PULMONOLOGY | Facility: CLINIC | Age: 79
End: 2024-03-13
Payer: MEDICARE

## 2024-03-13 DIAGNOSIS — E78.2 MIXED HYPERLIPIDEMIA: ICD-10-CM

## 2024-03-15 RX ORDER — EVOLOCUMAB 140 MG/ML
140 INJECTION, SOLUTION SUBCUTANEOUS
Qty: 2 EACH | Refills: 5 | Status: ACTIVE | OUTPATIENT
Start: 2024-03-15

## 2024-04-03 ENCOUNTER — PATIENT MESSAGE (OUTPATIENT)
Dept: PULMONOLOGY | Facility: CLINIC | Age: 79
End: 2024-04-03
Payer: MEDICARE

## 2024-04-09 ENCOUNTER — TELEPHONE (OUTPATIENT)
Dept: PULMONOLOGY | Facility: CLINIC | Age: 79
End: 2024-04-09
Payer: MEDICARE

## 2024-04-09 NOTE — TELEPHONE ENCOUNTER
Chronic Disease Management:   Called patient to confirm Pulmonary Disease Management appointment. Patient confirmed. Advised patient to bring respiratory inhalers to visit.

## 2024-04-10 ENCOUNTER — CLINICAL SUPPORT (OUTPATIENT)
Dept: PULMONOLOGY | Facility: CLINIC | Age: 79
End: 2024-04-10
Payer: MEDICARE

## 2024-04-10 VITALS — WEIGHT: 179.69 LBS | HEART RATE: 63 BPM | BODY MASS INDEX: 28.88 KG/M2 | HEIGHT: 66 IN | OXYGEN SATURATION: 99 %

## 2024-04-10 DIAGNOSIS — J45.909 ASTHMA, WELL CONTROLLED: Primary | ICD-10-CM

## 2024-04-10 PROCEDURE — 99999 PR PBB SHADOW E&M-EST. PATIENT-LVL II: CPT | Mod: PBBFAC,HCNC,,

## 2024-04-10 NOTE — PATIENT INSTRUCTIONS
ACTION PLAN    GREEN ZONE  My sputum is clear/white/usual color and easily cleared.  My breathing is no harder than usual.  I can do my usual activities.  I can think clearly.   Take your usual medicines, including oxygen, as you are told to do so by your health care provider.   YELLOW ZONE  My sputum has change (color, thickness, amount).  I am more short of breath than usual.  I cough or wheeze more.  I weigh more and my legs/feet swell.  I cannot do my usual activities without resting.   Call your health care provider. You will probably be told to begin taking an antibiotic and prednisone. Have your pharmacy phone number available.   RED ZONE  I cannot cough out my sputum.  I am much more short of breath than normal.  I need to sit up to breathe  I cannot do my usual activities.  I am unable to speak more than one or two words at a time.  I am confused.   Call your health care provider. You may be asked to come in to be seen, told to go to the emergency room, or told to call 9-1-1.     Asthma Trigger Checklist  Allergens, irritants, and other things may trigger your asthma. Check the box next to each of your triggers. After each trigger is a list of ways to avoid it.   Dust mites. Dust mites live in mattresses, bedding, carpets, curtains, and indoor dust.  To kill dust mites, wash bedding in hot water (130°F) each week.  Cover mattress and pillows with special dust-mite-proof cases.  Don't use upholstered furniture like sofas or chairs in the bedroom.  Use allergy-proof filters for air conditioners and furnaces. Replace or clean them as instructed.  If you can, replace carpeting with wood or tile monet, especially in the bedroom.  Animals. Animals with fur or feathers shed dander (allergens).  It's best to choose a pet that doesn't have fur or feathers, such as a fish or a reptile.  If you have pets, keep them off your bed and out of your bedroom.  Wash your hands and clothes after handling pets.    Mold. Mold  grows in damp places, such as bathrooms, basements, and closets.  Ask someone to clean damp areas in your home every week. Or try wearing a face mask while you clean.  Run an exhaust fan while bathing. Or leave a window open in the bathroom.  Repair water leaks in or around your home.  Have someone else cut grass or rake leaves, if possible.  Don't use vaporizers or humidifiers. They encourage mold growth.    Pollen. Pollen from trees, grasses, and weeds is a common allergen. (Flower pollens are generally not a problem).  Try to learn what types of pollen affect you most. Pollen levels vary depending on the plant, the season, and the time of day.  If possible, use air conditioning instead of opening the windows in your home or car.  Have someone else do yard work, if possible.    Cockroaches. Roaches are found in many homes and produce allergens.  Keep your kitchen clean and dry. A leaky faucet or drain can attract roaches.  Remove garbage from your home daily.  Store food in tightly sealed containers. Wash dishes as soon as they are used.  Use bait stations or traps to control roaches. Avoid using chemical sprays.    Smoke. Smoke may be from cigarettes, cigars, pipes, incense or candles, barbecues or grills, and fireplaces.  Don't smoke. And don't let people smoke in your home or car.  When you travel, ask for nonsmoking rental cars and hotel rooms.  Avoid fireplaces and wood stoves. If you can't, sit away from them. Make sure the smoke is directed outside.  Don't burn incense or use candles.  Move away from smoky outdoor cooking grills.    Smog.  Smog is from car exhaust and other pollution.  Read or listen to local air-quality reports. These let you know when air quality is poor.  Stay indoors as much as you can on smoggy days. If possible, use air conditioning instead of opening the windows.  In your car, set air conditioning to recirculate air, so less pollution gets in.    Strong odors. These include air  fresheners, deodorizers, and cleaning products; perfume, deodorant, and other beauty products; incense and candles; and insect sprays and other sprays.  Use scent-free products like deodorant or body lotion.  Avoid using cleaning products with bleach and ammonia. Make your own cleaning solution with white vinegar, baking soda, or mild dish soap.  Use exhaust fans while cooking. Or open a window, if possible.   Avoid perfumes, air fresheners, potpourri, and other scented products.          Other irritants. These include dust, aerosol sprays, and powders.  Wear a face mask while doing tasks like sanding, dusting, sweeping, and yard work. Open doors and windows if working indoors.  Use pump spray bottles instead of aerosols.  Pour liquid  onto a rag or cloth instead of spraying them.    Weather. Weather conditions can trigger symptoms or make them worse.  Watch for very high or low temperatures, very humid conditions, or a lot of wind, as these conditions can make symptoms worse.  Limit outdoor activity during the type of weather that affects you.  Wear a scarf over your mouth and nose in cold weather.    Colds, flu, and sinus infections. Upper respiratory infections can trigger asthma.  Wash your hands often with soap and warm water or use a hand  containing alcohol.  Get a yearly flu shot. And ask if you should get a pneumonia vaccine.  Take care of your general health. Get plenty of sleep. And eat a variety of healthy foods.    Food additives. Food additives can trigger asthma flare-ups in some people.  Check food labels for sulfites or other similar ingredients. These are often found in foods such as wine, beer, and dried fruits.  Avoid foods that contain these additives.    Medicine. Aspirin, NSAIDS like ibuprofen and naproxen, and heart medicines like beta-blockers may be triggers.  Tell your health care provider if you think certain medicines trigger symptoms.   Be sure to read the labels on  over-the-counter medicines. They may have ingredients that cause symptoms for you.   , Emotions. Laughing, crying, or feeling excited are triggers for some people.   To help you stay calm: Try breathing in slowly through your nose for a count of 2 seconds. Close your lips and breathe out for 4 seconds. Repeat.  Try to focus on a soothing image in your mind. This will help relax you and calm your breathing.  Remember to take your daily controller medicines. When you're upset or under stress, it's easy to forget.    Exercise. For some people, exercise can trigger symptoms. Don't let this keep you from being active.   If you have not been exercising regularly, start slow and work up gradually.  Take all of your medicines as prescribed.  If you use quick-relief medicine, make sure you have it with you when you exercise.  Stop if you have any symptoms. Make sure you talk with your provider about these symptoms.  © 9008-9991 [employer] [address] [place] [address], [place] [address]. All rights reserved. This information is not intended as a substitute for professional medical care. Always follow your healthcare professional's instructions.

## 2024-04-10 NOTE — PROGRESS NOTES
Pulmonary Disease Management  [facility]  Follow up Visit  Chronic Care Management    Alex Milner  was seen 4/10/2024  1:30 PM in the Pulmonary Disease Management Clinic for evaluation, education, reinforcement of self management techniques and exacerbation action plan.    Carlita Baxter    Past Medical History:   Diagnosis Date    Atherosclerosis of coronary artery 08/10/2022    Atherosclerotic PVD with intermittent claudication 08/16/2019    Atrial fibrillation     pt unaware of this    Back pain     Bilateral inguinal hernia     CT ABdomen/pelvis 3/9/2015 (care everywhere)---Bilateral inguinal hernias containing fat.      Calcified granuloma of lung 10/03/2017    CT CHest 3/26/2018---There are calcified lymph nodes in the mediastinum and left hilum.    CT chest 9/11/ 2017 Calcified left hilar and subcarinal granulomas are noted  ;Calcified granuloma noted within the posterior segment of the right hepatic lobe.    Diverticulosis     Diverticulosis 10/28/2013    Colonoscopy     ED (erectile dysfunction)     Elevated PSA     Granuloma of liver     ct chest 3/26/2018---There is a small calcified granuloma posteriorly in the right lobe of the liver    Hyperlipidemia     Hypertension     Liver mass 03/13/2020    New lesion compared to CT chest dating back to 2018. New liver lesion, dome of liver seen on CT 3/12/2020 and MRI abdomen 3/12/2020.  3/13/2020 referral to Oncology  PET CT   Resolved in 2020    Moderate persistent asthma without complication 09/26/2017    Personal history of colonic polyps 2013    Pulmonary heart disease 08/10/2022    Urinary tract infection        Patient's Medications   New Prescriptions    No medications on file   Previous Medications    ALBUTEROL (PROVENTIL/VENTOLIN HFA) 90 MCG/ACTUATION INHALER    INHALE 2 PUFFS INTO THE LUNGS EVERY 6 (SIX) HOURS AS NEEDED FOR WHEEZING OR SHORTNESS OF BREATH    AMLODIPINE (NORVASC) 10 MG TABLET    Take 1 tablet (10 mg total) by mouth  once daily.    ASPIRIN (ECOTRIN) 81 MG EC TABLET    TAKE 1 TABLET BY MOUTH EVERY DAY    ATORVASTATIN (LIPITOR) 80 MG TABLET    Take 1 tablet (80 mg total) by mouth once daily.    BRIMONIDINE 0.15 % OPTH DROP (ALPHAGAN) 0.15 % OPHTHALMIC SOLUTION    Place 1 drop into both eyes 2 (two) times a day.    DIAZEPAM (VALIUM) 5 MG TABLET    Take 1 tablet (5 mg total) by mouth once. for 1 dose    DORZOLAMIDE (TRUSOPT) 2 % OPHTHALMIC SOLUTION    INSTILL 1 DROP INTO BOTH EYES 2 TIMES A DAY    EVOLOCUMAB (REPATHA SURECLICK) 140 MG/ML PNIJ    Inject 1 mL (140 mg total) into the skin every 14 (fourteen) days.    FINASTERIDE (PROSCAR) 5 MG TABLET    Take 1 tablet (5 mg total) by mouth once daily.    FLUAD QUAD 2022-23,65Y UP,,PF, 60 MCG (15 MCG X 4)/0.5 ML SYRG        FLUTICASONE-SALMETEROL DISKUS INHALER 250-50 MCG    Inhale 1 puff into the lungs 2 (two) times daily. Controller    LATANOPROST 0.005 % OPHTHALMIC SOLUTION    Place 1 drop into both eyes every evening.    LISINOPRIL (PRINIVIL,ZESTRIL) 20 MG TABLET    Take 1 tablet (20 mg total) by mouth once daily.    OXYCODONE-ACETAMINOPHEN (PERCOCET) 5-325 MG PER TABLET    Take 1 tablet by mouth every 4 (four) hours as needed for Pain.    PREDNISOLONE ACETATE (PRED FORTE) 1 % DRPS    Place 1 drop into the right eye 4 (four) times daily.    RIVAROXABAN (XARELTO) 2.5 MG TAB    Take 1 tablet (2.5 mg total) by mouth 2 (two) times daily with meals.    SILDENAFIL (VIAGRA) 100 MG TABLET    Take 1 tablet (100 mg total) by mouth daily as needed for Erectile Dysfunction.    SULFAMETHOXAZOLE-TRIMETHOPRIM 800-160MG (BACTRIM DS) 800-160 MG TAB    Take 1 tablet by mouth 2 (two) times daily.    TAMSULOSIN (FLOMAX) 0.4 MG CAP    Take 1 capsule (0.4 mg total) by mouth once daily.   Modified Medications    No medications on file   Discontinued Medications    No medications on file             Educational assessment:   [x]            Good  []            Fair  []            Poor    Readiness to learn:    [x]            Good  []            Fair  []            Poor    Vision Status:   [x]            Good  []            Fair  []            Poor    Reading Ability:  [x]            Good  []            Fair  []            Poor    Knowledge of condition:   [x]            Good  []            Fair  []            Poor    Language Barriers:   []            Good  []            Fair  []            Poor  [x]            None    Cognitive/ Physical Barriers:   []            Good  []            Fair  []            Poor  [x]            None    Learning best by:                       [x]            Seeing  [x]            Hearing  [x]            Reading                         [x]            Doing    Describe any barrier /Limitation or financial implications of care choices identified     []            Financial  []            Emotional  []            Education  []            Vision/Hearing  []            Physical  [x]            None  []                TOPIC /CONTENT FOR TODAY:    [x]            MDI with or without spacer  [x]            Dry powder inhaler  []            Acapella   []           Peak Flow meter  [x]            Action plan  []            Nebulizer use  []            Oxygen use safety  []            Breathing and cough techniques  []            Energy conservation  [x]            Infection prevention  []           OTHER________________________        Learner:    [x]            Patient   []            Caregiver    Method:    [x]            Verbal explanation  []            Audio visual    [x]            Literature  [x]            Teach back      Evaluation:    [x]            Teach back  [x]            Demonstrate  []            Follow up phone call    []            2 weeks     [x]            4 weeks   []            PRN    Additional comments:   Patient was seen today to review respiratory medication purpose and proper technique for use of inhalers. Patient practiced proper technique using MDI with valved holding chamber  (spacer) and DPI inhalers. Patient demonstrated understanding. Literature was given to patient.     Asthma trigger checklist was verbally reviewed and literature given to patient.     Infection prevention was discussed.  Hand hygiene and cleaning of respiratory equipment was also discussed. Patient verbalized understanding.      COPD action plan was reviewed and literature was given to patient. Patient verbalized understanding.     Reviewed breathing techniques such as pursed-lip breathing, rubio-cough technique, and diaphragmatic breathing. Patient practiced proper technique and verbalized understanding. Literature given to patient.     Patient does not qualify for [employer] inhaler program.   Plan:  Medication adherence  Monthly Pulmonary Disease Management Questionnaire  Follow-up PDM appointment scheduled for 10/3/24    Reinforce education  Meds: ICS/LABA, CB  DME Needs: NA  Action Plan  Immunization: Pneumococcal- CURRENT, Flu-CURRENT , Covid 19- CURENT  Next Provider Visit: 7/9/24  Next Spirometry/CPFT: NOT SCHEDULED  Approximate time spent with patient: 30 MINUTES

## 2024-04-11 ENCOUNTER — LAB VISIT (OUTPATIENT)
Dept: LAB | Facility: HOSPITAL | Age: 79
End: 2024-04-11
Attending: UROLOGY
Payer: MEDICARE

## 2024-04-11 DIAGNOSIS — R97.20 ELEVATED PSA: ICD-10-CM

## 2024-04-11 LAB — COMPLEXED PSA SERPL-MCNC: 45.5 NG/ML (ref 0–4)

## 2024-04-11 PROCEDURE — 36415 COLL VENOUS BLD VENIPUNCTURE: CPT | Mod: HCNC,PN | Performed by: UROLOGY

## 2024-04-11 PROCEDURE — 84153 ASSAY OF PSA TOTAL: CPT | Mod: HCNC | Performed by: UROLOGY

## 2024-04-18 ENCOUNTER — OFFICE VISIT (OUTPATIENT)
Dept: UROLOGY | Facility: CLINIC | Age: 79
End: 2024-04-18
Payer: MEDICARE

## 2024-04-18 VITALS
DIASTOLIC BLOOD PRESSURE: 77 MMHG | SYSTOLIC BLOOD PRESSURE: 160 MMHG | WEIGHT: 179.69 LBS | HEART RATE: 64 BPM | BODY MASS INDEX: 29 KG/M2

## 2024-04-18 DIAGNOSIS — N52.9 ERECTILE DYSFUNCTION, UNSPECIFIED ERECTILE DYSFUNCTION TYPE: ICD-10-CM

## 2024-04-18 DIAGNOSIS — R97.20 ELEVATED PSA: Primary | ICD-10-CM

## 2024-04-18 DIAGNOSIS — N40.0 BENIGN PROSTATIC HYPERPLASIA, UNSPECIFIED WHETHER LOWER URINARY TRACT SYMPTOMS PRESENT: ICD-10-CM

## 2024-04-18 PROCEDURE — 1160F RVW MEDS BY RX/DR IN RCRD: CPT | Mod: HCNC,CPTII,S$GLB, | Performed by: UROLOGY

## 2024-04-18 PROCEDURE — 3288F FALL RISK ASSESSMENT DOCD: CPT | Mod: HCNC,CPTII,S$GLB, | Performed by: UROLOGY

## 2024-04-18 PROCEDURE — 99214 OFFICE O/P EST MOD 30 MIN: CPT | Mod: HCNC,S$GLB,, | Performed by: UROLOGY

## 2024-04-18 PROCEDURE — 1159F MED LIST DOCD IN RCRD: CPT | Mod: HCNC,CPTII,S$GLB, | Performed by: UROLOGY

## 2024-04-18 PROCEDURE — 1126F AMNT PAIN NOTED NONE PRSNT: CPT | Mod: HCNC,CPTII,S$GLB, | Performed by: UROLOGY

## 2024-04-18 PROCEDURE — 3077F SYST BP >= 140 MM HG: CPT | Mod: HCNC,CPTII,S$GLB, | Performed by: UROLOGY

## 2024-04-18 PROCEDURE — 1101F PT FALLS ASSESS-DOCD LE1/YR: CPT | Mod: HCNC,CPTII,S$GLB, | Performed by: UROLOGY

## 2024-04-18 PROCEDURE — 99999 PR PBB SHADOW E&M-EST. PATIENT-LVL IV: CPT | Mod: PBBFAC,HCNC,, | Performed by: UROLOGY

## 2024-04-18 PROCEDURE — 3078F DIAST BP <80 MM HG: CPT | Mod: HCNC,CPTII,S$GLB, | Performed by: UROLOGY

## 2024-04-18 RX ORDER — DIAZEPAM 5 MG/1
5 TABLET ORAL ONCE
Qty: 1 TABLET | Refills: 0 | Status: SHIPPED | OUTPATIENT
Start: 2024-04-18 | End: 2024-05-31

## 2024-04-18 RX ORDER — OXYCODONE AND ACETAMINOPHEN 5; 325 MG/1; MG/1
1 TABLET ORAL EVERY 4 HOURS PRN
Qty: 10 TABLET | Refills: 0 | Status: SHIPPED | OUTPATIENT
Start: 2024-04-18

## 2024-04-18 RX ORDER — SULFAMETHOXAZOLE AND TRIMETHOPRIM 800; 160 MG/1; MG/1
1 TABLET ORAL 2 TIMES DAILY
Qty: 10 TABLET | Refills: 0 | Status: SHIPPED | OUTPATIENT
Start: 2024-04-18

## 2024-04-18 NOTE — PROGRESS NOTES
[facility]:   Encounter Diagnoses   Name Primary?    Elevated PSA Yes    Benign prostatic hyperplasia, unspecified whether lower urinary tract symptoms present     Erectile dysfunction, unspecified erectile dysfunction type        HPI:   4/18/24- patient returns today to discuss his MRI, currently voiding well on medical therapy.  No change in erectile dysfunction.    1/8/18: Returns after long absence.  PSA >30.  Says he had a biopsy in 2016 with another urologist in [facility] off of [facility].  Second one he had.  MRI report from 5/17 reassuring with 98gm prostate.  Reduced stream not on flomax/finasteride.  No abd/pelvic pain and no exac/rel factors.  No hematuria.  No urolithiasis.  3-4 cups coffee in AM.  Nocturia x3-4.    Allergies:  Patient has no known allergies.    Medications:  has a current medication list which includes the following prescription(s): albuterol, aspirin, atorvastatin, brimonidine 0.15 % opth drop, dorzolamide, repatha sureclick, finasteride, fluad quad 2022-23(65y up)(pf), fluticasone-salmeterol 250-50 mcg/dose, latanoprost, lisinopril, oxycodone-acetaminophen, prednisolone acetate, xarelto, sildenafil, sulfamethoxazole-trimethoprim 800-160mg, tamsulosin, amlodipine, and diazepam.    Review of Systems:  General: No fever, chills, fatigability, or weight loss.  Skin: No rashes, itching, or changes in color or texture of skin.  Chest: Denies THOMPSON, cyanosis, wheezing, cough, and sputum production.  Abdomen: Appetite fine. No weight loss. Denies diarrhea, abdominal pain, hematemesis, or blood in stool.  Musculoskeletal: No joint stiffness or swelling. Some back pain.  : As above.  All other review of systems negative.    PMH:   has a past medical history of Atherosclerosis of coronary artery (08/10/2022), Atherosclerotic PVD with intermittent claudication (08/16/2019), Atrial fibrillation, Back pain, Bilateral inguinal hernia, Calcified granuloma of lung (10/03/2017), Diverticulosis,  Diverticulosis (10/28/2013), ED (erectile dysfunction), Elevated PSA, Granuloma of liver, Hyperlipidemia, Hypertension, Liver mass (03/13/2020), Moderate persistent asthma without complication (09/26/2017), Personal history of colonic polyps (2013), Pulmonary heart disease (08/10/2022), and Urinary tract infection.    PSH:   has a past surgical history that includes Hernia repair; Prostate biopsy; Aortography with serialography (N/A, 10/21/2019); Aortography with serialography (N/A, 11/14/2019); and Colonoscopy (N/A, 12/7/2022).    FamHx: family history includes Cancer in his brother, father, and mother; Cancer (age of onset: 64) in his sister; Prostate cancer in his brother.    SocHx:  reports that he quit smoking about 38 years ago. His smoking use included cigarettes. He started smoking about 64 years ago. He has a 51.2 pack-year smoking history. He has never used smokeless tobacco. He reports current alcohol use of about 12.0 standard drinks of alcohol per week. He reports that he does not use drugs.      Physical Exam:  Vitals:    04/18/24 1013   BP: (!) 160/77   Pulse: 64       General: A&Ox3, no apparent distress, no deformities  Neck: No masses, normal thyroid  Lungs: normal inspiration, no use of accessory muscles  Heart: normal pulse, no arrhythmias  Abdomen: Soft, NT, ND  Skin: The skin is warm and dry. No jaundice.  Ext: No c/c/e.    Labs/Studies:   pnbx negative 65g  3/8/21  Reported negative biopsy 2016 outside facility  PSA 45.5 4/24  PSA 47.5 11/23  PSA 36 1/23  PSA 27 7/22  PSA 30.1 4/22  PSA 32.6 1/22  PSA 41.2 9/21  PSA 21.9 1/21  PSA 19.3 7/20  MRI PIRADS 5 88g 1/24  MRI PI-RADS 2 6/22  MRI PI-RADS 4 2/21    Impression/Plan:       1. Elevated PSA- due to the rising PSA we obtained an MRI, we discussed options today and he will pursue an MRI guided biopsy.  Patient understands the risks, benefits and alternatives to the procedure.  I will follow-up after the study, call with any issues prior to  that time.    2. BPH- tamsulosin and finasteride appear to be working well, call when he needs a refill.    3. Erectile dysfunction- sildenafil 100 mg works well for the patient, call when he needs a refill.

## 2024-05-10 ENCOUNTER — OFFICE VISIT (OUTPATIENT)
Dept: OPHTHALMOLOGY | Facility: CLINIC | Age: 79
End: 2024-05-10
Payer: MEDICARE

## 2024-05-10 DIAGNOSIS — H40.1113 PRIMARY OPEN ANGLE GLAUCOMA (POAG) OF RIGHT EYE, SEVERE STAGE: Primary | ICD-10-CM

## 2024-05-10 DIAGNOSIS — H25.12 NUCLEAR SCLEROSIS OF LEFT EYE: ICD-10-CM

## 2024-05-10 DIAGNOSIS — H40.1121 PRIMARY OPEN ANGLE GLAUCOMA (POAG) OF LEFT EYE, MILD STAGE: ICD-10-CM

## 2024-05-10 DIAGNOSIS — Z96.1 PSEUDOPHAKIA OF RIGHT EYE: ICD-10-CM

## 2024-05-10 PROCEDURE — 1160F RVW MEDS BY RX/DR IN RCRD: CPT | Mod: HCNC,CPTII,S$GLB, | Performed by: OPTOMETRIST

## 2024-05-10 PROCEDURE — 1126F AMNT PAIN NOTED NONE PRSNT: CPT | Mod: HCNC,CPTII,S$GLB, | Performed by: OPTOMETRIST

## 2024-05-10 PROCEDURE — 1159F MED LIST DOCD IN RCRD: CPT | Mod: HCNC,CPTII,S$GLB, | Performed by: OPTOMETRIST

## 2024-05-10 PROCEDURE — 99999 PR PBB SHADOW E&M-EST. PATIENT-LVL III: CPT | Mod: PBBFAC,HCNC,, | Performed by: OPTOMETRIST

## 2024-05-10 PROCEDURE — 92014 COMPRE OPH EXAM EST PT 1/>: CPT | Mod: HCNC,S$GLB,, | Performed by: OPTOMETRIST

## 2024-05-10 NOTE — PROGRESS NOTES
HPI     Follow-up            Comments: Pt reports for DFE, states vision has been okay. No pain or   irritation. States he is using an orange top drop that he had from before   the cataract surgery, using it occasionally.          Comments    1. PCIOL OD DESHAUN Goniotomy 11/16/23  NSC OS     Spraggs top drop PRN          Last edited by Vickie Peraza on 5/10/2024  8:21 AM.            Assessment /Plan     For exam results, see Encounter Report.    Primary open angle glaucoma (POAG) of right eye, severe stage  Primary open angle glaucoma (POAG) of left eye, mild stage  Normal IOP today OU prior to dilation  Post dilation IOP 15 and 30  Will continue to monitor off gtts since GCL has been stable since 2019  Monitor 3 months    Nuclear sclerosis of left eye  Pt asymptomatic in regards to vision  Stable IOP off gtts  Will continue to monitor    Pseudophakia of right eye  Stable OD  Monitor        RTC 3 months for IOP check and gOCT or PRN  Discussed above and all questions were answered.

## 2024-05-15 ENCOUNTER — PATIENT OUTREACH (OUTPATIENT)
Dept: PULMONOLOGY | Facility: CLINIC | Age: 79
End: 2024-05-15
Payer: MEDICARE

## 2024-05-27 ENCOUNTER — PROCEDURE VISIT (OUTPATIENT)
Dept: UROLOGY | Facility: CLINIC | Age: 79
End: 2024-05-27
Payer: MEDICARE

## 2024-05-27 VITALS
BODY MASS INDEX: 28.74 KG/M2 | HEIGHT: 66 IN | SYSTOLIC BLOOD PRESSURE: 184 MMHG | WEIGHT: 178.81 LBS | RESPIRATION RATE: 18 BRPM | DIASTOLIC BLOOD PRESSURE: 71 MMHG

## 2024-05-27 DIAGNOSIS — R97.20 ELEVATED PSA: Primary | ICD-10-CM

## 2024-05-27 LAB
BILIRUB UR QL STRIP: NEGATIVE
GLUCOSE UR QL STRIP: NEGATIVE
KETONES UR QL STRIP: NEGATIVE
LEUKOCYTE ESTERASE UR QL STRIP: POSITIVE
PH, POC UA: 5
POC BLOOD, URINE: POSITIVE
POC NITRATES, URINE: NEGATIVE
PROT UR QL STRIP: NEGATIVE
SP GR UR STRIP: 1.01 (ref 1–1.03)
UROBILINOGEN UR STRIP-ACNC: 0.2 (ref 0.3–2.2)

## 2024-05-27 PROCEDURE — 88305 TISSUE EXAM BY PATHOLOGIST: CPT | Mod: 59,HCNC | Performed by: PATHOLOGY

## 2024-05-27 PROCEDURE — 81003 URINALYSIS AUTO W/O SCOPE: CPT | Mod: QW,HCNC,S$GLB, | Performed by: UROLOGY

## 2024-05-27 PROCEDURE — 88344 IMHCHEM/IMCYTCHM EA MLT ANTB: CPT | Mod: 59,HCNC | Performed by: PATHOLOGY

## 2024-05-27 PROCEDURE — 88305 TISSUE EXAM BY PATHOLOGIST: CPT | Mod: 26,HCNC,, | Performed by: PATHOLOGY

## 2024-05-27 PROCEDURE — 55700 PR BIOPSY OF PROSTATE,NEEDLE/PUNCH: CPT | Mod: HCNC,S$GLB,, | Performed by: UROLOGY

## 2024-05-27 PROCEDURE — 88344 IMHCHEM/IMCYTCHM EA MLT ANTB: CPT | Mod: 26,HCNC,, | Performed by: PATHOLOGY

## 2024-05-27 PROCEDURE — 76872 US TRANSRECTAL: CPT | Mod: 26,HCNC,S$GLB, | Performed by: UROLOGY

## 2024-05-27 PROCEDURE — 96372 THER/PROPH/DIAG INJ SC/IM: CPT | Mod: HCNC,59,S$GLB, | Performed by: UROLOGY

## 2024-05-27 RX ORDER — LIDOCAINE HYDROCHLORIDE 20 MG/ML
JELLY TOPICAL
Status: COMPLETED | OUTPATIENT
Start: 2024-05-27 | End: 2024-05-27

## 2024-05-27 RX ORDER — CIPROFLOXACIN 500 MG/1
500 TABLET ORAL
Status: COMPLETED | OUTPATIENT
Start: 2024-05-27 | End: 2024-05-27

## 2024-05-27 RX ORDER — CEFTRIAXONE 1 G/1
1 INJECTION, POWDER, FOR SOLUTION INTRAMUSCULAR; INTRAVENOUS
Status: COMPLETED | OUTPATIENT
Start: 2024-05-27 | End: 2024-05-27

## 2024-05-27 RX ADMIN — LIDOCAINE HYDROCHLORIDE 10 ML: 20 JELLY TOPICAL at 01:05

## 2024-05-27 RX ADMIN — CEFTRIAXONE 1 G: 1 INJECTION, POWDER, FOR SOLUTION INTRAMUSCULAR; INTRAVENOUS at 01:05

## 2024-05-27 RX ADMIN — CIPROFLOXACIN 500 MG: 500 TABLET ORAL at 02:05

## 2024-05-27 NOTE — PROGRESS NOTES
.Patient came in for scheduled prostate biopsy Rocephin 1gm injection ordered, Confirmed patient's allergies, Rocephin 1gm injection administered IM to Right ventrogluteal with 23g needle using aseptic technique. Pt tolerated procedure well. Patient agreed to wait 15 minutes after injection in the clinic and to report any adverse reactions.    .Patient came in for a cystoscopy, Ciprofloxacin 500 mg tab to be administered orally to help prevent infection. Confirmed patient's allergies, Ciprofloxacin 500 mg tab administered as ordered. Pt tolerated medication administration well. Patient agreed to wait 15 minutes after medication administration in the clinic and to report any adverse reactions.      Kwabena Taveras RN

## 2024-05-27 NOTE — PROCEDURES
Procedure: (1) Transrectal Prostate Biopsy                      (2) Transrectal ultrasound of prostate with MRI fusion of images                     (3) Ultrasound Guidance of Prostate Biopsy needles    Reason for procedure: elevated PSA, abnormal MRI  PSA:   45.5 4/11/24      Detail: Antibiotic prophylaxis was provided using cipro and rocephin. After proper consents were obtained, the patient was prepped and draped in normal fashion in the left lateral decubitus position for TRUS/Bx.  5 ml of lidocaine jelly was instilled in the rectum.   The U/S with rectal probe was into the patient's rectum. A sweep of the prostate was performed from left to right in the sagittal plane, and the MRI images were aligned with the ultrasound images. A spinal needle was used and 10ml of 1% lidocaine was instilled on the side of the prostate at the base of the seminal vesicles. Systematic review was performed of the prostate, noting a hypoechoic region at the right anterior apex. The prostate measured to be 81g. The region of interest was first targeted. Biopsy was then performed using an 18Ga biopsy needle in a standard fashion directed at the base, mid and apex bilaterally for a total of 19 cores. The patient tolerated the procedure well. Estimated blood loss was 2cc.     Alex was seen today for procedure.    Diagnoses and all orders for this visit:    Elevated PSA  -     POCT Urinalysis, Dipstick, Automated, W/O Scope    Other orders  -     cefTRIAXone injection 1 g  -     LIDOcaine HCl 2% urojet  -     ciprofloxacin HCl tablet 500 mg        I had a detailed discussion with the patient regarding post-TRUS biopsy fever and need to go to the ED for admission for IV antibiotics for any temperature above 100.4 degrees.     He will follow up in 1 week for TRUS biopsy results.

## 2024-05-31 ENCOUNTER — OFFICE VISIT (OUTPATIENT)
Dept: INTERNAL MEDICINE | Facility: CLINIC | Age: 79
End: 2024-05-31
Payer: MEDICARE

## 2024-05-31 VITALS
HEART RATE: 97 BPM | SYSTOLIC BLOOD PRESSURE: 138 MMHG | OXYGEN SATURATION: 99 % | WEIGHT: 181.19 LBS | HEIGHT: 66 IN | TEMPERATURE: 97 F | BODY MASS INDEX: 29.12 KG/M2 | DIASTOLIC BLOOD PRESSURE: 60 MMHG

## 2024-05-31 DIAGNOSIS — M54.50 LUMBAR BACK PAIN: ICD-10-CM

## 2024-05-31 DIAGNOSIS — I10 PRIMARY HYPERTENSION: Primary | ICD-10-CM

## 2024-05-31 DIAGNOSIS — J47.9 BRONCHIECTASIS, UNCOMPLICATED: ICD-10-CM

## 2024-05-31 DIAGNOSIS — I70.219 ATHEROSCLEROTIC PVD WITH INTERMITTENT CLAUDICATION: ICD-10-CM

## 2024-05-31 DIAGNOSIS — E78.2 MIXED HYPERLIPIDEMIA: ICD-10-CM

## 2024-05-31 DIAGNOSIS — I27.9 PULMONARY HEART DISEASE: ICD-10-CM

## 2024-05-31 LAB
FINAL PATHOLOGIC DIAGNOSIS: NORMAL
GROSS: NORMAL
Lab: NORMAL

## 2024-05-31 PROCEDURE — 3075F SYST BP GE 130 - 139MM HG: CPT | Mod: HCNC,CPTII,S$GLB, | Performed by: NURSE PRACTITIONER

## 2024-05-31 PROCEDURE — 99999 PR PBB SHADOW E&M-EST. PATIENT-LVL IV: CPT | Mod: PBBFAC,HCNC,, | Performed by: NURSE PRACTITIONER

## 2024-05-31 PROCEDURE — 3078F DIAST BP <80 MM HG: CPT | Mod: HCNC,CPTII,S$GLB, | Performed by: NURSE PRACTITIONER

## 2024-05-31 PROCEDURE — 3288F FALL RISK ASSESSMENT DOCD: CPT | Mod: HCNC,CPTII,S$GLB, | Performed by: NURSE PRACTITIONER

## 2024-05-31 PROCEDURE — 1159F MED LIST DOCD IN RCRD: CPT | Mod: HCNC,CPTII,S$GLB, | Performed by: NURSE PRACTITIONER

## 2024-05-31 PROCEDURE — 1101F PT FALLS ASSESS-DOCD LE1/YR: CPT | Mod: HCNC,CPTII,S$GLB, | Performed by: NURSE PRACTITIONER

## 2024-05-31 PROCEDURE — 99214 OFFICE O/P EST MOD 30 MIN: CPT | Mod: HCNC,S$GLB,, | Performed by: NURSE PRACTITIONER

## 2024-05-31 PROCEDURE — 1160F RVW MEDS BY RX/DR IN RCRD: CPT | Mod: HCNC,CPTII,S$GLB, | Performed by: NURSE PRACTITIONER

## 2024-05-31 NOTE — PROGRESS NOTES
"Subjective:       Patient ID: Alex Milner is a 78 y.o. male.    Chief Complaint: Follow-up    Pt presents to clinic today for follow up  Pt with hx of hypertension, hyperlipidemia, peripheral vascular disease, pulmonary nodules, BPH with elevated PSA.    Overall patient indicates he is doing well.    He notes no recent hospitalizations or ER visits.    He is noted no problems with his medications at this time.    He continues to follow with urology for his elevated PSA.      Today he complains of lower back pain  Reports this is a chronic issue  Has had pain for years  Some days better than other  Denies bowel or bladder issues  Does have some radiation from time to time down his legs  Takes tylenol with little relief in his pain  Request to see the specialist for his back         /60   Pulse 97   Temp 97 °F (36.1 °C) (Tympanic)   Ht 5' 6" (1.676 m)   Wt 82.2 kg (181 lb 3.5 oz)   SpO2 99%   BMI 29.25 kg/m²     Review of Systems   Constitutional:  Negative for activity change, appetite change, chills, diaphoresis, fatigue, fever and unexpected weight change.   HENT: Negative.     Eyes: Negative.    Respiratory:  Negative for apnea, chest tightness, shortness of breath and stridor.    Cardiovascular:  Negative for chest pain, palpitations and leg swelling.   Gastrointestinal: Negative.    Endocrine: Negative.    Genitourinary: Negative.    Musculoskeletal:  Positive for arthralgias, back pain and myalgias.   Skin:  Negative for color change, pallor, rash and wound.   Allergic/Immunologic: Negative.    Neurological:  Negative for dizziness, facial asymmetry, light-headedness and headaches.   Hematological:  Negative for adenopathy.   Psychiatric/Behavioral:  Negative for agitation and behavioral problems.        Objective:      Physical Exam  Vitals and nursing note reviewed.   Constitutional:       General: He is not in acute distress.     Appearance: He is well-developed. He is not diaphoretic. "   HENT:      Head: Normocephalic and atraumatic.   Cardiovascular:      Rate and Rhythm: Normal rate and regular rhythm.      Heart sounds: Normal heart sounds.   Pulmonary:      Effort: Pulmonary effort is normal. No respiratory distress.      Breath sounds: Normal breath sounds.   Musculoskeletal:      Lumbar back: No spasms, tenderness or bony tenderness. Decreased range of motion. Negative right straight leg raise test and negative left straight leg raise test.   Skin:     General: Skin is warm and dry.      Findings: No rash.   Neurological:      Mental Status: He is alert and oriented to person, place, and time.   Psychiatric:         Behavior: Behavior normal.         Thought Content: Thought content normal.         Judgment: Judgment normal.         Assessment:       1. Primary hypertension    2. Mixed hyperlipidemia    3. Lumbar back pain    4. Bronchiectasis, uncomplicated    5. Pulmonary heart disease    6. Atherosclerotic PVD with intermittent claudication    7. BMI 29.0-29.9,adult        Plan:       Alex was seen today for follow-up.    Diagnoses and all orders for this visit:    Primary hypertension    - Chronic, stable  Mixed hyperlipidemia      - Chronic, stable   Lumbar back pain  -     X-Ray Lumbar Spine AP And Lateral; Future  - Scheduled tylenol as discussed   Bronchiectasis, uncomplicated     - Chronic, continue to follow with pulm   Pulmonary heart disease     - Chronic, continue to follow with pulm   Atherosclerotic PVD with intermittent claudication    BMI 29.0-29.9,adult      Overall doing well  Continue meds as prescribed  Discussed age related vaccinations- deferred at this time  Will xray lumbar spine- suspicious of some arthritis  Discussed scheduled tylenol and possibly referral to back and spine clinic  Follow up in 6 months and PRN

## 2024-06-05 DIAGNOSIS — C61 MALIGNANT NEOPLASM OF PROSTATE: Primary | ICD-10-CM

## 2024-06-10 ENCOUNTER — OFFICE VISIT (OUTPATIENT)
Dept: UROLOGY | Facility: CLINIC | Age: 79
End: 2024-06-10
Payer: MEDICARE

## 2024-06-10 VITALS
DIASTOLIC BLOOD PRESSURE: 72 MMHG | SYSTOLIC BLOOD PRESSURE: 131 MMHG | HEIGHT: 66 IN | BODY MASS INDEX: 29.12 KG/M2 | HEART RATE: 58 BPM | WEIGHT: 181.19 LBS

## 2024-06-10 DIAGNOSIS — N52.9 ERECTILE DYSFUNCTION, UNSPECIFIED ERECTILE DYSFUNCTION TYPE: ICD-10-CM

## 2024-06-10 DIAGNOSIS — C61 MALIGNANT NEOPLASM OF PROSTATE: Primary | ICD-10-CM

## 2024-06-10 DIAGNOSIS — N40.0 BENIGN PROSTATIC HYPERPLASIA, UNSPECIFIED WHETHER LOWER URINARY TRACT SYMPTOMS PRESENT: ICD-10-CM

## 2024-06-10 PROCEDURE — 1101F PT FALLS ASSESS-DOCD LE1/YR: CPT | Mod: HCNC,CPTII,S$GLB, | Performed by: UROLOGY

## 2024-06-10 PROCEDURE — 99214 OFFICE O/P EST MOD 30 MIN: CPT | Mod: HCNC,S$GLB,, | Performed by: UROLOGY

## 2024-06-10 PROCEDURE — 3075F SYST BP GE 130 - 139MM HG: CPT | Mod: HCNC,CPTII,S$GLB, | Performed by: UROLOGY

## 2024-06-10 PROCEDURE — 3078F DIAST BP <80 MM HG: CPT | Mod: HCNC,CPTII,S$GLB, | Performed by: UROLOGY

## 2024-06-10 PROCEDURE — 3288F FALL RISK ASSESSMENT DOCD: CPT | Mod: HCNC,CPTII,S$GLB, | Performed by: UROLOGY

## 2024-06-10 PROCEDURE — 1160F RVW MEDS BY RX/DR IN RCRD: CPT | Mod: HCNC,CPTII,S$GLB, | Performed by: UROLOGY

## 2024-06-10 PROCEDURE — 1126F AMNT PAIN NOTED NONE PRSNT: CPT | Mod: HCNC,CPTII,S$GLB, | Performed by: UROLOGY

## 2024-06-10 PROCEDURE — 1159F MED LIST DOCD IN RCRD: CPT | Mod: HCNC,CPTII,S$GLB, | Performed by: UROLOGY

## 2024-06-10 PROCEDURE — 99999 PR PBB SHADOW E&M-EST. PATIENT-LVL V: CPT | Mod: PBBFAC,HCNC,, | Performed by: UROLOGY

## 2024-06-10 NOTE — PROGRESS NOTES
mriChief Complaint:   Encounter Diagnoses   Name Primary?    Malignant neoplasm of prostate Yes    Benign prostatic hyperplasia, unspecified whether lower urinary tract symptoms present     Erectile dysfunction, unspecified erectile dysfunction type        HPI:   6/10/24- here today to discuss his biopsy results.    1/8/18: Returns after long absence.  PSA >30.  Says he had a biopsy in 2016 with another urologist in town off of [facility].  Second one he had.  MRI report from 5/17 reassuring with 98gm prostate.  Reduced stream not on flomax/finasteride.  No abd/pelvic pain and no exac/rel factors.  No hematuria.  No urolithiasis.  3-4 cups coffee in AM.  Nocturia x3-4.    Allergies:  Patient has no known allergies.    Medications:  has a current medication list which includes the following prescription(s): albuterol, aspirin, atorvastatin, brimonidine 0.15 % opth drop, dorzolamide, repatha sureclick, finasteride, fluad quad 2022-23(65y up)(pf), fluticasone-salmeterol 250-50 mcg/dose, latanoprost, lisinopril, oxycodone-acetaminophen, prednisolone acetate, xarelto, sildenafil, tamsulosin, amlodipine, diazepam, and sulfamethoxazole-trimethoprim 800-160mg.    Review of Systems:  General: No fever, chills, fatigability, or weight loss.  Skin: No rashes, itching, or changes in color or texture of skin.  Chest: Denies THOMPSON, cyanosis, wheezing, cough, and sputum production.  Abdomen: Appetite fine. No weight loss. Denies diarrhea, abdominal pain, hematemesis, or blood in stool.  Musculoskeletal: No joint stiffness or swelling. Some back pain.  : As above.  All other review of systems negative.    PMH:   has a past medical history of Atherosclerosis of coronary artery (08/10/2022), Atherosclerotic PVD with intermittent claudication (08/16/2019), Atrial fibrillation, Back pain, Bilateral inguinal hernia, Calcified granuloma of lung (10/03/2017), Diverticulosis, Diverticulosis (10/28/2013), ED (erectile dysfunction), Elevated  PSA, Granuloma of liver, Hyperlipidemia, Hypertension, Liver mass (03/13/2020), Malignant neoplasm of prostate (6/10/2024), Moderate persistent asthma without complication (09/26/2017), Personal history of colonic polyps (2013), Pulmonary heart disease (08/10/2022), and Urinary tract infection.    PSH:   has a past surgical history that includes Hernia repair; Prostate biopsy; Aortography with serialography (N/A, 10/21/2019); Aortography with serialography (N/A, 11/14/2019); and Colonoscopy (N/A, 12/7/2022).    FamHx: family history includes Cancer in his brother, father, and mother; Cancer (age of onset: 64) in his sister; Prostate cancer in his brother.    SocHx:  reports that he quit smoking about 38 years ago. His smoking use included cigarettes. He started smoking about 64 years ago. He has a 51.2 pack-year smoking history. He has been exposed to tobacco smoke. He has never used smokeless tobacco. He reports current alcohol use of about 12.0 standard drinks of alcohol per week. He reports that he does not use drugs.      Physical Exam:  Vitals:    06/10/24 1454   BP: 131/72   Pulse: (!) 58       General: A&Ox3, no apparent distress, no deformities  Neck: No masses, normal thyroid  Lungs: normal inspiration, no use of accessory muscles  Heart: normal pulse, no arrhythmias  Abdomen: Soft, NT, ND  Skin: The skin is warm and dry. No jaundice.  Ext: No c/c/e.    Labs/Studies:   Uronav Gl7 81g 5/27/24  pnbx negative 65g  3/8/21  Reported negative biopsy 2016 outside facility  PSA 45.5 4/24  PSA 47.5 11/23  PSA 36 1/23  PSA 27 7/22  PSA 30.1 4/22  PSA 32.6 1/22  PSA 41.2 9/21  PSA 21.9 1/21  PSA 19.3 7/20  MRI PIRADS 5 88g 1/24  MRI PI-RADS 2 6/22  MRI PI-RADS 4 2/21    Impression/Plan:       1. 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  Patient is favoring radiation therapy, and will refer to Radiation Oncology  and follow up afterwards.  CT and bone scan are already scheduled    2. BPH- tamsulosin and finasteride appear to be working well, call when he needs a refill.  Patient may require surgical management in the future.  Call with any other issues in the meantime.    3. Erectile dysfunction- sildenafil 100 mg works well for the patient, call when he needs a refill.    I had a long discussion patient that in regards to risk stratification, treatment options.  These options include but not limited to external beam radiation therapy, brachytherapy, robotically assisted laparoscopic radical prostatectomy, hormonal therapy or cryotherapy.  Discussed the risks and benefits of all the above including surgery.  These include but not limited to damage to surrounding structures including the vascular structures, bladder, ureters and bowel.  We understanding 2nd bowel prep prior to the surgery and he will have Lu catheter for at least a week after the surgery.  The risk of heart attack, stroke, death, DVT and PE from surgical management.  Risk of incontinence, erectile dysfunction, need for further surgeries in regards to all these.  Risk of recurrence even after surgical management which might require hormonal therapy or radiation therapy.  I have given the patient adequate amounts of times to answer all of his questions, I have asked him to contact us if he has further questions in the near future.

## 2024-06-12 ENCOUNTER — TELEPHONE (OUTPATIENT)
Dept: UROLOGY | Facility: CLINIC | Age: 79
End: 2024-06-12
Payer: MEDICARE

## 2024-06-12 DIAGNOSIS — C61 MALIGNANT NEOPLASM OF PROSTATE: Primary | ICD-10-CM

## 2024-06-12 NOTE — TELEPHONE ENCOUNTER
----- Message from Pedro Baldwin sent at 6/12/2024  1:52 PM CDT -----  Regarding: Creat Order  Patient did not show up for lab appointment. Please reorder and reschedule.

## 2024-06-17 ENCOUNTER — HOSPITAL ENCOUNTER (OUTPATIENT)
Dept: RADIOLOGY | Facility: HOSPITAL | Age: 79
Discharge: HOME | End: 2024-06-17
Attending: UROLOGY
Payer: MEDICARE

## 2024-06-17 DIAGNOSIS — C61 MALIGNANT NEOPLASM OF PROSTATE: ICD-10-CM

## 2024-06-17 PROCEDURE — A9503 TC99M MEDRONATE: HCPCS | Mod: HCNC | Performed by: UROLOGY

## 2024-06-17 PROCEDURE — 78306 BONE IMAGING WHOLE BODY: CPT | Mod: TC,HCNC

## 2024-06-17 PROCEDURE — 78306 BONE IMAGING WHOLE BODY: CPT | Mod: 26,HCNC,, | Performed by: RADIOLOGY

## 2024-06-17 RX ADMIN — TECHNETIUM TC 99M MEDRONATE 25.8 MILLICURIE: 25 INJECTION, POWDER, FOR SOLUTION INTRAVENOUS at 08:06

## 2024-06-19 ENCOUNTER — PATIENT OUTREACH (OUTPATIENT)
Dept: PULMONOLOGY | Facility: CLINIC | Age: 79
End: 2024-06-19
Payer: MEDICARE

## 2024-06-24 ENCOUNTER — HOSPITAL ENCOUNTER (OUTPATIENT)
Dept: RADIOLOGY | Facility: HOSPITAL | Age: 79
Discharge: HOME | End: 2024-06-24
Attending: UROLOGY
Payer: MEDICARE

## 2024-06-24 DIAGNOSIS — C61 MALIGNANT NEOPLASM OF PROSTATE: ICD-10-CM

## 2024-06-24 PROCEDURE — 25500020 PHARM REV CODE 255: Mod: HCNC | Performed by: UROLOGY

## 2024-06-24 PROCEDURE — 74177 CT ABD & PELVIS W/CONTRAST: CPT | Mod: TC,HCNC

## 2024-06-24 PROCEDURE — A9698 NON-RAD CONTRAST MATERIALNOC: HCPCS | Mod: HCNC | Performed by: UROLOGY

## 2024-06-24 PROCEDURE — 74177 CT ABD & PELVIS W/CONTRAST: CPT | Mod: 26,HCNC,, | Performed by: RADIOLOGY

## 2024-06-24 RX ADMIN — IOHEXOL 1000 ML: 12 SOLUTION ORAL at 04:06

## 2024-06-24 RX ADMIN — IOHEXOL 100 ML: 350 INJECTION, SOLUTION INTRAVENOUS at 04:06

## 2024-06-25 ENCOUNTER — OFFICE VISIT (OUTPATIENT)
Dept: RADIATION ONCOLOGY | Facility: CLINIC | Age: 79
End: 2024-06-25
Payer: MEDICARE

## 2024-06-25 ENCOUNTER — TELEPHONE (OUTPATIENT)
Dept: UROLOGY | Facility: CLINIC | Age: 79
End: 2024-06-25
Payer: MEDICARE

## 2024-06-25 VITALS
OXYGEN SATURATION: 99 % | BODY MASS INDEX: 28.77 KG/M2 | RESPIRATION RATE: 18 BRPM | TEMPERATURE: 98 F | HEART RATE: 61 BPM | WEIGHT: 179 LBS | HEIGHT: 66 IN

## 2024-06-25 DIAGNOSIS — C61 MALIGNANT NEOPLASM OF PROSTATE: ICD-10-CM

## 2024-06-25 DIAGNOSIS — C61 MALIGNANT NEOPLASM OF PROSTATE: Primary | ICD-10-CM

## 2024-06-25 PROCEDURE — 1126F AMNT PAIN NOTED NONE PRSNT: CPT | Mod: HCNC,CPTII,S$GLB, | Performed by: SPECIALIST

## 2024-06-25 PROCEDURE — 1159F MED LIST DOCD IN RCRD: CPT | Mod: HCNC,CPTII,S$GLB, | Performed by: SPECIALIST

## 2024-06-25 PROCEDURE — 1101F PT FALLS ASSESS-DOCD LE1/YR: CPT | Mod: HCNC,CPTII,S$GLB, | Performed by: SPECIALIST

## 2024-06-25 PROCEDURE — 99999 PR PBB SHADOW E&M-EST. PATIENT-LVL V: CPT | Mod: PBBFAC,HCNC,, | Performed by: SPECIALIST

## 2024-06-25 PROCEDURE — 3288F FALL RISK ASSESSMENT DOCD: CPT | Mod: HCNC,CPTII,S$GLB, | Performed by: SPECIALIST

## 2024-06-25 PROCEDURE — 99205 OFFICE O/P NEW HI 60 MIN: CPT | Mod: HCNC,S$GLB,, | Performed by: SPECIALIST

## 2024-06-25 RX ORDER — BICALUTAMIDE 50 MG/1
50 TABLET, FILM COATED ORAL DAILY
Qty: 30 TABLET | Refills: 11 | Status: SHIPPED | OUTPATIENT
Start: 2024-06-25 | End: 2025-06-25

## 2024-06-25 NOTE — PROGRESS NOTES
[facility] / [facility] - Radiation Oncology Consult Note    HISTORY OF PRESENT ILLNESS:  78-year-old retired  on Proscar, Flomax, and Viagra,  whose PSA measured 21.9 on 01/15/2021.    TRUS guided biopsy on 03/08/2021 was benign, and he reports a prior biopsy in 2016 that was also benign        Prostate MRI on 06/03/2022 estimated a 81.4 cc gland and was read as BI-RADS 2, no focal suspicious abnormality    Prostate MRI on 01/11/2024, which I have reviewed today, estimates an 88 cc gland and describes a lenticular 3.3 x 1.0 x 1.6 cm T2 hypointense mass in the anterior transitional zone right greater than left from the midgland to the apex.  No capsular involvement, no extra capsular extension, and no other malignant findings.    TRUS guided and targeted biopsy on 05/27/2024 recovered adenocarcinoma in 7/8 cores from the right apex, which include some combination of 6 targeted cores and 2 sampling cores, with a Sully score of 3+4, with 10% pattern 4.      Bone scan on 06/17 24 showed no evidence of malignancy     CT of the abdomen and pelvis on 06/24/2024 showed prostatomegaly with a concentric wall thickening of the urinary bladder, without malignant findings    REVIEW OF SYSTEMS:  He reports no limitations in activities of daily living and remains fully independent.  He is taking Flomax once daily and Proscar for many years and has an AUA score of 30.  For me, he reports occasional weak stream with good emptying, occasional hesitancy, frequent daytime urgency with low volume urge incontinence once or twice a week not requiring use of a pad, daytime frequency every 1-2 hours, and nocturia 3-4 times per evening.  He does have toilet radar.  He denies hematuria or dysuria.  He has no new bone pain or GI complaints.  He has complete ED with an IIEF score of 17, no longer responding to Viagra.    PAST MEDICAL HISTORY:  Past Medical History:   Diagnosis Date    Atherosclerosis of  coronary artery 08/10/2022    Atherosclerotic PVD with intermittent claudication 08/16/2019    Atrial fibrillation     pt unaware of this    Back pain     Bilateral inguinal hernia     CT ABdomen/pelvis 3/9/2015 (care everywhere)---Bilateral inguinal hernias containing fat.      Calcified granuloma of lung 10/03/2017    CT CHest 3/26/2018---There are calcified lymph nodes in the mediastinum and left hilum.    CT chest 9/11/ 2017 Calcified left hilar and subcarinal granulomas are noted  ;Calcified granuloma noted within the posterior segment of the right hepatic lobe.    Diverticulosis     Diverticulosis 10/28/2013    Colonoscopy     ED (erectile dysfunction)     Elevated PSA     Granuloma of liver     ct chest 3/26/2018---There is a small calcified granuloma posteriorly in the right lobe of the liver    Hyperlipidemia     Hypertension     Liver mass 03/13/2020    New lesion compared to CT chest dating back to 2018. New liver lesion, dome of liver seen on CT 3/12/2020 and MRI abdomen 3/12/2020.  3/13/2020 referral to Oncology  PET CT   Resolved in 2020    Malignant neoplasm of prostate 6/10/2024    Moderate persistent asthma without complication 09/26/2017    Personal history of colonic polyps 2013    Pulmonary heart disease 08/10/2022    Urinary tract infection        PAST SURGICAL HISTORY:  Past Surgical History:   Procedure Laterality Date    AORTOGRAPHY WITH SERIALOGRAPHY N/A 10/21/2019    Procedure: AORTOGRAM, WITH RUNOFF;  Surgeon: Chencho Ross MD;  Location: [facility] CATH LAB;  Service: Cardiology;  Laterality: N/A;  pt requesting 9am arrival    AORTOGRAPHY WITH SERIALOGRAPHY N/A 11/14/2019    Procedure: AORTOGRAM, WITH RUNOFF;  Surgeon: Chencho Ross MD;  Location: [facility] CATH LAB;  Service: Cardiology;  Laterality: N/A;    COLONOSCOPY N/A 12/7/2022    Procedure: COLONOSCOPY;  Surgeon: Florinda Lindsey DO;  Location: [facility] ENDO;  Service: General;  Laterality: N/A;    HERNIA REPAIR      x2    PROSTATE BIOPSY       reported per patient around 2014 or 2015 which was reportedly negative       ALLERGIES:   Review of patient's allergies indicates:  No Known Allergies    MEDICATIONS:  Current Outpatient Medications   Medication Sig    albuterol (PROVENTIL/VENTOLIN HFA) 90 mcg/actuation inhaler INHALE 2 PUFFS INTO THE LUNGS EVERY 6 (SIX) HOURS AS NEEDED FOR WHEEZING OR SHORTNESS OF BREATH    amLODIPine (NORVASC) 10 MG tablet Take 1 tablet (10 mg total) by mouth once daily.    aspirin (ECOTRIN) 81 MG EC tablet TAKE 1 TABLET BY MOUTH EVERY DAY    atorvastatin (LIPITOR) 80 MG tablet Take 1 tablet (80 mg total) by mouth once daily.    brimonidine 0.15 % OPTH DROP (ALPHAGAN) 0.15 % ophthalmic solution Place 1 drop into both eyes 2 (two) times a day.    diazePAM (VALIUM) 5 MG tablet Take 1 tablet (5 mg total) by mouth once. for 1 dose (Patient not taking: Reported on 5/31/2024)    dorzolamide (TRUSOPT) 2 % ophthalmic solution INSTILL 1 DROP INTO BOTH EYES 2 TIMES A DAY    evolocumab (REPATHA SURECLICK) 140 mg/mL PnIj Inject 1 mL (140 mg total) into the skin every 14 (fourteen) days.    finasteride (PROSCAR) 5 mg tablet Take 1 tablet (5 mg total) by mouth once daily.    FLUAD QUAD 2022-23,65Y UP,,PF, 60 mcg (15 mcg x 4)/0.5 mL Syrg     fluticasone-salmeterol diskus inhaler 250-50 mcg Inhale 1 puff into the lungs 2 (two) times daily. Controller    latanoprost 0.005 % ophthalmic solution Place 1 drop into both eyes every evening.    lisinopriL (PRINIVIL,ZESTRIL) 20 MG tablet Take 1 tablet (20 mg total) by mouth once daily.    oxyCODONE-acetaminophen (PERCOCET) 5-325 mg per tablet Take 1 tablet by mouth every 4 (four) hours as needed for Pain.    prednisoLONE acetate (PRED FORTE) 1 % DrpS Place 1 drop into the right eye 4 (four) times daily.    rivaroxaban (XARELTO) 2.5 mg Tab Take 1 tablet (2.5 mg total) by mouth 2 (two) times daily with meals.    sildenafiL (VIAGRA) 100 MG tablet Take 1 tablet (100 mg total) by mouth daily as needed  for Erectile Dysfunction.    sulfamethoxazole-trimethoprim 800-160mg (BACTRIM DS) 800-160 mg Tab Take 1 tablet by mouth 2 (two) times daily. (Patient not taking: Reported on 6/10/2024)    tamsulosin (FLOMAX) 0.4 mg Cap Take 1 capsule (0.4 mg total) by mouth once daily.     No current facility-administered medications for this visit.       SOCIAL HISTORY:  Social History     Socioeconomic History    Marital status:    Occupational History     Employer: [employer]   Tobacco Use    Smoking status: Former     Current packs/day: 0.00     Average packs/day: 2.0 packs/day for 25.6 years (51.2 ttl pk-yrs)     Types: Cigarettes     Start date:      Quit date: 1985     Years since quittin.8     Passive exposure: Past    Smokeless tobacco: Never   Substance and Sexual Activity    Alcohol use: Yes     Alcohol/week: 12.0 standard drinks of alcohol     Types: 12 Cans of beer per week    Drug use: No    Sexual activity: Yes     Partners: Female     Social Determinants of Health     Financial Resource Strain: Low Risk  (2023)    Overall Financial Resource Strain (CARDIA)     Difficulty of Paying Living Expenses: Not very hard   Food Insecurity: No Food Insecurity (2023)    Hunger Vital Sign     Worried About Running Out of Food in the Last Year: Never true     Ran Out of Food in the Last Year: Never true   Transportation Needs: No Transportation Needs (2023)    PRAPARE - Transportation     Lack of Transportation (Medical): No     Lack of Transportation (Non-Medical): No   Physical Activity: Sufficiently Active (2023)    Exercise Vital Sign     Days of Exercise per Week: 7 days     Minutes of Exercise per Session: 30 min   Stress: No Stress Concern Present (2023)    [place] [employer] - Occupational Stress Questionnaire     Feeling of Stress : Not at all   Housing Stability: Low Risk  (2023)    Housing Stability Vital Sign     Unable to Pay for Housing  "in the Last Year: No     Number of Places Lived in the Last Year: 1     Unstable Housing in the Last Year: No       FAMILY HISTORY:  Family History   Problem Relation Name Age of Onset    Cancer Mother          Unknown primary    Cancer Father      Cancer Brother          prostate     Prostate cancer Brother      Cancer Sister  64        pancreatic     Diabetes Neg Hx      Heart disease Neg Hx      Kidney disease Neg Hx      Stroke Neg Hx      Hyperlipidemia Neg Hx      Hypertension Neg Hx           PHYSICAL EXAMINATION:       Vitals:    06/25/24 0830   BP: (!) (P) 147/61   Pulse: 61   Resp: 18   Temp: 97.8 °F (36.6 °C)   SpO2: 99%   Weight: 81.2 kg (179 lb 0.2 oz)   Height: 5' 6" (1.676 m)        General:  A&O x4, NAD, appears younger than his stated age, Scales exam table without assistance   Head:  PERRLA, EOM intact, CN II-XII intact  Lymphatics:  No cervical or supraclavicular adenopathy   Lungs: Clear to auscultation bilaterally   Heart regular:  rate and rhythm  Abdomen:  nontender and nondistended with positive bowel sounds no hepatomegaly  Pelvis:  No inguinal adenopathy, normal phallus scrotal contents, good rectal tone with a large symmetric soft a nodular prostate and no gross blood    KPS:  90    ASSESSMENT:  Young and healthy 78-year-old man with a newly diagnosed T1c N0 M0 high-risk adenocarcinoma of the prostate with a corrected PSA of 91 and an MRI showing a solitary parenchymal lesion without capsular abutment or any extra capsular disease.  Biopsy recovered 3+4 disease correlating with the MRI findings.  He has borderline urinary function on longstanding Flomax and Proscar, complete erectile dysfunction, and good disease insight    PLAN:  We had a very lengthy discussion regarding the nature of his diagnosis and appropriate management of high-risk prostate cancer in elderly but otherwise healthy individuals.  He has reviewed his surgical options with Dr. Copeland, and today we reviewed definitive " management with long course endocrine therapy and external beam radiotherapy.      We reviewed the logistics of pelvic radiotherapy, including the planning and treatment visits, as well as possible acute and chronic side effects in great detail.      We reviewed the rationale for inclusion of endocrine therapy, a 10-12% improvement in long-term biochemical control rates.  We also reviewed potential side effects including hot flashes, fatigue, loss of muscle mass, loss of libido and erectile function, and modestly increased risk of future cardiac events.      He listened intensely and asked many intelligent questions.  He voices a desire to proceed as described.  Informed written consent was obtained and he was given the original after scanning into the EMR.      I have instructed him to begin prescription for Casodex which I have entered today to [facility] in [place].  He will then return to Dr. Copeland clinic for initiation of LHRH agonist therapy, with the recommended duration of 18 months.  Two months after his 1st shot, he will return for simulation and radiation planning, beginning treatment about 1 week later.  I anticipate delivering 70 Gy in 28 fractions inclusive of the pelvic lymphatics      I spent approximately 60 minutes reviewing the available records and evaluating the patient, out of which over 50% of the time was spent face to face with the patient in counseling and coordinating this patient's care.

## 2024-07-08 ENCOUNTER — CLINICAL SUPPORT (OUTPATIENT)
Dept: UROLOGY | Facility: CLINIC | Age: 79
End: 2024-07-08
Payer: MEDICARE

## 2024-07-08 DIAGNOSIS — C61 MALIGNANT NEOPLASM OF PROSTATE: Primary | ICD-10-CM

## 2024-07-08 PROCEDURE — 99999 PR PBB SHADOW E&M-EST. PATIENT-LVL II: CPT | Mod: PBBFAC,HCNC,,

## 2024-07-08 PROCEDURE — 96402 CHEMO HORMON ANTINEOPL SQ/IM: CPT | Mod: HCNC,S$GLB,, | Performed by: UROLOGY

## 2024-07-08 NOTE — PROGRESS NOTES
.Lupron 45 mg administered IM to right dorsalgluteal using aseptic technique. Pt tolerated procedure well. Patient agreed to wait 15 minutes after injection in the clinic and to report any adverse reactions.

## 2024-07-09 ENCOUNTER — OFFICE VISIT (OUTPATIENT)
Dept: PULMONOLOGY | Facility: CLINIC | Age: 79
End: 2024-07-09
Payer: MEDICARE

## 2024-07-09 VITALS
OXYGEN SATURATION: 98 % | SYSTOLIC BLOOD PRESSURE: 138 MMHG | HEART RATE: 60 BPM | HEIGHT: 66 IN | RESPIRATION RATE: 16 BRPM | BODY MASS INDEX: 29.23 KG/M2 | DIASTOLIC BLOOD PRESSURE: 42 MMHG | WEIGHT: 181.88 LBS

## 2024-07-09 DIAGNOSIS — J45.20 ASTHMA, MILD INTERMITTENT, WELL-CONTROLLED: ICD-10-CM

## 2024-07-09 DIAGNOSIS — J47.9 BRONCHIECTASIS, UNCOMPLICATED: ICD-10-CM

## 2024-07-09 DIAGNOSIS — R91.8 MULTIPLE PULMONARY NODULES: Primary | ICD-10-CM

## 2024-07-09 PROCEDURE — 1160F RVW MEDS BY RX/DR IN RCRD: CPT | Mod: HCNC,CPTII,S$GLB, | Performed by: HOSPITALIST

## 2024-07-09 PROCEDURE — 3075F SYST BP GE 130 - 139MM HG: CPT | Mod: HCNC,CPTII,S$GLB, | Performed by: HOSPITALIST

## 2024-07-09 PROCEDURE — 3078F DIAST BP <80 MM HG: CPT | Mod: HCNC,CPTII,S$GLB, | Performed by: HOSPITALIST

## 2024-07-09 PROCEDURE — 99213 OFFICE O/P EST LOW 20 MIN: CPT | Mod: HCNC,S$GLB,, | Performed by: HOSPITALIST

## 2024-07-09 PROCEDURE — 99999 PR PBB SHADOW E&M-EST. PATIENT-LVL V: CPT | Mod: PBBFAC,HCNC,, | Performed by: HOSPITALIST

## 2024-07-09 PROCEDURE — 1159F MED LIST DOCD IN RCRD: CPT | Mod: HCNC,CPTII,S$GLB, | Performed by: HOSPITALIST

## 2024-07-09 PROCEDURE — 3288F FALL RISK ASSESSMENT DOCD: CPT | Mod: HCNC,CPTII,S$GLB, | Performed by: HOSPITALIST

## 2024-07-09 PROCEDURE — 1101F PT FALLS ASSESS-DOCD LE1/YR: CPT | Mod: HCNC,CPTII,S$GLB, | Performed by: HOSPITALIST

## 2024-07-09 RX ORDER — FLUTICASONE PROPIONATE AND SALMETEROL 250; 50 UG/1; UG/1
1 POWDER RESPIRATORY (INHALATION) 2 TIMES DAILY
Qty: 60 EACH | Refills: 11 | Status: SHIPPED | OUTPATIENT
Start: 2024-07-09 | End: 2025-07-09

## 2024-07-09 NOTE — PROGRESS NOTES
Subjective:      Patient ID: Alex Milner is a 78 y.o. male.    Chief Complaint: Asthma, pulm nodules    Interval Hx 7/9/24:    Mr. Milner presents to Pulmonary clinic for follow up asthma and pulmonary nodules. He was last seen 1/2024- encouraged to increase Advair use to BID. Chart reviewed- he has recently been diagnosed with prostate cancer and is proceeding with radiation.    In good spirits. No respiratory complaints at this time, hasn't had to use rescue inhaler, using advair more as needed when he remembers. Still able to walk his yorkiepoo outside a couple blocks each day without issue.     Pulmonary Interventions:  Advair- just using when he thinks about it  Albuterol- not having to use at all    HPI 1/9/2024:     78 year old male with history of HTN, HLD, PVD, asthma and pulmonary nodules (followed by Dr. Hernandez) who presents to clinic today for asthma follow up. He was last seen 10/2023 by Dr. Hernandez- at that time was poorly compliant to LABA-ICS, FeNO trending up with decreased FEV1 and FVC- plan was made for close follow up with FeNO.      Mr. Milner states that he is doing well, no complaints of feeling out of breath or wheezing, coughed a little yesterday with the weather change. No ED/UC visits since last seen. He has been trying to be more compliant with ics-laba.     Pertinent Work Up:  FeNO 1/9/24- 35ppb  FeNO 10/2023- 57ppb  [place] 10/2023- Mild obstruction with FEV1 82.4%, significant bronchodilator response  Pulmonary Nodules- stable 9/2022 since 3/2020     Pulmonary Interventions:  Albuterol HFA- never using  Advair 250- Using once per day, every now and then twice per day     Smoking hx: Quit 1985, 51 pack years    Review of Systems   Respiratory:  Negative for cough, sputum production, wheezing and dyspnea on extertion.      Objective:     Physical Exam   Constitutional: He is oriented to person, place, and time. He appears well-developed and well-nourished. He is obese.  "  Cardiovascular: Normal rate and regular rhythm.   Pulmonary/Chest: Normal expansion, effort normal and breath sounds normal.   Neurological: He is alert and oriented to person, place, and time.   Skin: Skin is warm and dry.     Personal Diagnostic Review  As Above      6/25/2024     8:30 AM 6/10/2024     2:54 PM 5/31/2024     2:49 PM 5/27/2024    12:56 PM 4/18/2024    10:13 AM 4/10/2024     1:28 PM 1/9/2024     1:17 PM   Pulmonary Function Tests   SpO2 99 %  99 %   99 % 93 %   Height 5' 6" (1.676 m) 5' 6" (1.676 m) 5' 6" (1.676 m) 5' 6" (1.676 m)  5' 6" (1.676 m) 5' 6" (1.676 m)   Weight 81.2 kg (179 lb 0.2 oz) 82.2 kg (181 lb 3.5 oz) 82.2 kg (181 lb 3.5 oz) 81.1 kg (178 lb 12.7 oz) 81.5 kg (179 lb 10.8 oz) 81.5 kg (179 lb 10.8 oz) 80.3 kg (177 lb 0.5 oz)   BMI (Calculated) 28.9 29.3 29.3 28.9  29 28.6        Assessment:     No diagnosis found.     Outpatient Encounter Medications as of 7/9/2024   Medication Sig Dispense Refill    albuterol (PROVENTIL/VENTOLIN HFA) 90 mcg/actuation inhaler INHALE 2 PUFFS INTO THE LUNGS EVERY 6 (SIX) HOURS AS NEEDED FOR WHEEZING OR SHORTNESS OF BREATH 18 g 11    amLODIPine (NORVASC) 10 MG tablet Take 1 tablet (10 mg total) by mouth once daily. 30 tablet 11    aspirin (ECOTRIN) 81 MG EC tablet TAKE 1 TABLET BY MOUTH EVERY DAY 90 tablet 2    atorvastatin (LIPITOR) 80 MG tablet Take 1 tablet (80 mg total) by mouth once daily. 90 tablet 3    bicalutamide (CASODEX) 50 MG Tab Take 1 tablet (50 mg total) by mouth once daily. 30 tablet 11    brimonidine 0.15 % OPTH DROP (ALPHAGAN) 0.15 % ophthalmic solution Place 1 drop into both eyes 2 (two) times a day. 15 mL 4    diazePAM (VALIUM) 5 MG tablet Take 1 tablet (5 mg total) by mouth once. for 1 dose (Patient not taking: Reported on 5/31/2024) 1 tablet 0    dorzolamide (TRUSOPT) 2 % ophthalmic solution INSTILL 1 DROP INTO BOTH EYES 2 TIMES A DAY 30 mL 4    evolocumab (REPATHA SURECLICK) 140 mg/mL PnIrish Inject 1 mL (140 mg total) into the skin " every 14 (fourteen) days. 2 each 5    finasteride (PROSCAR) 5 mg tablet Take 1 tablet (5 mg total) by mouth once daily. 90 tablet 3    FLUAD QUAD 2022-23,65Y UP,,PF, 60 mcg (15 mcg x 4)/0.5 mL Syrg       fluticasone-salmeterol diskus inhaler 250-50 mcg Inhale 1 puff into the lungs 2 (two) times daily. Controller 60 each 11    latanoprost 0.005 % ophthalmic solution Place 1 drop into both eyes every evening. 2 mL 11    lisinopriL (PRINIVIL,ZESTRIL) 20 MG tablet Take 1 tablet (20 mg total) by mouth once daily. 90 tablet 1    oxyCODONE-acetaminophen (PERCOCET) 5-325 mg per tablet Take 1 tablet by mouth every 4 (four) hours as needed for Pain. 10 tablet 0    prednisoLONE acetate (PRED FORTE) 1 % DrpS Place 1 drop into the right eye 4 (four) times daily. 5 mL 1    rivaroxaban (XARELTO) 2.5 mg Tab Take 1 tablet (2.5 mg total) by mouth 2 (two) times daily with meals. 60 tablet 5    sildenafiL (VIAGRA) 100 MG tablet Take 1 tablet (100 mg total) by mouth daily as needed for Erectile Dysfunction. 45 tablet 5    sulfamethoxazole-trimethoprim 800-160mg (BACTRIM DS) 800-160 mg Tab Take 1 tablet by mouth 2 (two) times daily. 10 tablet 0    tamsulosin (FLOMAX) 0.4 mg Cap Take 1 capsule (0.4 mg total) by mouth once daily. 90 capsule 3     Facility-Administered Encounter Medications as of 7/9/2024   Medication Dose Route Frequency Provider Last Rate Last Admin    [COMPLETED] leuprolide acetate (6 month) injection 45 mg  45 mg Intramuscular 1 time in Clinic/HOD    45 mg at 07/08/24 0922     No orders of the defined types were placed in this encounter.        Plan:     Problem List Items Addressed This Visit          Pulmonary    Multiple pulmonary nodules - Primary     - CXR at follow up in 6 months         Relevant Orders    X-Ray Chest PA And Lateral    Asthma, well controlled     - continue advair, albuterol as needed         Relevant Medications    fluticasone-salmeterol diskus inhaler 250-50 mcg    Bronchiectasis, uncomplicated     Relevant Medications    fluticasone-salmeterol diskus inhaler 250-50 mcg     Plan discussed with pt and he expressed understanding, all questions answered. RTC 6 months or sooner as needed.

## 2024-08-15 ENCOUNTER — TELEPHONE (OUTPATIENT)
Dept: OPHTHALMOLOGY | Facility: CLINIC | Age: 79
End: 2024-08-15
Payer: MEDICARE

## 2024-08-15 ENCOUNTER — PATIENT OUTREACH (OUTPATIENT)
Dept: PULMONOLOGY | Facility: CLINIC | Age: 79
End: 2024-08-15
Payer: MEDICARE

## 2024-08-15 NOTE — TELEPHONE ENCOUNTER
----- Message from Brenda Milner sent at 8/15/2024  9:22 AM CDT -----  Alex is needing a call back to reschedule a missed appt from 8/12 for pressure check pt states he can not wait months would like to speak with nurse about getting worked in . Please give him a call back  at [phone].

## 2024-08-16 ENCOUNTER — OFFICE VISIT (OUTPATIENT)
Dept: OPHTHALMOLOGY | Facility: CLINIC | Age: 79
End: 2024-08-16
Payer: MEDICARE

## 2024-08-16 DIAGNOSIS — H40.1121 PRIMARY OPEN ANGLE GLAUCOMA (POAG) OF LEFT EYE, MILD STAGE: Primary | ICD-10-CM

## 2024-08-16 DIAGNOSIS — H40.1113 PRIMARY OPEN ANGLE GLAUCOMA (POAG) OF RIGHT EYE, SEVERE STAGE: ICD-10-CM

## 2024-08-16 PROCEDURE — 99999 PR PBB SHADOW E&M-EST. PATIENT-LVL III: CPT | Mod: PBBFAC,HCNC,, | Performed by: OPTOMETRIST

## 2024-08-16 RX ORDER — LATANOPROST 50 UG/ML
1 SOLUTION/ DROPS OPHTHALMIC NIGHTLY
Qty: 2.5 ML | Refills: 11 | Status: SHIPPED | OUTPATIENT
Start: 2024-08-16 | End: 2025-08-16

## 2024-08-16 NOTE — PROGRESS NOTES
HPI     Glaucoma            Comments: Medication eye drops: none  Last HVF: 10/06/23  Last gOCT: 8/16/2024  Last SDP: none                Last edited by Beena Levy MA on 8/16/2024  7:41 AM.            Assessment /Plan     For exam results, see Encounter Report.    Primary open angle glaucoma (POAG) of left eye, mild stage  -     Posterior Segment OCT Optic Nerve- Both eyes    Primary open angle glaucoma (POAG) of right eye, severe stage  -     Posterior Segment OCT Optic Nerve- Both eyes  -     latanoprost 0.005 % ophthalmic solution; Place 1 drop into both eyes every evening.  Dispense: 2.5 mL; Refill: 11    IOP elevated today OS>OD  Some progression on NFL scan OD, stable OS  Stable GCL compared to previous  Resume Latanoprost qhs OU  Monitor 1 month      RTC 1 month with [facility] for IOP check and to discuss surgery OS or PRN  Discussed above and all questions were answered.

## 2024-09-09 ENCOUNTER — HOSPITAL ENCOUNTER (OUTPATIENT)
Dept: RADIOLOGY | Facility: HOSPITAL | Age: 79
Discharge: HOME | End: 2024-09-09
Attending: SPECIALIST
Payer: MEDICARE

## 2024-09-09 ENCOUNTER — HOSPITAL ENCOUNTER (OUTPATIENT)
Dept: RADIATION THERAPY | Facility: HOSPITAL | Age: 79
Discharge: HOME | End: 2024-09-09
Attending: SPECIALIST
Payer: MEDICARE

## 2024-09-09 PROCEDURE — 77334 RADIATION TREATMENT AID(S): CPT | Mod: 26,HCNC,, | Performed by: SPECIALIST

## 2024-09-09 PROCEDURE — 77014 HC CT GUIDANCE RADIATION THERAPY FLDS PLACEMENT: CPT | Mod: TC,HCNC | Performed by: SPECIALIST

## 2024-09-09 PROCEDURE — 77263 THER RADIOLOGY TX PLNG CPLX: CPT | Mod: HCNC,,, | Performed by: SPECIALIST

## 2024-09-09 PROCEDURE — 77334 RADIATION TREATMENT AID(S): CPT | Mod: TC,HCNC | Performed by: SPECIALIST

## 2024-09-09 PROCEDURE — 77014 PR  CT GUIDANCE PLACEMENT RAD THERAPY FIELDS: CPT | Mod: 26,HCNC,, | Performed by: SPECIALIST

## 2024-09-19 ENCOUNTER — OFFICE VISIT (OUTPATIENT)
Dept: OPHTHALMOLOGY | Facility: CLINIC | Age: 79
End: 2024-09-19
Payer: MEDICARE

## 2024-09-19 DIAGNOSIS — H40.1113 PRIMARY OPEN ANGLE GLAUCOMA (POAG) OF RIGHT EYE, SEVERE STAGE: ICD-10-CM

## 2024-09-19 DIAGNOSIS — H25.12 NUCLEAR SCLEROSIS OF LEFT EYE: ICD-10-CM

## 2024-09-19 DIAGNOSIS — Z96.1 PSEUDOPHAKIA OF RIGHT EYE: ICD-10-CM

## 2024-09-19 DIAGNOSIS — H40.1121 PRIMARY OPEN ANGLE GLAUCOMA (POAG) OF LEFT EYE, MILD STAGE: Primary | ICD-10-CM

## 2024-09-19 PROCEDURE — 1159F MED LIST DOCD IN RCRD: CPT | Mod: HCNC,CPTII,S$GLB, | Performed by: STUDENT IN AN ORGANIZED HEALTH CARE EDUCATION/TRAINING PROGRAM

## 2024-09-19 PROCEDURE — 99999 PR PBB SHADOW E&M-EST. PATIENT-LVL I: CPT | Mod: PBBFAC,HCNC,, | Performed by: STUDENT IN AN ORGANIZED HEALTH CARE EDUCATION/TRAINING PROGRAM

## 2024-09-19 PROCEDURE — 99214 OFFICE O/P EST MOD 30 MIN: CPT | Mod: HCNC,S$GLB,, | Performed by: STUDENT IN AN ORGANIZED HEALTH CARE EDUCATION/TRAINING PROGRAM

## 2024-09-19 PROCEDURE — 1160F RVW MEDS BY RX/DR IN RCRD: CPT | Mod: HCNC,CPTII,S$GLB, | Performed by: STUDENT IN AN ORGANIZED HEALTH CARE EDUCATION/TRAINING PROGRAM

## 2024-09-19 PROCEDURE — G2211 COMPLEX E/M VISIT ADD ON: HCPCS | Mod: HCNC,S$GLB,, | Performed by: STUDENT IN AN ORGANIZED HEALTH CARE EDUCATION/TRAINING PROGRAM

## 2024-09-19 NOTE — PROGRESS NOTES
HPI     Glaucoma     Additional comments: pt reports for 1 month for iop check and to discuss   surgery OS or PRN. 100% compliant with gtts. VA stable. No pain or   discomfort.            Comments    1. POAG Severe OD, Mild OS  2. PC IOL OD DESHAUN Goniotomy 11/16/23  3. NS OS    Latanoprost qhs OU          Last edited by Roberto Milner on 9/19/2024  8:49 AM.            Assessment /Plan     For exam results, see Encounter Report.    Primary open angle glaucoma (POAG) of left eye, mild stage  Primary open angle glaucoma (POAG) of right eye, severe stage- IOP controlled with no evidence of progression. Continue current treatment. Reviewed importance of continued compliance with treatment and follow up.   Continue:  Latanoprost QHS OU     *Will plan for KDB w/ CEIOL OS  if patient is approved by oncologist as he is starting radiation for prostate cancer, if patient is not cleared for cataract surgery will proceed with SLT instead, patient voices understanding and will talk with his oncologist on 9/26/2024    Nuclear sclerosis of left eye- see above     Pseudophakia of right eye- Healed well      Return to clinic in 1M DFE W/ 2nd eye cat eval OS

## 2024-09-24 DIAGNOSIS — I70.219 ATHEROSCLEROTIC PVD WITH INTERMITTENT CLAUDICATION: ICD-10-CM

## 2024-09-24 DIAGNOSIS — I70.0 AORTIC ATHEROSCLEROSIS: Primary | ICD-10-CM

## 2024-09-24 DIAGNOSIS — I27.9 PULMONARY HEART DISEASE: ICD-10-CM

## 2024-09-25 ENCOUNTER — OFFICE VISIT (OUTPATIENT)
Dept: CARDIOLOGY | Facility: CLINIC | Age: 79
End: 2024-09-25
Payer: MEDICARE

## 2024-09-25 ENCOUNTER — HOSPITAL ENCOUNTER (OUTPATIENT)
Dept: CARDIOLOGY | Facility: HOSPITAL | Age: 79
Discharge: HOME | End: 2024-09-25
Attending: INTERNAL MEDICINE
Payer: MEDICARE

## 2024-09-25 VITALS
WEIGHT: 182.75 LBS | HEART RATE: 62 BPM | BODY MASS INDEX: 29.5 KG/M2 | DIASTOLIC BLOOD PRESSURE: 60 MMHG | SYSTOLIC BLOOD PRESSURE: 140 MMHG | OXYGEN SATURATION: 97 %

## 2024-09-25 DIAGNOSIS — I70.0 AORTIC ATHEROSCLEROSIS: ICD-10-CM

## 2024-09-25 DIAGNOSIS — R09.89 BRUIT OF LEFT CAROTID ARTERY: ICD-10-CM

## 2024-09-25 DIAGNOSIS — I65.29 STENOSIS OF CAROTID ARTERY, UNSPECIFIED LATERALITY: ICD-10-CM

## 2024-09-25 DIAGNOSIS — I10 PRIMARY HYPERTENSION: Chronic | ICD-10-CM

## 2024-09-25 DIAGNOSIS — I70.219 ATHEROSCLEROTIC PVD WITH INTERMITTENT CLAUDICATION: ICD-10-CM

## 2024-09-25 DIAGNOSIS — I27.9 PULMONARY HEART DISEASE: ICD-10-CM

## 2024-09-25 DIAGNOSIS — J47.9 BRONCHIECTASIS, UNCOMPLICATED: ICD-10-CM

## 2024-09-25 DIAGNOSIS — E78.2 MIXED HYPERLIPIDEMIA: ICD-10-CM

## 2024-09-25 DIAGNOSIS — I70.219 ATHEROSCLEROTIC PVD WITH INTERMITTENT CLAUDICATION: Primary | ICD-10-CM

## 2024-09-25 DIAGNOSIS — I25.10 ATHEROSCLEROSIS OF CORONARY ARTERY, UNSPECIFIED VESSEL OR LESION TYPE, UNSPECIFIED WHETHER ANGINA PRESENT, UNSPECIFIED WHETHER NATIVE OR TRANSPLANTED HEART: ICD-10-CM

## 2024-09-25 DIAGNOSIS — C61 MALIGNANT NEOPLASM OF PROSTATE: ICD-10-CM

## 2024-09-25 LAB
OHS QRS DURATION: 88 MS
OHS QTC CALCULATION: 430 MS

## 2024-09-25 PROCEDURE — 3078F DIAST BP <80 MM HG: CPT | Mod: HCNC,CPTII,S$GLB, | Performed by: INTERNAL MEDICINE

## 2024-09-25 PROCEDURE — 99214 OFFICE O/P EST MOD 30 MIN: CPT | Mod: HCNC,S$GLB,, | Performed by: INTERNAL MEDICINE

## 2024-09-25 PROCEDURE — 3075F SYST BP GE 130 - 139MM HG: CPT | Mod: HCNC,CPTII,S$GLB, | Performed by: INTERNAL MEDICINE

## 2024-09-25 PROCEDURE — 99999 PR PBB SHADOW E&M-EST. PATIENT-LVL IV: CPT | Mod: PBBFAC,HCNC,, | Performed by: INTERNAL MEDICINE

## 2024-09-25 PROCEDURE — 1101F PT FALLS ASSESS-DOCD LE1/YR: CPT | Mod: HCNC,CPTII,S$GLB, | Performed by: INTERNAL MEDICINE

## 2024-09-25 PROCEDURE — 93005 ELECTROCARDIOGRAM TRACING: CPT | Mod: HCNC

## 2024-09-25 PROCEDURE — 1126F AMNT PAIN NOTED NONE PRSNT: CPT | Mod: HCNC,CPTII,S$GLB, | Performed by: INTERNAL MEDICINE

## 2024-09-25 PROCEDURE — 93010 ELECTROCARDIOGRAM REPORT: CPT | Mod: HCNC,,, | Performed by: INTERNAL MEDICINE

## 2024-09-25 PROCEDURE — 1159F MED LIST DOCD IN RCRD: CPT | Mod: HCNC,CPTII,S$GLB, | Performed by: INTERNAL MEDICINE

## 2024-09-25 PROCEDURE — 3288F FALL RISK ASSESSMENT DOCD: CPT | Mod: HCNC,CPTII,S$GLB, | Performed by: INTERNAL MEDICINE

## 2024-09-25 RX ORDER — LEUPROLIDE ACETATE 45 MG
KIT INTRAMUSCULAR
COMMUNITY
Start: 2024-07-08

## 2024-09-25 NOTE — PROGRESS NOTES
Subjective:   Patient ID:  Alex Milner is a 79 y.o. male who presents for follow up of Follow-up      HPI  77 yo male, 6 months f/u  PMH PAD, h/o AO showed left infrapopliteal . Distal left SFA PTCA and  AO right SFA PTCA by Dr. Ross,  HTN, HLP, PVD, obesity, ETOH use PAULA on CPAP. No smoking  C/o bilateral thigh and buttock pain with walking and some time long time sitting, and back pain at night,. No calf pain   left FREDY 0.7 and right 1.1. LE arterial US showed left infrapopliteal stenosis and occluded AT      Chest CT wo contrast showed mild aorta calcification  No chest pain sob, dizziness, palpitation,  Resolved claudication after angiogram. Walks 2 miles a day. Only knee pain, No muscle weakness, pain and numbness  No smoking/drinking  Med compliance     05/2021 visit  C/o both thigh muscle tightness at rest and night. Some sore on both thighs at walking.  Some lower back pain at night  No chest pain dyspnea palpitation, faint and dizziness  Today EKG NSR HR 54 bpm, no acute ctt change      visit  Started taking Lipitor and zetia daily about few months ago. Occasionally muscle pain at walking. No resting pain.   Some lower back pain . No chest pain dyspnea dizziness and faint     Interval history  No leg pain with a lot of walking. No chest pain dyspnea dizziness dan leg swelling  Med compliance and no active bleeding  ekg NSR and BP controlled. , Copay of repatha was high     9/7/2022  Has non limiting claudication worse on left than right . He has no rest pain he has no discoloration. He is taking repatha. Has no chf symptoms he is compliant with meds and diet had fredy with exercise symptomatic recovered fast to baseline.  He is on asa and cilostazol.no bleeding or bruising .    He is on statins and [id number] for his hlp.        9/27/2023   Status quo with pvd and symptoms unchanged from last year visit he is able to get his lifestyle addressed not  smoking drinks beer denies any cardiac symptoms tolerated asa and pletal .tolerated [id number]     9/25/2024  Has prostate cancer follow urology. Ha sno new complaints has gianed weight eats peanut butter jelly sandwiches. EKG non specific changes he is very active physically  he gets leg fatigue and soreness in calgf after walking 2 blocks has to lean opn cart at [employer] to get through store.  Past Medical History:   Diagnosis Date    Atherosclerosis of coronary artery 08/10/2022    Atherosclerotic PVD with intermittent claudication 08/16/2019    Atrial fibrillation     pt unaware of this    Back pain     Bilateral inguinal hernia     CT ABdomen/pelvis 3/9/2015 (care everywhere)---Bilateral inguinal hernias containing fat.      Calcified granuloma of lung 10/03/2017    CT CHest 3/26/2018---There are calcified lymph nodes in the mediastinum and left hilum.    CT chest 9/11/ 2017 Calcified left hilar and subcarinal granulomas are noted  ;Calcified granuloma noted within the posterior segment of the right hepatic lobe.    Diverticulosis     Diverticulosis 10/28/2013    Colonoscopy     ED (erectile dysfunction)     Elevated PSA     Granuloma of liver     ct chest 3/26/2018---There is a small calcified granuloma posteriorly in the right lobe of the liver    Hyperlipidemia     Hypertension     Liver mass 03/13/2020    New lesion compared to CT chest dating back to 2018. New liver lesion, dome of liver seen on CT 3/12/2020 and MRI abdomen 3/12/2020.  3/13/2020 referral to Oncology  PET CT   Resolved in 2020    Malignant neoplasm of prostate 6/10/2024    Moderate persistent asthma without complication 09/26/2017    Personal history of colonic polyps 2013    Pulmonary heart disease 08/10/2022    Urinary tract infection        Past Surgical History:   Procedure Laterality Date    AORTOGRAPHY WITH SERIALOGRAPHY N/A 10/21/2019    Procedure: AORTOGRAM, WITH RUNOFF;  Surgeon: Chencho Ross MD;  Location: [facility] CATH LAB;   Service: Cardiology;  Laterality: N/A;  pt requesting 9am arrival    AORTOGRAPHY WITH SERIALOGRAPHY N/A 2019    Procedure: AORTOGRAM, WITH RUNOFF;  Surgeon: Chencho Ross MD;  Location: [facility] CATH LAB;  Service: Cardiology;  Laterality: N/A;    COLONOSCOPY N/A 2022    Procedure: COLONOSCOPY;  Surgeon: Florinda Lindsey DO;  Location: [facility] ENDO;  Service: General;  Laterality: N/A;    HERNIA REPAIR      x2    PROSTATE BIOPSY      reported per patient around  or  which was reportedly negative       Social History     Tobacco Use    Smoking status: Former     Current packs/day: 0.00     Average packs/day: 2.0 packs/day for 25.6 years (51.2 ttl pk-yrs)     Types: Cigarettes     Start date:      Quit date: 1985     Years since quittin.1     Passive exposure: Past    Smokeless tobacco: Never   Substance Use Topics    Alcohol use: Yes     Alcohol/week: 12.0 standard drinks of alcohol     Types: 12 Cans of beer per week    Drug use: No       Family History   Problem Relation Name Age of Onset    Cancer Mother          Unknown primary    Cancer Father      Cancer Brother          prostate     Prostate cancer Brother      Cancer Sister  64        pancreatic     Diabetes Neg Hx      Heart disease Neg Hx      Kidney disease Neg Hx      Stroke Neg Hx      Hyperlipidemia Neg Hx      Hypertension Neg Hx         Current Outpatient Medications   Medication Sig    albuterol (PROVENTIL/VENTOLIN HFA) 90 mcg/actuation inhaler INHALE 2 PUFFS INTO THE LUNGS EVERY 6 (SIX) HOURS AS NEEDED FOR WHEEZING OR SHORTNESS OF BREATH    amLODIPine (NORVASC) 10 MG tablet Take 1 tablet (10 mg total) by mouth once daily.    atorvastatin (LIPITOR) 80 MG tablet Take 1 tablet (80 mg total) by mouth once daily. (Patient taking differently: Take 80 mg by mouth once daily. Takes 2-3 times a week)    bicalutamide (CASODEX) 50 MG Tab Take 1 tablet (50 mg total) by mouth once daily.    brimonidine 0.15 % OPTH DROP (ALPHAGAN)  0.15 % ophthalmic solution Place 1 drop into both eyes 2 (two) times a day.    finasteride (PROSCAR) 5 mg tablet Take 1 tablet (5 mg total) by mouth once daily.    fluticasone-salmeterol diskus inhaler 250-50 mcg Inhale 1 puff into the lungs 2 (two) times daily. Controller    lisinopriL (PRINIVIL,ZESTRIL) 20 MG tablet Take 1 tablet (20 mg total) by mouth once daily.    LUPRON DEPOT, 6 MONTH, 45 mg SyKt injection     tamsulosin (FLOMAX) 0.4 mg Cap Take 1 capsule (0.4 mg total) by mouth once daily.    aspirin (ECOTRIN) 81 MG EC tablet TAKE 1 TABLET BY MOUTH EVERY DAY (Patient not taking: Reported on 9/25/2024)    diazePAM (VALIUM) 5 MG tablet Take 1 tablet (5 mg total) by mouth once. for 1 dose    dorzolamide (TRUSOPT) 2 % ophthalmic solution INSTILL 1 DROP INTO BOTH EYES 2 TIMES A DAY (Patient not taking: Reported on 9/25/2024)    evolocumab (REPATHA SURECLICK) 140 mg/mL PnIj Inject 1 mL (140 mg total) into the skin every 14 (fourteen) days. (Patient not taking: Reported on 9/25/2024)    FLUAD QUAD 2022-23,65Y UP,,PF, 60 mcg (15 mcg x 4)/0.5 mL Syrg     latanoprost 0.005 % ophthalmic solution Place 1 drop into both eyes every evening. (Patient not taking: Reported on 9/25/2024)    oxyCODONE-acetaminophen (PERCOCET) 5-325 mg per tablet Take 1 tablet by mouth every 4 (four) hours as needed for Pain. (Patient not taking: Reported on 9/25/2024)    prednisoLONE acetate (PRED FORTE) 1 % DrpS Place 1 drop into the right eye 4 (four) times daily. (Patient not taking: Reported on 9/25/2024)    rivaroxaban (XARELTO) 2.5 mg Tab Take 1 tablet (2.5 mg total) by mouth 2 (two) times daily with meals. (Patient not taking: Reported on 9/25/2024)    sildenafiL (VIAGRA) 100 MG tablet Take 1 tablet (100 mg total) by mouth daily as needed for Erectile Dysfunction. (Patient not taking: Reported on 9/25/2024)    sulfamethoxazole-trimethoprim 800-160mg (BACTRIM DS) 800-160 mg Tab Take 1 tablet by mouth 2 (two) times daily. (Patient not  taking: Reported on 9/25/2024)     No current facility-administered medications for this visit.     Current Outpatient Medications on File Prior to Visit   Medication Sig    albuterol (PROVENTIL/VENTOLIN HFA) 90 mcg/actuation inhaler INHALE 2 PUFFS INTO THE LUNGS EVERY 6 (SIX) HOURS AS NEEDED FOR WHEEZING OR SHORTNESS OF BREATH    amLODIPine (NORVASC) 10 MG tablet Take 1 tablet (10 mg total) by mouth once daily.    atorvastatin (LIPITOR) 80 MG tablet Take 1 tablet (80 mg total) by mouth once daily. (Patient taking differently: Take 80 mg by mouth once daily. Takes 2-3 times a week)    bicalutamide (CASODEX) 50 MG Tab Take 1 tablet (50 mg total) by mouth once daily.    brimonidine 0.15 % OPTH DROP (ALPHAGAN) 0.15 % ophthalmic solution Place 1 drop into both eyes 2 (two) times a day.    finasteride (PROSCAR) 5 mg tablet Take 1 tablet (5 mg total) by mouth once daily.    fluticasone-salmeterol diskus inhaler 250-50 mcg Inhale 1 puff into the lungs 2 (two) times daily. Controller    lisinopriL (PRINIVIL,ZESTRIL) 20 MG tablet Take 1 tablet (20 mg total) by mouth once daily.    LUPRON DEPOT, 6 MONTH, 45 mg SyKt injection     tamsulosin (FLOMAX) 0.4 mg Cap Take 1 capsule (0.4 mg total) by mouth once daily.    aspirin (ECOTRIN) 81 MG EC tablet TAKE 1 TABLET BY MOUTH EVERY DAY (Patient not taking: Reported on 9/25/2024)    diazePAM (VALIUM) 5 MG tablet Take 1 tablet (5 mg total) by mouth once. for 1 dose    dorzolamide (TRUSOPT) 2 % ophthalmic solution INSTILL 1 DROP INTO BOTH EYES 2 TIMES A DAY (Patient not taking: Reported on 9/25/2024)    evolocumab (REPATHA SURECLICK) 140 mg/mL PnIj Inject 1 mL (140 mg total) into the skin every 14 (fourteen) days. (Patient not taking: Reported on 9/25/2024)    FLUAD QUAD 2022-23,65Y UP,,PF, 60 mcg (15 mcg x 4)/0.5 mL Syrg     latanoprost 0.005 % ophthalmic solution Place 1 drop into both eyes every evening. (Patient not taking: Reported on 9/25/2024)    oxyCODONE-acetaminophen  (PERCOCET) 5-325 mg per tablet Take 1 tablet by mouth every 4 (four) hours as needed for Pain. (Patient not taking: Reported on 9/25/2024)    prednisoLONE acetate (PRED FORTE) 1 % DrpS Place 1 drop into the right eye 4 (four) times daily. (Patient not taking: Reported on 9/25/2024)    rivaroxaban (XARELTO) 2.5 mg Tab Take 1 tablet (2.5 mg total) by mouth 2 (two) times daily with meals. (Patient not taking: Reported on 9/25/2024)    sildenafiL (VIAGRA) 100 MG tablet Take 1 tablet (100 mg total) by mouth daily as needed for Erectile Dysfunction. (Patient not taking: Reported on 9/25/2024)    sulfamethoxazole-trimethoprim 800-160mg (BACTRIM DS) 800-160 mg Tab Take 1 tablet by mouth 2 (two) times daily. (Patient not taking: Reported on 9/25/2024)     No current facility-administered medications on file prior to visit.     Review of patient's allergies indicates:  No Known Allergies   Review of Systems   Constitutional: Negative for malaise/fatigue.   Eyes:  Negative for blurred vision.   Cardiovascular:  Positive for claudication. Negative for chest pain, cyanosis, dyspnea on exertion, irregular heartbeat, leg swelling, near-syncope, orthopnea, palpitations and paroxysmal nocturnal dyspnea.   Respiratory:  Negative for cough, hemoptysis and shortness of breath.    Hematologic/Lymphatic: Negative for bleeding problem. Does not bruise/bleed easily.   Skin:  Negative for dry skin and itching.   Musculoskeletal:  Negative for falls, muscle weakness and myalgias.   Gastrointestinal:  Negative for abdominal pain, diarrhea, heartburn, hematemesis, hematochezia and melena.   Genitourinary:  Negative for flank pain and hematuria.   Neurological:  Negative for dizziness, focal weakness, headaches, light-headedness, numbness, paresthesias, seizures and weakness.   Psychiatric/Behavioral:  Negative for altered mental status and memory loss. The patient is not nervous/anxious.    Allergic/Immunologic: Negative for hives.        Objective:   Physical Exam  Vitals and nursing note reviewed.   Constitutional:       General: He is not in acute distress.     Appearance: He is well-developed. He is not diaphoretic.   HENT:      Head: Normocephalic and atraumatic.   Eyes:      General:         Right eye: No discharge.         Left eye: No discharge.      Pupils: Pupils are equal, round, and reactive to light.   Neck:      Thyroid: No thyromegaly.      Vascular: Carotid bruit present. No JVD.   Cardiovascular:      Rate and Rhythm: Normal rate and regular rhythm.      Pulses: Intact distal pulses.           Carotid pulses are 2+ on the right side and 2+ on the left side with bruit.       Radial pulses are 2+ on the right side and 2+ on the left side.        Femoral pulses are 2+ on the right side and 2+ on the left side.       Popliteal pulses are 2+ on the right side and 2+ on the left side.        Dorsalis pedis pulses are 1+ on the right side and 1+ on the left side.        Posterior tibial pulses are 0 on the right side and 0 on the left side.      Heart sounds: Normal heart sounds. No murmur heard.     No friction rub. No gallop.   Pulmonary:      Effort: Pulmonary effort is normal. No respiratory distress.      Breath sounds: Normal breath sounds. No wheezing or rales.   Chest:      Chest wall: No tenderness.   Abdominal:      General: Bowel sounds are normal. There is no distension.      Palpations: Abdomen is soft.      Tenderness: There is no abdominal tenderness.   Musculoskeletal:         General: Normal range of motion.      Cervical back: Neck supple.   Skin:     General: Skin is warm and dry.      Findings: No erythema or rash.   Neurological:      Mental Status: He is alert and oriented to person, place, and time.      Cranial Nerves: No cranial nerve deficit.   Psychiatric:         Behavior: Behavior normal.       Vitals:    09/25/24 0836 09/25/24 0837   BP: (!) 136/50 (!) 140/60   BP Location: Right arm Left arm   Patient  "Position: Sitting Sitting   BP Method: Large (Manual) Large (Manual)   Pulse: 62    SpO2: 97%    Weight: 82.9 kg (182 lb 12.2 oz)      Lab Results   Component Value Date    CHOL 197 09/28/2023    CHOL 201 (H) 01/04/2022    CHOL 300 (H) 06/25/2021      Body mass index is 29.5 kg/m².   No results found for: "LABA1C", "HGBA1C"   BMP  Lab Results   Component Value Date     09/28/2023    K 4.9 09/28/2023     09/28/2023    CO2 29 09/28/2023    BUN 10 09/28/2023    CREATININE 1.1 01/09/2024    CALCIUM 9.3 09/28/2023    ANIONGAP 4 (L) 09/28/2023    EGFRNORACEVR >60 01/09/2024      Lab Results   Component Value Date    HDL 74 09/28/2023    HDL 45 01/04/2022    HDL 91 (H) 06/25/2021     Lab Results   Component Value Date    LDLCALC 100.0 09/28/2023    LDLCALC 134.2 01/04/2022    LDLCALC 188.2 (H) 06/25/2021     Lab Results   Component Value Date    TRIG 115 09/28/2023    TRIG 109 01/04/2022    TRIG 104 06/25/2021     Lab Results   Component Value Date    CHOLHDL 37.6 09/28/2023    CHOLHDL 22.4 01/04/2022    CHOLHDL 30.3 06/25/2021       Chemistry        Component Value Date/Time     09/28/2023 0725    K 4.9 09/28/2023 0725     09/28/2023 0725    CO2 29 09/28/2023 0725    BUN 10 09/28/2023 0725    CREATININE 1.1 01/09/2024 0919    GLU 96 09/28/2023 0725        Component Value Date/Time    CALCIUM 9.3 09/28/2023 0725    ALKPHOS 80 09/28/2023 0725    AST 17 09/28/2023 0725    ALT 11 09/28/2023 0725    BILITOT 0.3 09/28/2023 0725    ESTGFRAFRICA >60.0 07/26/2022 0818    EGFRNONAA >60.0 07/26/2022 0818          Lab Results   Component Value Date    TSH 0.911 06/27/2019     Lab Results   Component Value Date    INR 0.9 03/31/2020    INR 0.9 11/07/2019    INR 1.0 10/14/2019     Lab Results   Component Value Date    WBC 4.78 07/26/2022    HGB 14.1 07/26/2022    HCT 44.8 07/26/2022    MCV 84 07/26/2022     07/26/2022     BMP  Sodium   Date Value Ref Range Status   09/28/2023 142 136 - 145 mmol/L Final "     Potassium   Date Value Ref Range Status   09/28/2023 4.9 3.5 - 5.1 mmol/L Final     Chloride   Date Value Ref Range Status   09/28/2023 109 95 - 110 mmol/L Final     CO2   Date Value Ref Range Status   09/28/2023 29 23 - 29 mmol/L Final     BUN   Date Value Ref Range Status   09/28/2023 10 8 - 23 mg/dL Final     Creatinine   Date Value Ref Range Status   01/09/2024 1.1 0.5 - 1.4 mg/dL Final     Calcium   Date Value Ref Range Status   09/28/2023 9.3 8.7 - 10.5 mg/dL Final     Anion Gap   Date Value Ref Range Status   09/28/2023 4 (L) 8 - 16 mmol/L Final     eGFR if    Date Value Ref Range Status   07/26/2022 >60.0 >60 mL/min/1.73 m^2 Final     eGFR if non    Date Value Ref Range Status   07/26/2022 >60.0 >60 mL/min/1.73 m^2 Final     Comment:     Calculation used to obtain the estimated glomerular filtration  rate (eGFR) is the CKD-EPI equation.        CrCl cannot be calculated (Patient's most recent lab result is older than the maximum 7 days allowed.).    Assessment:     1. Atherosclerotic PVD with intermittent claudication    2. Primary hypertension    3. Mixed hyperlipidemia    4. Aortic atherosclerosis    5. Bronchiectasis, uncomplicated    6. Atherosclerosis of coronary artery, unspecified vessel or lesion type, unspecified whether angina present, unspecified whether native or transplanted heart    7. Pulmonary heart disease    8. Stenosis of carotid artery, unspecified laterality    9. Malignant neoplasm of prostate    Pvd with claudication will reevaluate with arpit with exercise and arterial duplex. Has been taking asa erratically taking xarelto. And statins. Needs to lose weight  counseled.   Cad asymptomatic continue same MEDS . Rf modificATION DISCUSSED.    HLP  ON STATINS NO RECENT LABS WILL CHECK CONTINUE SAME FOR NOW.  Htn borderline needs better diet compliance weight loss counseled.   Carotid disease asymptomatic needs repeat us. Last eval 4 years ago.CONTIONUE  ANTIPLATELETS STATINS.    Plan:   Continue current therapy  Cardiac low salt diet.  Risk factor modification and excercise program./WEIGHT LOSS  F/u YEARLY    NEEDS LABS VASCULAR STUDIES PHONE REVIEW.

## 2024-09-26 ENCOUNTER — DOCUMENTATION ONLY (OUTPATIENT)
Dept: RADIATION ONCOLOGY | Facility: CLINIC | Age: 79
End: 2024-09-26
Payer: MEDICARE

## 2024-09-26 NOTE — PLAN OF CARE
Day 1 of outpatient xrt to the prostate. Pelvis handout & verbal instructions were given to the patient. Skin care & side effects were reviewed. Contact info was provided. Patient verbalized understanding.

## 2024-09-30 ENCOUNTER — DOCUMENTATION ONLY (OUTPATIENT)
Dept: RADIATION ONCOLOGY | Facility: CLINIC | Age: 79
End: 2024-09-30
Payer: MEDICARE

## 2024-10-01 ENCOUNTER — HOSPITAL ENCOUNTER (OUTPATIENT)
Dept: RADIATION THERAPY | Facility: HOSPITAL | Age: 79
Discharge: HOME | End: 2024-10-01
Attending: SPECIALIST
Payer: MEDICARE

## 2024-10-03 ENCOUNTER — CLINICAL SUPPORT (OUTPATIENT)
Dept: PULMONOLOGY | Facility: CLINIC | Age: 79
End: 2024-10-03
Payer: MEDICARE

## 2024-10-03 VITALS — WEIGHT: 184.5 LBS | OXYGEN SATURATION: 98 % | BODY MASS INDEX: 29.65 KG/M2 | HEIGHT: 66 IN | HEART RATE: 64 BPM

## 2024-10-03 DIAGNOSIS — J45.909 ASTHMA, WELL CONTROLLED: Primary | ICD-10-CM

## 2024-10-03 PROCEDURE — 99999 PR PBB SHADOW E&M-EST. PATIENT-LVL I: CPT | Mod: PBBFAC,HCNC,,

## 2024-10-03 NOTE — PROGRESS NOTES
Pulmonary Disease Management  [facility]  Final Follow up Visit  Chronic Care Management    Alex Milner  was seen 10/3/2024  1:00 PM in the Pulmonary Disease Management Clinic for evaluation, education, reinforcement of self management techniques and exacerbation action plan.    Carlita Baxter    Past Medical History:   Diagnosis Date    Atherosclerosis of coronary artery 08/10/2022    Atherosclerotic PVD with intermittent claudication 08/16/2019    Atrial fibrillation     pt unaware of this    Back pain     Bilateral inguinal hernia     CT ABdomen/pelvis 3/9/2015 (care everywhere)---Bilateral inguinal hernias containing fat.      Calcified granuloma of lung 10/03/2017    CT CHest 3/26/2018---There are calcified lymph nodes in the mediastinum and left hilum.    CT chest 9/11/ 2017 Calcified left hilar and subcarinal granulomas are noted  ;Calcified granuloma noted within the posterior segment of the right hepatic lobe.    Diverticulosis     Diverticulosis 10/28/2013    Colonoscopy     ED (erectile dysfunction)     Elevated PSA     Granuloma of liver     ct chest 3/26/2018---There is a small calcified granuloma posteriorly in the right lobe of the liver    Hyperlipidemia     Hypertension     Liver mass 03/13/2020    New lesion compared to CT chest dating back to 2018. New liver lesion, dome of liver seen on CT 3/12/2020 and MRI abdomen 3/12/2020.  3/13/2020 referral to Oncology  PET CT   Resolved in 2020    Malignant neoplasm of prostate 6/10/2024    Moderate persistent asthma without complication 09/26/2017    Personal history of colonic polyps 2013    Pulmonary heart disease 08/10/2022    Urinary tract infection        Patient's Medications   New Prescriptions    No medications on file   Previous Medications    ALBUTEROL (PROVENTIL/VENTOLIN HFA) 90 MCG/ACTUATION INHALER    INHALE 2 PUFFS INTO THE LUNGS EVERY 6 (SIX) HOURS AS NEEDED FOR WHEEZING OR SHORTNESS OF BREATH    AMLODIPINE (NORVASC) 10  MG TABLET    Take 1 tablet (10 mg total) by mouth once daily.    ASPIRIN (ECOTRIN) 81 MG EC TABLET    TAKE 1 TABLET BY MOUTH EVERY DAY    ATORVASTATIN (LIPITOR) 80 MG TABLET    Take 1 tablet (80 mg total) by mouth once daily.    BICALUTAMIDE (CASODEX) 50 MG TAB    Take 1 tablet (50 mg total) by mouth once daily.    BRIMONIDINE 0.15 % OPTH DROP (ALPHAGAN) 0.15 % OPHTHALMIC SOLUTION    Place 1 drop into both eyes 2 (two) times a day.    DIAZEPAM (VALIUM) 5 MG TABLET    Take 1 tablet (5 mg total) by mouth once. for 1 dose    DORZOLAMIDE (TRUSOPT) 2 % OPHTHALMIC SOLUTION    INSTILL 1 DROP INTO BOTH EYES 2 TIMES A DAY    EVOLOCUMAB (REPATHA SURECLICK) 140 MG/ML PNIJ    Inject 1 mL (140 mg total) into the skin every 14 (fourteen) days.    FINASTERIDE (PROSCAR) 5 MG TABLET    Take 1 tablet (5 mg total) by mouth once daily.    FLUAD QUAD 2022-23,65Y UP,,PF, 60 MCG (15 MCG X 4)/0.5 ML SYRG        FLUTICASONE-SALMETEROL DISKUS INHALER 250-50 MCG    Inhale 1 puff into the lungs 2 (two) times daily. Controller    LATANOPROST 0.005 % OPHTHALMIC SOLUTION    Place 1 drop into both eyes every evening.    LISINOPRIL (PRINIVIL,ZESTRIL) 20 MG TABLET    Take 1 tablet (20 mg total) by mouth once daily.    LUPRON DEPOT, 6 MONTH, 45 MG SYKT INJECTION        OXYCODONE-ACETAMINOPHEN (PERCOCET) 5-325 MG PER TABLET    Take 1 tablet by mouth every 4 (four) hours as needed for Pain.    PREDNISOLONE ACETATE (PRED FORTE) 1 % DRPS    Place 1 drop into the right eye 4 (four) times daily.    RIVAROXABAN (XARELTO) 2.5 MG TAB    Take 1 tablet (2.5 mg total) by mouth 2 (two) times daily with meals.    SILDENAFIL (VIAGRA) 100 MG TABLET    Take 1 tablet (100 mg total) by mouth daily as needed for Erectile Dysfunction.    SULFAMETHOXAZOLE-TRIMETHOPRIM 800-160MG (BACTRIM DS) 800-160 MG TAB    Take 1 tablet by mouth 2 (two) times daily.    TAMSULOSIN (FLOMAX) 0.4 MG CAP    Take 1 capsule (0.4 mg total) by mouth once daily.   Modified Medications    No  medications on file   Discontinued Medications    No medications on file             Educational assessment:   [x]            Good  []            Fair  []            Poor    Readiness to learn:   [x]            Good  []            Fair  []            Poor    Vision Status:   [x]            Good  []            Fair  []            Poor    Reading Ability:  [x]            Good  []            Fair  []            Poor    Knowledge of condition:   [x]            Good  []            Fair  []            Poor    Language Barriers:   []            Good  []            Fair  []            Poor  [x]            None    Cognitive/ Physical Barriers:   []            Good  []            Fair  []            Poor  [x]            None    Learning best by:                       [x]            Seeing  [x]            Hearing  [x]            Reading                         [x]            Doing    Describe any barrier /Limitation or financial implications of care choices identified     []            Financial  []            Emotional  []            Education  []            Vision/Hearing  []            Physical  [x]            None  []                TOPIC /CONTENT FOR TODAY:    [x]            MDI with or without spacer  []            Dry powder inhaler  []            Acapella  []            Peak Flow meter  []            COPD action plan  []            Nebulizer use  []            Oxygen use safety  []            Breathing and cough techniques  []            Energy conservation  []            Infection prevention  []           OTHER________________________        Learner:    []            Patient   []            Caregiver    Method:    []            Verbal explanation  []            Audio visual    []            Literature  []            Teach back      Evaluation:    []            Teach back  []            Demonstrate  []            Follow up phone call    []            2 weeks     []            4 weeks   []            PRN    Additional  comments:   Patient was seen today to review respiratory medication purpose and proper technique for use of inhalers. Patient practiced proper technique using MDI with valved holding chamber (spacer) and DPI inhalers. Patient demonstrated understanding. Literature was given to patient.    It has been several months that he has not taken Advair. He reports use of albuterol about two times over the past month. He states he is doing well.      Reviewed the difference in controller (ADVAIR) and rescue (ALBUTEROL) medications.    CONTROLLER: ADVAIR   Frequency: 2 PUFFS TWICE DAILY    RESCUE:ALBUTEROL   Frequency: 2 PUFFS AS NEEDED EVERY 4 HOURS    Asthma trigger checklist was verbally reviewed and literature given to patient.     Infection prevention was discussed. Patient was reminded to get influenza vaccine. Hand hygiene and cleaning of respiratory equipment was also discussed. Patient verbalized understanding.      COPD action plan was reviewed and literature was given to patient. Patient verbalized understanding.     ACT SCORE: 23     Plan:    Reinforce education  Meds: Advair (not taking), albuterol  DME Needs: na  Action Plan    Approximate time spent with patient: 15 minutes  Asthma Trigger Checklist  Allergens, irritants, and other things may trigger your asthma. Check the box next to each of your triggers. After each trigger is a list of ways to avoid it.   Dust mites. Dust mites live in mattresses, bedding, carpets, curtains, and indoor dust.  To kill dust mites, wash bedding in hot water (130°F) each week.  Cover mattress and pillows with special dust-mite-proof cases.  Don't use upholstered furniture like sofas or chairs in the bedroom.  Use allergy-proof filters for air conditioners and furnaces. Replace or clean them as instructed.  If you can, replace carpeting with wood or tile monet, especially in the bedroom.  Animals. Animals with fur or feathers shed dander (allergens).  It's best to choose a pet  that doesn't have fur or feathers, such as a fish or a reptile.  If you have pets, keep them off your bed and out of your bedroom.  Wash your hands and clothes after handling pets.    Mold. Mold grows in damp places, such as bathrooms, basements, and closets.  Ask someone to clean damp areas in your home every week. Or try wearing a face mask while you clean.  Run an exhaust fan while bathing. Or leave a window open in the bathroom.  Repair water leaks in or around your home.  Have someone else cut grass or rake leaves, if possible.  Don't use vaporizers or humidifiers. They encourage mold growth.    Pollen. Pollen from trees, grasses, and weeds is a common allergen. (Flower pollens are generally not a problem).  Try to learn what types of pollen affect you most. Pollen levels vary depending on the plant, the season, and the time of day.  If possible, use air conditioning instead of opening the windows in your home or car.  Have someone else do yard work, if possible.    Cockroaches. Roaches are found in many homes and produce allergens.  Keep your kitchen clean and dry. A leaky faucet or drain can attract roaches.  Remove garbage from your home daily.  Store food in tightly sealed containers. Wash dishes as soon as they are used.  Use bait stations or traps to control roaches. Avoid using chemical sprays.    Smoke. Smoke may be from cigarettes, cigars, pipes, incense or candles, barbecues or grills, and fireplaces.  Don't smoke. And don't let people smoke in your home or car.  When you travel, ask for nonsmoking rental cars and hotel rooms.  Avoid fireplaces and wood stoves. If you can't, sit away from them. Make sure the smoke is directed outside.  Don't burn incense or use candles.  Move away from smoky outdoor cooking grills.    Smog.  Smog is from car exhaust and other pollution.  Read or listen to local air-quality reports. These let you know when air quality is poor.  Stay indoors as much as you can on smoggy  days. If possible, use air conditioning instead of opening the windows.  In your car, set air conditioning to recirculate air, so less pollution gets in.    Strong odors. These include air fresheners, deodorizers, and cleaning products; perfume, deodorant, and other beauty products; incense and candles; and insect sprays and other sprays.  Use scent-free products like deodorant or body lotion.  Avoid using cleaning products with bleach and ammonia. Make your own cleaning solution with white vinegar, baking soda, or mild dish soap.  Use exhaust fans while cooking. Or open a window, if possible.   Avoid perfumes, air fresheners, potpourri, and other scented products.          Other irritants. These include dust, aerosol sprays, and powders.  Wear a face mask while doing tasks like sanding, dusting, sweeping, and yard work. Open doors and windows if working indoors.  Use pump spray bottles instead of aerosols.  Pour liquid  onto a rag or cloth instead of spraying them.    Weather. Weather conditions can trigger symptoms or make them worse.  Watch for very high or low temperatures, very humid conditions, or a lot of wind, as these conditions can make symptoms worse.  Limit outdoor activity during the type of weather that affects you.  Wear a scarf over your mouth and nose in cold weather.    Colds, flu, and sinus infections. Upper respiratory infections can trigger asthma.  Wash your hands often with soap and warm water or use a hand  containing alcohol.  Get a yearly flu shot. And ask if you should get a pneumonia vaccine.  Take care of your general health. Get plenty of sleep. And eat a variety of healthy foods.    Food additives. Food additives can trigger asthma flare-ups in some people.  Check food labels for sulfites or other similar ingredients. These are often found in foods such as wine, beer, and dried fruits.  Avoid foods that contain these additives.    Medicine. Aspirin, NSAIDS like  ibuprofen and naproxen, and heart medicines like beta-blockers may be triggers.  Tell your health care provider if you think certain medicines trigger symptoms.   Be sure to read the labels on over-the-counter medicines. They may have ingredients that cause symptoms for you.   , Emotions. Laughing, crying, or feeling excited are triggers for some people.   To help you stay calm: Try breathing in slowly through your nose for a count of 2 seconds. Close your lips and breathe out for 4 seconds. Repeat.  Try to focus on a soothing image in your mind. This will help relax you and calm your breathing.  Remember to take your daily controller medicines. When you're upset or under stress, it's easy to forget.    Exercise. For some people, exercise can trigger symptoms. Don't let this keep you from being active.   If you have not been exercising regularly, start slow and work up gradually.  Take all of your medicines as prescribed.  If you use quick-relief medicine, make sure you have it with you when you exercise.  Stop if you have any symptoms. Make sure you talk with your provider about these symptoms.  © 0037-7834 [employer]. [address] [place] [id number]. All rights reserved. This information is not intended as a substitute for professional medical care. Always follow your healthcare professional's instructions.          ACTION PLAN    GREEN ZONE  My sputum is clear/white/usual color and easily cleared.  My breathing is no harder than usual.  I can do my usual activities.  I can think clearly.   Take your usual medicines, including oxygen, as you are told to do so by your health care provider.   YELLOW ZONE  My sputum has change (color, thickness, amount).  I am more short of breath than usual.  I cough or wheeze more.  I weigh more and my legs/feet swell.  I cannot do my usual activities without resting.   Call your health care provider. You will probably be told to begin taking an antibiotic  and prednisone. Have your pharmacy phone number available.   RED ZONE  I cannot cough out my sputum.  I am much more short of breath than normal.  I need to sit up to breathe  I cannot do my usual activities.  I am unable to speak more than one or two words at a time.  I am confused.   Call your health care provider. You may be asked to come in to be seen, told to go to the emergency room, or told to call 9-1-1.

## 2024-10-07 ENCOUNTER — DOCUMENTATION ONLY (OUTPATIENT)
Dept: RADIATION ONCOLOGY | Facility: CLINIC | Age: 79
End: 2024-10-07
Payer: MEDICARE

## 2024-10-07 NOTE — PLAN OF CARE
Day 8 outpatient xrt to the prostate. He describes some intermittent nonspecific right lower quadrant discomfort without change in his bowel habits. he is otherwise tolerating therapy without change or complaint.  Will continue to monitor.

## 2024-10-09 ENCOUNTER — OFFICE VISIT (OUTPATIENT)
Dept: OPHTHALMOLOGY | Facility: CLINIC | Age: 79
End: 2024-10-09
Payer: MEDICARE

## 2024-10-09 DIAGNOSIS — H40.1121 PRIMARY OPEN ANGLE GLAUCOMA (POAG) OF LEFT EYE, MILD STAGE: ICD-10-CM

## 2024-10-09 DIAGNOSIS — Z96.1 PSEUDOPHAKIA OF RIGHT EYE: ICD-10-CM

## 2024-10-09 DIAGNOSIS — H40.1113 PRIMARY OPEN ANGLE GLAUCOMA (POAG) OF RIGHT EYE, SEVERE STAGE: Primary | ICD-10-CM

## 2024-10-09 DIAGNOSIS — H25.12 NUCLEAR SCLEROSIS OF LEFT EYE: ICD-10-CM

## 2024-10-09 PROCEDURE — G2211 COMPLEX E/M VISIT ADD ON: HCPCS | Mod: HCNC,S$GLB,, | Performed by: STUDENT IN AN ORGANIZED HEALTH CARE EDUCATION/TRAINING PROGRAM

## 2024-10-09 PROCEDURE — 1159F MED LIST DOCD IN RCRD: CPT | Mod: HCNC,CPTII,S$GLB, | Performed by: STUDENT IN AN ORGANIZED HEALTH CARE EDUCATION/TRAINING PROGRAM

## 2024-10-09 PROCEDURE — 99213 OFFICE O/P EST LOW 20 MIN: CPT | Mod: HCNC,S$GLB,, | Performed by: STUDENT IN AN ORGANIZED HEALTH CARE EDUCATION/TRAINING PROGRAM

## 2024-10-09 PROCEDURE — 99999 PR PBB SHADOW E&M-EST. PATIENT-LVL I: CPT | Mod: PBBFAC,HCNC,, | Performed by: STUDENT IN AN ORGANIZED HEALTH CARE EDUCATION/TRAINING PROGRAM

## 2024-10-09 PROCEDURE — 1160F RVW MEDS BY RX/DR IN RCRD: CPT | Mod: HCNC,CPTII,S$GLB, | Performed by: STUDENT IN AN ORGANIZED HEALTH CARE EDUCATION/TRAINING PROGRAM

## 2024-10-09 NOTE — PROGRESS NOTES
HPI     Cataract     Additional comments: Pt reports 1M DFE W/ 2nd eye cat eval OS. Pt states   occasionally he has blurred vision & glare problems. 100% complaint with   Latanoprost QHS OU. No pain or irritation.           Comments    1. POAG Severe OD, Mild OS  2. PC IOL OD DEHSAUN Goniotomy 11/16/23  3. NS OS    Target 13 OU           Last edited by Roberto Milner on 10/9/2024  9:04 AM.            Assessment /Plan     For exam results, see Encounter Report.    Primary open angle glaucoma (POAG) of right eye, severe stage  Primary open angle glaucoma (POAG) of left eye, mild stage  S/p CEIOL w/ KDB OD  IOP well-controlled OU on latanoprost alone. Will continue current treatment.   Will repeat gonio and IOP check at next visit and consider CEIOL if IOP elevated or angle narrow    Nuclear sclerosis of left eye  May consider CEIOL at next visit    Pseudophakia of right eye  monitor    RTC 1 mo IOP check with gonio

## 2024-10-14 ENCOUNTER — DOCUMENTATION ONLY (OUTPATIENT)
Dept: RADIATION ONCOLOGY | Facility: CLINIC | Age: 79
End: 2024-10-14
Payer: MEDICARE

## 2024-10-14 NOTE — PLAN OF CARE
Day 13 outpatient xrt to the prostate. he reports subtle increased GI and  frequency not requiring intervention. He denies other change or complaint, including resolution of the right sided abdominal crampy discomfort he noted last week. Will continue to monitor.

## 2024-10-16 ENCOUNTER — HOSPITAL ENCOUNTER (OUTPATIENT)
Dept: CARDIOLOGY | Facility: HOSPITAL | Age: 79
Discharge: HOME | End: 2024-10-16
Attending: INTERNAL MEDICINE
Payer: MEDICARE

## 2024-10-16 VITALS
HEIGHT: 66 IN | SYSTOLIC BLOOD PRESSURE: 155 MMHG | DIASTOLIC BLOOD PRESSURE: 72 MMHG | WEIGHT: 184 LBS | BODY MASS INDEX: 29.57 KG/M2

## 2024-10-16 VITALS
SYSTOLIC BLOOD PRESSURE: 155 MMHG | WEIGHT: 184 LBS | DIASTOLIC BLOOD PRESSURE: 72 MMHG | HEIGHT: 66 IN | BODY MASS INDEX: 29.57 KG/M2

## 2024-10-16 VITALS
HEIGHT: 66 IN | WEIGHT: 184 LBS | SYSTOLIC BLOOD PRESSURE: 155 MMHG | BODY MASS INDEX: 29.57 KG/M2 | DIASTOLIC BLOOD PRESSURE: 72 MMHG

## 2024-10-16 DIAGNOSIS — I65.29 STENOSIS OF CAROTID ARTERY, UNSPECIFIED LATERALITY: ICD-10-CM

## 2024-10-16 DIAGNOSIS — I70.219 ATHEROSCLEROTIC PVD WITH INTERMITTENT CLAUDICATION: ICD-10-CM

## 2024-10-16 DIAGNOSIS — I70.0 AORTIC ATHEROSCLEROSIS: ICD-10-CM

## 2024-10-16 DIAGNOSIS — E78.2 MIXED HYPERLIPIDEMIA: ICD-10-CM

## 2024-10-16 DIAGNOSIS — R09.89 BRUIT OF LEFT CAROTID ARTERY: ICD-10-CM

## 2024-10-16 LAB
ABI CLAUDICATION TIME: 1 MIN
ABI POST MINUTES1: 5 MIN
ABI POST MINUTES2: 10 MIN
IMMEDIATE ARM BP: 152 MMHG
IMMEDIATE LEFT ABI: 0.47
IMMEDIATE LEFT TIBIAL: 72 MMHG
IMMEDIATE RIGHT ABI: 0.48
IMMEDIATE RIGHT TIBIAL: 73 MMHG
LEFT ABI: 0.78
LEFT ANT TIBIAL SYS PSV: 26 CM/S
LEFT ARM BP: 155 MMHG
LEFT ARM DIASTOLIC BLOOD PRESSURE: 72 MMHG
LEFT ARM SYSTOLIC BLOOD PRESSURE: 155 MMHG
LEFT CBA DIAS: 12 CM/S
LEFT CBA SYS: 87 CM/S
LEFT CCA DIST DIAS: 7 CM/S
LEFT CCA DIST SYS: 108 CM/S
LEFT CCA MID DIAS: 16 CM/S
LEFT CCA MID SYS: 114 CM/S
LEFT CCA PROX DIAS: 21 CM/S
LEFT CCA PROX SYS: 117 CM/S
LEFT CFA PSV: 201 CM/S
LEFT DORSALIS PEDIS: 121 MMHG
LEFT ECA DIAS: 5 CM/S
LEFT ECA SYS: 84 CM/S
LEFT ICA DIST DIAS: 24 CM/S
LEFT ICA DIST SYS: 114 CM/S
LEFT ICA MID DIAS: 23 CM/S
LEFT ICA MID SYS: 111 CM/S
LEFT ICA PROX DIAS: 20 CM/S
LEFT ICA PROX SYS: 87 CM/S
LEFT PERONEAL SYS PSV: 52 CM/S
LEFT POPLITEAL PSV: 86 CM/S
LEFT POSTERIOR TIBIAL: 107 MMHG
LEFT PROFUNDA SYS PSV: 177 CM/S
LEFT SUPER FEMORAL DIST SYS PSV: 113 CM/S
LEFT SUPER FEMORAL MID SYS PSV: 229 CM/S
LEFT SUPER FEMORAL OSTIAL SYS PSV: 141 CM/S
LEFT SUPER FEMORAL PROX SYS PSV: 171 CM/S
LEFT TBI: 0.44
LEFT TIB/PER TRUNK SYS PSV: 154 CM/S
LEFT TOE PRESSURE: 68 MMHG
LEFT VERTEBRAL DIAS: 18 CM/S
LEFT VERTEBRAL SYS: 95 CM/S
OHS CV CAROTID RIGHT ICA EDV HIGHEST: 34
OHS CV CAROTID ULTRASOUND LEFT ICA/CCA RATIO: 1.06
OHS CV CAROTID ULTRASOUND RIGHT ICA/CCA RATIO: 2.61
OHS CV LEFT LOWER EXTREMITY ABI (NO CALC): 0.78
OHS CV PV CAROTID LEFT HIGHEST CCA: 117
OHS CV PV CAROTID LEFT HIGHEST ICA: 114
OHS CV PV CAROTID RIGHT HIGHEST CCA: 94
OHS CV PV CAROTID RIGHT HIGHEST ICA: 198
OHS CV RIGHT ABI LOWER EXTREMITY (NO CALC): 1.13
OHS CV US CAROTID LEFT HIGHEST EDV: 24
POST1 ARM BP: 188 MMHG
POST1 LEFT ABI: 0.59
POST1 LEFT TIBIAL: 111 MMHG
POST1 RIGHT ABI: 0.9
POST1 RIGHT TIBIAL: 170 MMHG
POST2 ARM BP: 181 MMHG
POST2 LEFT ABI: 0.47
POST2 LEFT TIBIAL: 85 MMHG
POST2 RIGHT ABI: 0.97
POST2 RIGHT TIBIAL: 176 MMHG
RIGHT ABI: 1.13
RIGHT ANT TIBIAL SYS PSV: 24 CM/S
RIGHT ARM BP: 152 MMHG
RIGHT ARM DIASTOLIC BLOOD PRESSURE: 70 MMHG
RIGHT ARM SYSTOLIC BLOOD PRESSURE: 152 MMHG
RIGHT CBA DIAS: 14 CM/S
RIGHT CBA SYS: 72 CM/S
RIGHT CCA DIST DIAS: 12 CM/S
RIGHT CCA DIST SYS: 76 CM/S
RIGHT CCA MID DIAS: 10 CM/S
RIGHT CCA MID SYS: 94 CM/S
RIGHT CCA PROX DIAS: 8 CM/S
RIGHT CCA PROX SYS: 80 CM/S
RIGHT CFA PSV: 147 CM/S
RIGHT DORSALIS PEDIS: 175 MMHG
RIGHT ECA DIAS: 2 CM/S
RIGHT ECA SYS: 84 CM/S
RIGHT ICA DIST DIAS: 34 CM/S
RIGHT ICA DIST SYS: 198 CM/S
RIGHT ICA MID DIAS: 18 CM/S
RIGHT ICA MID SYS: 92 CM/S
RIGHT ICA PROX DIAS: 22 CM/S
RIGHT ICA PROX SYS: 83 CM/S
RIGHT PERONEAL SYS PSV: 86 CM/S
RIGHT POPLITEAL PSV: 71 CM/S
RIGHT POST TIBIAL SYS PSV: 43 CM/S
RIGHT POSTERIOR TIBIAL: 142 MMHG
RIGHT PROFUNDA SYS PSV: 249 CM/S
RIGHT SUPER FEMORAL DIST SYS PSV: 93 CM/S
RIGHT SUPER FEMORAL MID SYS PSV: 171 CM/S
RIGHT SUPER FEMORAL OSTIAL SYS PSV: 149 CM/S
RIGHT SUPER FEMORAL PROX SYS PSV: 124 CM/S
RIGHT TBI: 0.61
RIGHT TIB/PER TRUNK SYS PSV: 170 CM/S
RIGHT TOE PRESSURE: 95 MMHG
RIGHT VERTEBRAL DIAS: 12 CM/S
RIGHT VERTEBRAL SYS: 126 CM/S
TREADMILL GRADE: 10 %
TREADMILL SPEED: 1.8 MPH
TREADMILL TIME: 2.2 MIN

## 2024-10-16 PROCEDURE — 93925 LOWER EXTREMITY STUDY: CPT | Mod: HCNC

## 2024-10-16 PROCEDURE — 93925 LOWER EXTREMITY STUDY: CPT | Mod: 26,HCNC,, | Performed by: INTERNAL MEDICINE

## 2024-10-16 PROCEDURE — 93924 LWR XTR VASC STDY BILAT: CPT | Mod: 26,HCNC,, | Performed by: INTERNAL MEDICINE

## 2024-10-16 PROCEDURE — 93924 LWR XTR VASC STDY BILAT: CPT | Mod: HCNC

## 2024-10-16 PROCEDURE — 93880 EXTRACRANIAL BILAT STUDY: CPT | Mod: HCNC

## 2024-10-16 PROCEDURE — 93880 EXTRACRANIAL BILAT STUDY: CPT | Mod: 26,HCNC,, | Performed by: INTERNAL MEDICINE

## 2024-10-21 ENCOUNTER — TELEPHONE (OUTPATIENT)
Dept: CARDIOLOGY | Facility: CLINIC | Age: 79
End: 2024-10-21
Payer: MEDICARE

## 2024-10-21 ENCOUNTER — DOCUMENTATION ONLY (OUTPATIENT)
Dept: RADIATION ONCOLOGY | Facility: CLINIC | Age: 79
End: 2024-10-21
Payer: MEDICARE

## 2024-10-21 NOTE — PLAN OF CARE
Day 18 of outpatient xrt to the prostate. He reports minimal  change. GI as softer and more frequent, 2 times per day which he is pleased with. Ongoing hot flash. Will continue to monitor.

## 2024-10-21 NOTE — TELEPHONE ENCOUNTER
Called and advised test results and he states that he can walk about a block but has to stop about that.         ----- Message from Chencho Ross MD sent at 10/21/2024  7:48 AM CDT -----  Stable blockages in legs   Mild blockages in neck . Needs keep aggressive rf modification.walking exercise.

## 2024-10-21 NOTE — TELEPHONE ENCOUNTER
Called and advised test results and he states that he can walk about a block but has to stop about that.             ----- Message from Chencho Ross MD sent at 10/21/2024  7:49 AM CDT -----  Exercise  is ok for legs for now. If he is limited with exercise we can discuss other options.

## 2024-10-22 NOTE — TELEPHONE ENCOUNTER
I called patient and voiced the below and he voiced understanding       Chencho Ross MD [place], [place], [facility]  Caller: Unspecified (Yesterday,  2:36 PM)  Let him try to push it a little see how he can tolerate it

## 2024-10-25 ENCOUNTER — PATIENT MESSAGE (OUTPATIENT)
Dept: INTERNAL MEDICINE | Facility: CLINIC | Age: 79
End: 2024-10-25
Payer: MEDICARE

## 2024-10-28 ENCOUNTER — DOCUMENTATION ONLY (OUTPATIENT)
Dept: RADIATION ONCOLOGY | Facility: CLINIC | Age: 79
End: 2024-10-28
Payer: MEDICARE

## 2024-11-01 ENCOUNTER — HOSPITAL ENCOUNTER (OUTPATIENT)
Dept: RADIATION THERAPY | Facility: HOSPITAL | Age: 79
Discharge: HOME | End: 2024-11-01
Attending: SPECIALIST
Payer: MEDICARE

## 2024-11-01 ENCOUNTER — DOCUMENTATION ONLY (OUTPATIENT)
Dept: RADIATION ONCOLOGY | Facility: CLINIC | Age: 79
End: 2024-11-01
Payer: MEDICARE

## 2024-11-01 PROCEDURE — 77014 PR  CT GUIDANCE PLACEMENT RAD THERAPY FIELDS: CPT | Mod: 26,HCNC,, | Performed by: SPECIALIST

## 2024-11-01 PROCEDURE — 77385 HC IMRT, SIMPLE: CPT | Mod: HCNC | Performed by: SPECIALIST

## 2024-11-04 PROCEDURE — 77336 RADIATION PHYSICS CONSULT: CPT | Mod: HCNC | Performed by: SPECIALIST

## 2024-11-05 ENCOUNTER — OFFICE VISIT (OUTPATIENT)
Dept: OPHTHALMOLOGY | Facility: CLINIC | Age: 79
End: 2024-11-05
Payer: MEDICARE

## 2024-11-05 DIAGNOSIS — H40.1121 PRIMARY OPEN ANGLE GLAUCOMA (POAG) OF LEFT EYE, MILD STAGE: ICD-10-CM

## 2024-11-05 DIAGNOSIS — H40.1113 PRIMARY OPEN ANGLE GLAUCOMA (POAG) OF RIGHT EYE, SEVERE STAGE: Primary | ICD-10-CM

## 2024-11-05 PROCEDURE — G2211 COMPLEX E/M VISIT ADD ON: HCPCS | Mod: HCNC,S$GLB,, | Performed by: STUDENT IN AN ORGANIZED HEALTH CARE EDUCATION/TRAINING PROGRAM

## 2024-11-05 PROCEDURE — 92020 GONIOSCOPY: CPT | Mod: HCNC,S$GLB,, | Performed by: STUDENT IN AN ORGANIZED HEALTH CARE EDUCATION/TRAINING PROGRAM

## 2024-11-05 PROCEDURE — 99214 OFFICE O/P EST MOD 30 MIN: CPT | Mod: HCNC,S$GLB,, | Performed by: STUDENT IN AN ORGANIZED HEALTH CARE EDUCATION/TRAINING PROGRAM

## 2024-11-05 PROCEDURE — 1160F RVW MEDS BY RX/DR IN RCRD: CPT | Mod: HCNC,CPTII,S$GLB, | Performed by: STUDENT IN AN ORGANIZED HEALTH CARE EDUCATION/TRAINING PROGRAM

## 2024-11-05 PROCEDURE — 1159F MED LIST DOCD IN RCRD: CPT | Mod: HCNC,CPTII,S$GLB, | Performed by: STUDENT IN AN ORGANIZED HEALTH CARE EDUCATION/TRAINING PROGRAM

## 2024-11-05 PROCEDURE — 99999 PR PBB SHADOW E&M-EST. PATIENT-LVL I: CPT | Mod: PBBFAC,HCNC,, | Performed by: STUDENT IN AN ORGANIZED HEALTH CARE EDUCATION/TRAINING PROGRAM

## 2024-11-05 RX ORDER — LATANOPROST 50 UG/ML
1 SOLUTION/ DROPS OPHTHALMIC NIGHTLY
Qty: 5 ML | Refills: 6 | Status: SHIPPED | OUTPATIENT
Start: 2024-11-05

## 2024-11-05 NOTE — PROGRESS NOTES
HPI     Glaucoma     Additional comments:  Pt reports 1M iop check & gonio. Pt states he would   like to get some reading glasses. 100% complaint with Latanoprost QHS OU.   No pain or irritation.             Comments    1. POAG Severe OD, Mild OS  2. PC IOL OD DESHAUN Goniotomy 11/16/23  3. NS OS    Target 13 OU           Last edited by Roberto Milner on 11/5/2024  2:27 PM.            Assessment /Plan     For exam results, see Encounter Report.    Primary open angle glaucoma (POAG) of right eye, severe stage  Primary open angle glaucoma (POAG) of left eye, mild stage- IOP controlled with no evidence of progression. Continue current treatment. Reviewed importance of continued compliance with treatment and follow up.   Continue:  Latanoprost QHS OU       Return to clinic in 4M IOP and GOCT

## 2024-11-15 ENCOUNTER — OFFICE VISIT (OUTPATIENT)
Dept: UROLOGY | Facility: CLINIC | Age: 79
End: 2024-11-15
Payer: MEDICARE

## 2024-11-15 VITALS
WEIGHT: 177.94 LBS | SYSTOLIC BLOOD PRESSURE: 147 MMHG | BODY MASS INDEX: 28.6 KG/M2 | HEART RATE: 57 BPM | DIASTOLIC BLOOD PRESSURE: 58 MMHG | RESPIRATION RATE: 14 BRPM | HEIGHT: 66 IN

## 2024-11-15 DIAGNOSIS — N52.9 ERECTILE DYSFUNCTION, UNSPECIFIED ERECTILE DYSFUNCTION TYPE: ICD-10-CM

## 2024-11-15 DIAGNOSIS — N40.0 BENIGN PROSTATIC HYPERPLASIA, UNSPECIFIED WHETHER LOWER URINARY TRACT SYMPTOMS PRESENT: ICD-10-CM

## 2024-11-15 DIAGNOSIS — C61 MALIGNANT NEOPLASM OF PROSTATE: Primary | ICD-10-CM

## 2024-11-15 PROCEDURE — 99999 PR PBB SHADOW E&M-EST. PATIENT-LVL V: CPT | Mod: PBBFAC,HCNC,, | Performed by: UROLOGY

## 2024-11-15 NOTE — PROGRESS NOTES
mriChief Complaint:   Encounter Diagnoses   Name Primary?    Malignant neoplasm of prostate Yes    Benign prostatic hyperplasia, unspecified whether lower urinary tract symptoms present     Erectile dysfunction, unspecified erectile dysfunction type        HPI:   11/15/24- patient is currently voiding well on medical management, radiation has been completed he is doing well, not due for a Lupron shot as of yet.    1/8/18: Returns after long absence.  PSA >30.  Says he had a biopsy in 2016 with another urologist in town off of [facility].  Second one he had.  MRI report from 5/17 reassuring with 98gm prostate.  Reduced stream not on flomax/finasteride.  No abd/pelvic pain and no exac/rel factors.  No hematuria.  No urolithiasis.  3-4 cups coffee in AM.  Nocturia x3-4.    Allergies:  Patient has no known allergies.    Medications:  has a current medication list which includes the following prescription(s): albuterol, aspirin, atorvastatin, bicalutamide, brimonidine 0.15 % opth drop, dorzolamide, repatha sureclick, finasteride, fluad quad 2022-23(65y up)(pf), fluticasone-salmeterol 250-50 mcg/dose, latanoprost, latanoprost, lisinopril, lupron depot (6 month), oxycodone-acetaminophen, prednisolone acetate, xarelto, sildenafil, sulfamethoxazole-trimethoprim 800-160mg, tamsulosin, amlodipine, and diazepam.    Review of Systems:  General: No fever, chills, fatigability, or weight loss.  Skin: No rashes, itching, or changes in color or texture of skin.  Chest: Denies THOMPSON, cyanosis, wheezing, cough, and sputum production.  Abdomen: Appetite fine. No weight loss. Denies diarrhea, abdominal pain, hematemesis, or blood in stool.  Musculoskeletal: No joint stiffness or swelling. Some back pain.  : As above.  All other review of systems negative.    PMH:   has a past medical history of Atherosclerosis of coronary artery (08/10/2022), Atherosclerotic PVD with intermittent claudication (08/16/2019), Atrial fibrillation, Back  pain, Bilateral inguinal hernia, Calcified granuloma of lung (10/03/2017), Diverticulosis, Diverticulosis (10/28/2013), ED (erectile dysfunction), Elevated PSA, Granuloma of liver, Hyperlipidemia, Hypertension, Liver mass (03/13/2020), Malignant neoplasm of prostate (6/10/2024), Moderate persistent asthma without complication (09/26/2017), Personal history of colonic polyps (2013), Pulmonary heart disease (08/10/2022), and Urinary tract infection.    PSH:   has a past surgical history that includes Hernia repair; Prostate biopsy; Aortography with serialography (N/A, 10/21/2019); Aortography with serialography (N/A, 11/14/2019); and Colonoscopy (N/A, 12/7/2022).    FamHx: family history includes Cancer in his brother, father, and mother; Cancer (age of onset: 64) in his sister; Prostate cancer in his brother.    SocHx:  reports that he quit smoking about 39 years ago. His smoking use included cigarettes. He started smoking about 64 years ago. He has a 51.2 pack-year smoking history. He has been exposed to tobacco smoke. He has never used smokeless tobacco. He reports current alcohol use of about 12.0 standard drinks of alcohol per week. He reports that he does not use drugs.      Physical Exam:  Vitals:    11/15/24 1334   BP: (!) 147/58   Pulse: (!) 57   Resp: 14       General: A&Ox3, no apparent distress, no deformities  Neck: No masses, normal thyroid  Lungs: normal inspiration, no use of accessory muscles  Heart: normal pulse, no arrhythmias  Abdomen: Soft, NT, ND  Skin: The skin is warm and dry. No jaundice.  Ext: No c/c/e.    Labs/Studies:   Uronav Gl7 81g 5/27/24  pnbx negative 65g  3/8/21  Reported negative biopsy 2016 outside facility  PSA 45.5 4/24  PSA 47.5 11/23  PSA 36 1/23  PSA 27 7/22  PSA 30.1 4/22  PSA 32.6 1/22  PSA 41.2 9/21  PSA 21.9 1/21  PSA 19.3 7/20  CT/BS no mets 6/24  MRI PIRADS 5 88g 1/24  MRI PI-RADS 2 6/22  MRI PI-RADS 4 2/21    Impression/Plan:       1. Prostate cancer-  XRT  11/24,   lupron  7/8/24 (18 mo)    Patient is status post radiation therapy, we will bring him back in January with his 2nd Lupron and a PSA.  We will do 18 months of treatment total in regards to his hormone ablation.  Patient does note some hot flashes, he will attempt flax seed oil and if this does not improve he will let us know.  Patient will hold his Casodex.    2. BPH- tamsulosin and finasteride appear to be working well.  Call with any other issues in the meantime.    3. Erectile dysfunction- sildenafil 100 mg works well for the patient, currently not an issue.

## 2024-11-26 ENCOUNTER — PATIENT OUTREACH (OUTPATIENT)
Dept: ADMINISTRATIVE | Facility: HOSPITAL | Age: 79
End: 2024-11-26
Payer: MEDICARE

## 2024-12-03 ENCOUNTER — OFFICE VISIT (OUTPATIENT)
Dept: RADIATION ONCOLOGY | Facility: CLINIC | Age: 79
End: 2024-12-03
Payer: MEDICARE

## 2024-12-03 VITALS
RESPIRATION RATE: 19 BRPM | TEMPERATURE: 99 F | OXYGEN SATURATION: 99 % | WEIGHT: 179.88 LBS | HEIGHT: 66 IN | HEART RATE: 75 BPM | SYSTOLIC BLOOD PRESSURE: 168 MMHG | DIASTOLIC BLOOD PRESSURE: 79 MMHG | BODY MASS INDEX: 28.91 KG/M2

## 2024-12-03 DIAGNOSIS — C61 MALIGNANT NEOPLASM OF PROSTATE: Primary | ICD-10-CM

## 2024-12-03 PROCEDURE — 3077F SYST BP >= 140 MM HG: CPT | Mod: HCNC,CPTII,S$GLB, | Performed by: SPECIALIST

## 2024-12-03 PROCEDURE — 99024 POSTOP FOLLOW-UP VISIT: CPT | Mod: HCNC,S$GLB,, | Performed by: SPECIALIST

## 2024-12-03 PROCEDURE — 1101F PT FALLS ASSESS-DOCD LE1/YR: CPT | Mod: HCNC,CPTII,S$GLB, | Performed by: SPECIALIST

## 2024-12-03 PROCEDURE — 3288F FALL RISK ASSESSMENT DOCD: CPT | Mod: HCNC,CPTII,S$GLB, | Performed by: SPECIALIST

## 2024-12-03 PROCEDURE — 3078F DIAST BP <80 MM HG: CPT | Mod: HCNC,CPTII,S$GLB, | Performed by: SPECIALIST

## 2024-12-03 PROCEDURE — 1159F MED LIST DOCD IN RCRD: CPT | Mod: HCNC,CPTII,S$GLB, | Performed by: SPECIALIST

## 2024-12-03 PROCEDURE — 1126F AMNT PAIN NOTED NONE PRSNT: CPT | Mod: HCNC,CPTII,S$GLB, | Performed by: SPECIALIST

## 2024-12-03 NOTE — PROGRESS NOTES
[facility] / [facility] - Radiation Oncology Follow Up Note    HISTORY OF PRESENT ILLNESS:  Patient left before he could be seen.  He will be rescheduled

## 2024-12-27 ENCOUNTER — OFFICE VISIT (OUTPATIENT)
Dept: RADIATION ONCOLOGY | Facility: CLINIC | Age: 79
End: 2024-12-27
Payer: MEDICARE

## 2024-12-27 VITALS
HEART RATE: 88 BPM | RESPIRATION RATE: 19 BRPM | DIASTOLIC BLOOD PRESSURE: 67 MMHG | SYSTOLIC BLOOD PRESSURE: 181 MMHG | BODY MASS INDEX: 28.84 KG/M2 | TEMPERATURE: 99 F | HEIGHT: 66 IN | WEIGHT: 179.44 LBS | OXYGEN SATURATION: 99 %

## 2024-12-27 DIAGNOSIS — C61 MALIGNANT NEOPLASM OF PROSTATE: Primary | ICD-10-CM

## 2024-12-27 PROCEDURE — 99999 PR PBB SHADOW E&M-EST. PATIENT-LVL IV: CPT | Mod: PBBFAC,HCNC,, | Performed by: SPECIALIST

## 2024-12-27 NOTE — PROGRESS NOTES
[facility] / [facility] - Radiation Oncology Follow Up Note    HISTORY OF PRESENT ILLNESS: C61 - high-risk prostate cancer Stage IIIA, T1c, N0, M0, P>=20, G2      78-year-old retired  on Proscar, Flomax, and Viagra, whose PSA measured 21.9 on 01/15/2021.      TRUS guided biopsy on 03/08/2021 was benign, and he reports a prior biopsy in 2016 that was also benign      Prostate MRI on 06/03/2022 estimated a 81.4 cc gland and was read as BI-RADS 2, no focal suspicious abnormality      Prostate MRI on 01/11/2024, which I have reviewed today, estimates an 88 cc gland and describes a lenticular 3.3 x 1.0 x 1.6 cm T2 hypointense mass in the anterior transitional zone right greater than left from the midgland to the apex. No capsular involvement, no extra capsular extension, and no other malignant findings.      TRUS guided and targeted biopsy on 05/27/2024 recovered adenocarcinoma in 7/8 cores from the right apex, which include some combination of 6 targeted cores and 2 sampling cores, with a Sully score of 3+4, with 10% pattern 4.      Bone scan on 06/17 24 showed no evidence of malignancy      CT of the abdomen and pelvis on 06/24/2024 showed prostatomegaly with a concentric wall thickening of the urinary bladder, without malignant findings      Treatment Summary: One-month of Casodex was prescribed on 06/25/2024.      A six-month Lupron was delivered by Dr. Copeland on 07/08/2024      He was treated to the prostate and pelvic lymphatics, receiving 70 Gy and 50.4 Gy, respectively, in 28 fractions last on 11/01/2024         INTERVAL HISTORY:  He returns for routine one-month follow-up.  He was originally scheduled for 12/03/2024 but left before being seen.    He had tolerated therapy well, reporting modest GI and  change by the conclusion of therapy, with no intervention indicated, and today reports ongoing use of nightly Flomax as at baseline, with near-complete normalization, with  "rare modest improving dysuria, urgency similar to baseline with occasional low volume incontinence not requiring use of a pad, and nocturia 3 times per evening.  He denies weak stream, hesitancy, hematuria, daytime frequency less than 2 hours, or new bone pain.  He has ongoing but improving GI softening and I instructed him that he may discontinue senna.    Ongoing hot flashes consistent with ongoing endocrine therapy with ED.       PHYSICAL EXAMINATION:  Vitals:    12/27/24 1431   BP: (!) 181/67  Comment: pt says he didnt take bp medicine today   Pulse: 88   Resp: 19   Temp: 98.6 °F (37 °C)   SpO2: 99%   Weight: 81.4 kg (179 lb 7.3 oz)   Height: 5' 6" (1.676 m)      General:  A&O x4, NAD  Lungs:  CTAB  Heart:  RRR  Abdomen:  NTND +BS      ASSESSMENT:  Near-complete resolution of GI and  changes with ongoing nightly Flomax as at baseline.  Six-month into 18 planned months of endocrine therapy      PLAN:  He sees Dr. Copeland in early January for his 2nd Lupron injection.  He will follow up here in 6 months      "

## 2024-12-31 RX ORDER — TAMSULOSIN HYDROCHLORIDE 0.4 MG/1
1 CAPSULE ORAL
Qty: 90 CAPSULE | Refills: 3 | Status: SHIPPED | OUTPATIENT
Start: 2024-12-31

## 2025-01-09 ENCOUNTER — HOSPITAL ENCOUNTER (OUTPATIENT)
Dept: RADIOLOGY | Facility: HOSPITAL | Age: 80
Discharge: HOME | End: 2025-01-09
Attending: HOSPITALIST
Payer: MEDICARE

## 2025-01-09 ENCOUNTER — OFFICE VISIT (OUTPATIENT)
Dept: PULMONOLOGY | Facility: CLINIC | Age: 80
End: 2025-01-09
Attending: HOSPITALIST
Payer: MEDICARE

## 2025-01-09 VITALS
OXYGEN SATURATION: 98 % | DIASTOLIC BLOOD PRESSURE: 82 MMHG | HEIGHT: 66 IN | SYSTOLIC BLOOD PRESSURE: 138 MMHG | RESPIRATION RATE: 20 BRPM | HEART RATE: 62 BPM | BODY MASS INDEX: 29.09 KG/M2 | WEIGHT: 181 LBS

## 2025-01-09 DIAGNOSIS — J45.20 ASTHMA, MILD INTERMITTENT, WELL-CONTROLLED: ICD-10-CM

## 2025-01-09 DIAGNOSIS — R91.8 MULTIPLE PULMONARY NODULES: Primary | ICD-10-CM

## 2025-01-09 DIAGNOSIS — R91.8 MULTIPLE PULMONARY NODULES: ICD-10-CM

## 2025-01-09 DIAGNOSIS — J47.9 BRONCHIECTASIS, UNCOMPLICATED: ICD-10-CM

## 2025-01-09 PROCEDURE — 1101F PT FALLS ASSESS-DOCD LE1/YR: CPT | Mod: HCNC,CPTII,S$GLB, | Performed by: HOSPITALIST

## 2025-01-09 PROCEDURE — 1160F RVW MEDS BY RX/DR IN RCRD: CPT | Mod: HCNC,CPTII,S$GLB, | Performed by: HOSPITALIST

## 2025-01-09 PROCEDURE — 99213 OFFICE O/P EST LOW 20 MIN: CPT | Mod: HCNC,S$GLB,, | Performed by: HOSPITALIST

## 2025-01-09 PROCEDURE — 99999 PR PBB SHADOW E&M-EST. PATIENT-LVL IV: CPT | Mod: PBBFAC,HCNC,, | Performed by: HOSPITALIST

## 2025-01-09 PROCEDURE — 1159F MED LIST DOCD IN RCRD: CPT | Mod: HCNC,CPTII,S$GLB, | Performed by: HOSPITALIST

## 2025-01-09 PROCEDURE — 3075F SYST BP GE 130 - 139MM HG: CPT | Mod: HCNC,CPTII,S$GLB, | Performed by: HOSPITALIST

## 2025-01-09 PROCEDURE — 71046 X-RAY EXAM CHEST 2 VIEWS: CPT | Mod: 26,HCNC,, | Performed by: RADIOLOGY

## 2025-01-09 PROCEDURE — 3079F DIAST BP 80-89 MM HG: CPT | Mod: HCNC,CPTII,S$GLB, | Performed by: HOSPITALIST

## 2025-01-09 PROCEDURE — 3288F FALL RISK ASSESSMENT DOCD: CPT | Mod: HCNC,CPTII,S$GLB, | Performed by: HOSPITALIST

## 2025-01-09 PROCEDURE — 71046 X-RAY EXAM CHEST 2 VIEWS: CPT | Mod: TC,HCNC

## 2025-01-09 RX ORDER — ALBUTEROL SULFATE 90 UG/1
INHALANT RESPIRATORY (INHALATION)
Qty: 18 G | Refills: 11 | Status: SHIPPED | OUTPATIENT
Start: 2025-01-09

## 2025-01-09 RX ORDER — FLUTICASONE PROPIONATE AND SALMETEROL 250; 50 UG/1; UG/1
1 POWDER RESPIRATORY (INHALATION) 2 TIMES DAILY
Qty: 60 EACH | Refills: 11 | Status: SHIPPED | OUTPATIENT
Start: 2025-01-09 | End: 2026-01-09

## 2025-01-09 NOTE — ASSESSMENT & PLAN NOTE
- well controlled with intermittent albuterol and advair use  - discussed symptoms that would indicate going back to using advair on a schedule  - up to date on flu and rsv

## 2025-01-09 NOTE — PROGRESS NOTES
Subjective:      Patient ID: Alex Milner is a 79 y.o. male.    Chief Complaint: Asthma, Pulmonary Nodules    Interval Hx 1/9/25:    Mr. Milner presents today for follow up asthma and nodules. He was last seen 7/2024- reported controlled asthma symptoms with intermittent advair usage, CXR ordered.     Got flu shot in [place], thinks he may have gotten RSV then too. Doing well, no respiratory issues. Gained a couple of pounds over the holidays, but planning to lose them.    Interim Testing:  - CXR 1/9/25- stable compared to 10/2023    Pulmonary Interventions:  - Advair- using 1-2/month  - Albuterol- using 1-2x/week    Interval Hx 7/9/24:     Mr. Milner presents to Pulmonary clinic for follow up asthma and pulmonary nodules. He was last seen 1/2024- encouraged to increase Advair use to BID. Chart reviewed- he has recently been diagnosed with prostate cancer and is proceeding with radiation.     In good spirits. No respiratory complaints at this time, hasn't had to use rescue inhaler, using advair more as needed when he remembers. Still able to walk his [place]poo outside a couple blocks each day without issue.      Pulmonary Interventions:  Advair- just using when he thinks about it  Albuterol- not having to use at all     HPI 1/9/2024:     78 year old male with history of HTN, HLD, PVD, asthma and pulmonary nodules (followed by Dr. Hernandez) who presents to clinic today for asthma follow up. He was last seen 10/2023 by Dr. Hernandez- at that time was poorly compliant to LABA-ICS, FeNO trending up with decreased FEV1 and FVC- plan was made for close follow up with FeNO.      Mr. Milner states that he is doing well, no complaints of feeling out of breath or wheezing, coughed a little yesterday with the weather change. No ED/UC visits since last seen. He has been trying to be more compliant with ics-laba.     Pertinent Work Up:  FeNO 1/9/24- 35ppb  FeNO 10/2023- 57ppb  [place] 10/2023- Mild obstruction with  "FEV1 82.4%, significant bronchodilator response  Pulmonary Nodules- stable 9/2022 since 3/2020     Pulmonary Interventions:  Albuterol HFA- never using  Advair 250- Using once per day, every now and then twice per day     Smoking hx: Quit 1985, 51 pack years       Review of Systems   Respiratory:  Negative for cough, sputum production, wheezing and dyspnea on extertion.      Objective:     Physical Exam   Constitutional: He is oriented to person, place, and time. He appears well-developed and well-nourished. He is obese.   Cardiovascular: Normal rate and regular rhythm.   Pulmonary/Chest: Normal expansion, effort normal and breath sounds normal.   Neurological: He is alert and oriented to person, place, and time.   Skin: Skin is warm and dry.     Personal Diagnostic Review  As Above      12/27/2024     2:31 PM 12/3/2024     9:33 AM 11/15/2024     1:34 PM 10/16/2024    11:07 AM 10/16/2024    10:54 AM 10/16/2024     9:42 AM 10/3/2024     1:25 PM   Pulmonary Function Tests   SpO2 99 % 99 %     98 %   Height 5' 6" (1.676 m) 5' 6" (1.676 m) 5' 6" (1.676 m) 5' 6" (1.676 m) 5' 6" (1.676 m) 5' 6" (1.676 m) 5' 6" (1.676 m)   Weight 81.4 kg (179 lb 7.3 oz) 81.6 kg (179 lb 14.3 oz) 80.7 kg (177 lb 14.6 oz) 83.5 kg (184 lb) 83.5 kg (184 lb) 83.5 kg (184 lb) 83.7 kg (184 lb 8.4 oz)   BMI (Calculated) 29 29 28.7 29.7 29.7 29.7 29.8        Assessment:     No diagnosis found.     Outpatient Encounter Medications as of 1/9/2025   Medication Sig Dispense Refill    albuterol (PROVENTIL/VENTOLIN HFA) 90 mcg/actuation inhaler INHALE 2 PUFFS INTO THE LUNGS EVERY 6 (SIX) HOURS AS NEEDED FOR WHEEZING OR SHORTNESS OF BREATH 18 g 11    amLODIPine (NORVASC) 10 MG tablet Take 1 tablet (10 mg total) by mouth once daily. 30 tablet 11    aspirin (ECOTRIN) 81 MG EC tablet TAKE 1 TABLET BY MOUTH EVERY DAY 90 tablet 2    atorvastatin (LIPITOR) 80 MG tablet Take 1 tablet (80 mg total) by mouth once daily. (Patient taking differently: Take 80 mg by " mouth once daily. Takes 2-3 times a week) 90 tablet 3    bicalutamide (CASODEX) 50 MG Tab Take 1 tablet (50 mg total) by mouth once daily. 30 tablet 11    brimonidine 0.15 % OPTH DROP (ALPHAGAN) 0.15 % ophthalmic solution Place 1 drop into both eyes 2 (two) times a day. 15 mL 4    diazePAM (VALIUM) 5 MG tablet Take 1 tablet (5 mg total) by mouth once. for 1 dose 1 tablet 0    dorzolamide (TRUSOPT) 2 % ophthalmic solution INSTILL 1 DROP INTO BOTH EYES 2 TIMES A DAY 30 mL 4    evolocumab (REPATHA SURECLICK) 140 mg/mL PnIj Inject 1 mL (140 mg total) into the skin every 14 (fourteen) days. 2 each 5    finasteride (PROSCAR) 5 mg tablet Take 1 tablet (5 mg total) by mouth once daily. 90 tablet 3    FLUAD QUAD 2022-23,65Y UP,,PF, 60 mcg (15 mcg x 4)/0.5 mL Syrg       fluticasone-salmeterol diskus inhaler 250-50 mcg Inhale 1 puff into the lungs 2 (two) times daily. Controller 60 each 11    latanoprost 0.005 % ophthalmic solution Place 1 drop into both eyes every evening. 2.5 mL 11    latanoprost 0.005 % ophthalmic solution Place 1 drop into both eyes every evening. 5 mL 6    lisinopriL (PRINIVIL,ZESTRIL) 20 MG tablet Take 1 tablet (20 mg total) by mouth once daily. 90 tablet 1    LUPRON DEPOT, 6 MONTH, 45 mg SyKt injection       oxyCODONE-acetaminophen (PERCOCET) 5-325 mg per tablet Take 1 tablet by mouth every 4 (four) hours as needed for Pain. 10 tablet 0    prednisoLONE acetate (PRED FORTE) 1 % DrpS Place 1 drop into the right eye 4 (four) times daily. 5 mL 1    rivaroxaban (XARELTO) 2.5 mg Tab Take 1 tablet (2.5 mg total) by mouth 2 (two) times daily with meals. 60 tablet 5    sildenafiL (VIAGRA) 100 MG tablet Take 1 tablet (100 mg total) by mouth daily as needed for Erectile Dysfunction. 45 tablet 5    sulfamethoxazole-trimethoprim 800-160mg (BACTRIM DS) 800-160 mg Tab Take 1 tablet by mouth 2 (two) times daily. 10 tablet 0    tamsulosin (FLOMAX) 0.4 mg Cap TAKE 1 CAPSULE BY MOUTH EVERY DAY 90 capsule 3     [DISCONTINUED] tamsulosin (FLOMAX) 0.4 mg Cap Take 1 capsule (0.4 mg total) by mouth once daily. 90 capsule 3     No facility-administered encounter medications on file as of 1/9/2025.     No orders of the defined types were placed in this encounter.        Plan:     Problem List Items Addressed This Visit       Multiple pulmonary nodules - Primary     - stable cxr today compared to 10/2023         Asthma, well controlled     - well controlled with intermittent albuterol and advair use  - discussed symptoms that would indicate going back to using advair on a schedule  - up to date on flu and rsv         Relevant Medications    albuterol (PROVENTIL/VENTOLIN HFA) 90 mcg/actuation inhaler    fluticasone-salmeterol diskus inhaler 250-50 mcg     Other Visit Diagnoses       Bronchiectasis, uncomplicated        Relevant Medications    fluticasone-salmeterol diskus inhaler 250-50 mcg          Follow up in one year or sooner as needed.

## 2025-01-10 ENCOUNTER — LAB VISIT (OUTPATIENT)
Dept: LAB | Facility: HOSPITAL | Age: 80
End: 2025-01-10
Attending: UROLOGY
Payer: MEDICARE

## 2025-01-10 DIAGNOSIS — C61 MALIGNANT NEOPLASM OF PROSTATE: ICD-10-CM

## 2025-01-10 LAB — COMPLEXED PSA SERPL-MCNC: 0.04 NG/ML (ref 0–4)

## 2025-01-10 PROCEDURE — 36415 COLL VENOUS BLD VENIPUNCTURE: CPT | Mod: HCNC,PN | Performed by: UROLOGY

## 2025-01-10 PROCEDURE — 84153 ASSAY OF PSA TOTAL: CPT | Mod: HCNC | Performed by: UROLOGY

## 2025-01-13 ENCOUNTER — CLINICAL SUPPORT (OUTPATIENT)
Dept: UROLOGY | Facility: CLINIC | Age: 80
End: 2025-01-13
Payer: MEDICARE

## 2025-01-13 ENCOUNTER — OFFICE VISIT (OUTPATIENT)
Dept: UROLOGY | Facility: CLINIC | Age: 80
End: 2025-01-13
Payer: MEDICARE

## 2025-01-13 VITALS
HEIGHT: 66 IN | DIASTOLIC BLOOD PRESSURE: 76 MMHG | TEMPERATURE: 98 F | BODY MASS INDEX: 28.7 KG/M2 | WEIGHT: 178.56 LBS | RESPIRATION RATE: 17 BRPM | HEART RATE: 56 BPM | SYSTOLIC BLOOD PRESSURE: 163 MMHG

## 2025-01-13 DIAGNOSIS — C61 MALIGNANT NEOPLASM OF PROSTATE: Primary | ICD-10-CM

## 2025-01-13 DIAGNOSIS — N40.0 BENIGN PROSTATIC HYPERPLASIA, UNSPECIFIED WHETHER LOWER URINARY TRACT SYMPTOMS PRESENT: ICD-10-CM

## 2025-01-13 DIAGNOSIS — N52.9 ERECTILE DYSFUNCTION, UNSPECIFIED ERECTILE DYSFUNCTION TYPE: ICD-10-CM

## 2025-01-13 DIAGNOSIS — Z00.00 ENCOUNTER FOR MEDICARE ANNUAL WELLNESS EXAM: ICD-10-CM

## 2025-01-13 LAB
BILIRUB UR QL STRIP: NEGATIVE
GLUCOSE UR QL STRIP: NEGATIVE
KETONES UR QL STRIP: NEGATIVE
LEUKOCYTE ESTERASE UR QL STRIP: NEGATIVE
PH, POC UA: 5.5
POC BLOOD, URINE: NEGATIVE
POC NITRATES, URINE: NEGATIVE
POC RESIDUAL URINE VOLUME: 64 ML (ref 0–100)
PROT UR QL STRIP: POSITIVE
SP GR UR STRIP: 1.02 (ref 1–1.03)
UROBILINOGEN UR STRIP-ACNC: 0.2 (ref 0.3–2.2)

## 2025-01-13 PROCEDURE — 3078F DIAST BP <80 MM HG: CPT | Mod: HCNC,CPTII,S$GLB, | Performed by: UROLOGY

## 2025-01-13 PROCEDURE — 3288F FALL RISK ASSESSMENT DOCD: CPT | Mod: HCNC,CPTII,S$GLB, | Performed by: UROLOGY

## 2025-01-13 PROCEDURE — 99999 PR PBB SHADOW E&M-EST. PATIENT-LVL V: CPT | Mod: PBBFAC,HCNC,, | Performed by: UROLOGY

## 2025-01-13 PROCEDURE — 51798 US URINE CAPACITY MEASURE: CPT | Mod: HCNC,S$GLB,, | Performed by: UROLOGY

## 2025-01-13 PROCEDURE — 99214 OFFICE O/P EST MOD 30 MIN: CPT | Mod: HCNC,25,S$GLB, | Performed by: UROLOGY

## 2025-01-13 PROCEDURE — 96402 CHEMO HORMON ANTINEOPL SQ/IM: CPT | Mod: HCNC,S$GLB,, | Performed by: UROLOGY

## 2025-01-13 PROCEDURE — 3077F SYST BP >= 140 MM HG: CPT | Mod: HCNC,CPTII,S$GLB, | Performed by: UROLOGY

## 2025-01-13 PROCEDURE — 99999 PR PBB SHADOW E&M-EST. PATIENT-LVL III: CPT | Mod: PBBFAC,HCNC,,

## 2025-01-13 PROCEDURE — 1126F AMNT PAIN NOTED NONE PRSNT: CPT | Mod: HCNC,CPTII,S$GLB, | Performed by: UROLOGY

## 2025-01-13 PROCEDURE — 81003 URINALYSIS AUTO W/O SCOPE: CPT | Mod: QW,HCNC,S$GLB, | Performed by: UROLOGY

## 2025-01-13 PROCEDURE — 1101F PT FALLS ASSESS-DOCD LE1/YR: CPT | Mod: HCNC,CPTII,S$GLB, | Performed by: UROLOGY

## 2025-01-13 RX ORDER — CEFDINIR 300 MG/1
300 CAPSULE ORAL 2 TIMES DAILY
Qty: 20 CAPSULE | Refills: 0 | Status: SHIPPED | OUTPATIENT
Start: 2025-01-13 | End: 2025-01-23

## 2025-01-13 NOTE — PROGRESS NOTES
..Patient came in for ordered Lupron 45 mg, after confirming patient's allergies and donning proper PPE, Lupron 45 mg was administered IM to left dorsalgluteal using aseptic technique. Pt tolerated procedure well. Patient agreed to wait 15 minutes after injection in the clinic and to report any adverse reactions.      Kwabena Taveras RN

## 2025-01-13 NOTE — PROGRESS NOTES
[facility]:   [facility]ounter Diagnoses   Name Primary?    Malignant neoplasm of prostate Yes    Benign prostatic hyperplasia, unspecified whether lower urinary tract symptoms present     Erectile dysfunction, unspecified erectile dysfunction type        HPI:   1/13/25- here today for his six-month Lupron, he has been having some dysuria with increased frequency and urgency over the last 2 weeks.  Erections are currently not an issue.    1/8/18: Returns after long absence.  PSA >30.  Says he had a biopsy in 2016 with another urologist in [facility] off [facility].  Second one he had.  MRI report from 5/17 reassuring with 98gm prostate.  Reduced stream not on flomax/finasteride.  No abd/pelvic pain and no exac/rel factors.  No hematuria.  No urolithiasis.  3-4 cups coffee in AM.  Nocturia x3-4.    Allergies:  Patient has no known allergies.    Medications:  has a current medication list which includes the following prescription(s): albuterol, aspirin, atorvastatin, bicalutamide, brimonidine 0.15 % opth drop, dorzolamide, repatha sureclick, finasteride, fluad quad 2022-23(65y up)(pf), fluticasone-salmeterol 250-50 mcg/dose, latanoprost, latanoprost, lisinopril, lupron depot (6 month), oxycodone-acetaminophen, prednisolone acetate, xarelto, sildenafil, sulfamethoxazole-trimethoprim 800-160mg, tamsulosin, amlodipine, and diazepam, and the following Facility-Administered Medications: leuprolide acetate (6 month).    Review of Systems:  General: No fever, chills, fatigability, or weight loss.  Skin: No rashes, itching, or changes in color or texture of skin.  Chest: Denies THOMPSON, cyanosis, wheezing, cough, and sputum production.  Abdomen: Appetite fine. No weight loss. Denies diarrhea, abdominal pain, hematemesis, or blood in stool.  Musculoskeletal: No joint stiffness or swelling. Some back pain.  : As above.  All other review of systems negative.    PMH:   has a past medical history of Atherosclerosis of coronary artery  (08/10/2022), Atherosclerotic PVD with intermittent claudication (08/16/2019), Atrial fibrillation, Back pain, Bilateral inguinal hernia, Calcified granuloma of lung (10/03/2017), Diverticulosis, Diverticulosis (10/28/2013), ED (erectile dysfunction), Elevated PSA, Granuloma of liver, Hyperlipidemia, Hypertension, Liver mass (03/13/2020), Malignant neoplasm of prostate (6/10/2024), Moderate persistent asthma without complication (09/26/2017), Personal history of colonic polyps (2013), Pulmonary heart disease (08/10/2022), and Urinary tract infection.    PSH:   has a past surgical history that includes Hernia repair; Prostate biopsy; Aortography with serialography (N/A, 10/21/2019); Aortography with serialography (N/A, 11/14/2019); and Colonoscopy (N/A, 12/7/2022).    FamHx: family history includes Cancer in his brother, father, and mother; Cancer (age of onset: 64) in his sister; Prostate cancer in his brother.    SocHx:  reports that he quit smoking about 39 years ago. His smoking use included cigarettes. He started smoking about 65 years ago. He has a 51.2 pack-year smoking history. He has been exposed to tobacco smoke. He has never used smokeless tobacco. He reports current alcohol use of about 12.0 standard drinks of alcohol per week. He reports that he does not use drugs.      Physical Exam:  Vitals:    01/13/25 0851   BP: (!) 163/76   Pulse: (!) 56   Resp: 17   Temp: 97.8 °F (36.6 °C)       General: A&Ox3, no apparent distress, no deformities  Neck: No masses, normal thyroid  Lungs: normal inspiration, no use of accessory muscles  Heart: normal pulse, no arrhythmias  Abdomen: Soft, NT, ND  Skin: The skin is warm and dry. No jaundice.  Ext: No c/c/e.    Labs/Studies:   UA 30 protein 1/25  PVR 64 ml 1/25  Uronav Gl7 81g 5/27/24  pnbx negative 65g  3/8/21  Reported negative biopsy 2016 outside facility  PSA 0.4 1/25  PSA 45.5 4/24  PSA 47.5 11/23  PSA 36 1/23  PSA 27 7/22  PSA 30.1 4/22  PSA 32.6 1/22  PSA 41.2  9/21  PSA 21.9 1/21  PSA 19.3 7/20  CT/BS no mets 6/24  MRI PIRADS 5 88g 1/24  MRI PI-RADS 2 6/22  MRI PI-RADS 4 2/21    Impression/Plan:       1. Prostate cancer-  XRT  11/24,  lupron  1/13/25, 7/8/24 (18 mo)    Patient is status post radiation therapy, PSA is stable and he has received another six-month Lupron today.  Will see him back in 6 months with his last Lupron and another PSA.  Call with any other issues in the meantime.  Currently not treating his hot flashes.    2. BPH- tamsulosin and finasteride appear to be working well.  Due to the recent dysuria and irritation we will treat with 10 days of cefdinir b.i.d..  If this does not improve he will let us know.    3. Erectile dysfunction- sildenafil 100 mg works well for the patient, currently not an issue.

## 2025-03-05 ENCOUNTER — OFFICE VISIT (OUTPATIENT)
Dept: OPHTHALMOLOGY | Facility: CLINIC | Age: 80
End: 2025-03-05
Payer: MEDICARE

## 2025-03-05 DIAGNOSIS — H40.1121 PRIMARY OPEN ANGLE GLAUCOMA (POAG) OF LEFT EYE, MILD STAGE: ICD-10-CM

## 2025-03-05 DIAGNOSIS — H40.1113 PRIMARY OPEN ANGLE GLAUCOMA (POAG) OF RIGHT EYE, SEVERE STAGE: Primary | ICD-10-CM

## 2025-03-05 PROCEDURE — 99999 PR PBB SHADOW E&M-EST. PATIENT-LVL III: CPT | Mod: PBBFAC,,, | Performed by: STUDENT IN AN ORGANIZED HEALTH CARE EDUCATION/TRAINING PROGRAM

## 2025-03-05 PROCEDURE — 99213 OFFICE O/P EST LOW 20 MIN: CPT | Mod: S$GLB,,, | Performed by: STUDENT IN AN ORGANIZED HEALTH CARE EDUCATION/TRAINING PROGRAM

## 2025-03-05 PROCEDURE — 1160F RVW MEDS BY RX/DR IN RCRD: CPT | Mod: CPTII,S$GLB,, | Performed by: STUDENT IN AN ORGANIZED HEALTH CARE EDUCATION/TRAINING PROGRAM

## 2025-03-05 PROCEDURE — 1159F MED LIST DOCD IN RCRD: CPT | Mod: CPTII,S$GLB,, | Performed by: STUDENT IN AN ORGANIZED HEALTH CARE EDUCATION/TRAINING PROGRAM

## 2025-03-06 ENCOUNTER — PATIENT MESSAGE (OUTPATIENT)
Dept: ADMINISTRATIVE | Facility: HOSPITAL | Age: 80
End: 2025-03-06
Payer: MEDICARE

## 2025-03-19 ENCOUNTER — LAB VISIT (OUTPATIENT)
Dept: LAB | Facility: HOSPITAL | Age: 80
End: 2025-03-19
Attending: INTERNAL MEDICINE
Payer: MEDICARE

## 2025-03-19 DIAGNOSIS — I70.219 ATHEROSCLEROTIC PVD WITH INTERMITTENT CLAUDICATION: ICD-10-CM

## 2025-03-19 DIAGNOSIS — I65.29 STENOSIS OF CAROTID ARTERY, UNSPECIFIED LATERALITY: ICD-10-CM

## 2025-03-19 DIAGNOSIS — R09.89 BRUIT OF LEFT CAROTID ARTERY: ICD-10-CM

## 2025-03-19 DIAGNOSIS — E78.2 MIXED HYPERLIPIDEMIA: ICD-10-CM

## 2025-03-19 DIAGNOSIS — I70.0 AORTIC ATHEROSCLEROSIS: ICD-10-CM

## 2025-03-19 LAB
ALBUMIN SERPL BCP-MCNC: 3.6 G/DL (ref 3.5–5.2)
ALP SERPL-CCNC: 78 U/L (ref 40–150)
ALT SERPL W/O P-5'-P-CCNC: 11 U/L (ref 10–44)
ANION GAP SERPL CALC-SCNC: 8 MMOL/L (ref 8–16)
AST SERPL-CCNC: 15 U/L (ref 10–40)
BILIRUB SERPL-MCNC: 0.3 MG/DL (ref 0.1–1)
BUN SERPL-MCNC: 14 MG/DL (ref 8–23)
CALCIUM SERPL-MCNC: 9.2 MG/DL (ref 8.7–10.5)
CHLORIDE SERPL-SCNC: 108 MMOL/L (ref 95–110)
CHOLEST SERPL-MCNC: 303 MG/DL (ref 120–199)
CHOLEST/HDLC SERPL: 4.5 {RATIO} (ref 2–5)
CO2 SERPL-SCNC: 26 MMOL/L (ref 23–29)
CREAT SERPL-MCNC: 1 MG/DL (ref 0.5–1.4)
EST. GFR  (NO RACE VARIABLE): >60 ML/MIN/1.73 M^2
GLUCOSE SERPL-MCNC: 102 MG/DL (ref 70–110)
HDLC SERPL-MCNC: 68 MG/DL (ref 40–75)
HDLC SERPL: 22.4 % (ref 20–50)
LDLC SERPL CALC-MCNC: 209.8 MG/DL (ref 63–159)
NONHDLC SERPL-MCNC: 235 MG/DL
POTASSIUM SERPL-SCNC: 4.6 MMOL/L (ref 3.5–5.1)
PROT SERPL-MCNC: 7.1 G/DL (ref 6–8.4)
SODIUM SERPL-SCNC: 142 MMOL/L (ref 136–145)
TRIGL SERPL-MCNC: 126 MG/DL (ref 30–150)

## 2025-03-19 PROCEDURE — 80061 LIPID PANEL: CPT | Performed by: INTERNAL MEDICINE

## 2025-03-19 PROCEDURE — 80053 COMPREHEN METABOLIC PANEL: CPT | Performed by: INTERNAL MEDICINE

## 2025-03-19 PROCEDURE — 36415 COLL VENOUS BLD VENIPUNCTURE: CPT | Mod: PN | Performed by: INTERNAL MEDICINE

## 2025-03-31 RX ORDER — FINASTERIDE 5 MG/1
5 TABLET, FILM COATED ORAL
Qty: 90 TABLET | Refills: 3 | Status: SHIPPED | OUTPATIENT
Start: 2025-03-31

## 2025-03-31 NOTE — TELEPHONE ENCOUNTER
MA received medication refill for  finasteride (PROSCAR) 5 mg tablet. Last office visit was 1/13/25 with instructions to Take 1 tablet (5 mg total) by mouth once daily. Follow up hellen scheduled for  7/14/25.Last prescription for finasteride (PROSCAR) 5 mg tablet was sent to the pharmacy on 12/28/23 with 3 refills. Prescription routed to provider.     Please see the attached refill request.

## 2025-04-02 ENCOUNTER — OFFICE VISIT (OUTPATIENT)
Dept: CARDIOLOGY | Facility: CLINIC | Age: 80
End: 2025-04-02
Payer: MEDICARE

## 2025-04-02 VITALS
HEIGHT: 66 IN | BODY MASS INDEX: 29.62 KG/M2 | DIASTOLIC BLOOD PRESSURE: 72 MMHG | WEIGHT: 184.31 LBS | SYSTOLIC BLOOD PRESSURE: 156 MMHG | HEART RATE: 59 BPM | OXYGEN SATURATION: 97 %

## 2025-04-02 DIAGNOSIS — I70.0 AORTIC ATHEROSCLEROSIS: ICD-10-CM

## 2025-04-02 DIAGNOSIS — I10 PRIMARY HYPERTENSION: Chronic | ICD-10-CM

## 2025-04-02 DIAGNOSIS — E78.2 MIXED HYPERLIPIDEMIA: ICD-10-CM

## 2025-04-02 DIAGNOSIS — I25.10 ATHEROSCLEROSIS OF CORONARY ARTERY, UNSPECIFIED VESSEL OR LESION TYPE, UNSPECIFIED WHETHER ANGINA PRESENT, UNSPECIFIED WHETHER NATIVE OR TRANSPLANTED HEART: ICD-10-CM

## 2025-04-02 DIAGNOSIS — I65.29 STENOSIS OF CAROTID ARTERY, UNSPECIFIED LATERALITY: ICD-10-CM

## 2025-04-02 DIAGNOSIS — I70.219 ATHEROSCLEROTIC PVD WITH INTERMITTENT CLAUDICATION: Primary | ICD-10-CM

## 2025-04-02 DIAGNOSIS — I27.9 PULMONARY HEART DISEASE: ICD-10-CM

## 2025-04-02 DIAGNOSIS — C61 MALIGNANT NEOPLASM OF PROSTATE: ICD-10-CM

## 2025-04-02 PROCEDURE — 1101F PT FALLS ASSESS-DOCD LE1/YR: CPT | Mod: HCNC,CPTII,S$GLB, | Performed by: INTERNAL MEDICINE

## 2025-04-02 PROCEDURE — 99214 OFFICE O/P EST MOD 30 MIN: CPT | Mod: HCNC,S$GLB,, | Performed by: INTERNAL MEDICINE

## 2025-04-02 PROCEDURE — 1160F RVW MEDS BY RX/DR IN RCRD: CPT | Mod: HCNC,CPTII,S$GLB, | Performed by: INTERNAL MEDICINE

## 2025-04-02 PROCEDURE — 1126F AMNT PAIN NOTED NONE PRSNT: CPT | Mod: HCNC,CPTII,S$GLB, | Performed by: INTERNAL MEDICINE

## 2025-04-02 PROCEDURE — 99999 PR PBB SHADOW E&M-EST. PATIENT-LVL V: CPT | Mod: PBBFAC,HCNC,, | Performed by: INTERNAL MEDICINE

## 2025-04-02 PROCEDURE — 3078F DIAST BP <80 MM HG: CPT | Mod: HCNC,CPTII,S$GLB, | Performed by: INTERNAL MEDICINE

## 2025-04-02 PROCEDURE — 3077F SYST BP >= 140 MM HG: CPT | Mod: HCNC,CPTII,S$GLB, | Performed by: INTERNAL MEDICINE

## 2025-04-02 PROCEDURE — 1159F MED LIST DOCD IN RCRD: CPT | Mod: HCNC,CPTII,S$GLB, | Performed by: INTERNAL MEDICINE

## 2025-04-02 PROCEDURE — 3288F FALL RISK ASSESSMENT DOCD: CPT | Mod: HCNC,CPTII,S$GLB, | Performed by: INTERNAL MEDICINE

## 2025-04-02 RX ORDER — EVOLOCUMAB 140 MG/ML
140 INJECTION, SOLUTION SUBCUTANEOUS
Qty: 2 EACH | Refills: 5 | Status: SHIPPED | OUTPATIENT
Start: 2025-04-02 | End: 2025-04-02 | Stop reason: SDUPTHER

## 2025-04-02 RX ORDER — EVOLOCUMAB 140 MG/ML
140 INJECTION, SOLUTION SUBCUTANEOUS
Qty: 6 EACH | Refills: 4 | Status: ACTIVE | OUTPATIENT
Start: 2025-04-02

## 2025-04-02 NOTE — PROGRESS NOTES
Subjective:   Patient ID:  Alex Milner is a 79 y.o. male who presents for follow up of No chief complaint on file.      HPI  75 yo male, 6 months f/u  PMH PAD, h/o AO showed left infrapopliteal . Distal left SFA PTCA and  AO right SFA PTCA by Dr. Ross,  HTN, HLP, PVD, obesity, ETOH use PAULA on CPAP. No smoking  C/o bilateral thigh and buttock pain with walking and some time long time sitting, and back pain at night,. No calf pain   left FREDY 0.7 and right 1.1. LE arterial US showed left infrapopliteal stenosis and occluded AT      Chest CT wo contrast showed mild aorta calcification  No chest pain sob, dizziness, palpitation,  Resolved claudication after angiogram. Walks 2 miles a day. Only knee pain, No muscle weakness, pain and numbness  No smoking/drinking  Med compliance     05/2021 visit  C/o both thigh muscle tightness at rest and night. Some sore on both thighs at walking.  Some lower back pain at night  No chest pain dyspnea palpitation, faint and dizziness  Today EKG NSR HR 54 bpm, no acute ctt change      visit  Started taking Lipitor and zetia daily about few months ago. Occasionally muscle pain at walking. No resting pain.   Some lower back pain . No chest pain dyspnea dizziness and faint     Interval history  No leg pain with a lot of walking. No chest pain dyspnea dizziness dan leg swelling  Med compliance and no active bleeding  ekg NSR and BP controlled. , Copay of repatha was high     9/7/2022  Has non limiting claudication worse on left than right . He has no rest pain he has no discoloration. He is taking repatha. Has no chf symptoms he is compliant with meds and diet had fredy with exercise symptomatic recovered fast to baseline.  He is on asa and cilostazol.no bleeding or bruising .    He is on statins and [id number] for his hlp.        9/27/2023   Status quo with pvd and symptoms unchanged from last year visit he is able to get his lifestyle  addressed not smoking drinks beer denies any cardiac symptoms tolerated asa and pletal .tolerated [id number]      9/25/2024  Has prostate cancer follow urology. Ha sno new complaints has gianed weight eats peanut butter jelly sandwiches. EKG non specific changes he is very active physically  he gets leg fatigue and soreness in calgf after walking 2 blocks has to lean opn cart at [employer] to get through store.    4/2/2025   Has not been taking repatha his prescription ran out. He is not taking his lipitor even regularily no good explanation.  He is not compliant with slat he did not take his meds this morning. His claudication is stable. Unchanged.   Past Medical History:   Diagnosis Date    Atherosclerosis of coronary artery 08/10/2022    Atherosclerotic PVD with intermittent claudication 08/16/2019    Atrial fibrillation     pt unaware of this    Back pain     Bilateral inguinal hernia     CT ABdomen/pelvis 3/9/2015 (care everywhere)---Bilateral inguinal hernias containing fat.      Calcified granuloma of lung 10/03/2017    CT CHest 3/26/2018---There are calcified lymph nodes in the mediastinum and left hilum.    CT chest 9/11/ 2017 Calcified left hilar and subcarinal granulomas are noted  ;Calcified granuloma noted within the posterior segment of the right hepatic lobe.    Diverticulosis     Diverticulosis 10/28/2013    Colonoscopy     ED (erectile dysfunction)     Elevated PSA     Granuloma of liver     ct chest 3/26/2018---There is a small calcified granuloma posteriorly in the right lobe of the liver    Hyperlipidemia     Hypertension     Liver mass 03/13/2020    New lesion compared to CT chest dating back to 2018. New liver lesion, dome of liver seen on CT 3/12/2020 and MRI abdomen 3/12/2020.  3/13/2020 referral to Oncology  PET CT   Resolved in 2020    Malignant neoplasm of prostate 6/10/2024    Moderate persistent asthma without complication 09/26/2017    Personal history of colonic polyps 2013     Pulmonary heart disease 08/10/2022    Urinary tract infection        Past Surgical History:   Procedure Laterality Date    AORTOGRAPHY WITH SERIALOGRAPHY N/A 10/21/2019    Procedure: AORTOGRAM, WITH RUNOFF;  Surgeon: Chencho Ross MD;  Location: [facility] CATH LAB;  Service: Cardiology;  Laterality: N/A;  pt requesting 9am arrival    AORTOGRAPHY WITH SERIALOGRAPHY N/A 11/14/2019    Procedure: AORTOGRAM, WITH RUNOFF;  Surgeon: Chencho Ross MD;  Location: [facility] CATH LAB;  Service: Cardiology;  Laterality: N/A;    COLONOSCOPY N/A 12/7/2022    Procedure: COLONOSCOPY;  Surgeon: Florinda Lindsey DO;  Location: [facility] ENDO;  Service: General;  Laterality: N/A;    HERNIA REPAIR      x2    PROSTATE BIOPSY      reported per patient around 2014 or 2015 which was reportedly negative       Social History[1]    Family History   Problem Relation Name Age of Onset    Cancer Mother          Unknown primary    Cancer Father      Cancer Brother          prostate     Prostate cancer Brother      Cancer Sister  64        pancreatic     Diabetes Neg Hx      Heart disease Neg Hx      Kidney disease Neg Hx      Stroke Neg Hx      Hyperlipidemia Neg Hx      Hypertension Neg Hx         Current Outpatient Medications   Medication Sig    albuterol (PROVENTIL/VENTOLIN HFA) 90 mcg/actuation inhaler INHALE 2 PUFFS INTO THE LUNGS EVERY 6 (SIX) HOURS AS NEEDED FOR WHEEZING OR SHORTNESS OF BREATH    amLODIPine (NORVASC) 10 MG tablet Take 1 tablet (10 mg total) by mouth once daily.    aspirin (ECOTRIN) 81 MG EC tablet TAKE 1 TABLET BY MOUTH EVERY DAY    atorvastatin (LIPITOR) 80 MG tablet Take 1 tablet (80 mg total) by mouth once daily. (Patient taking differently: Take 80 mg by mouth once daily. Takes 2-3 times a week)    bicalutamide (CASODEX) 50 MG Tab Take 1 tablet (50 mg total) by mouth once daily.    brimonidine 0.15 % OPTH DROP (ALPHAGAN) 0.15 % ophthalmic solution Place 1 drop into both eyes 2 (two) times a day.    diazePAM (VALIUM) 5 MG  tablet Take 1 tablet (5 mg total) by mouth once. for 1 dose    dorzolamide (TRUSOPT) 2 % ophthalmic solution INSTILL 1 DROP INTO BOTH EYES 2 TIMES A DAY    evolocumab (REPATHA SURECLICK) 140 mg/mL PnIj Inject 1 mL (140 mg total) into the skin every 14 (fourteen) days.    finasteride (PROSCAR) 5 mg tablet TAKE 1 TABLET BY MOUTH EVERY DAY    FLUAD QUAD 2022-23,65Y UP,,PF, 60 mcg (15 mcg x 4)/0.5 mL Syrg     fluticasone-salmeterol diskus inhaler 250-50 mcg Inhale 1 puff into the lungs 2 (two) times daily. Controller    latanoprost 0.005 % ophthalmic solution Place 1 drop into both eyes every evening.    latanoprost 0.005 % ophthalmic solution Place 1 drop into both eyes every evening.    lisinopriL (PRINIVIL,ZESTRIL) 20 MG tablet Take 1 tablet (20 mg total) by mouth once daily.    LUPRON DEPOT, 6 MONTH, 45 mg SyKt injection     oxyCODONE-acetaminophen (PERCOCET) 5-325 mg per tablet Take 1 tablet by mouth every 4 (four) hours as needed for Pain.    prednisoLONE acetate (PRED FORTE) 1 % DrpS Place 1 drop into the right eye 4 (four) times daily.    rivaroxaban (XARELTO) 2.5 mg Tab Take 1 tablet (2.5 mg total) by mouth 2 (two) times daily with meals.    sildenafiL (VIAGRA) 100 MG tablet Take 1 tablet (100 mg total) by mouth daily as needed for Erectile Dysfunction.    sulfamethoxazole-trimethoprim 800-160mg (BACTRIM DS) 800-160 mg Tab Take 1 tablet by mouth 2 (two) times daily.    tamsulosin (FLOMAX) 0.4 mg Cap TAKE 1 CAPSULE BY MOUTH EVERY DAY     No current facility-administered medications for this visit.     Current Outpatient Medications on File Prior to Visit   Medication Sig    albuterol (PROVENTIL/VENTOLIN HFA) 90 mcg/actuation inhaler INHALE 2 PUFFS INTO THE LUNGS EVERY 6 (SIX) HOURS AS NEEDED FOR WHEEZING OR SHORTNESS OF BREATH    amLODIPine (NORVASC) 10 MG tablet Take 1 tablet (10 mg total) by mouth once daily.    aspirin (ECOTRIN) 81 MG EC tablet TAKE 1 TABLET BY MOUTH EVERY DAY    atorvastatin (LIPITOR) 80 MG  tablet Take 1 tablet (80 mg total) by mouth once daily. (Patient taking differently: Take 80 mg by mouth once daily. Takes 2-3 times a week)    bicalutamide (CASODEX) 50 MG Tab Take 1 tablet (50 mg total) by mouth once daily.    brimonidine 0.15 % OPTH DROP (ALPHAGAN) 0.15 % ophthalmic solution Place 1 drop into both eyes 2 (two) times a day.    diazePAM (VALIUM) 5 MG tablet Take 1 tablet (5 mg total) by mouth once. for 1 dose    dorzolamide (TRUSOPT) 2 % ophthalmic solution INSTILL 1 DROP INTO BOTH EYES 2 TIMES A DAY    evolocumab (REPATHA SURECLICK) 140 mg/mL PnIj Inject 1 mL (140 mg total) into the skin every 14 (fourteen) days.    finasteride (PROSCAR) 5 mg tablet TAKE 1 TABLET BY MOUTH EVERY DAY    FLUAD QUAD 2022-23,65Y UP,,PF, 60 mcg (15 mcg x 4)/0.5 mL Syrg     fluticasone-salmeterol diskus inhaler 250-50 mcg Inhale 1 puff into the lungs 2 (two) times daily. Controller    latanoprost 0.005 % ophthalmic solution Place 1 drop into both eyes every evening.    latanoprost 0.005 % ophthalmic solution Place 1 drop into both eyes every evening.    lisinopriL (PRINIVIL,ZESTRIL) 20 MG tablet Take 1 tablet (20 mg total) by mouth once daily.    LUPRON DEPOT, 6 MONTH, 45 mg SyKt injection     oxyCODONE-acetaminophen (PERCOCET) 5-325 mg per tablet Take 1 tablet by mouth every 4 (four) hours as needed for Pain.    prednisoLONE acetate (PRED FORTE) 1 % DrpS Place 1 drop into the right eye 4 (four) times daily.    rivaroxaban (XARELTO) 2.5 mg Tab Take 1 tablet (2.5 mg total) by mouth 2 (two) times daily with meals.    sildenafiL (VIAGRA) 100 MG tablet Take 1 tablet (100 mg total) by mouth daily as needed for Erectile Dysfunction.    sulfamethoxazole-trimethoprim 800-160mg (BACTRIM DS) 800-160 mg Tab Take 1 tablet by mouth 2 (two) times daily.    tamsulosin (FLOMAX) 0.4 mg Cap TAKE 1 CAPSULE BY MOUTH EVERY DAY     No current facility-administered medications on file prior to visit.     Review of patient's allergies  indicates:  No Known Allergies   Review of Systems   Constitutional: Negative for malaise/fatigue.   Eyes:  Negative for blurred vision.   Cardiovascular:  Negative for chest pain, claudication, cyanosis, dyspnea on exertion, irregular heartbeat, leg swelling, near-syncope, orthopnea, palpitations and paroxysmal nocturnal dyspnea.   Respiratory:  Negative for cough, hemoptysis and shortness of breath.    Hematologic/Lymphatic: Negative for bleeding problem. Does not bruise/bleed easily.   Skin:  Negative for dry skin and itching.   Musculoskeletal:  Negative for falls, muscle weakness and myalgias.   Gastrointestinal:  Negative for abdominal pain, diarrhea, heartburn, hematemesis, hematochezia and melena.   Genitourinary:  Negative for flank pain and hematuria.   Neurological:  Negative for dizziness, focal weakness, headaches, light-headedness, numbness, paresthesias, seizures and weakness.   Psychiatric/Behavioral:  Negative for altered mental status and memory loss. The patient is not nervous/anxious.    Allergic/Immunologic: Negative for hives.       Objective:   Physical Exam  Vitals and nursing note reviewed.   Constitutional:       General: He is not in acute distress.     Appearance: He is well-developed. He is not diaphoretic.   HENT:      Head: Normocephalic and atraumatic.   Eyes:      General:         Right eye: No discharge.         Left eye: No discharge.      Pupils: Pupils are equal, round, and reactive to light.   Neck:      Thyroid: No thyromegaly.      Vascular: Carotid bruit present. No JVD.   Cardiovascular:      Rate and Rhythm: Normal rate and regular rhythm.      Pulses: Intact distal pulses.           Carotid pulses are 2+ on the right side and 2+ on the left side.       Radial pulses are 2+ on the right side and 2+ on the left side.        Femoral pulses are 2+ on the right side with bruit and 2+ on the left side.       Popliteal pulses are 2+ on the right side and 2+ on the left side.       "  Dorsalis pedis pulses are 1+ on the right side and 1+ on the left side.        Posterior tibial pulses are 0 on the right side and 0 on the left side.      Heart sounds: Normal heart sounds. No murmur heard.     No friction rub. No gallop.   Pulmonary:      Effort: Pulmonary effort is normal. No respiratory distress.      Breath sounds: Normal breath sounds. No wheezing or rales.   Chest:      Chest wall: No tenderness.   Abdominal:      General: Bowel sounds are normal. There is no distension.      Palpations: Abdomen is soft.      Tenderness: There is no abdominal tenderness.   Musculoskeletal:         General: Normal range of motion.      Cervical back: Neck supple.      Right lower leg: No edema.      Left lower leg: No edema.   Skin:     General: Skin is warm and dry.      Findings: No erythema or rash.   Neurological:      General: No focal deficit present.      Mental Status: He is alert and oriented to person, place, and time.      Cranial Nerves: No cranial nerve deficit.   Psychiatric:         Mood and Affect: Mood normal.         Behavior: Behavior normal.       Vitals:    04/02/25 0922 04/02/25 0926   BP: (!) 162/70 (!) 156/72   BP Location: Right arm Left arm   Patient Position: Sitting Sitting   Pulse: (!) 59 (!) 59   SpO2: 97% 97%   Weight: 83.6 kg (184 lb 4.9 oz) 83.6 kg (184 lb 4.9 oz)   Height: 5' 6" (1.676 m) 5' 6" (1.676 m)     Lab Results   Component Value Date    CHOL 303 (H) 03/19/2025    CHOL 197 09/28/2023    CHOL 201 (H) 01/04/2022      Body mass index is 29.75 kg/m².   No results found for: "LABA1C", "HGBA1C"   BMP  Lab Results   Component Value Date     03/19/2025    K 4.6 03/19/2025     03/19/2025    CO2 26 03/19/2025    BUN 14 03/19/2025    CREATININE 1.0 03/19/2025    CALCIUM 9.2 03/19/2025    ANIONGAP 8 03/19/2025    EGFRNORACEVR >60.0 03/19/2025      Lab Results   Component Value Date    HDL 68 03/19/2025    HDL 74 09/28/2023    HDL 45 01/04/2022     Lab Results "   Component Value Date    LDLCALC 209.8 (H) 03/19/2025    LDLCALC 100.0 09/28/2023    LDLCALC 134.2 01/04/2022     Lab Results   Component Value Date    TRIG 126 03/19/2025    TRIG 115 09/28/2023    TRIG 109 01/04/2022     Lab Results   Component Value Date    CHOLHDL 22.4 03/19/2025    CHOLHDL 37.6 09/28/2023    CHOLHDL 22.4 01/04/2022       Chemistry        Component Value Date/Time     03/19/2025 0810    K 4.6 03/19/2025 0810     03/19/2025 0810    CO2 26 03/19/2025 0810    BUN 14 03/19/2025 0810    CREATININE 1.0 03/19/2025 0810     03/19/2025 0810        Component Value Date/Time    CALCIUM 9.2 03/19/2025 0810    ALKPHOS 78 03/19/2025 0810    AST 15 03/19/2025 0810    ALT 11 03/19/2025 0810    BILITOT 0.3 03/19/2025 0810    ESTGFRAFRICA >60.0 07/26/2022 0818    EGFRNONAA >60.0 07/26/2022 0818          Lab Results   Component Value Date    TSH 0.911 06/27/2019     Lab Results   Component Value Date    INR 0.9 03/31/2020    INR 0.9 11/07/2019    INR 1.0 10/14/2019     Lab Results   Component Value Date    WBC 4.78 07/26/2022    HGB 14.1 07/26/2022    HCT 44.8 07/26/2022    MCV 84 07/26/2022     07/26/2022     BMP  Sodium   Date Value Ref Range Status   03/19/2025 142 136 - 145 mmol/L Final     Potassium   Date Value Ref Range Status   03/19/2025 4.6 3.5 - 5.1 mmol/L Final     Chloride   Date Value Ref Range Status   03/19/2025 108 95 - 110 mmol/L Final     CO2   Date Value Ref Range Status   03/19/2025 26 23 - 29 mmol/L Final     BUN   Date Value Ref Range Status   03/19/2025 14 8 - 23 mg/dL Final     Creatinine   Date Value Ref Range Status   03/19/2025 1.0 0.5 - 1.4 mg/dL Final     Calcium   Date Value Ref Range Status   03/19/2025 9.2 8.7 - 10.5 mg/dL Final     Anion Gap   Date Value Ref Range Status   03/19/2025 8 8 - 16 mmol/L Final     eGFR if    Date Value Ref Range Status   07/26/2022 >60.0 >60 mL/min/1.73 m^2 Final     eGFR if non    Date Value  Ref Range Status   07/26/2022 >60.0 >60 mL/min/1.73 m^2 Final     Comment:     Calculation used to obtain the estimated glomerular filtration  rate (eGFR) is the CKD-EPI equation.        CrCl cannot be calculated (Patient's most recent lab result is older than the maximum 7 days allowed.).  Findings    FREDY The right resting FREDY reduction is normal.   The right ankle [DT] artery has biphasic flow.   The right ankle [DP] artery has biphasic flow.   The left resting FREDY reduction is moderately decreased.   The left ankle [PT] artery has monophasic flow.  The left ankle [DP] artery has monophasic flow.  Exercise Study: treadmill test.    The exercise study was stopped due to leg fatigue, claudication and patient request.       Symptoms: Symptoms  0:45 - 2-3/10, deeply tired/tight feeling of bilateral lower extrem   1:30 - 6/10   2:00 - 9/10   2:15 - 10/10, patient request to stop  Patients discomfort subsided during recovery and he felt normal by the end of 10 minutes.   Immediately post exercise, the right FREDY is severely decreased.  Immediately post exercise, the left FREDY is severely decreased.           US Measurements - FREDY    Right   Right arm  mmHg         Right posterior tibial 142 mmHg         Right dorsalis pedis 175 mmHg         Right FREDY 1.13         Right toe pressure 95 mmHg         Right TBI 0.61          Left   Left arm  mmHg         Left posterior tibial 107 mmHg         Left dorsalis pedis 121 mmHg         Left FREDY 0.78         Left toe pressure 68 mmHg         Left TBI 0.44               US Measurements - Stress FREDY    Treadmill speed 1.8 mph         Treadmill grade 10 %         Treadmill time 2.2 min                US Measurements - Post Stress FREDY    Immediate arm  mmHg         FREDY claudication time 1 min         2nd post exercise arm  mmHg         2nd post exercise reading 5 min         3rd post exercise arm  mmHg         3rd post exercise reading 10 min          Right    Immediate right tibial 73 mmHg         Immediate right FREDY 0.48         2nd post exercise right tibial 170 mmHg         2nd post exercise right FREDY 0.9         3rd post exercise right tibial 176 mmHg         3rd post exercise right FREDY 0.97          Left   Immediate left tibial 72 mmHg         Immediate left FREDY 0.47         2nd post exercise left tibial 111 mmHg         2nd post exercise left FREDY 0.59         3rd post exercise left tibial 85 mmHg         3rd post excercise left FREDY 0.47               US Measurements - FREDY    Right   Right arm  mmHg         Right posterior tibial 142 mmHg         Right dorsalis pedis 175 mmHg         Right FREDY 1.13         Right toe pressure 95 mmHg         Right TBI 0.61          Left   Left arm  mmHg         Left posterior tibial 107 mmHg         Left dorsalis pedis 121 mmHg         Left FREDY 0.78         Left toe pressure 68 mmHg         Left TBI 0.44               US Measurements - Stress FREDY    Treadmill speed 1.8 mph         Treadmill grade 10 %         Treadmill time 2.2 min                US Measurements - Post Stress FREDY    Immediate arm  mmHg         FREDY claudication time 1 min         2nd post exercise arm  mmHg         2nd post exercise reading 5 min         3rd post exercise arm  mmHg         3rd post exercise reading 10 min          Right   Immediate right tibial 73 mmHg         Immediate right FREDY 0.48         2nd post exercise right tibial 170 mmHg         2nd post exercise right FREDY 0.9         3rd post exercise right tibial 176 mmHg         3rd post exercise right FREDY 0.97          Left   Immediate left tibial 72 mmHg         Immediate left FREDY 0.47         2nd post exercise left tibial 111 mmHg         2nd post exercise left FREDY 0.59         3rd post exercise left tibial 85 mmHg         3rd post excercise left FREDY 0.47               Interpretation Summary  Show Result Comparison     RT FREDY IS NORMAL. SCATTERED PLAQUES W/O SIGNIFICANT  STENOSIS WITH EVIDENCE OF OCCLUDED RIGHT PTA.RIGHT PFA IS 50-75% STENOSED.    LEFT FREDY SUGGEST MODERATE DISEASE    THERE IS 50-75% STENOSIS OF LEFT  CFA AND SFA .THE LEFT PTA IS OCCLUDED.  Interpretation Summary  Show Result Comparison     There is 40-49% right Internal Carotid Stenosis.    There is 20-39% left Internal Carotid Stenosis.   Assessment:     1. Atherosclerotic PVD with intermittent claudication    2. Pulmonary heart disease    3. Primary hypertension    4. Mixed hyperlipidemia    5. Aortic atherosclerosis    6. Atherosclerosis of coronary artery, unspecified vessel or lesion type, unspecified whether angina present, unspecified whether native or transplanted heart    7. Stenosis of carotid artery, unspecified laterality    8. Malignant neoplasm of prostate      Pvd with claudication stable pattern  Has been taking asa . erratic lipid therapy  Needs to lose weight  counseled.   Cad asymptomatic continue same MEDS . Rf modificATION DISCUSSED.    HLP  uncontrolled not taking statins regularily and  stopped repatha will resume.  Htn borderline needs better diet compliance weight loss counseled. He has not taken meds this morning and he ahs been non compliant with slat. His bp usually at home when take emds is on target.  Carotid disease asymptomatic mild will continue antiplatelets lipid therapy  Asthma follows with pulmonary will defer. Probably pulmonary htn is secondary.   Plan:   Continue current therapy  Cardiac low salt diet.  Risk factor modification and excercise program./weight loss  F/u in 6 months with lipid cmp   Compliance with meds discussed.           [1]   Social History  Tobacco Use    Smoking status: Former     Current packs/day: 0.00     Average packs/day: 2.0 packs/day for 25.6 years (51.2 ttl pk-yrs)     Types: Cigarettes     Start date:      Quit date: 1985     Years since quittin.6     Passive exposure: Past    Smokeless tobacco: Never   Substance Use Topics    Alcohol  use: Yes     Alcohol/week: 12.0 standard drinks of alcohol     Types: 12 Cans of beer per week    Drug use: No

## 2025-04-09 ENCOUNTER — HOSPITAL ENCOUNTER (OUTPATIENT)
Dept: RADIOLOGY | Facility: HOSPITAL | Age: 80
Discharge: HOME | End: 2025-04-09
Attending: INTERNAL MEDICINE
Payer: MEDICARE

## 2025-04-09 ENCOUNTER — OFFICE VISIT (OUTPATIENT)
Dept: INTERNAL MEDICINE | Facility: CLINIC | Age: 80
End: 2025-04-09
Payer: MEDICARE

## 2025-04-09 VITALS
DIASTOLIC BLOOD PRESSURE: 60 MMHG | HEART RATE: 74 BPM | BODY MASS INDEX: 29.36 KG/M2 | WEIGHT: 181.88 LBS | OXYGEN SATURATION: 96 % | SYSTOLIC BLOOD PRESSURE: 140 MMHG | TEMPERATURE: 96 F

## 2025-04-09 DIAGNOSIS — G89.29 CHRONIC BILATERAL LOW BACK PAIN WITH BILATERAL SCIATICA: ICD-10-CM

## 2025-04-09 DIAGNOSIS — M48.062 PSEUDOCLAUDICATION: Primary | ICD-10-CM

## 2025-04-09 DIAGNOSIS — M54.41 CHRONIC BILATERAL LOW BACK PAIN WITH BILATERAL SCIATICA: ICD-10-CM

## 2025-04-09 DIAGNOSIS — M54.42 CHRONIC BILATERAL LOW BACK PAIN WITH BILATERAL SCIATICA: ICD-10-CM

## 2025-04-09 DIAGNOSIS — M48.062 PSEUDOCLAUDICATION: ICD-10-CM

## 2025-04-09 PROCEDURE — 99999 PR PBB SHADOW E&M-EST. PATIENT-LVL V: CPT | Mod: PBBFAC,HCNC,, | Performed by: INTERNAL MEDICINE

## 2025-04-09 PROCEDURE — G2211 COMPLEX E/M VISIT ADD ON: HCPCS | Mod: HCNC,S$GLB,, | Performed by: INTERNAL MEDICINE

## 2025-04-09 PROCEDURE — 3078F DIAST BP <80 MM HG: CPT | Mod: HCNC,CPTII,S$GLB, | Performed by: INTERNAL MEDICINE

## 2025-04-09 PROCEDURE — 1101F PT FALLS ASSESS-DOCD LE1/YR: CPT | Mod: HCNC,CPTII,S$GLB, | Performed by: INTERNAL MEDICINE

## 2025-04-09 PROCEDURE — 1159F MED LIST DOCD IN RCRD: CPT | Mod: HCNC,CPTII,S$GLB, | Performed by: INTERNAL MEDICINE

## 2025-04-09 PROCEDURE — 1125F AMNT PAIN NOTED PAIN PRSNT: CPT | Mod: HCNC,CPTII,S$GLB, | Performed by: INTERNAL MEDICINE

## 2025-04-09 PROCEDURE — 72100 X-RAY EXAM L-S SPINE 2/3 VWS: CPT | Mod: TC,HCNC,FY,PO

## 2025-04-09 PROCEDURE — 72100 X-RAY EXAM L-S SPINE 2/3 VWS: CPT | Mod: 26,HCNC,, | Performed by: STUDENT IN AN ORGANIZED HEALTH CARE EDUCATION/TRAINING PROGRAM

## 2025-04-09 PROCEDURE — 1160F RVW MEDS BY RX/DR IN RCRD: CPT | Mod: HCNC,CPTII,S$GLB, | Performed by: INTERNAL MEDICINE

## 2025-04-09 PROCEDURE — 99213 OFFICE O/P EST LOW 20 MIN: CPT | Mod: HCNC,S$GLB,, | Performed by: INTERNAL MEDICINE

## 2025-04-09 PROCEDURE — 3288F FALL RISK ASSESSMENT DOCD: CPT | Mod: HCNC,CPTII,S$GLB, | Performed by: INTERNAL MEDICINE

## 2025-04-09 PROCEDURE — 3077F SYST BP >= 140 MM HG: CPT | Mod: HCNC,CPTII,S$GLB, | Performed by: INTERNAL MEDICINE

## 2025-04-09 NOTE — PROGRESS NOTES
Subjective:       Patient ID: Alex Milner is a 79 y.o. male.    Chief Complaint: Leg Pain      HPI:  History of Present Illness    Patient presents today for leg pain at wife's insistence    He experiences leg pain after walking about a block or a couple hundred feet, which improves with rest but returns upon resuming walking. He describes a burning sensation in the bottom of his foot. He occasionally experiences pain while sitting, though this is not a significant concern. The primary issue is pain during walking.  Pain is primarily in the thighs and upper legs not in the calves    He reports chronic back problems with intermittent exacerbations and night pain causing difficulty finding a comfortable sleeping position. 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 He has not previously sought medical attention for back pain.    He takes Tylenol with variable effectiveness, reporting inconsistent relief of symptoms.         Review of Systems   Constitutional:  Negative for fever and unexpected weight change.   Respiratory:  Negative for cough, shortness of breath and wheezing.    Cardiovascular:  Negative for chest pain and palpitations.   Gastrointestinal:  Negative for constipation, diarrhea, nausea and vomiting.   Genitourinary:  Negative for dysuria and hematuria.   Musculoskeletal:  Positive for back pain.       Objective:   BP (!) 140/60   Pulse 74   Temp 96.4 °F (35.8 °C) (Tympanic)   Wt 82.5 kg (181 lb 14.1 oz)   SpO2 96%   BMI 29.36 kg/m²      Physical Exam  Vitals reviewed.   Constitutional:       Appearance: He is well-developed.   HENT:      Head: Normocephalic and atraumatic.      Right Ear: Tympanic membrane, ear canal and external ear normal.      Left Ear: Tympanic membrane, ear canal and external ear normal.   Eyes:      Pupils: Pupils are equal, round, and reactive to light.   Neck:      Thyroid: No thyromegaly.    Cardiovascular:      Rate and Rhythm: Normal rate and regular rhythm.      Heart sounds: Normal heart sounds. No murmur heard.     No friction rub. No gallop.   Pulmonary:      Effort: Pulmonary effort is normal.      Breath sounds: Normal breath sounds. No wheezing or rales.   Abdominal:      General: Bowel sounds are normal. There is no distension.      Palpations: Abdomen is soft.      Tenderness: There is no abdominal tenderness.   Musculoskeletal:      Cervical back: Neck supple.      Comments: No spinous process tenderness.  Left lower back pain on right figure 4.  Left lower back pain on left figure 4.   Psychiatric:         Mood and Affect: Mood normal.             Assessment:       1. Pseudoclaudication    2. Chronic bilateral low back pain with bilateral sciatica        Plan:   No problem-specific Assessment & Plan notes found for this encounter.    Alex was seen today for leg pain.    Diagnoses and all orders for this visit:    Pseudoclaudication  -     X-Ray Lumbar Spine AP And Lateral; Future  -     Ambulatory Referral/Consult to Physical Therapy; Future    Chronic bilateral low back pain with bilateral sciatica  -     Ambulatory Referral/Consult to Physical Therapy; Future      Assessment & Plan    PERIPHERAL VASCULAR DISEASE:  - Evaluated the patient's leg pain, which worsens with walking and is accompanied by foot burning.  - Noted that the patient experiences pain in legs after walking about 100 feet or 1 block, with burning sensation in the foot.  - Observed that pain subsides after a few minutes of sitting but recurs upon resuming walking.  - Acknowledged diminished blood flow but suspected lower back as the primary cause of symptoms.  - Will focus on lower back issues rather than vascular etiology.  - No specific treatment for peripheral vascular disease initiated at this time.    LOW BACK PAIN:  - Assessed the patient's constant back pain, which is particularly problematic at night, causing  difficulty in finding a comfortable position.  - Performed physical exam, revealing pain in the left side of the lower back and hip when the leg is crossed.  - Initiated conservative management with analgesics and physical therapy.  - Prescribed Tylenol: 2 tablets every 6 hours (4 times daily) for pain management.  - Ordered XR Lumbar Spine to assess for structural issues and determine if further imaging is needed.  - Referred the patient to a physical therapist for lower back treatment.                This note was generated with the assistance of ambient listening technology. Verbal consent was obtained by the patient and accompanying visitor(s) for the recording of patient appointment to facilitate this note

## 2025-04-10 ENCOUNTER — RESULTS FOLLOW-UP (OUTPATIENT)
Dept: INTERNAL MEDICINE | Facility: CLINIC | Age: 80
End: 2025-04-10

## 2025-06-20 ENCOUNTER — TELEPHONE (OUTPATIENT)
Dept: ADMINISTRATIVE | Facility: OTHER | Age: 80
End: 2025-06-20
Payer: MEDICARE

## 2025-06-20 ENCOUNTER — OFFICE VISIT (OUTPATIENT)
Dept: INTERNAL MEDICINE | Facility: CLINIC | Age: 80
End: 2025-06-20
Payer: MEDICARE

## 2025-06-20 VITALS
OXYGEN SATURATION: 100 % | WEIGHT: 180.31 LBS | HEART RATE: 61 BPM | SYSTOLIC BLOOD PRESSURE: 138 MMHG | BODY MASS INDEX: 28.98 KG/M2 | DIASTOLIC BLOOD PRESSURE: 52 MMHG | HEIGHT: 66 IN | TEMPERATURE: 97 F

## 2025-06-20 DIAGNOSIS — E78.2 MIXED HYPERLIPIDEMIA: ICD-10-CM

## 2025-06-20 DIAGNOSIS — M54.42 CHRONIC BILATERAL LOW BACK PAIN WITH BILATERAL SCIATICA: Primary | ICD-10-CM

## 2025-06-20 DIAGNOSIS — I10 PRIMARY HYPERTENSION: ICD-10-CM

## 2025-06-20 DIAGNOSIS — M54.41 CHRONIC BILATERAL LOW BACK PAIN WITH BILATERAL SCIATICA: Primary | ICD-10-CM

## 2025-06-20 DIAGNOSIS — G89.29 CHRONIC BILATERAL LOW BACK PAIN WITH BILATERAL SCIATICA: Primary | ICD-10-CM

## 2025-06-20 PROCEDURE — 99999 PR PBB SHADOW E&M-EST. PATIENT-LVL V: CPT | Mod: PBBFAC,HCNC,, | Performed by: NURSE PRACTITIONER

## 2025-06-20 NOTE — LETTER
I certify that (Name) lAex Milner meets the requirements as outlined in # 1, 6 (shown on reverse side) and qualifies for a mobility impaired license plate/hang-tag. I further understand that willful and false certification shall subject me to fines/imprisonment as outlined in R.S. 47:463.4 (G) (4). The applicant's information is as follows:    YOB: 1945            Race:Black or         Gender:Male    Address:  [address] [place] [address]    City:[place]                               State:[place]     Zip Code:[address]     [x]Permanently Impaired - Applicant has a total or lifelong condition of mobility impairment from which little or no improvement or recovery can reasonably be expected. A medical examiners certification is required on initial application only.      [] Temporarily Impaired - Applicant has a temporary condition of mobility impairment of which improvement or recovery can reasonably be expected. Applicant is entitled to a hangtag, which will be valid for one (1) year. A medical examiners certification is required for the renewal of the hangtag      [] Unable to appear in person at the Office of Motor Vehicles - Applicant must bring facial photo        Medical Examiner's Signature________________________________________ Date:6/20/25_________________________    Printed Name:___________________________________________________    State License #_____________________________    Address: [address] [place] [address]___                                     Phone Number: [phone]                                                                                                                                                                                                                  City: [place]__________________________________ State: [place] __Zip Code: [address] _______________    TO BE COMPLETED BY MOTOR VEHICLE ANALYST ONLY    ANDRES   Lic. Plate #      Hangtag Control #    [place] ID #      Date Issued:    #:   Office #:

## 2025-06-20 NOTE — PROGRESS NOTES
"Subjective:       Patient ID: Alex Milner is a 79 y.o. male.    CHIEF COMPLAINT:  - Mr. Milner presents with persistent back pain that has been ongoing for many months    HPI:  Mr. Milner reports constant back pain present for months, with no improvement since his last visit. The pain occurs daily regardless of activities, significantly impacting his daily life. He has difficulty walking long distances, needing to stop 2-3 times before reaching [place] due to pain. Dr. Patel evaluated him about a month ago for the same issue, and x-rays showed arthritis in his back. He takes 2 extra-strength Tylenol tablets at bedtime, which helps him sleep through the night. Without medication, he has difficulty sleeping due to discomfort. He was referred to physical therapy after his last visit but never received a follow-up call to schedule an appointment. 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        BP (!) 138/52   Pulse 61   Temp 97.3 °F (36.3 °C) (Tympanic)   Ht 5' 6" (1.676 m)   Wt 81.8 kg (180 lb 5.4 oz)   SpO2 100%   BMI 29.11 kg/m²     Review of Systems   Constitutional:  Negative for fever and unexpected weight change.   Respiratory:  Negative for cough, shortness of breath and wheezing.    Cardiovascular:  Negative for chest pain and palpitations.   Gastrointestinal:  Negative for constipation, diarrhea, nausea and vomiting.   Genitourinary:  Negative for dysuria and hematuria.   Musculoskeletal:  Positive for back pain.       Objective:      Physical Exam  Vitals reviewed.   Constitutional:       Appearance: He is well-developed.   HENT:      Head: Normocephalic and atraumatic.      Right Ear: Tympanic membrane, ear canal and external ear normal.      Left Ear: Tympanic membrane, ear canal and external ear normal.   Eyes:      Pupils: Pupils are " equal, round, and reactive to light.   Neck:      Thyroid: No thyromegaly.   Cardiovascular:      Rate and Rhythm: Normal rate and regular rhythm.      Heart sounds: Normal heart sounds. No murmur heard.     No friction rub. No gallop.   Pulmonary:      Effort: Pulmonary effort is normal.      Breath sounds: Normal breath sounds. No wheezing or rales.   Abdominal:      General: Bowel sounds are normal. There is no distension.      Palpations: Abdomen is soft.      Tenderness: There is no abdominal tenderness.   Musculoskeletal:      Cervical back: Neck supple.      Comments: No spinous process tenderness.  Left lower back pain on right figure 4.  Left lower back pain on left figure 4.   Psychiatric:         Mood and Affect: Mood normal.         Assessment and Plan        Alex was seen today for leg pain.    Diagnoses and all orders for this visit:    Chronic bilateral low back pain with bilateral sciatica  -     Ambulatory Referral/Consult to Physical Therapy  -     Ambulatory referral/consult to Spine Care; Future    Primary hypertension    Mixed hyperlipidemia    BMI 29.0-29.9,adult      There are no Patient Instructions on file for this visit.      1. Chronic bilateral low back pain with bilateral sciatica    2. Primary hypertension    3. Mixed hyperlipidemia    4. BMI 29.0-29.9,adult        Assessment & Plan    SPONDYLOSIS AND LOW BACK PAIN:  - Reviewed previous x-rays showing arthritis in the back, likely source of pain.  - Noted the patient experiences persistent back pain daily regardless of activity.  - Increased Tylenol (acetaminophen) Extra Strength dosage to 1 tablet in the morning, 1 tablet at lunch, and 2 tablets at bedtime.  - Explained benefits of consistent pain management with Tylenol throughout the day.  - Referred the patient to physical therapy for management of symptoms.  - Discussed referral to back and spine team for evaluation and potential trigger point injections if physical therapy is  not effective.  - Mr. Milner to obtain handicap parking permit from [facility] using provided form.    FOLLOW-UP:  - Instructed the patient to follow up after physical therapy assessment.   - keep regular scheduled appt with Dr. Patel and JAMES         This note was generated with the assistance of ambient listening technology. Verbal consent was obtained by the patient and accompanying visitor(s) for the recording of patient appointment to facilitate this note. I attest to having reviewed and edited the generated note for accuracy, though some syntax or spelling errors may persist. Please contact the author of this note for any clarification.

## 2025-07-01 ENCOUNTER — OFFICE VISIT (OUTPATIENT)
Dept: RADIATION ONCOLOGY | Facility: CLINIC | Age: 80
End: 2025-07-01
Payer: MEDICARE

## 2025-07-01 VITALS
DIASTOLIC BLOOD PRESSURE: 63 MMHG | BODY MASS INDEX: 28.98 KG/M2 | RESPIRATION RATE: 19 BRPM | HEIGHT: 66 IN | WEIGHT: 180.31 LBS | OXYGEN SATURATION: 99 % | TEMPERATURE: 98 F | SYSTOLIC BLOOD PRESSURE: 131 MMHG | HEART RATE: 98 BPM

## 2025-07-01 DIAGNOSIS — C61 MALIGNANT NEOPLASM OF PROSTATE: Primary | ICD-10-CM

## 2025-07-01 PROCEDURE — 1159F MED LIST DOCD IN RCRD: CPT | Mod: CPTII,HCNC,S$GLB, | Performed by: SPECIALIST

## 2025-07-01 PROCEDURE — 1101F PT FALLS ASSESS-DOCD LE1/YR: CPT | Mod: CPTII,HCNC,S$GLB, | Performed by: SPECIALIST

## 2025-07-01 PROCEDURE — 3075F SYST BP GE 130 - 139MM HG: CPT | Mod: CPTII,HCNC,S$GLB, | Performed by: SPECIALIST

## 2025-07-01 PROCEDURE — 1126F AMNT PAIN NOTED NONE PRSNT: CPT | Mod: CPTII,HCNC,S$GLB, | Performed by: SPECIALIST

## 2025-07-01 PROCEDURE — 99999 PR PBB SHADOW E&M-EST. PATIENT-LVL IV: CPT | Mod: PBBFAC,HCNC,, | Performed by: SPECIALIST

## 2025-07-01 PROCEDURE — 99213 OFFICE O/P EST LOW 20 MIN: CPT | Mod: HCNC,S$GLB,, | Performed by: SPECIALIST

## 2025-07-01 PROCEDURE — 3288F FALL RISK ASSESSMENT DOCD: CPT | Mod: CPTII,HCNC,S$GLB, | Performed by: SPECIALIST

## 2025-07-01 PROCEDURE — 3078F DIAST BP <80 MM HG: CPT | Mod: CPTII,HCNC,S$GLB, | Performed by: SPECIALIST

## 2025-07-01 NOTE — PROGRESS NOTES
Outpatient Rehab    Physical Therapy Evaluation    Patient Name: Alex Milner  MRN: [id number]  YOB: 1945  Encounter Date: 7/2/2025    Therapy Diagnosis:   Encounter Diagnosis   Name Primary?    Chronic bilateral low back pain with bilateral sciatica Yes     Physician: Hamida Laureano NP    Physician Orders: Eval and Treat  Medical Diagnosis: Chronic bilateral low back pain with bilateral sciatica      Visit # / Visits Authorized:  1 / 1  Insurance Authorization Period: 6/20/2025 to 6/20/2026  Date of Evaluation: 7/2/2025  Plan of Care Certification: 7/2/2025 to 9/10/25     Time In:   7:30 am  Time Out:  8:25 am  Total Time (in minutes):   55 min  Total Billable Time (in minutes):  30    Intake Outcome Measure for FOTO Survey    Therapist reviewed FOTO scores for Alex Milner on 7/2/2025.   FOTO report - see Media section or FOTO account episode details.     Intake Score: 55%    Precautions:     Standard; radiation to pelvis (getting another treatment in a few weeks).   PT will avoid modalities as he is intermittently having radiation still.    Subjective   History of Present Illness  Alex is a 79 y.o. male who reports to physical therapy with a chief concern of low back pain worse with prolonged sitting or standing.     The patient reports a medical diagnosis of M54.42,M54.41,G89.29 (ICD-10-CM) - Chronic bilateral low back pain with bilateral sciatica.            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 Pt states back pain is new in the last few months. He saw his prostate cancer Dr lately and he is getting more radiation soon. Pt has been having stiffness and achy pain that ease with Tylenol and change.    Pain     Patient reports a current pain level of 3/10. Pain at best is reported as 3/10. Pain at worst is reported as 7/10.   Location: low back pain; occasional L foot tingling with prolonged sitting.  Clinical  Progression (since onset): Worsening  Pain Qualities: Aching  Pain-Relieving Factors: Activity modification  Pain-Aggravating Factors: Bending, Straightening, Sitting         Living Arrangements  Living Situation  Living Arrangements: Family members    Home Setup  Patient is able to live on main floor of home: Yes        Employment      Pt is retired but manages the yard work and grocery shopping. Pt states walking long distances requires him to use the basket as his legs and back will bother him.      Past Medical History/Physical Systems Review:   Alex Milner  has a past medical history of Atherosclerosis of coronary artery, Atherosclerotic PVD with intermittent claudication, Atrial fibrillation, Back pain, Bilateral inguinal hernia, Calcified granuloma of lung, Diverticulosis, Diverticulosis, ED (erectile dysfunction), Elevated PSA, Granuloma of liver, Hyperlipidemia, Hypertension, Liver mass, Malignant neoplasm of prostate, Moderate persistent asthma without complication, Personal history of colonic polyps, Pulmonary heart disease, and Urinary tract infection.    Alex Milner  has a past surgical history that includes Hernia repair; Prostate biopsy; Aortography with serialography (N/A, 10/21/2019); Aortography with serialography (N/A, 11/14/2019); and Colonoscopy (N/A, 12/7/2022).    Alex has a current medication list which includes the following prescription(s): albuterol, amlodipine, aspirin, atorvastatin, bicalutamide, brimonidine 0.15 % opth drop, diazepam, dorzolamide, finasteride, fluad quad 2022-23(65y up)(pf), fluticasone-salmeterol 250-50 mcg/dose, latanoprost, latanoprost, lisinopril, lupron depot (6 month), oxycodone-acetaminophen, prednisolone acetate, xarelto, sildenafil, and tamsulosin.    Review of patient's allergies indicates:  No Known Allergies     Objective      Spinal Mobility  Hypomobile: Thoracic and Lumbosacral  Thoracic Mobility Details: 40 % ROM with rotation to R and  L-with some low back pain; PT noted forward head posture, rounded shoulders and increase upper cervical hyperextension, reduced thoracic kyphosis and increased lumbar lordosis with anterior pelvic tilt with a mild L lateral trunk shift. Pt can B heel raise up to 30 % ROM but when assesssing single leg heel raises he is only at 10 % ROM on L and 20 % on R with only 2-3 rep tolerance even with wall support. Pt single leg stand balance on R is 3 sec (and falls to L like with gait suggesting R hip weakness; L 5 sec. 30 sec sit to stand test 4 reps needing UE support at times as his legs fatigue quickly and he falls into knee valgus.  Lumbosacral Mobility Details: Flexion 12 inches from reaching the floor but is in neutral; tends to sit in R side flexion but when assessing R side flexion ROM is above the knee reaching vs L below knee reaching; lumbar extension is at 50 % at the most.          Hip Range of Motion   Right Hip   Active (deg) Passive (deg) Pain   Flexion 110       Extension 0       ABduction         ADduction         External Rotation 90/90 30       External Rotation Prone         Internal Rotation 90/90 20       Internal Rotation Prone             Left Hip   Active (deg) Passive (deg) Pain   Flexion 120       Extension 0       ABduction         ADduction         External Rotation 90/90 35       External Rotation Prone         Internal Rotation 90/90 35       Internal Rotation Prone             Knee ROM WFL but ankles at 4 degrees DF at best.              Hip Strength - Planes of Motion   Right Strength Right Pain Left Strength Left  Pain   Flexion (L2) 3+   3+     Extension 2+   3-     ABduction 3+   3+     ADduction 3+   3+     Internal Rotation 3+   3     External Rotation 3+   3         Knee Strength   Right Strength Right Pain Left Strength Left  Pain   Flexion (S2) 4   4     Prone Flexion 4   4     Extension (L3) 4   4       Lizandro test + B; Prone knee flexion limited to 100 degrees B            Treatment:  Balance/Neuromuscular Re-Education  NMR 1: PPT 2x 10  NMR 2: PPT with SLR 3x 5  NMR 3: Side lying hip abduction 2x 10  NMR 4: leaning on elbows on plinth hip extension 2x 10  NMR 5: seated ball squeeze 10 x 10 sec x 2 sets  NMR 6: table top hold 3 sec x 5 reps  NMR 7: LAQ 5# 2x 10    Time Entry(in minutes):  PT Evaluation (Low) Time Entry: 14  Neuromuscular Re-Education Time Entry: 16    Assessment & Plan   Assessment  Alex presents with a condition of Low complexity.   Presentation of Symptoms: Stable  Will Comorbidities Impact Care: Yes  Radiation and chemo    Functional Limitations: Activity tolerance, Bed mobility, Completing self-care activities, Decreased ambulation distance/endurance, Disrupted sleep pattern, Driving, Gait limitations, Getting off the floor, Increased risk of fall, Maintaining balance, Manipulating objects, Pain when reaching, Pain with ADLs/IADLs, Painful locomotion/ambulation, Participating in leisure activities, Performing household chores, Proprioception, Range of motion, Sitting tolerance, Squatting, Transfers, Standing tolerance  Impairments: Abnormal gait, Abnormal muscle firing, Abnormal muscle tone, Abnormal or restricted range of motion, Activity intolerance, Impaired balance, Impaired physical strength, Lack of appropriate home exercise program, Pain with functional activity, Safety issue, Weight-bearing intolerance  Personal Factors Affecting Prognosis: Pain    Patient Goal for Therapy (PT): wants to be less weak and more able to do things around the house with less pain  Prognosis: Good  Assessment Details: PT noted ROM loss, strength loss and functional mobility resulting in pain in the back and tingling in the L foot at times. Pt is a good candidate for PT to advance functional mobility and reduce pain.      Plan  From a physical therapy perspective, the patient would benefit from: Skilled Rehab Services    Planned therapy interventions include: Therapeutic  exercise, Therapeutic activities, Neuromuscular re-education, Manual therapy, and Gait training.    Planned modalities to include: Biofeedback and Mechanical traction.        Visit Frequency: 2 times Per Week for 10 Weeks.       This plan was discussed with Patient.   Discussion participants: Agreed Upon Plan of Care  Plan details: PT to advance pt with skilled PT to advance functional mobility and reduce pain.            The patient's spiritual, cultural, and educational needs were considered, and the patient is agreeable to the plan of care and goals.     Education  Education was done with Patient. The patient's learning style includes Demonstration. The patient Requires assistance and Requires continuing/additional education.         PT to advance pt with skilled PT to advance functional mobility and reduce pain.         Goals:   Active       goals       I with HEP       Start:  07/02/25    Expected End:  07/30/25            hip abduction 4+/5 or better       Start:  07/02/25    Expected End:  09/10/25            hip flexion 5/5        Start:  07/02/25    Expected End:  09/10/25            dead lift up to 40 # for home tasks       Start:  07/02/25    Expected End:  09/10/25            core strength 2+/5 or better       Start:  07/02/25    Expected End:  09/10/25                Diana Jarvis PT

## 2025-07-01 NOTE — PROGRESS NOTES
[facility] / [facility] - Radiation Oncology Follow Up Note    HISTORY OF PRESENT ILLNESS: C61 - high-risk prostate cancer Stage IIIA, T1c, N0, M0, P>=20, G2      78-year-old retired  on Proscar, Flomax, and Viagra, whose PSA measured 21.9 on 01/15/2021.      TRUS guided biopsy on 03/08/2021 was benign, and he reports a prior biopsy in 2016 that was also benign      Prostate MRI on 06/03/2022 estimated a 81.4 cc gland and was read as BI-RADS 2, no focal suspicious abnormality      Prostate MRI on 01/11/2024, which I have reviewed today, estimates an 88 cc gland and describes a lenticular 3.3 x 1.0 x 1.6 cm T2 hypointense mass in the anterior transitional zone right greater than left from the midgland to the apex. No capsular involvement, no extra capsular extension, and no other malignant findings.      TRUS guided and targeted biopsy on 05/27/2024 recovered adenocarcinoma in 7/8 cores from the right apex, which include some combination of 6 targeted cores and 2 sampling cores, with a Strasburg score of 3+4, with 10% pattern 4.      Bone scan on 06/17 24 showed no evidence of malignancy      CT of the abdomen and pelvis on 06/24/2024 showed prostatomegaly with a concentric wall thickening of the urinary bladder, without malignant findings      Treatment Summary: One-month of Casodex was prescribed on 06/25/2024.      A six-month Lupron was delivered by Dr. Copeland on 07/08/2024      He was treated to the prostate and pelvic lymphatics, receiving 70 Gy and 50.4 Gy, respectively, in 28 fractions last on 11/01/2024          45.5 04/11/2024 07/08/2024 1st Lupron  11/01/2024 radiotherapy complete  0.04 01/10/2025   01/13/2025 2nd Lupron    INTERVAL HISTORY:  He returns for routine six-month follow up.  He continues to take daily Flomax and describes function similar to baseline including occasional daytime urgency with occasional low volume urge incontinence not requiring a change of shorts  "or use of a pad, and nocturia 3-4 times per evening.  He denies weak stream, hesitancy, hematuria, dysuria, daytime frequency less than 2 hours, new bone pain, or GI complaints.  He has persistent ED    Six-month Lupron most recently delivered on 01/13/2025, his 2nd of 3 planned      PHYSICAL EXAMINATION:  Vitals:    07/01/25 1257   BP: 131/63   Pulse: 98   Resp: 19   Temp: 97.5 °F (36.4 °C)   SpO2: 99%   Weight: 81.8 kg (180 lb 5.4 oz)   Height: 5' 6" (1.676 m)      General:  A&O x4, NAD  HEENT:  PEERLA, CN II-XII intact, EOM intact,   Lymphatics:  no cervical/sclav LAD  Lungs:  CTAB  Heart:  RRR  Abdomen:  NTND +BS      ASSESSMENT:  He has returned to baseline function.  His 3rd and final six-month Lupron is due later this month      PLAN:  We reviewed anticipated PSA dynamics during and after testosterone recovery which will begin in the months after completion of endocrine therapy in 6-7 months.  He will return to my clinic in 1 year      "

## 2025-07-02 ENCOUNTER — OFFICE VISIT (OUTPATIENT)
Dept: INTERNAL MEDICINE | Facility: CLINIC | Age: 80
End: 2025-07-02
Payer: MEDICARE

## 2025-07-02 ENCOUNTER — CLINICAL SUPPORT (OUTPATIENT)
Dept: REHABILITATION | Facility: HOSPITAL | Age: 80
End: 2025-07-02
Attending: NURSE PRACTITIONER
Payer: MEDICARE

## 2025-07-02 VITALS
TEMPERATURE: 98 F | HEART RATE: 65 BPM | SYSTOLIC BLOOD PRESSURE: 130 MMHG | DIASTOLIC BLOOD PRESSURE: 62 MMHG | WEIGHT: 182.31 LBS | OXYGEN SATURATION: 97 % | BODY MASS INDEX: 29.43 KG/M2

## 2025-07-02 DIAGNOSIS — C61 MALIGNANT NEOPLASM OF PROSTATE: ICD-10-CM

## 2025-07-02 DIAGNOSIS — G89.29 CHRONIC BILATERAL LOW BACK PAIN WITH BILATERAL SCIATICA: Primary | ICD-10-CM

## 2025-07-02 DIAGNOSIS — E78.2 MIXED HYPERLIPIDEMIA: ICD-10-CM

## 2025-07-02 DIAGNOSIS — I10 PRIMARY HYPERTENSION: Chronic | ICD-10-CM

## 2025-07-02 DIAGNOSIS — M54.42 CHRONIC BILATERAL LOW BACK PAIN WITH BILATERAL SCIATICA: Primary | ICD-10-CM

## 2025-07-02 DIAGNOSIS — I70.219 ATHEROSCLEROTIC PVD WITH INTERMITTENT CLAUDICATION: ICD-10-CM

## 2025-07-02 DIAGNOSIS — M54.41 CHRONIC BILATERAL LOW BACK PAIN WITH BILATERAL SCIATICA: Primary | ICD-10-CM

## 2025-07-02 PROCEDURE — 3288F FALL RISK ASSESSMENT DOCD: CPT | Mod: CPTII,HCNC,S$GLB, | Performed by: INTERNAL MEDICINE

## 2025-07-02 PROCEDURE — 1125F AMNT PAIN NOTED PAIN PRSNT: CPT | Mod: CPTII,HCNC,S$GLB, | Performed by: INTERNAL MEDICINE

## 2025-07-02 PROCEDURE — 1159F MED LIST DOCD IN RCRD: CPT | Mod: CPTII,HCNC,S$GLB, | Performed by: INTERNAL MEDICINE

## 2025-07-02 PROCEDURE — 1160F RVW MEDS BY RX/DR IN RCRD: CPT | Mod: CPTII,HCNC,S$GLB, | Performed by: INTERNAL MEDICINE

## 2025-07-02 PROCEDURE — 97161 PT EVAL LOW COMPLEX 20 MIN: CPT | Mod: HCNC,PN

## 2025-07-02 PROCEDURE — 99999 PR PBB SHADOW E&M-EST. PATIENT-LVL IV: CPT | Mod: PBBFAC,HCNC,, | Performed by: INTERNAL MEDICINE

## 2025-07-02 PROCEDURE — 3078F DIAST BP <80 MM HG: CPT | Mod: CPTII,HCNC,S$GLB, | Performed by: INTERNAL MEDICINE

## 2025-07-02 PROCEDURE — 1101F PT FALLS ASSESS-DOCD LE1/YR: CPT | Mod: CPTII,HCNC,S$GLB, | Performed by: INTERNAL MEDICINE

## 2025-07-02 PROCEDURE — G2211 COMPLEX E/M VISIT ADD ON: HCPCS | Mod: HCNC,S$GLB,, | Performed by: INTERNAL MEDICINE

## 2025-07-02 PROCEDURE — 3075F SYST BP GE 130 - 139MM HG: CPT | Mod: CPTII,HCNC,S$GLB, | Performed by: INTERNAL MEDICINE

## 2025-07-02 PROCEDURE — 99214 OFFICE O/P EST MOD 30 MIN: CPT | Mod: HCNC,S$GLB,, | Performed by: INTERNAL MEDICINE

## 2025-07-02 PROCEDURE — 97112 NEUROMUSCULAR REEDUCATION: CPT | Mod: HCNC,PN

## 2025-07-02 RX ORDER — ATORVASTATIN CALCIUM 80 MG/1
80 TABLET, FILM COATED ORAL DAILY
Qty: 90 TABLET | Refills: 3 | Status: SHIPPED | OUTPATIENT
Start: 2025-07-02

## 2025-07-02 NOTE — PROGRESS NOTES
Subjective:       Patient ID: Alex Milner is a 79 y.o. male.    Chief Complaint: Back Pain      HPI:  History of Present Illness    Patient presents today for back pain    He reports ongoing back pain for 3-4 months. Physical therapy was recently initiated. His physical therapist noted he walks with a noticeable lean. He experiences significant difficulty with neck range of motion and is unable to fully raise his neck as recommended. He sought medical intervention due to pain intolerance. He reports his initial physical therapy session went well and believes it will be beneficial.    He reports taking atorvastatin inconsistently, only a couple times per week instead of daily as prescribed. He acknowledges this may be contributing to elevated cholesterol levels.    He continues Lupron injections every 6 months with Dr. Curtis, with next injection scheduled in a couple weeks.         Review of Systems   Constitutional:  Negative for fever and unexpected weight change.   HENT:  Negative for hearing loss, postnasal drip and rhinorrhea.    Eyes:  Negative for pain and visual disturbance.   Respiratory:  Negative for cough, shortness of breath and wheezing.    Cardiovascular:  Negative for chest pain and palpitations.   Gastrointestinal:  Negative for constipation, diarrhea, nausea and vomiting.   Genitourinary:  Negative for dysuria and hematuria.   Musculoskeletal:  Negative for arthralgias, back pain, myalgias and neck stiffness.   Skin:  Negative for pallor and rash.   Neurological:  Negative for seizures, syncope and headaches.   Hematological:  Negative for adenopathy.   Psychiatric/Behavioral:  Negative for dysphoric mood. The patient is not nervous/anxious.        Objective:   /62   Pulse 65   Temp 97.8 °F (36.6 °C) (Tympanic)   Wt 82.7 kg (182 lb 5.1 oz)   SpO2 97%   BMI 29.43 kg/m²      Physical Exam  Vitals reviewed.   Constitutional:       General: He is not in acute distress.      Appearance: He is well-developed.   HENT:      Head: Normocephalic and atraumatic.      Right Ear: Tympanic membrane and ear canal normal.      Left Ear: Tympanic membrane and ear canal normal.   Eyes:      Pupils: Pupils are equal, round, and reactive to light.   Neck:      Thyroid: No thyromegaly.      Vascular: No JVD.   Cardiovascular:      Rate and Rhythm: Normal rate and regular rhythm.      Heart sounds: Normal heart sounds. No murmur heard.     No friction rub. No gallop.   Pulmonary:      Effort: Pulmonary effort is normal.      Breath sounds: Normal breath sounds. No wheezing or rales.   Abdominal:      General: Bowel sounds are normal. There is no distension.      Palpations: Abdomen is soft.      Tenderness: There is no abdominal tenderness. There is no guarding or rebound.   Musculoskeletal:         General: Normal range of motion.      Cervical back: Normal range of motion and neck supple.   Lymphadenopathy:      Cervical: No cervical adenopathy.   Skin:     General: Skin is warm and dry.      Findings: No rash.   Neurological:      General: No focal deficit present.      Mental Status: He is alert and oriented to person, place, and time.      Cranial Nerves: No cranial nerve deficit.      Deep Tendon Reflexes: Reflexes are normal and symmetric.   Psychiatric:         Mood and Affect: Mood normal.         Judgment: Judgment normal.         No visits with results within 2 Week(s) from this visit.   Latest known visit with results is:   Lab Visit on 03/19/2025   Component Date Value    Sodium 03/19/2025 142     Potassium 03/19/2025 4.6     Chloride 03/19/2025 108     CO2 03/19/2025 26     Glucose 03/19/2025 102     BUN 03/19/2025 14     Creatinine 03/19/2025 1.0     Calcium 03/19/2025 9.2     Total Protein 03/19/2025 7.1     Albumin 03/19/2025 3.6     Total Bilirubin 03/19/2025 0.3     Alkaline Phosphatase 03/19/2025 78     AST 03/19/2025 15     ALT 03/19/2025 11     eGFR 03/19/2025 >60.0     Anion Gap  03/19/2025 8     Cholesterol 03/19/2025 303 (H)     Triglycerides 03/19/2025 126     HDL 03/19/2025 68     LDL Cholesterol 03/19/2025 209.8 (H)     HDL/Cholesterol Ratio 03/19/2025 22.4     Total Cholesterol/HDL Ra* 03/19/2025 4.5     Non-HDL Cholesterol 03/19/2025 235        Assessment:       1. Mixed hyperlipidemia    2. Atherosclerotic PVD with intermittent claudication    3. Primary hypertension    4. Malignant neoplasm of prostate        Plan:   Hypertension  Blood pressure is under good control.  We will continue the current regimen.  Will work on regular aerobic exercise and a low salt diet.      Alex was seen today for back pain.    Diagnoses and all orders for this visit:    Mixed hyperlipidemia  Comments:  Resume atorvastatin daily.  update labs in a few months  Orders:  -     atorvastatin (LIPITOR) 80 MG tablet; Take 1 tablet (80 mg total) by mouth once daily.  -     CBC Auto Differential; Future    Atherosclerotic PVD with intermittent claudication  Comments:  resume atorvastatin. continue aspirin daily  Orders:  -     atorvastatin (LIPITOR) 80 MG tablet; Take 1 tablet (80 mg total) by mouth once daily.    Primary hypertension  -     CBC Auto Differential; Future    Malignant neoplasm of prostate  Comments:  continuing lupron injections with Dr. Copeland      Assessment & Plan    BACK PAIN (DORSALGIA):  - Reviewed therapy for back pain, which the patient reports has been ongoing for 3-4 months and started acutely this morning.  - Patient reports therapy has gone well.  - Will continue physical therapy for back pain management.    HYPERLIPIDEMIA:  - Recent labs from Dr. Rose showed elevated cholesterol levels, likely due to inconsistent use of atorvastatin (patient taking it only a couple times per week instead of daily).  - Advised patient to take medication daily for proper management.  - Sent refills for atorvastatin with instructions for daily use.  - Ordered follow-up labs for the end of summer  to reassess cholesterol management.             This note was generated with the assistance of ambient listening technology. Verbal consent was obtained by the patient and accompanying visitor(s) for the recording of patient appointment to facilitate this note

## 2025-07-08 ENCOUNTER — CLINICAL SUPPORT (OUTPATIENT)
Dept: REHABILITATION | Facility: HOSPITAL | Age: 80
End: 2025-07-08
Payer: MEDICARE

## 2025-07-08 DIAGNOSIS — G89.29 CHRONIC BILATERAL LOW BACK PAIN WITH BILATERAL SCIATICA: Primary | ICD-10-CM

## 2025-07-08 DIAGNOSIS — M54.42 CHRONIC BILATERAL LOW BACK PAIN WITH BILATERAL SCIATICA: Primary | ICD-10-CM

## 2025-07-08 DIAGNOSIS — M54.41 CHRONIC BILATERAL LOW BACK PAIN WITH BILATERAL SCIATICA: Primary | ICD-10-CM

## 2025-07-08 PROCEDURE — 97112 NEUROMUSCULAR REEDUCATION: CPT | Mod: HCNC,PN,CQ

## 2025-07-08 PROCEDURE — 97110 THERAPEUTIC EXERCISES: CPT | Mod: HCNC,PN,CQ

## 2025-07-08 NOTE — PROGRESS NOTES
Outpatient Rehab    Physical Therapy Visit    Patient Name: Alex Milner  MRN: [id number]  YOB: 1945  Encounter Date: 7/8/2025    Therapy Diagnosis:   Encounter Diagnosis   Name Primary?    Chronic bilateral low back pain with bilateral sciatica Yes     Physician: Hamida Laureano NP    Physician Orders: Eval and Treat  Medical Diagnosis: Chronic bilateral low back pain with bilateral sciatica  Surgical Diagnosis: Not applicable for this Episode   Surgical Date: Not applicable for this Episode  Days Since Last Surgery: Not applicable for this Episode    Visit # / Visits Authorized:  1 / 20  Insurance Authorization Period: 7/8/2025 to 9/30/2025  Date of Evaluation: 7/2/2025  Plan of Care Certification: 7/2/2025 to 9/10/2025      PT/PTA: PTA   Number of PTA visits since last PT visit:1  Time In: 1305   Time Out: 1355  Total Time (in minutes): 50   Total Billable Time (in minutes): 50    FOTO:  Intake Score: 55%  Survey Score 2:  %  Survey Score 3:  %    Precautions:     Standard; radiation to pelvis (getting another treatment in a few weeks).      Subjective   Patient reports no tingling in right leg today or low back pain. He is enjoying his exercises..  Pain reported as 0/10.      Objective            Treatment:  Therapeutic Exercise  TE 1: hip flexor stretch  TE 2: piriformis stretch rle  Balance/Neuromuscular Re-Education  NMR 1: PPT 2x 10  NMR 2: PPT with SLR 3x 5  NMR 3: Side lying hip abduction 2x 10  NMR 4: leaning on elbows on plinth hip extension 2x 10  NMR 5: seated ball squeeze 10 x 10 sec x 2 sets  NMR 6: table top hold 3 sec x 5 reps  NMR 7: LAQ 5# 2x 10  NMR 8: paloff press ytb 2x10  NMR 9: side lying clams 2x10  NMR 10: bridges 2x10  Therapeutic Activity  TA 1: sit to stand 20-22 inch box x10 each    Time Entry(in minutes):  Neuromuscular Re-Education Time Entry: 30  Therapeutic Activity Time Entry: 5  Therapeutic Exercise Time Entry: 15    Assessment & Plan   Assessment: Patient  presents to therapy without any radicular symptoms throughout treatment session. Reviewed HEP demonstrating good understanding of exercises. Patient participated in hip and glute strengthening exercises with only complaint of R knee diiscomfort during sit to stands therefore increased height to decrease strain on knee until stronger.  Evaluation/Treatment Tolerance: Patient tolerated treatment well    The patient will continue to benefit from skilled outpatient physical therapy in order to address the deficits listed in the problem list on the initial evaluation, provide patient and family education, and maximize the patients level of independence in the home and community environments.     The patient's spiritual, cultural, and educational needs were considered, and the patient is agreeable to the plan of care and goals.           Plan:      Goals:   Active       goals       I with HEP       Start:  07/02/25    Expected End:  07/30/25            hip abduction 4+/5 or better       Start:  07/02/25    Expected End:  09/10/25            hip flexion 5/5        Start:  07/02/25    Expected End:  09/10/25            dead lift up to 40 # for home tasks       Start:  07/02/25    Expected End:  09/10/25            core strength 2+/5 or better       Start:  07/02/25    Expected End:  09/10/25                Fern Diaz PTA

## 2025-07-10 ENCOUNTER — LAB VISIT (OUTPATIENT)
Dept: LAB | Facility: HOSPITAL | Age: 80
End: 2025-07-10
Attending: UROLOGY
Payer: MEDICARE

## 2025-07-10 ENCOUNTER — CLINICAL SUPPORT (OUTPATIENT)
Dept: REHABILITATION | Facility: HOSPITAL | Age: 80
End: 2025-07-10
Payer: MEDICARE

## 2025-07-10 DIAGNOSIS — M54.41 CHRONIC BILATERAL LOW BACK PAIN WITH BILATERAL SCIATICA: Primary | ICD-10-CM

## 2025-07-10 DIAGNOSIS — C61 MALIGNANT NEOPLASM OF PROSTATE: ICD-10-CM

## 2025-07-10 DIAGNOSIS — G89.29 CHRONIC BILATERAL LOW BACK PAIN WITH BILATERAL SCIATICA: Primary | ICD-10-CM

## 2025-07-10 DIAGNOSIS — M54.42 CHRONIC BILATERAL LOW BACK PAIN WITH BILATERAL SCIATICA: Primary | ICD-10-CM

## 2025-07-10 LAB — PSA SERPL-MCNC: 0.04 NG/ML

## 2025-07-10 PROCEDURE — 97112 NEUROMUSCULAR REEDUCATION: CPT | Mod: HCNC,PN

## 2025-07-10 PROCEDURE — 36415 COLL VENOUS BLD VENIPUNCTURE: CPT | Mod: HCNC,PN

## 2025-07-10 PROCEDURE — 84153 ASSAY OF PSA TOTAL: CPT | Mod: HCNC

## 2025-07-10 NOTE — PROGRESS NOTES
Outpatient Rehab    Physical Therapy Visit    Patient Name: Alex Milner  MRN: [id number]  YOB: 1945  Encounter Date: 7/10/2025    Therapy Diagnosis:   Encounter Diagnosis   Name Primary?    Chronic bilateral low back pain with bilateral sciatica Yes       Physician: Hamida Laureano NP    Physician Orders: Eval and Treat  Medical Diagnosis: Chronic bilateral low back pain with bilateral sciatica      Visit # / Visits Authorized:  2 / 20  Insurance Authorization Period: 7/8/2025 to 9/30/2025  Date of Evaluation: 7/2/2025  Plan of Care Certification: 7/2/2025 to 9/10/2025   Progress note due 30 days from 7/2/25    PT/PTA:     Number of PTA visits since last PT visit:   Time In:   1:06 pm-1:30  Time Out:  2:00  Total Time (in minutes):   54 min  Total Billable Time (in minutes):  24 with PT only    FOTO:  Intake Score:  %  Survey Score 2:  %  Survey Score 3:  %    Precautions:     Standard; radiation to pelvis (getting another treatment in a few weeks).      Subjective   Pt states he is not having any pain radiating into the legs today..  Pain reported as 2/10. low back    Objective            Treatment:  Therapeutic Exercise  TE 1: hip flexor stretch in prone with strap with PPT 1x 10 x 10 sec  TE 2: piriformis stretch R LE 3x 30 sec  TE 3: shuttle 4 bands 3 min  Balance/Neuromuscular Re-Education  NMR 1: PPT 2x 10  NMR 2: PPT with SLR 3x10  NMR 3: Side lying hip abduction 2x 10 per leg with cues on hip neutral to avoid rotating upward using anterior hip vs lateral hip  NMR 4: leaning on elbows on plinth hip extension 2x 10  NMR 5: seated ball squeeze 10 x 10 sec x 2 sets  NMR 6: single leg table top hold 10 x 5 sec; table top hold 3 sec x 5 reps  NMR 7: LAQ 5# 2x 10  NMR 8: paloff press rtb 2x10  NMR 9: side lying clams 2x10 with green theraband    Time Entry(in minutes):  Neuromuscular Re-Education Time Entry: 24    Assessment & Plan   Assessment: Pt states the back is less sore today and slowly  improving. Pt is surprised how weak the hips are for abduction so he is needing a lot of cues from PT to avoid compensation.  Evaluation/Treatment Tolerance: Patient limited by fatigue    The patient will continue to benefit from skilled outpatient physical therapy in order to address the deficits listed in the problem list on the initial evaluation, provide patient and family education, and maximize the patients level of independence in the home and community environments.     The patient's spiritual, cultural, and educational needs were considered, and the patient is agreeable to the plan of care and goals.           Plan: PT to advance as tolerated increaseing core load.    Goals:   Active       goals       I with HEP (Progressing)       Start:  07/02/25    Expected End:  07/30/25            hip abduction 4+/5 or better (Progressing)       Start:  07/02/25    Expected End:  09/10/25            hip flexion 5/5  (Progressing)       Start:  07/02/25    Expected End:  09/10/25            dead lift up to 40 # for home tasks (Progressing)       Start:  07/02/25    Expected End:  09/10/25            core strength 2+/5 or better (Progressing)       Start:  07/02/25    Expected End:  09/10/25                Diana Jarvis PT

## 2025-07-14 ENCOUNTER — OFFICE VISIT (OUTPATIENT)
Dept: UROLOGY | Facility: CLINIC | Age: 80
End: 2025-07-14
Payer: MEDICARE

## 2025-07-14 VITALS
SYSTOLIC BLOOD PRESSURE: 144 MMHG | WEIGHT: 182.13 LBS | DIASTOLIC BLOOD PRESSURE: 69 MMHG | HEART RATE: 65 BPM | BODY MASS INDEX: 29.27 KG/M2 | HEIGHT: 66 IN

## 2025-07-14 DIAGNOSIS — C61 MALIGNANT NEOPLASM OF PROSTATE: Primary | ICD-10-CM

## 2025-07-14 DIAGNOSIS — N52.9 ERECTILE DYSFUNCTION, UNSPECIFIED ERECTILE DYSFUNCTION TYPE: ICD-10-CM

## 2025-07-14 DIAGNOSIS — N40.0 BENIGN PROSTATIC HYPERPLASIA, UNSPECIFIED WHETHER LOWER URINARY TRACT SYMPTOMS PRESENT: ICD-10-CM

## 2025-07-14 PROCEDURE — 1159F MED LIST DOCD IN RCRD: CPT | Mod: CPTII,HCNC,S$GLB, | Performed by: UROLOGY

## 2025-07-14 PROCEDURE — 1101F PT FALLS ASSESS-DOCD LE1/YR: CPT | Mod: CPTII,HCNC,S$GLB, | Performed by: UROLOGY

## 2025-07-14 PROCEDURE — 3288F FALL RISK ASSESSMENT DOCD: CPT | Mod: CPTII,HCNC,S$GLB, | Performed by: UROLOGY

## 2025-07-14 PROCEDURE — 3078F DIAST BP <80 MM HG: CPT | Mod: CPTII,HCNC,S$GLB, | Performed by: UROLOGY

## 2025-07-14 PROCEDURE — 3077F SYST BP >= 140 MM HG: CPT | Mod: CPTII,HCNC,S$GLB, | Performed by: UROLOGY

## 2025-07-14 PROCEDURE — 99999 PR PBB SHADOW E&M-EST. PATIENT-LVL IV: CPT | Mod: PBBFAC,HCNC,, | Performed by: UROLOGY

## 2025-07-14 PROCEDURE — 1126F AMNT PAIN NOTED NONE PRSNT: CPT | Mod: CPTII,HCNC,S$GLB, | Performed by: UROLOGY

## 2025-07-14 PROCEDURE — 99214 OFFICE O/P EST MOD 30 MIN: CPT | Mod: HCNC,25,S$GLB, | Performed by: UROLOGY

## 2025-07-14 PROCEDURE — 96402 CHEMO HORMON ANTINEOPL SQ/IM: CPT | Mod: HCNC,S$GLB,, | Performed by: UROLOGY

## 2025-07-14 RX ORDER — SOLIFENACIN SUCCINATE 5 MG/1
5 TABLET, FILM COATED ORAL DAILY
Qty: 30 TABLET | Refills: 11 | Status: SHIPPED | OUTPATIENT
Start: 2025-07-14 | End: 2026-07-14

## 2025-07-14 NOTE — PROGRESS NOTES
mriChief Complaint:   Encounter Diagnoses   Name Primary?    Malignant neoplasm of prostate Yes    Benign prostatic hyperplasia, unspecified whether lower urinary tract symptoms present     Erectile dysfunction, unspecified erectile dysfunction type        HPI:   7/14/25- still having issues with the urgency primarily night, no flow issues.  Here today to discuss his PSA and get his last Lupron, erections are currently not an issue.    1/8/18: Returns after long absence.  PSA >30.  Says he had a biopsy in 2016 with another urologist in town off of [facility].  Second one he had.  MRI report from 5/17 reassuring with 98gm prostate.  Reduced stream not on flomax/finasteride.  No abd/pelvic pain and no exac/rel factors.  No hematuria.  No urolithiasis.  3-4 cups coffee in AM.  Nocturia x3-4.    Allergies:  Patient has no known allergies.    Medications:  has a current medication list which includes the following prescription(s): albuterol, amlodipine, aspirin, atorvastatin, bicalutamide, brimonidine 0.15 % opth drop, diazepam, dorzolamide, finasteride, fluticasone-salmeterol 250-50 mcg/dose, latanoprost, latanoprost, lisinopril, lupron depot (6 month), oxycodone-acetaminophen, prednisolone acetate, xarelto, sildenafil, and tamsulosin.    Review of Systems:  General: No fever, chills, fatigability, or weight loss.  Skin: No rashes, itching, or changes in color or texture of skin.  Chest: Denies THOMPSON, cyanosis, wheezing, cough, and sputum production.  Abdomen: Appetite fine. No weight loss. Denies diarrhea, abdominal pain, hematemesis, or blood in stool.  Musculoskeletal: No joint stiffness or swelling. Some back pain.  : As above.  All other review of systems negative.    PMH:   has a past medical history of Atherosclerosis of coronary artery (08/10/2022), Atherosclerotic PVD with intermittent claudication (08/16/2019), Atrial fibrillation, Back pain, Bilateral inguinal hernia, Calcified granuloma of lung (10/03/2017),  Diverticulosis, Diverticulosis (10/28/2013), ED (erectile dysfunction), Elevated PSA, Granuloma of liver, Hyperlipidemia, Hypertension, Liver mass (03/13/2020), Malignant neoplasm of prostate (6/10/2024), Moderate persistent asthma without complication (09/26/2017), Personal history of colonic polyps (2013), Pulmonary heart disease (08/10/2022), and Urinary tract infection.    PSH:   has a past surgical history that includes Hernia repair; Prostate biopsy; Aortography with serialography (N/A, 10/21/2019); Aortography with serialography (N/A, 11/14/2019); and Colonoscopy (N/A, 12/7/2022).    FamHx: family history includes Cancer in his brother, father, and mother; Cancer (age of onset: 64) in his sister; Prostate cancer in his brother.    SocHx:  reports that he quit smoking about 39 years ago. His smoking use included cigarettes. He started smoking about 65 years ago. He has a 51.2 pack-year smoking history. He has been exposed to tobacco smoke. He has never used smokeless tobacco. He reports current alcohol use of about 12.0 standard drinks of alcohol per week. He reports that he does not use drugs.      Physical Exam:  There were no vitals filed for this visit.      General: A&Ox3, no apparent distress, no deformities  Neck: No masses, normal thyroid  Lungs: normal inspiration, no use of accessory muscles  Heart: normal pulse, no arrhythmias  Abdomen: Soft, NT, ND  Skin: The skin is warm and dry. No jaundice.  Ext: No c/c/e.    Labs/Studies:   UA 30 protein 1/25  PVR 64 ml 1/25  Uronav Gl7 81g 5/27/24  pnbx negative 65g  3/8/21  Reported negative biopsy 2016 outside facility  PSA 0.4 7/25  PSA 45.5 4/24  PSA 47.5 11/23  PSA 36 1/23  PSA 27 7/22  PSA 30.1 4/22  PSA 32.6 1/22  PSA 41.2 9/21  PSA 21.9 1/21  PSA 19.3 7/20  CT/BS no mets 6/24  MRI PIRADS 5 88g 1/24  MRI PI-RADS 2 6/22  MRI PI-RADS 4 2/21    Impression/Plan:      1. Prostate cancer-  XRT  11/24,  lupron  7/14/25, 1/13/25, 7/8/24 (18 mo)    Patient is  status post radiation therapy, PSA is stable and will receive his last six-month Lupron today.  Will see him back in 6 months with a PSA.  Call with any other issues in the meantime.  Currently not treating his hot flashes.    2. BPH- tamsulosin and finasteride appear to be working well for his flow but he is still having significant issues of urgency overnight.  Patient is interested in a VESIcare 5 mg trial.  I have informed him this may cause dry mouth, dry eyes, urinary retention or constipation.  If he has any issues he is to stop the medication and contact our office, otherwise reassess at the next appointment.    3. Erectile dysfunction- sildenafil 100 mg works well for the patient, currently not an issue.

## 2025-07-14 NOTE — PROGRESS NOTES
Patient came in for ordered Lupron 45 mg, after confirming patient's allergies and donning proper PPE, Lupron 45 mg was administered IM to right dorsalgluteal using aseptic technique. Pt tolerated procedure well. Patient agreed to wait 15 minutes after injection in the clinic and to report any adverse reactions.       Kwabena Taveras RN

## 2025-07-15 ENCOUNTER — CLINICAL SUPPORT (OUTPATIENT)
Dept: REHABILITATION | Facility: HOSPITAL | Age: 80
End: 2025-07-15
Payer: MEDICARE

## 2025-07-15 DIAGNOSIS — M54.42 CHRONIC BILATERAL LOW BACK PAIN WITH BILATERAL SCIATICA: Primary | ICD-10-CM

## 2025-07-15 DIAGNOSIS — G89.29 CHRONIC BILATERAL LOW BACK PAIN WITH BILATERAL SCIATICA: Primary | ICD-10-CM

## 2025-07-15 DIAGNOSIS — M54.41 CHRONIC BILATERAL LOW BACK PAIN WITH BILATERAL SCIATICA: Primary | ICD-10-CM

## 2025-07-15 PROCEDURE — 97112 NEUROMUSCULAR REEDUCATION: CPT | Mod: HCNC,PN

## 2025-07-15 PROCEDURE — 97140 MANUAL THERAPY 1/> REGIONS: CPT | Mod: HCNC,PN

## 2025-07-15 NOTE — PROGRESS NOTES
Outpatient Rehab    Physical Therapy Visit    Patient Name: Alex Milner  MRN: [id number]  YOB: 1945  Encounter Date: 7/15/2025    Therapy Diagnosis:   Encounter Diagnosis   Name Primary?    Chronic bilateral low back pain with bilateral sciatica Yes         Physician: Hamida Laureano NP    Physician Orders: Eval and Treat  Medical Diagnosis: Chronic bilateral low back pain with bilateral sciatica      Visit # / Visits Authorized:  3 / 20  Insurance Authorization Period: 7/8/2025 to 9/30/2025  Date of Evaluation: 7/2/2025  Plan of Care Certification: 7/2/2025 to 9/10/2025   Progress note due 30 days from 7/2/25    PT/PTA:     Number of PTA visits since last PT visit:   Time In:   8:32  Time Out:  9:30 am  Total Time (in minutes):   58 min  Total Billable Time (in minutes):  28 with PT only    FOTO:  Intake Score:  %  Survey Score 2:  %  Survey Score 3:  %    Precautions:     Standard; radiation to pelvis (getting another treatment in a few weeks).      Subjective   Pt had an injection in the R hip yesterday so he is a bit sore.  Pain reported as 3/10. low back    Objective            Treatment:  Therapeutic Exercise  TE 2: piriformis stretch R LE 3x 30 sec seated (before and after manual therapy)  TE 3: shuttle 4 bands 3 min  Manual Therapy  MT 1: traction with belt in SLR position and JOSE and for lateral hip B for hip mobility as j carlos and JOSE are very tight  Balance/Neuromuscular Re-Education  NMR 1: PPT 1x 10 with ball squeeze 10 sec hold  NMR 2: PPT with SLR 3x10  NMR 3: Side lying hip abduction 2x 10 per leg with cues on hip neutral to avoid rotating upward using anterior hip vs lateral hip  NMR 4: leaning on elbows on plinth hip extension 2x 10  NMR 6: single leg table top hold 10 x 5 sec; table top hold 3 sec x 5 reps  NMR 7: LAQ 5# 2x 10  NMR 9: side lying clams 2x10 with green theraband      Time Entry(in minutes):  Manual Therapy Time Entry: 10  Neuromuscular Re-Education Time Entry:  18    Assessment & Plan   Assessment: Pt has difficulty engaging the glutes in side lying clams and tends to roll back instead. Pt struggles with core engagement needing regular cues to reduce this. Pt is very tight with j carlos but more with JOSE position so PT added manual work to reduce capsular stiffness limiting his mobility.  Evaluation/Treatment Tolerance: Patient limited by fatigue    The patient will continue to benefit from skilled outpatient physical therapy in order to address the deficits listed in the problem list on the initial evaluation, provide patient and family education, and maximize the patients level of independence in the home and community environments.     The patient's spiritual, cultural, and educational needs were considered, and the patient is agreeable to the plan of care and goals.           Plan: PT to advance as tolerated increaseing core load.    Goals:   Active       goals       I with HEP (Progressing)       Start:  07/02/25    Expected End:  07/30/25            hip abduction 4+/5 or better (Progressing)       Start:  07/02/25    Expected End:  09/10/25            hip flexion 5/5  (Progressing)       Start:  07/02/25    Expected End:  09/10/25            dead lift up to 40 # for home tasks (Progressing)       Start:  07/02/25    Expected End:  09/10/25            core strength 2+/5 or better (Progressing)       Start:  07/02/25    Expected End:  09/10/25                Diana Jarvis PT

## 2025-07-16 ENCOUNTER — OFFICE VISIT (OUTPATIENT)
Dept: OPHTHALMOLOGY | Facility: CLINIC | Age: 80
End: 2025-07-16
Payer: MEDICARE

## 2025-07-16 DIAGNOSIS — H40.1121 PRIMARY OPEN ANGLE GLAUCOMA (POAG) OF LEFT EYE, MILD STAGE: ICD-10-CM

## 2025-07-16 DIAGNOSIS — H40.1113 PRIMARY OPEN ANGLE GLAUCOMA (POAG) OF RIGHT EYE, SEVERE STAGE: Primary | ICD-10-CM

## 2025-07-16 PROCEDURE — 99214 OFFICE O/P EST MOD 30 MIN: CPT | Mod: HCNC,S$GLB,, | Performed by: STUDENT IN AN ORGANIZED HEALTH CARE EDUCATION/TRAINING PROGRAM

## 2025-07-16 PROCEDURE — 1159F MED LIST DOCD IN RCRD: CPT | Mod: CPTII,HCNC,S$GLB, | Performed by: STUDENT IN AN ORGANIZED HEALTH CARE EDUCATION/TRAINING PROGRAM

## 2025-07-16 PROCEDURE — 1160F RVW MEDS BY RX/DR IN RCRD: CPT | Mod: CPTII,HCNC,S$GLB, | Performed by: STUDENT IN AN ORGANIZED HEALTH CARE EDUCATION/TRAINING PROGRAM

## 2025-07-16 PROCEDURE — 99999 PR PBB SHADOW E&M-EST. PATIENT-LVL III: CPT | Mod: PBBFAC,HCNC,, | Performed by: STUDENT IN AN ORGANIZED HEALTH CARE EDUCATION/TRAINING PROGRAM

## 2025-07-16 PROCEDURE — G2211 COMPLEX E/M VISIT ADD ON: HCPCS | Mod: HCNC,S$GLB,, | Performed by: STUDENT IN AN ORGANIZED HEALTH CARE EDUCATION/TRAINING PROGRAM

## 2025-07-16 RX ORDER — LATANOPROST 50 UG/ML
1 SOLUTION/ DROPS OPHTHALMIC NIGHTLY
Qty: 5 ML | Refills: 6 | Status: SHIPPED | OUTPATIENT
Start: 2025-07-16

## 2025-07-16 NOTE — PROGRESS NOTES
HPI     Glaucoma     Additional comments: Patient presents today for RTC 4 Month IOP check.   States he forgets drop 1-2 times a week, denies VA changes.            Comments    1. POAG Severe OD, Mild OS  2. PC IOL OD DESHAUN Goniotomy 11/16/23  NSC OS *good TM    Latanoprost QHS OU     Target 13 OU            Last edited by Lelia Poe on 7/16/2025  8:27 AM.            Assessment /Plan     For exam results, see Encounter Report.    Primary open angle glaucoma (POAG) of right eye, severe stage  Primary open angle glaucoma (POAG) of left eye, mild stage  -   IOP controlled with no evidence of progression. Continue current treatment. Reviewed importance of continued compliance with treatment and follow up.   Continue-   Latanoprost QHS OU    RTC 4M DFE W/ GOCT

## 2025-07-18 ENCOUNTER — CLINICAL SUPPORT (OUTPATIENT)
Dept: REHABILITATION | Facility: HOSPITAL | Age: 80
End: 2025-07-18
Payer: MEDICARE

## 2025-07-18 DIAGNOSIS — G89.29 CHRONIC BILATERAL LOW BACK PAIN WITH BILATERAL SCIATICA: Primary | ICD-10-CM

## 2025-07-18 DIAGNOSIS — M54.41 CHRONIC BILATERAL LOW BACK PAIN WITH BILATERAL SCIATICA: Primary | ICD-10-CM

## 2025-07-18 DIAGNOSIS — M54.42 CHRONIC BILATERAL LOW BACK PAIN WITH BILATERAL SCIATICA: Primary | ICD-10-CM

## 2025-07-18 PROCEDURE — 97112 NEUROMUSCULAR REEDUCATION: CPT | Mod: HCNC,PN

## 2025-07-18 PROCEDURE — 97140 MANUAL THERAPY 1/> REGIONS: CPT | Mod: HCNC,PN

## 2025-07-18 NOTE — PROGRESS NOTES
Outpatient Rehab    Physical Therapy Visit    Patient Name: Alex Milner  MRN: [id number]  YOB: 1945  Encounter Date: 7/18/2025    Therapy Diagnosis:   Encounter Diagnosis   Name Primary?    Chronic bilateral low back pain with bilateral sciatica Yes           Physician: Hamida Laureano NP    Physician Orders: Eval and Treat  Medical Diagnosis: Chronic bilateral low back pain with bilateral sciatica      Visit # / Visits Authorized:  4 / 20  Insurance Authorization Period: 7/8/2025 to 9/30/2025  Date of Evaluation: 7/2/2025  Plan of Care Certification: 7/2/2025 to 9/10/2025   Progress note due 30 days from 7/2/25    PT/PTA:     Number of PTA visits since last PT visit:   Time In:   9:03 am  Time Out:  10:00 am  Total Time (in minutes):   57 min  Total Billable Time (in minutes):  27 with PT only    FOTO:  Intake Score:  %  Survey Score 2:  %  Survey Score 3:  %    Precautions:     Standard; radiation to pelvis (getting another treatment in a few weeks).      Subjective   Pt reports no new issues.    hips and low back    Objective            Treatment:  Therapeutic Exercise  TE 2: piriformis stretch R LE 10x 10 sec seated (before and after manual therapy)  TE 3: shuttle 4 bands 3 min and 1 min at 5 bands  TE 4: child pose forward x 5 then lateral x 3 per side 20 sec per position  Manual Therapy  MT 1: traction with belt in SLR position and JOSE and for lateral hip B for hip mobility as j carlos and JOSE are very tight; PT also pulled for inferior posterior capsule mobility with knee in full flexed posture on R and L  MT 2: PRone jose position for anterior capsular mobs (PA) grade II-III B  Balance/Neuromuscular Re-Education  NMR 1: PPT 1x 10 with ball squeeze 10 sec hold  NMR 2: PPT with SLR 3x10  NMR 3: Side lying hip abduction 2x 10 per leg with cues on hip neutral to avoid rotating upward using anterior hip vs lateral hip; side lying hip adduction with stool 2x 10; Side lying hip internal  rotation with ball with red theraband at ankles 2x 10  NMR 4: leaning on elbows on plinth hip extension 2x 10  NMR 6: deferred: table top hold 3 sec x 5 reps  NMR 7: LAQ 5# 2x 10  NMR 8: paloff press rtb 2x10      Time Entry(in minutes):  Manual Therapy Time Entry: 15  Neuromuscular Re-Education Time Entry: 12    Assessment & Plan   Assessment: PT continued with tasks to free up the capsules at the hips and increase JOSE ROM. Pt is progressing but strength fatigues quickly. PT is slowly advancing that as tolerated.  Evaluation/Treatment Tolerance: Patient limited by fatigue    The patient will continue to benefit from skilled outpatient physical therapy in order to address the deficits listed in the problem list on the initial evaluation, provide patient and family education, and maximize the patients level of independence in the home and community environments.     The patient's spiritual, cultural, and educational needs were considered, and the patient is agreeable to the plan of care and goals.           Plan: PT to advance as tolerated increaseing core load.    Goals:   Active       goals       I with HEP (Progressing)       Start:  07/02/25    Expected End:  07/30/25            hip abduction 4+/5 or better (Progressing)       Start:  07/02/25    Expected End:  09/10/25            hip flexion 5/5  (Progressing)       Start:  07/02/25    Expected End:  09/10/25            dead lift up to 40 # for home tasks (Progressing)       Start:  07/02/25    Expected End:  09/10/25            core strength 2+/5 or better (Progressing)       Start:  07/02/25    Expected End:  09/10/25                Diana Jarvis, PT

## 2025-07-22 ENCOUNTER — CLINICAL SUPPORT (OUTPATIENT)
Dept: REHABILITATION | Facility: HOSPITAL | Age: 80
End: 2025-07-22
Payer: MEDICARE

## 2025-07-22 DIAGNOSIS — G89.29 CHRONIC BILATERAL LOW BACK PAIN WITH BILATERAL SCIATICA: Primary | ICD-10-CM

## 2025-07-22 DIAGNOSIS — M54.42 CHRONIC BILATERAL LOW BACK PAIN WITH BILATERAL SCIATICA: Primary | ICD-10-CM

## 2025-07-22 DIAGNOSIS — M54.41 CHRONIC BILATERAL LOW BACK PAIN WITH BILATERAL SCIATICA: Primary | ICD-10-CM

## 2025-07-22 PROCEDURE — 97112 NEUROMUSCULAR REEDUCATION: CPT | Mod: HCNC,PN

## 2025-07-22 PROCEDURE — 97110 THERAPEUTIC EXERCISES: CPT | Mod: HCNC,PN

## 2025-07-22 NOTE — PROGRESS NOTES
Outpatient Rehab    Physical Therapy Visit    Patient Name: Alex Milner  MRN: [id number]  YOB: 1945  Encounter Date: 7/22/2025    Therapy Diagnosis:   Encounter Diagnosis   Name Primary?    Chronic bilateral low back pain with bilateral sciatica Yes     Physician: Hamida Laureano NP    Physician Orders: Eval and Treat  Medical Diagnosis: Chronic bilateral low back pain with bilateral sciatica      Visit # / Visits Authorized:  5 / 20  Insurance Authorization Period: 7/8/2025 to 9/30/2025  Date of Evaluation: 7/2/2025  Plan of Care Certification: 7/2/2025 to 9/10/2025   Progress note due 30 days from 7/2/25    PT/PTA:     Number of PTA visits since last PT visit:   Time In:   10:45 am-11:15 with PT only  Time Out:  10:40 am  Total Time (in minutes):   55 min  Total Billable Time (in minutes):  30 with PT only    FOTO:  Intake Score:  %  Survey Score 2:  %  Survey Score 3:  %    Precautions:     Standard; radiation to pelvis (getting another treatment in a few weeks).      Subjective   He can now walk the dog for 2 blocks when before only 1..  Pain reported as 3/10. hips and low back    Objective            Treatment:  Therapeutic Exercise  TE 1: calf stretch 2x 10 3 sec each  TE 2: piriformis stretch R LE 10x 10 sec seated (before and after manual therapy)  TE 3: shuttle 4 bands 3 min  TE 4: child pose forward x 5 then lateral x 3 per side 20 sec per position  TE 5: 1/2 kneeling side glides (hip mobility work) and forward glides 2x 10 each per leg  Balance/Neuromuscular Re-Education  NMR 1: single leg stand hip hikes on steps 3x 10  NMR 2: PPT with SLR 3x10 2#; PPT with ball squeeze 2x 10 sec hold  NMR 4: leaning on elbows on plinth hip extension 2x 10 2#  Therapeutic Activity  TA 1: sit to stand 20 with 15 # goblet hold 2x 10  TA 2: farmer's carry 15# per hand (B 30 #) 80 ft x 3        Time Entry(in minutes):  Neuromuscular Re-Education Time Entry: 14  Therapeutic Exercise Time Entry:  16    Assessment & Plan   Assessment: Pt fatigues easily with sit to stand tasks. Pt added the goblet to begin challenging him more. Pt struggled in 1/2 kneeling as hips are so tight he gets in a L lateral anterior lean. PT focused on hip ROM tasks as well to free up hip mobility and reduce pain at the hips as his JOSE is tight as well as his Lizandro still but slowly improving.  Evaluation/Treatment Tolerance: Patient limited by fatigue    The patient will continue to benefit from skilled outpatient physical therapy in order to address the deficits listed in the problem list on the initial evaluation, provide patient and family education, and maximize the patients level of independence in the home and community environments.     The patient's spiritual, cultural, and educational needs were considered, and the patient is agreeable to the plan of care and goals.           Plan: PT to advance as tolerated increasing core load.    Goals:   Active       goals       I with HEP (Met)       Start:  07/02/25    Expected End:  07/30/25    Resolved:  07/22/25         hip abduction 4+/5 or better (Progressing)       Start:  07/02/25    Expected End:  09/10/25            hip flexion 5/5  (Progressing)       Start:  07/02/25    Expected End:  09/10/25            dead lift up to 40 # for home tasks (Progressing)       Start:  07/02/25    Expected End:  09/10/25            core strength 2+/5 or better (Progressing)       Start:  07/02/25    Expected End:  09/10/25                Diana Jarvis PT

## 2025-07-24 ENCOUNTER — CLINICAL SUPPORT (OUTPATIENT)
Dept: REHABILITATION | Facility: HOSPITAL | Age: 80
End: 2025-07-24
Payer: MEDICARE

## 2025-07-24 DIAGNOSIS — M54.41 CHRONIC BILATERAL LOW BACK PAIN WITH BILATERAL SCIATICA: Primary | ICD-10-CM

## 2025-07-24 DIAGNOSIS — G89.29 CHRONIC BILATERAL LOW BACK PAIN WITH BILATERAL SCIATICA: Primary | ICD-10-CM

## 2025-07-24 DIAGNOSIS — M54.42 CHRONIC BILATERAL LOW BACK PAIN WITH BILATERAL SCIATICA: Primary | ICD-10-CM

## 2025-07-24 PROCEDURE — 97112 NEUROMUSCULAR REEDUCATION: CPT | Mod: HCNC,PN

## 2025-07-24 PROCEDURE — 97110 THERAPEUTIC EXERCISES: CPT | Mod: HCNC,PN

## 2025-07-24 PROCEDURE — 97530 THERAPEUTIC ACTIVITIES: CPT | Mod: HCNC,PN

## 2025-07-24 NOTE — PROGRESS NOTES
Outpatient Rehab    Physical Therapy Visit and Progress Note    Patient Name: Alex Milner  MRN: [id number]  YOB: 1945  Encounter Date: 7/24/2025    Therapy Diagnosis:   Encounter Diagnosis   Name Primary?    Chronic bilateral low back pain with bilateral sciatica Yes       Physician: Hamida Laureano NP    Physician Orders: Eval and Treat  Medical Diagnosis: Chronic bilateral low back pain with bilateral sciatica      Visit # / Visits Authorized:  6 / 20  Insurance Authorization Period: 7/8/2025 to 9/30/2025  Date of Evaluation: 7/2/2025  Plan of Care Certification: 7/2/2025 to 9/10/2025   Progress note due 30 days from 7/24/25    PT/PTA:     Number of PTA visits since last PT visit:   Time In:   1:10-2 with PT only  Time Out:  2:05  am  Total Time (in minutes):   55 min  Total Billable Time (in minutes):  55 with PT only    FOTO:  Intake Score:  %  Survey Score 2:  %  Survey Score 3:  %    Precautions:     Standard; radiation to pelvis (getting another treatment in a few weeks).      Subjective   no pain today for the first time.  Pain reported as 0/10. hips and low back    Objective          Thoracic Mobility Details: 40 % ROM with rotation to R and L-with some low back pain improved to 70 R to L and 60 % to R today.    Pt can B heel raise up to 30 % ROM but when assesssing single leg heel raises he is only at 10 % ROM on L and 20 % on R with only 2-3 rep tolerance even with wall support at eval but today up to today R heel raises 20% ROM and 8 reps and L 10 % with 6 reps but B is up to 30 % ROM and 15 reps today.    Pt single leg stand balance on R is 3 sec (and falls to L like with gait suggesting R hip weakness; L 5 sec on eval but today 9 sec R and 7 sec L    30 sec sit to stand test 4 reps needing UE support at times as his legs fatigue quickly and he falls into knee valgus on eval but today 12 reps       Lumbosacral Mobility Details: Flexion 12 inches from reaching the floor but is in  neutral improved today to 4 inches from floor;  tends to sit in R side flexion but when assessing R side flexion ROM is above the knee reaching vs L below knee reaching improved to 1 inch below knees; lumbar extension is at 50 % at the most improved to 80% ROM            Hip Range of Motion   Right Hip    Active (deg) today Pain   Flexion 110  120     Extension 0  5     ABduction         ADduction         External Rotation 90/90 30  40     External Rotation Prone         Internal Rotation 90/90 20  35     Internal Rotation Prone               Left Hip    Active (deg) today Pain   Flexion 120  130     Extension 0  3     ABduction         ADduction         External Rotation 90/90 35  40     External Rotation Prone         Internal Rotation 90/90 35  40     Internal Rotation Prone               Knee ROM WFL but ankles at 4 degrees DF at best improved to 8 DF.        Hip Strength - Planes of Motion    Right Strength today Left Strength today   Flexion (L2) 3+  4+ 3+  4+   Extension 2+  3+ 3-  3+   ABduction 3+  4 3+  4   ADduction 3+  4 3+  4   Internal Rotation 3+  4 3  4   External Rotation 3+  4 3  4         Knee Strength    Right Strength today Left Strength today   Flexion (S2) 4  5 4  5   Prone Flexion 4  5 4  5   Extension (L3) 4  5 4  5      Lizandro test + B; Prone knee flexion limited to 100 degrees B improved to 108 today             Treatment:  Therapeutic Exercise  TE 1: Oral curl 15 # bar 3x 10 (max cues for proper use)  TE 2: piriformis stretch R LE 10x 10 sec seated  TE 3: shuttle 5 bands 3 min  Balance/Neuromuscular Re-Education  NMR 1: eccentric loaed hip flexion lean back in tall kneeling with PPT 2x 10  NMR 5: trunk extension machine 0-55 degrees 50 # 1x 10 then 80 # 1x 20  NMR 10: 1/2 kneeling PPT 2x 10 ; 1/2 kneeling PPT with shoulder flexion 2x 10 per leg with green physioball ; 1/2 kneeling PPT chops with green theraball 2x 10  Therapeutic Activity  TA 3: dead lift training  for hip hinge then  progress into 15# bar 3 x10    Time Entry(in minutes):  Neuromuscular Re-Education Time Entry: 24  Therapeutic Activity Time Entry: 15  Therapeutic Exercise Time Entry: 16    Assessment & Plan   Assessment: PT had pt work on tall kneeling PPT work but his hip flexors are still tight making it hard to overcome. Pt was able to hip extend in tall kneeling by 5 degrees.   Evaluation/Treatment Tolerance: Patient limited by fatigue    The patient will continue to benefit from skilled outpatient physical therapy in order to address the deficits listed in the problem list on the initial evaluation, provide patient and family education, and maximize the patients level of independence in the home and community environments.     The patient's spiritual, cultural, and educational needs were considered, and the patient is agreeable to the plan of care and goals.           Plan: PT to advance as tolerated increasing core load.    Goals:   Active       goals       I with HEP (Met)       Start:  07/02/25    Expected End:  07/30/25    Resolved:  07/22/25         hip abduction 4+/5 or better (Progressing)       Start:  07/02/25    Expected End:  09/10/25            hip flexion 5/5  (Progressing)       Start:  07/02/25    Expected End:  09/10/25            dead lift up to 40 # for home tasks (Progressing)       Start:  07/02/25    Expected End:  09/10/25            core strength 2+/5 or better (Progressing)       Start:  07/02/25    Expected End:  09/10/25                Diana Jarvis PT

## 2025-07-29 ENCOUNTER — CLINICAL SUPPORT (OUTPATIENT)
Dept: REHABILITATION | Facility: HOSPITAL | Age: 80
End: 2025-07-29
Payer: MEDICARE

## 2025-07-29 DIAGNOSIS — M54.42 CHRONIC BILATERAL LOW BACK PAIN WITH BILATERAL SCIATICA: Primary | ICD-10-CM

## 2025-07-29 DIAGNOSIS — G89.29 CHRONIC BILATERAL LOW BACK PAIN WITH BILATERAL SCIATICA: Primary | ICD-10-CM

## 2025-07-29 DIAGNOSIS — M54.41 CHRONIC BILATERAL LOW BACK PAIN WITH BILATERAL SCIATICA: Primary | ICD-10-CM

## 2025-07-29 PROCEDURE — 97110 THERAPEUTIC EXERCISES: CPT | Mod: HCNC,PN

## 2025-07-29 PROCEDURE — 97112 NEUROMUSCULAR REEDUCATION: CPT | Mod: HCNC,PN

## 2025-07-29 NOTE — PROGRESS NOTES
Outpatient Rehab    Physical Therapy Visit    Patient Name: Alex Milner  MRN: [id number]  YOB: 1945  Encounter Date: 7/29/2025    Therapy Diagnosis:   Encounter Diagnosis   Name Primary?    Chronic bilateral low back pain with bilateral sciatica Yes       Physician: Hamida Laureano NP    Physician Orders: Eval and Treat  Medical Diagnosis: Chronic bilateral low back pain with bilateral sciatica      Visit # / Visits Authorized:  7 / 20  Insurance Authorization Period: 7/8/2025 to 9/30/2025  Date of Evaluation: 7/2/2025  Plan of Care Certification: 7/2/2025 to 9/10/2025   Progress note due 30 days from 7/24/25    PT/PTA:     Number of PTA visits since last PT visit:   Time In:   1:03-1:35 with PT only  Time Out:  2:00  pm  Total Time (in minutes):   57 min  Total Billable Time (in minutes):  32 with PT only    FOTO:  Intake Score:  %  Survey Score 2:  %  Survey Score 3:  %    Precautions:     Standard; radiation to pelvis (getting another treatment in a few weeks).      Subjective   pt reports he contineud to have less pain this weekend but is still struggling with our core work outs and his hips fatigue by the end of the session..  Pain reported as 0/10. hips and low back    Objective                    Treatment:  Therapeutic Exercise  TE 2: piriformis stretch R LE 10x 10 sec seated  TE 3: shuttle 5 bands 3 min  Balance/Neuromuscular Re-Education  NMR 4: leaning on elbows on plinth hip extension 2x 10 2#; side lying hip adduction with 3 sec hold 3x 10  NMR 5: trunk extension machine 0-55 degrees 50 # 1x 10 then 88 # 1x 20  NMR 6: table top hold 3 sec x 5 reps with ball squeeze ; side lying ball squeeze with hip internal rotation 4 # 2x 15 per side (increase load on next if tolerated)  NMR 10: 1/2 kneeling PPT 2x 10 ; 1/2 kneeling PPT with shoulder flexion 2x 10 per leg with 4# ball ; 1/2 kneeling PPT chops with 10# ball 2x 10    Time Entry(in minutes):  Neuromuscular Re-Education Time Entry:  23  Therapeutic Exercise Time Entry: 9    Assessment & Plan   Assessment: PT continued to advance hip control and core support. Pt tends to sit and walk in a lot of rib flaring toward ER and loaded lumbar extension with poor core engagement. Pt has a weak TA and fascia weakness needing cues to engage or a 3-4 inch breech is noted in the mid abdomen. PT uses pt hands on his abdomin to help work on proprioceptive training for this area with core therex when able.  Evaluation/Treatment Tolerance: Patient limited by fatigue    The patient will continue to benefit from skilled outpatient physical therapy in order to address the deficits listed in the problem list on the initial evaluation, provide patient and family education, and maximize the patients level of independence in the home and community environments.     The patient's spiritual, cultural, and educational needs were considered, and the patient is agreeable to the plan of care and goals.           Plan: PT to advance as tolerated increasing core load.    Goals:   Active       goals       I with HEP (Met)       Start:  07/02/25    Expected End:  07/30/25    Resolved:  07/22/25         hip abduction 4+/5 or better (Progressing)       Start:  07/02/25    Expected End:  09/10/25            hip flexion 5/5  (Progressing)       Start:  07/02/25    Expected End:  09/10/25            dead lift up to 40 # for home tasks (Progressing)       Start:  07/02/25    Expected End:  09/10/25            core strength 2+/5 or better (Progressing)       Start:  07/02/25    Expected End:  09/10/25                Diana Jarvis, LORI

## 2025-07-31 ENCOUNTER — CLINICAL SUPPORT (OUTPATIENT)
Dept: REHABILITATION | Facility: HOSPITAL | Age: 80
End: 2025-07-31
Payer: MEDICARE

## 2025-07-31 DIAGNOSIS — M54.42 CHRONIC BILATERAL LOW BACK PAIN WITH BILATERAL SCIATICA: Primary | ICD-10-CM

## 2025-07-31 DIAGNOSIS — G89.29 CHRONIC BILATERAL LOW BACK PAIN WITH BILATERAL SCIATICA: Primary | ICD-10-CM

## 2025-07-31 DIAGNOSIS — M54.41 CHRONIC BILATERAL LOW BACK PAIN WITH BILATERAL SCIATICA: Primary | ICD-10-CM

## 2025-07-31 PROCEDURE — 97530 THERAPEUTIC ACTIVITIES: CPT | Mod: HCNC,PN,CQ

## 2025-07-31 PROCEDURE — 97112 NEUROMUSCULAR REEDUCATION: CPT | Mod: HCNC,PN,CQ

## 2025-07-31 PROCEDURE — 97110 THERAPEUTIC EXERCISES: CPT | Mod: HCNC,PN,CQ

## 2025-07-31 NOTE — PROGRESS NOTES
Outpatient Rehab    Physical Therapy Visit    Patient Name: Alex Milner  MRN: [id number]  YOB: 1945  Encounter Date: 7/31/2025    Therapy Diagnosis:   Encounter Diagnosis   Name Primary?    Chronic bilateral low back pain with bilateral sciatica Yes         Physician: Hamida Laureano NP    Physician Orders: Eval and Treat  Medical Diagnosis: Chronic bilateral low back pain with bilateral sciatica      Visit # / Visits Authorized:  8 / 20  Insurance Authorization Period: 7/8/2025 to 9/30/2025  Date of Evaluation: 7/2/2025  Plan of Care Certification: 7/2/2025 to 9/10/2025   Progress note due 30 days from 7/24/25    PT/PTA: PTA   Number of PTA visits since last PT visit:1  Time In:   1:25  Time Out:  2:20 pm  Total Time (in minutes):   55 min  Total Billable Time (in minutes):  55    FOTO:  Intake Score: 55%  Survey Score 2:  %  Survey Score 3:  %    Precautions:     Standard; radiation to pelvis (getting another treatment in a few weeks).      Subjective   Patient reports his low back hurts if he overworks it but he is doing much better..  Pain reported as 0/10. hips and low back    Objective                    Treatment:  Therapeutic Exercise  TE 2: piriformis stretch R LE 10x 10 sec seated  TE 3: shuttle 5 bands 3 min  Balance/Neuromuscular Re-Education  NMR 2: PPT with SLR 2x10 3# (challenging)  NMR 4: leaning on elbows on plinth hip extension 2x 10 3#  NMR 5: trunk extension machine 0-65 degrees 50 # 1x 10 then 90# 1x 20  NMR 6: table top hold 3 sec x 5 reps with ball squeeze ; side lying ball squeeze with hip internal rotation 6 # 2x 15 per side  NMR 7: LAQ 5# 3x 10  NMR 9: side lying clams 2x15 with green theraband  NMR 10: 1/2 kneeling PPT x 10 ; 1/2 kneeling PPT with shoulder flexion 2x 10 per leg with 4# ball ; 1/2 kneeling PPT chops with 10# ball 2x 10  Therapeutic Activity  TA 1: sit to stand 20 with 15 # goblet hold 2x 10  TA 2: unilateral farmer's carry 15# 80 ft x 3  TA 3: dead lift  training for hip hinge to 20 inch box then progress into 15# KB 2 x10      Time Entry(in minutes):  Neuromuscular Re-Education Time Entry: 30  Therapeutic Activity Time Entry: 15  Therapeutic Exercise Time Entry: 10    Assessment & Plan   Assessment: Patient participated in exercises for hip strengthening with notable increased fatigue during SLR with increased resistance today. Core strength improving however limited in endurance in table top position with cues to improve breathing technique. Tolerated increased weight well with glute max and min strengthening performed today.       The patient will continue to benefit from skilled outpatient physical therapy in order to address the deficits listed in the problem list on the initial evaluation, provide patient and family education, and maximize the patients level of independence in the home and community environments.     The patient's spiritual, cultural, and educational needs were considered, and the patient is agreeable to the plan of care and goals.           Plan: PT to advance as tolerated increasing core load.    Goals:   Active       goals       I with HEP (Met)       Start:  07/02/25    Expected End:  07/30/25    Resolved:  07/22/25         hip abduction 4+/5 or better (Progressing)       Start:  07/02/25    Expected End:  09/10/25            hip flexion 5/5  (Progressing)       Start:  07/02/25    Expected End:  09/10/25            dead lift up to 40 # for home tasks (Progressing)       Start:  07/02/25    Expected End:  09/10/25            core strength 2+/5 or better (Progressing)       Start:  07/02/25    Expected End:  09/10/25                Fern Diaz PTA

## 2025-08-07 ENCOUNTER — CLINICAL SUPPORT (OUTPATIENT)
Dept: REHABILITATION | Facility: HOSPITAL | Age: 80
End: 2025-08-07
Payer: MEDICARE

## 2025-08-07 DIAGNOSIS — G89.29 CHRONIC BILATERAL LOW BACK PAIN WITH BILATERAL SCIATICA: Primary | ICD-10-CM

## 2025-08-07 DIAGNOSIS — M54.41 CHRONIC BILATERAL LOW BACK PAIN WITH BILATERAL SCIATICA: Primary | ICD-10-CM

## 2025-08-07 DIAGNOSIS — M54.42 CHRONIC BILATERAL LOW BACK PAIN WITH BILATERAL SCIATICA: Primary | ICD-10-CM

## 2025-08-07 PROCEDURE — 97112 NEUROMUSCULAR REEDUCATION: CPT | Mod: HCNC,PN

## 2025-08-07 NOTE — PROGRESS NOTES
Outpatient Rehab    Physical Therapy Visit    Patient Name: Alex Milner  MRN: [id number]  YOB: 1945  Encounter Date: 8/7/2025    Therapy Diagnosis:   Encounter Diagnosis   Name Primary?    Chronic bilateral low back pain with bilateral sciatica Yes       Physician: Hamida Laureano NP    Physician Orders: Eval and Treat  Medical Diagnosis: Chronic bilateral low back pain with bilateral sciatica      Visit # / Visits Authorized:  9 / 20  Insurance Authorization Period: 7/8/2025 to 9/30/2025  Date of Evaluation: 7/2/2025  Plan of Care Certification: 7/2/2025 to 9/10/2025   Progress note due 30 days from 7/24/25    PT/PTA:     Number of PTA visits since last PT visit:   Time In:   7:30 am-8  PT only  Time Out:  8:25 am  Total Time (in minutes):   55 min  Total Billable Time (in minutes):  30    FOTO:  Intake Score:  %  Survey Score 2:  %  Survey Score 3:  %    Precautions:     Standard; radiation to pelvis (getting another treatment in a few weeks).      Subjective   Pt reports he has been doing well but walking a lot flares it up some still..  Pain reported as 0/10. hips and low back    Objective            Treatment:  Therapeutic Exercise  TE 1: Oral curl 35 # bar 3x 10 (moderate cues for proper use)  TE 2: piriformis stretch R LE 10x 10 sec seated  TE 3: shuttle 5 bands 3 min  TE 6: hooklying ball  squeeze 2x 10 10 sec holds  Balance/Neuromuscular Re-Education  NMR 1: eccentric loaded hip flexion lean back in tall kneeling with PPT 2x 10  NMR 2: PPT with SLR 2x10 3# (challenging)  NMR 6: table top hold 3 sec x 5 repsx 2 sets with ball squeeze ; side lying ball squeeze with hip internal rotation 6 # 2x 15 per side  NMR 8: staggered stand alternate toe touches (modified vicky dead lift) 3x 5 with MAX A for safety; staggered stand 10 # modified vicky dead lift 3x 5 to alternate foot; goblet hold of 35# bar sit to stand from 18 inch box 2x 8  NMR 9: side lying clams 2x15 with green  gauri      Time Entry(in minutes):  Neuromuscular Re-Education Time Entry: 30    Assessment & Plan   Assessment: PT began progressing pt to more hip control work in staggered stance. Pt struggled considerably just to get in to position and was not able to rise up onto the toes on the back leg to shift majority of weight to the front leg as he has poor single leg balance with difficulty controlling the hip.   Evaluation/Treatment Tolerance: Patient limited by fatigue    The patient will continue to benefit from skilled outpatient physical therapy in order to address the deficits listed in the problem list on the initial evaluation, provide patient and family education, and maximize the patients level of independence in the home and community environments.     The patient's spiritual, cultural, and educational needs were considered, and the patient is agreeable to the plan of care and goals.           Plan: PT to advance as tolerated increasing core load.    Goals:   Active       goals       I with HEP (Met)       Start:  07/02/25    Expected End:  07/30/25    Resolved:  07/22/25         hip abduction 4+/5 or better (Progressing)       Start:  07/02/25    Expected End:  09/10/25            hip flexion 5/5  (Progressing)       Start:  07/02/25    Expected End:  09/10/25            dead lift up to 40 # for home tasks (Progressing)       Start:  07/02/25    Expected End:  09/10/25            core strength 2+/5 or better (Progressing)       Start:  07/02/25    Expected End:  09/10/25                Diana Jarvis PT

## 2025-08-13 ENCOUNTER — CLINICAL SUPPORT (OUTPATIENT)
Dept: REHABILITATION | Facility: HOSPITAL | Age: 80
End: 2025-08-13
Payer: MEDICARE

## 2025-08-13 DIAGNOSIS — M54.41 CHRONIC BILATERAL LOW BACK PAIN WITH BILATERAL SCIATICA: Primary | ICD-10-CM

## 2025-08-13 DIAGNOSIS — G89.29 CHRONIC BILATERAL LOW BACK PAIN WITH BILATERAL SCIATICA: Primary | ICD-10-CM

## 2025-08-13 DIAGNOSIS — M54.42 CHRONIC BILATERAL LOW BACK PAIN WITH BILATERAL SCIATICA: Primary | ICD-10-CM

## 2025-08-13 PROCEDURE — 97530 THERAPEUTIC ACTIVITIES: CPT | Mod: HCNC,PN

## 2025-08-13 PROCEDURE — 97112 NEUROMUSCULAR REEDUCATION: CPT | Mod: HCNC,PN

## 2025-08-15 ENCOUNTER — CLINICAL SUPPORT (OUTPATIENT)
Dept: REHABILITATION | Facility: HOSPITAL | Age: 80
End: 2025-08-15
Payer: MEDICARE

## 2025-08-15 DIAGNOSIS — G89.29 CHRONIC BILATERAL LOW BACK PAIN WITH BILATERAL SCIATICA: Primary | ICD-10-CM

## 2025-08-15 DIAGNOSIS — M54.41 CHRONIC BILATERAL LOW BACK PAIN WITH BILATERAL SCIATICA: Primary | ICD-10-CM

## 2025-08-15 DIAGNOSIS — M54.42 CHRONIC BILATERAL LOW BACK PAIN WITH BILATERAL SCIATICA: Primary | ICD-10-CM

## 2025-08-15 PROCEDURE — 97112 NEUROMUSCULAR REEDUCATION: CPT | Mod: HCNC,PN

## 2025-08-15 PROCEDURE — 97530 THERAPEUTIC ACTIVITIES: CPT | Mod: HCNC,PN

## 2025-08-19 ENCOUNTER — CLINICAL SUPPORT (OUTPATIENT)
Dept: REHABILITATION | Facility: HOSPITAL | Age: 80
End: 2025-08-19
Payer: MEDICARE

## 2025-08-19 DIAGNOSIS — M54.41 CHRONIC BILATERAL LOW BACK PAIN WITH BILATERAL SCIATICA: Primary | ICD-10-CM

## 2025-08-19 DIAGNOSIS — M54.42 CHRONIC BILATERAL LOW BACK PAIN WITH BILATERAL SCIATICA: Primary | ICD-10-CM

## 2025-08-19 DIAGNOSIS — G89.29 CHRONIC BILATERAL LOW BACK PAIN WITH BILATERAL SCIATICA: Primary | ICD-10-CM

## 2025-08-19 PROCEDURE — 97112 NEUROMUSCULAR REEDUCATION: CPT | Mod: HCNC,PN

## 2025-08-19 PROCEDURE — 97530 THERAPEUTIC ACTIVITIES: CPT | Mod: HCNC,PN

## 2025-08-22 ENCOUNTER — CLINICAL SUPPORT (OUTPATIENT)
Dept: REHABILITATION | Facility: HOSPITAL | Age: 80
End: 2025-08-22
Payer: MEDICARE

## 2025-08-22 DIAGNOSIS — M54.42 CHRONIC BILATERAL LOW BACK PAIN WITH BILATERAL SCIATICA: Primary | ICD-10-CM

## 2025-08-22 DIAGNOSIS — M54.41 CHRONIC BILATERAL LOW BACK PAIN WITH BILATERAL SCIATICA: Primary | ICD-10-CM

## 2025-08-22 DIAGNOSIS — G89.29 CHRONIC BILATERAL LOW BACK PAIN WITH BILATERAL SCIATICA: Primary | ICD-10-CM

## 2025-08-22 PROCEDURE — 97110 THERAPEUTIC EXERCISES: CPT | Mod: HCNC,PN

## 2025-08-22 PROCEDURE — 97112 NEUROMUSCULAR REEDUCATION: CPT | Mod: HCNC,PN

## 2025-08-22 PROCEDURE — 97530 THERAPEUTIC ACTIVITIES: CPT | Mod: HCNC,PN

## 2025-08-25 RX ORDER — LATANOPROST 50 UG/ML
1 SOLUTION/ DROPS OPHTHALMIC
Qty: 7.5 ML | Refills: 3 | Status: SHIPPED | OUTPATIENT
Start: 2025-08-25

## 2025-08-26 ENCOUNTER — CLINICAL SUPPORT (OUTPATIENT)
Dept: REHABILITATION | Facility: HOSPITAL | Age: 80
End: 2025-08-26
Payer: MEDICARE

## 2025-08-26 DIAGNOSIS — G89.29 CHRONIC BILATERAL LOW BACK PAIN WITH BILATERAL SCIATICA: Primary | ICD-10-CM

## 2025-08-26 DIAGNOSIS — M54.41 CHRONIC BILATERAL LOW BACK PAIN WITH BILATERAL SCIATICA: Primary | ICD-10-CM

## 2025-08-26 DIAGNOSIS — M54.42 CHRONIC BILATERAL LOW BACK PAIN WITH BILATERAL SCIATICA: Primary | ICD-10-CM

## 2025-08-26 PROCEDURE — 97110 THERAPEUTIC EXERCISES: CPT | Mod: HCNC,PN

## 2025-08-26 PROCEDURE — 97112 NEUROMUSCULAR REEDUCATION: CPT | Mod: HCNC,PN

## 2025-08-28 ENCOUNTER — CLINICAL SUPPORT (OUTPATIENT)
Dept: REHABILITATION | Facility: HOSPITAL | Age: 80
End: 2025-08-28
Payer: MEDICARE

## 2025-08-28 DIAGNOSIS — M54.42 CHRONIC BILATERAL LOW BACK PAIN WITH BILATERAL SCIATICA: Primary | ICD-10-CM

## 2025-08-28 DIAGNOSIS — G89.29 CHRONIC BILATERAL LOW BACK PAIN WITH BILATERAL SCIATICA: Primary | ICD-10-CM

## 2025-08-28 DIAGNOSIS — M54.41 CHRONIC BILATERAL LOW BACK PAIN WITH BILATERAL SCIATICA: Primary | ICD-10-CM

## 2025-08-28 PROCEDURE — 97112 NEUROMUSCULAR REEDUCATION: CPT | Mod: HCNC,PN

## 2025-08-28 PROCEDURE — 97110 THERAPEUTIC EXERCISES: CPT | Mod: HCNC,PN

## 2025-09-02 ENCOUNTER — LAB VISIT (OUTPATIENT)
Dept: LAB | Facility: HOSPITAL | Age: 80
End: 2025-09-02
Attending: INTERNAL MEDICINE
Payer: MEDICARE

## 2025-09-02 DIAGNOSIS — I10 PRIMARY HYPERTENSION: ICD-10-CM

## 2025-09-02 DIAGNOSIS — E78.2 MIXED HYPERLIPIDEMIA: ICD-10-CM

## 2025-09-02 LAB
ABSOLUTE EOSINOPHIL (OHS): 0.26 K/UL
ABSOLUTE MONOCYTE (OHS): 0.48 K/UL (ref 0.3–1)
ABSOLUTE NEUTROPHIL COUNT (OHS): 2.27 K/UL (ref 1.8–7.7)
BASOPHILS # BLD AUTO: 0.03 K/UL
BASOPHILS NFR BLD AUTO: 0.8 %
ERYTHROCYTE [DISTWIDTH] IN BLOOD BY AUTOMATED COUNT: 13.8 % (ref 11.5–14.5)
HCT VFR BLD AUTO: 38.3 % (ref 40–54)
HGB BLD-MCNC: 12.1 GM/DL (ref 14–18)
IMM GRANULOCYTES # BLD AUTO: 0.01 K/UL (ref 0–0.04)
IMM GRANULOCYTES NFR BLD AUTO: 0.3 % (ref 0–0.5)
LYMPHOCYTES # BLD AUTO: 0.84 K/UL (ref 1–4.8)
MCH RBC QN AUTO: 27 PG (ref 27–31)
MCHC RBC AUTO-ENTMCNC: 31.6 G/DL (ref 32–36)
MCV RBC AUTO: 86 FL (ref 82–98)
NUCLEATED RBC (/100WBC) (OHS): 0 /100 WBC
PLATELET # BLD AUTO: 238 K/UL (ref 150–450)
PMV BLD AUTO: 10.3 FL (ref 9.2–12.9)
RBC # BLD AUTO: 4.48 M/UL (ref 4.6–6.2)
RELATIVE EOSINOPHIL (OHS): 6.7 %
RELATIVE LYMPHOCYTE (OHS): 21.6 % (ref 18–48)
RELATIVE MONOCYTE (OHS): 12.3 % (ref 4–15)
RELATIVE NEUTROPHIL (OHS): 58.3 % (ref 38–73)
WBC # BLD AUTO: 3.89 K/UL (ref 3.9–12.7)

## 2025-09-02 PROCEDURE — 36415 COLL VENOUS BLD VENIPUNCTURE: CPT | Mod: HCNC,PN

## 2025-09-02 PROCEDURE — 85025 COMPLETE CBC W/AUTO DIFF WBC: CPT | Mod: HCNC
